# Patient Record
Sex: MALE | Race: BLACK OR AFRICAN AMERICAN | NOT HISPANIC OR LATINO | Employment: OTHER | URBAN - METROPOLITAN AREA
[De-identification: names, ages, dates, MRNs, and addresses within clinical notes are randomized per-mention and may not be internally consistent; named-entity substitution may affect disease eponyms.]

---

## 2020-03-08 PROCEDURE — 93005 ELECTROCARDIOGRAM TRACING: CPT

## 2020-03-08 PROCEDURE — 99283 EMERGENCY DEPT VISIT LOW MDM: CPT

## 2020-03-09 ENCOUNTER — HOSPITAL ENCOUNTER (EMERGENCY)
Facility: HOSPITAL | Age: 85
Discharge: HOME | End: 2020-03-09
Attending: EMERGENCY MEDICINE

## 2020-03-09 VITALS
HEART RATE: 106 BPM | TEMPERATURE: 97.9 F | SYSTOLIC BLOOD PRESSURE: 146 MMHG | DIASTOLIC BLOOD PRESSURE: 76 MMHG | OXYGEN SATURATION: 99 % | WEIGHT: 127 LBS | RESPIRATION RATE: 16 BRPM

## 2020-03-09 DIAGNOSIS — M79.601 RIGHT ARM PAIN: Primary | ICD-10-CM

## 2020-03-09 LAB
ATRIAL RATE: 100 BPM
P AXIS: 67 DEGREES
PR INTERVAL: 206 MS
QRS AXIS: -73 DEGREES
QRSD INTERVAL: 96 MS
QT INTERVAL: 340 MS
QTC INTERVAL: 438 MS
T WAVE AXIS: 68 DEGREES
VENTRICULAR RATE: 100 BPM

## 2020-03-09 PROCEDURE — 93010 ELECTROCARDIOGRAM REPORT: CPT | Performed by: INTERNAL MEDICINE

## 2020-03-09 PROCEDURE — 99282 EMERGENCY DEPT VISIT SF MDM: CPT | Performed by: EMERGENCY MEDICINE

## 2020-03-09 RX ORDER — AMLODIPINE BESYLATE 10 MG/1
10 TABLET ORAL DAILY
COMMUNITY
End: 2022-07-04 | Stop reason: SDUPTHER

## 2020-03-09 NOTE — ED PROVIDER NOTES
History  Chief Complaint   Patient presents with    Arm Pain     patient c/o right arm pain starting at shoulder to bicep, started on 3/2, reports is intermittent worsened today, did have chest pain on 3/2 which has subsided since, hurts more with cold water       History provided by:  Patient   used: No      42-year-old man presents emergency department for evaluation of right arm pain  He states this is isolated to his right biceps area  Started on 1 week ago  Intermittent  Denies any current chest pain or shortness of breath  Denies any diaphoresis  Denies any numbness, weakness, tingling  Denies any recent falls or trauma  Did not take any medication for the pain  Currently completely asymptomatic  Denies any exertional component  Prior to Admission Medications   Prescriptions Last Dose Informant Patient Reported? Taking? amLODIPine (NORVASC) 10 mg tablet 3/8/2020 at Unknown time  Yes Yes   Sig: Take 10 mg by mouth daily      Facility-Administered Medications: None       Past Medical History:   Diagnosis Date    Hypertension        History reviewed  No pertinent surgical history  History reviewed  No pertinent family history  I have reviewed and agree with the history as documented  E-Cigarette/Vaping    E-Cigarette Use Never User      E-Cigarette/Vaping Substances     Social History     Tobacco Use    Smoking status: Never Smoker    Smokeless tobacco: Never Used   Substance Use Topics    Alcohol use: Never     Frequency: Never    Drug use: Never       Review of Systems   Constitutional: Negative for activity change, appetite change, chills, fatigue and fever  HENT: Negative for congestion, dental problem, facial swelling, sore throat, tinnitus and trouble swallowing  Eyes: Negative for pain, discharge and itching  Respiratory: Negative for apnea, chest tightness and wheezing  Cardiovascular: Negative for chest pain, palpitations and leg swelling  Gastrointestinal: Negative for abdominal pain and nausea  Genitourinary: Negative for difficulty urinating, dysuria and flank pain  Musculoskeletal: Negative for arthralgias, back pain, gait problem, joint swelling and neck pain  Right arm pain   Skin: Negative for color change, rash and wound  Neurological: Negative for dizziness and facial asymmetry  Psychiatric/Behavioral: Negative for agitation and behavioral problems  The patient is not nervous/anxious  All other systems reviewed and are negative  Physical Exam  Physical Exam   Constitutional: He is oriented to person, place, and time  He appears well-developed and well-nourished  No distress  HENT:   Head: Normocephalic and atraumatic  Right Ear: External ear normal    Left Ear: External ear normal    Eyes: Pupils are equal, round, and reactive to light  EOM are normal  Right eye exhibits no discharge  Left eye exhibits no discharge  Neck: Normal range of motion  Neck supple  No tracheal deviation present  No thyromegaly present  Cardiovascular: Regular rhythm  No murmur heard  Pulmonary/Chest: Effort normal and breath sounds normal    Abdominal: Soft  Bowel sounds are normal  He exhibits no distension  There is no tenderness  Musculoskeletal: Normal range of motion  He exhibits no edema or deformity  Full range of motion  No bony tenderness  Intact, symmetric distal pulses  Bilateral breath sounds  Neurological: He is alert and oriented to person, place, and time  No cranial nerve deficit  He exhibits normal muscle tone  Skin: Skin is warm  Capillary refill takes less than 2 seconds  He is not diaphoretic  Psychiatric: He has a normal mood and affect  His behavior is normal    Nursing note and vitals reviewed        Vital Signs  ED Triage Vitals [03/09/20 0018]   Temperature Pulse Respirations Blood Pressure SpO2   97 9 °F (36 6 °C) (!) 106 16 146/76 99 %      Temp Source Heart Rate Source Patient Position - Orthostatic VS BP Location FiO2 (%)   Tympanic Monitor Sitting Right arm --      Pain Score       5           Vitals:    03/09/20 0018   BP: 146/76   Pulse: (!) 106   Patient Position - Orthostatic VS: Sitting         Visual Acuity      ED Medications  Medications - No data to display    Diagnostic Studies  Results Reviewed     None                 No orders to display              Procedures  Procedures         ED Course                               MDM  Number of Diagnoses or Management Options  Right arm pain: new and does not require workup  Diagnosis management comments: Pain isolated right bicep  No signs of biceps tear  Patient currently completely asymptomatic  No bony tenderness  No emergent labs or imaging indicated  Possible musculoskeletal pain  Return precautions discussed in depth with patient and his family  Specific return precautions for chest pain or shortness of breath, numbness, tingling  Intact distal pulse  Not suspect acute vascular pathology  No signs of DVT  Recommended Tylenol, rice therapy, return if worsens  Risk of Complications, Morbidity, and/or Mortality  Presenting problems: moderate  Diagnostic procedures: low  Management options: moderate    Patient Progress  Patient progress: stable        Disposition  Final diagnoses:   Right arm pain     Time reflects when diagnosis was documented in both MDM as applicable and the Disposition within this note     Time User Action Codes Description Comment    3/9/2020 12:45 AM Lance Padilla [M79 [id number]] Right arm pain       ED Disposition     ED Disposition Condition Date/Time Comment    Discharge Stable Mon Mar 9, 2020 12:45 AM Nayan Boss discharge to home/self care              Follow-up Information     Follow up With Specialties Details Why Contact Info Additional [address] Emergency Department Emergency Medicine Go to  If symptoms worsen (any chest pain, shortness of breath, numbness, weakness) [address] [facility] [address]  [phone] [facility] ED, [place], [place], [address]          Discharge Medication List as of 3/9/2020 12:45 AM      CONTINUE these medications which have NOT CHANGED    Details   amLODIPine (NORVASC) 10 mg tablet Take 10 mg by mouth daily, Historical Med           No discharge procedures on file      PDMP Review     None          ED Provider  Electronically Signed by           Mahendra Oliveira MD  03/09/20 2295

## 2021-03-10 ENCOUNTER — IMMUNIZATIONS (OUTPATIENT)
Dept: FAMILY MEDICINE CLINIC | Facility: HOSPITAL | Age: 86
End: 2021-03-10

## 2021-03-10 DIAGNOSIS — Z23 ENCOUNTER FOR IMMUNIZATION: Primary | ICD-10-CM

## 2021-03-10 PROCEDURE — 0011A SARS-COV-2 / COVID-19 MRNA VACCINE (MODERNA) 100 MCG: CPT

## 2021-03-10 PROCEDURE — 91301 SARS-COV-2 / COVID-19 MRNA VACCINE (MODERNA) 100 MCG: CPT

## 2021-04-09 ENCOUNTER — IMMUNIZATIONS (OUTPATIENT)
Dept: FAMILY MEDICINE CLINIC | Facility: HOSPITAL | Age: 86
End: 2021-04-09

## 2021-04-09 DIAGNOSIS — Z23 ENCOUNTER FOR IMMUNIZATION: Primary | ICD-10-CM

## 2021-04-09 PROCEDURE — 0012A SARS-COV-2 / COVID-19 MRNA VACCINE (MODERNA) 100 MCG: CPT

## 2021-04-09 PROCEDURE — 91301 SARS-COV-2 / COVID-19 MRNA VACCINE (MODERNA) 100 MCG: CPT

## 2022-07-04 ENCOUNTER — APPOINTMENT (EMERGENCY)
Dept: RADIOLOGY | Facility: HOSPITAL | Age: 87
End: 2022-07-04
Payer: COMMERCIAL

## 2022-07-04 ENCOUNTER — HOSPITAL ENCOUNTER (EMERGENCY)
Facility: HOSPITAL | Age: 87
Discharge: HOME | End: 2022-07-04
Attending: EMERGENCY MEDICINE
Payer: COMMERCIAL

## 2022-07-04 VITALS
DIASTOLIC BLOOD PRESSURE: 76 MMHG | OXYGEN SATURATION: 97 % | RESPIRATION RATE: 18 BRPM | HEART RATE: 84 BPM | TEMPERATURE: 97.1 F | SYSTOLIC BLOOD PRESSURE: 125 MMHG

## 2022-07-04 DIAGNOSIS — J43.9 BULLOUS EMPHYSEMA (HCC): ICD-10-CM

## 2022-07-04 DIAGNOSIS — E87.6 HYPOKALEMIA: ICD-10-CM

## 2022-07-04 DIAGNOSIS — I10 HYPERTENSION: Primary | ICD-10-CM

## 2022-07-04 DIAGNOSIS — R77.8 ELEVATED TROPONIN: ICD-10-CM

## 2022-07-04 DIAGNOSIS — R91.1 PULMONARY NODULE: ICD-10-CM

## 2022-07-04 LAB
2HR DELTA HS TROPONIN: 8 NG/L
4HR DELTA HS TROPONIN: 9 NG/L
ALBUMIN SERPL BCP-MCNC: 3.9 G/DL (ref 3.5–5)
ALP SERPL-CCNC: 64 U/L (ref 46–116)
ALT SERPL W P-5'-P-CCNC: 19 U/L (ref 12–78)
ANION GAP SERPL CALCULATED.3IONS-SCNC: 10 MMOL/L (ref 4–13)
AST SERPL W P-5'-P-CCNC: 19 U/L (ref 5–45)
BACTERIA UR QL AUTO: NORMAL /HPF
BASOPHILS # BLD AUTO: 0.03 THOUSANDS/ΜL (ref 0–0.1)
BASOPHILS NFR BLD AUTO: 0 % (ref 0–1)
BILIRUB SERPL-MCNC: 0.71 MG/DL (ref 0.2–1)
BILIRUB UR QL STRIP: NEGATIVE
BUN SERPL-MCNC: 13 MG/DL (ref 5–25)
CALCIUM SERPL-MCNC: 8.8 MG/DL (ref 8.3–10.1)
CARDIAC TROPONIN I PNL SERPL HS: 13 NG/L
CARDIAC TROPONIN I PNL SERPL HS: 14 NG/L
CARDIAC TROPONIN I PNL SERPL HS: 5 NG/L
CHLORIDE SERPL-SCNC: 106 MMOL/L (ref 100–108)
CLARITY UR: CLEAR
CO2 SERPL-SCNC: 28 MMOL/L (ref 21–32)
COLOR UR: COLORLESS
CREAT SERPL-MCNC: 1.1 MG/DL (ref 0.6–1.3)
EOSINOPHIL # BLD AUTO: 0.1 THOUSAND/ΜL (ref 0–0.61)
EOSINOPHIL NFR BLD AUTO: 1 % (ref 0–6)
ERYTHROCYTE [DISTWIDTH] IN BLOOD BY AUTOMATED COUNT: 12.9 % (ref 11.6–15.1)
GFR SERPL CREATININE-BSD FRML MDRD: 60 ML/MIN/1.73SQ M
GLUCOSE SERPL-MCNC: 102 MG/DL (ref 65–140)
GLUCOSE UR STRIP-MCNC: NEGATIVE MG/DL
HCT VFR BLD AUTO: 44.2 % (ref 36.5–49.3)
HGB BLD-MCNC: 14.5 G/DL (ref 12–17)
HGB UR QL STRIP.AUTO: NEGATIVE
IMM GRANULOCYTES # BLD AUTO: 0.02 THOUSAND/UL (ref 0–0.2)
IMM GRANULOCYTES NFR BLD AUTO: 0 % (ref 0–2)
KETONES UR STRIP-MCNC: NEGATIVE MG/DL
LEUKOCYTE ESTERASE UR QL STRIP: ABNORMAL
LYMPHOCYTES # BLD AUTO: 1.15 THOUSANDS/ΜL (ref 0.6–4.47)
LYMPHOCYTES NFR BLD AUTO: 16 % (ref 14–44)
MCH RBC QN AUTO: 30.3 PG (ref 26.8–34.3)
MCHC RBC AUTO-ENTMCNC: 32.8 G/DL (ref 31.4–37.4)
MCV RBC AUTO: 93 FL (ref 82–98)
MONOCYTES # BLD AUTO: 0.61 THOUSAND/ΜL (ref 0.17–1.22)
MONOCYTES NFR BLD AUTO: 8 % (ref 4–12)
NEUTROPHILS # BLD AUTO: 5.47 THOUSANDS/ΜL (ref 1.85–7.62)
NEUTS SEG NFR BLD AUTO: 75 % (ref 43–75)
NITRITE UR QL STRIP: NEGATIVE
NON-SQ EPI CELLS URNS QL MICRO: NORMAL /HPF
NRBC BLD AUTO-RTO: 0 /100 WBCS
PH UR STRIP.AUTO: 7.5 [PH]
PLATELET # BLD AUTO: 438 THOUSANDS/UL (ref 149–390)
PMV BLD AUTO: 9 FL (ref 8.9–12.7)
POTASSIUM SERPL-SCNC: 3.3 MMOL/L (ref 3.5–5.3)
PROT SERPL-MCNC: 7.5 G/DL (ref 6.4–8.2)
PROT UR STRIP-MCNC: NEGATIVE MG/DL
RBC # BLD AUTO: 4.78 MILLION/UL (ref 3.88–5.62)
RBC #/AREA URNS AUTO: NORMAL /HPF
SODIUM SERPL-SCNC: 144 MMOL/L (ref 136–145)
SP GR UR STRIP.AUTO: 1.01 (ref 1–1.03)
UROBILINOGEN UR QL STRIP.AUTO: 0.2 E.U./DL
WBC # BLD AUTO: 7.38 THOUSAND/UL (ref 4.31–10.16)
WBC #/AREA URNS AUTO: NORMAL /HPF

## 2022-07-04 PROCEDURE — 99285 EMERGENCY DEPT VISIT HI MDM: CPT | Performed by: EMERGENCY MEDICINE

## 2022-07-04 PROCEDURE — 85025 COMPLETE CBC W/AUTO DIFF WBC: CPT | Performed by: EMERGENCY MEDICINE

## 2022-07-04 PROCEDURE — 93005 ELECTROCARDIOGRAM TRACING: CPT

## 2022-07-04 PROCEDURE — 71045 X-RAY EXAM CHEST 1 VIEW: CPT

## 2022-07-04 PROCEDURE — G1004 CDSM NDSC: HCPCS

## 2022-07-04 PROCEDURE — 81001 URINALYSIS AUTO W/SCOPE: CPT | Performed by: EMERGENCY MEDICINE

## 2022-07-04 PROCEDURE — 36415 COLL VENOUS BLD VENIPUNCTURE: CPT | Performed by: EMERGENCY MEDICINE

## 2022-07-04 PROCEDURE — 99284 EMERGENCY DEPT VISIT MOD MDM: CPT

## 2022-07-04 PROCEDURE — 80053 COMPREHEN METABOLIC PANEL: CPT | Performed by: EMERGENCY MEDICINE

## 2022-07-04 PROCEDURE — 84484 ASSAY OF TROPONIN QUANT: CPT | Performed by: EMERGENCY MEDICINE

## 2022-07-04 PROCEDURE — 71250 CT THORAX DX C-: CPT

## 2022-07-04 RX ORDER — AMLODIPINE BESYLATE 10 MG/1
10 TABLET ORAL DAILY
Qty: 30 TABLET | Refills: 0 | Status: SHIPPED | OUTPATIENT
Start: 2022-07-04

## 2022-07-04 RX ORDER — POTASSIUM CHLORIDE 20 MEQ/1
40 TABLET, EXTENDED RELEASE ORAL ONCE
Status: COMPLETED | OUTPATIENT
Start: 2022-07-04 | End: 2022-07-04

## 2022-07-04 RX ADMIN — POTASSIUM CHLORIDE 40 MEQ: 1500 TABLET, EXTENDED RELEASE ORAL at 12:53

## 2022-07-04 NOTE — ED PROVIDER NOTES
History  Chief Complaint   Patient presents with    High Blood Pressure     Pt worried about his blood pressure, has been in the 180's at home  Takes amlodipine,      Patient here with complaint about hypertension, stated his systolic blood pressure was in the 180s today, and was tachycardic to about 111  He denies chest pain, shortness of breath, headache or visual changes  Patient has a history of known hypertension and is compliant with Norvasc  He states that his blood pressure has been somewhat elevated in the past 4 days  Patient checks his blood pressure every morning and every night  History provided by:  Patient   used: No    Hypertension  Severity:  Moderate  Onset quality:  Sudden  Timing:  Constant  Progression:  Unchanged  Chronicity:  New  Relieved by:  Nothing  Worsened by:  Nothing  Ineffective treatments:  None tried  Associated symptoms: no abdominal pain, no chest pain, no fever, no hematuria, no nausea, no palpitations, no shortness of breath and not vomiting        Prior to Admission Medications   Prescriptions Last Dose Informant Patient Reported? Taking? amLODIPine (NORVASC) 10 mg tablet   Yes No   Sig: Take 10 mg by mouth daily   amLODIPine (NORVASC) 10 mg tablet   No Yes   Sig: Take 1 tablet (10 mg total) by mouth daily      Facility-Administered Medications: None       Past Medical History:   Diagnosis Date    Hypertension        History reviewed  No pertinent surgical history  History reviewed  No pertinent family history  I have reviewed and agree with the history as documented  E-Cigarette/Vaping    E-Cigarette Use Never User      E-Cigarette/Vaping Substances     Social History     Tobacco Use    Smoking status: Never Smoker    Smokeless tobacco: Never Used   Vaping Use    Vaping Use: Never used   Substance Use Topics    Alcohol use: Never    Drug use: Never       Review of Systems   Constitutional: Negative for chills and fever  Respiratory: Negative for cough, shortness of breath and wheezing  Cardiovascular: Negative for chest pain and palpitations  Gastrointestinal: Negative for abdominal pain, constipation, diarrhea, nausea and vomiting  Genitourinary: Negative for dysuria, flank pain, hematuria and urgency  Musculoskeletal: Negative for back pain  Skin: Negative for color change and rash  All other systems reviewed and are negative  Physical Exam  Physical Exam  Vitals and nursing note reviewed  Constitutional:       Appearance: He is well-developed  HENT:      Head: Normocephalic and atraumatic  Eyes:      Pupils: Pupils are equal, round, and reactive to light  Cardiovascular:      Rate and Rhythm: Normal rate and regular rhythm  Heart sounds: Normal heart sounds  Pulmonary:      Effort: Pulmonary effort is normal       Breath sounds: Normal breath sounds  Abdominal:      General: Bowel sounds are normal  There is no distension  Palpations: Abdomen is soft  There is no mass  Tenderness: There is no abdominal tenderness  There is no guarding or rebound  Musculoskeletal:      Cervical back: Normal range of motion and neck supple  Skin:     General: Skin is warm and dry  Capillary Refill: Capillary refill takes less than 2 seconds  Neurological:      Mental Status: He is alert and oriented to person, place, and time  Psychiatric:         Behavior: Behavior normal          Thought Content:  Thought content normal          Judgment: Judgment normal          Vital Signs  ED Triage Vitals [07/04/22 0702]   Temperature Pulse Respirations Blood Pressure SpO2   (!) 97 1 °F (36 2 °C) 65 18 154/90 98 %      Temp Source Heart Rate Source Patient Position - Orthostatic VS BP Location FiO2 (%)   Tympanic Monitor Lying Left arm --      Pain Score       --           Vitals:    07/04/22 0845 07/04/22 0900 07/04/22 1254 07/04/22 1359   BP: 129/68 131/76 157/77 125/76   Pulse: 68 78 89 84 Patient Position - Orthostatic VS:   Sitting Lying         Visual Acuity      ED Medications  Medications   potassium chloride (K-DUR,KLOR-CON) CR tablet 40 mEq (40 mEq Oral Given 7/4/22 1253)       Diagnostic Studies  Results Reviewed     Procedure Component Value Units Date/Time    HS Troponin I 4hr [[id number]]  (Normal) Collected: 07/04/22 1146    Lab Status: Final result Specimen: Blood from Arm, Right Updated: 07/04/22 1216     hs TnI 4hr 14 ng/L      Delta 4hr hsTnI 9 ng/L     HS Troponin I 2hr [[id number]]  (Normal) Collected: 07/04/22 0950    Lab Status: Final result Specimen: Blood from Arm, Right Updated: 07/04/22 1019     hs TnI 2hr 13 ng/L      Delta 2hr hsTnI 8 ng/L     Urine Microscopic [[id number]]  (Normal) Collected: 07/04/22 0819    Lab Status: Final result Specimen: Urine, Clean Catch Updated: 07/04/22 0844     RBC, UA None Seen /hpf      WBC, UA 0-1 /hpf      Epithelial Cells Occasional /hpf      Bacteria, UA Occasional /hpf     UA (URINE) with reflex to Scope [[id number]]  (Abnormal) Collected: 07/04/22 0819    Lab Status: Final result Specimen: Urine, Clean Catch Updated: 07/04/22 0824     Color, UA Colorless     Clarity, UA Clear     Specific Gravity, UA 1 010     pH, UA 7 5     Leukocytes, UA Trace     Nitrite, UA Negative     Protein, UA Negative mg/dl      Glucose, UA Negative mg/dl      Ketones, UA Negative mg/dl      Urobilinogen, UA 0 2 E U /dl      Bilirubin, UA Negative     Occult Blood, UA Negative    HS Troponin 0hr (reflex protocol) [[id number]]  (Normal) Collected: 07/04/22 0745    Lab Status: Final result Specimen: Blood from Arm, Left Updated: 07/04/22 0815     hs TnI 0hr 5 ng/L     Comprehensive metabolic panel [[id number]]  (Abnormal) Collected: 07/04/22 0745    Lab Status: Final result Specimen: Blood from Arm, Left Updated: 07/04/22 6259     Sodium 144 mmol/L      Potassium 3 3 mmol/L      Chloride 106 mmol/L      CO2 28 mmol/L      ANION GAP 10 mmol/L      BUN 13 mg/dL Creatinine 1 10 mg/dL      Glucose 102 mg/dL      Calcium 8 8 mg/dL      AST 19 U/L      ALT 19 U/L      Alkaline Phosphatase 64 U/L      Total Protein 7 5 g/dL      Albumin 3 9 g/dL      Total Bilirubin 0 71 mg/dL      eGFR 60 ml/min/1 73sq m     Narrative:      National Kidney Disease Foundation guidelines for Chronic Kidney Disease (CKD):     Stage 1 with normal or high GFR (GFR > 90 mL/min/1 73 square meters)    Stage 2 Mild CKD (GFR = 60-89 mL/min/1 73 square meters)    Stage 3A Moderate CKD (GFR = 45-59 mL/min/1 73 square meters)    Stage 3B Moderate CKD (GFR = 30-44 mL/min/1 73 square meters)    Stage 4 Severe CKD (GFR = 15-29 mL/min/1 73 square meters)    Stage 5 End Stage CKD (GFR <15 mL/min/1 73 square meters)  Note: GFR calculation is accurate only with a steady state creatinine    CBC and differential [[id number]]  (Abnormal) Collected: 07/04/22 0745    Lab Status: Final result Specimen: Blood from Arm, Left Updated: 07/04/22 0751     WBC 7 38 Thousand/uL      RBC 4 78 Million/uL      Hemoglobin 14 5 g/dL      Hematocrit 44 2 %      MCV 93 fL      MCH 30 3 pg      MCHC 32 8 g/dL      RDW 12 9 %      MPV 9 0 fL      Platelets 324 Thousands/uL      nRBC 0 /100 WBCs      Neutrophils Relative 75 %      Immat GRANS % 0 %      Lymphocytes Relative 16 %      Monocytes Relative 8 %      Eosinophils Relative 1 %      Basophils Relative 0 %      Neutrophils Absolute 5 47 Thousands/µL      Immature Grans Absolute 0 02 Thousand/uL      Lymphocytes Absolute 1 15 Thousands/µL      Monocytes Absolute 0 61 Thousand/µL      Eosinophils Absolute 0 10 Thousand/µL      Basophils Absolute 0 03 Thousands/µL                  CT chest without contrast   Final Result by Irene Davidson MD (07/04 1027)         1  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  No focal consolidation  2  2 mm right upper lobe nodule   Based on current Fleischner Society 2017 Guidelines on incidental pulmonary nodule, no routine follow-up is needed if the patient is low risk  If the patient is high risk, optional follow-up chest CT at 12 months can be    considered  3  Additional findings as noted  The study was marked in [facility] for immediate notification  Workstation performed: [id number]         XR chest 1 view portable   Final Result by Noel Olivarez MD (07/04 1224)      Emphysema with no acute disease  Workstation performed: [id number]                    Procedures  ECG 12 Lead Documentation Only    Date/Time: 7/4/2022 8:02 AM  Performed by: Crystal Sher DO  Authorized by: Crystal Sher DO     Indications / Diagnosis:  HTN  ECG reviewed by me, the ED Provider: yes    Patient location:  ED  Previous ECG:     Previous ECG:  Unavailable    Comparison to cardiac monitor: Yes    Interpretation:     Interpretation: non-specific    Rate:     ECG rate:  81bpm    ECG rate assessment: normal    Rhythm:     Rhythm: sinus rhythm    Ectopy:     Ectopy: none    QRS:     QRS axis:  Left  Conduction:     Conduction: normal    ST segments:     ST segments:  Normal  T waves:     T waves: normal               ED Course  ED Course as of 07/04/22 1615   Mon Jul 04, 2022   1051 I reviewed test results with patient and family  Patient opts for 3rd troponin at this time rather than getting admitted  MDM  Number of Diagnoses or Management Options  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  Elevated troponin: new and requires workup  Hypertension: new and requires workup  Hypokalemia: new and requires workup  Pulmonary nodule: new and requires workup  Diagnosis management comments: Patient here with complaint of intermittent episodes of hypertension  He states he is compliant with medication  Labs reviewed  Initial troponin of 5, delta troponin of 8    Patient remains asymptomatic without chest pain or shortness of breath  I offered patient admission, however he prefers to have 3rd troponin done in the ED  I reviewed labs with an EKG with Cardiology on-call and he agreed that patient may be discharged to home with outpatient follow-up as he is asymptomatic at this time  Patient's son is also in the ER he agrees with plan  Patient to follow-up with Cardiology, pulmonology and [place] [address] to establish primary care  Patient return to emergency room for worsening symptoms  Amount and/or Complexity of Data Reviewed  Clinical lab tests: ordered and reviewed  Tests in the radiology section of CPT®: ordered and reviewed    Risk of Complications, Morbidity, and/or Mortality  Presenting problems: high  Diagnostic procedures: high  Management options: high    Patient Progress  Patient progress: improved      Disposition  Final diagnoses:   Hypertension   Hypokalemia   Elevated troponin   Pulmonary nodule   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[address] )     Time reflects when diagnosis was documented in both MDM as applicable and the Disposition within this note     Time User Action Codes Description Comment    7/4/2022  1:44 PM Oyesanmi, [address] [place] Hypertension     7/4/2022  1:45 PM Oyesanmi, Olubusola O Add [E87 6] Hypokalemia     7/4/2022  1:45 PM Marval Yaron O Add [R77 8] Elevated troponin     7/4/2022  1:45 PM Oyesanmi, Olubusola O Add [R91 1] Pulmonary nodule     7/4/2022  1:46 PM Marval Yaron O Add [J43 9] Bullous emphysema [facility])       ED Disposition     ED Disposition   Discharge    Condition   Stable    Date/Time   Mon Jul 4, 2022  1:44 PM    Comment   Darius Barragan discharge to home/self care                 Follow-up Information     Follow up With Specialties Details Why Contact Info Additional Information    [address]  [facility] Schedule an appointment as soon as possible for a visit in 2 days for follow up Sunday [address]  [address] [facility]  [phone] [address] Pulmonology Schedule an appointment as soon as possible for a visit in 2 days for follow up [address] [facility] Pulmonary [address] [place], [place], [address] [facility] Cardiology Schedule an appointment as soon as possible for a visit  for follow up [address] [facility], [place] [address] [facility] [place], [place] [facility] [address] [place] [address], [address] [place], [place], [address] [phone]          Discharge Medication List as of 7/4/2022  1:49 PM      CONTINUE these medications which have CHANGED    Details   amLODIPine (NORVASC) 10 mg tablet Take 1 tablet (10 mg total) by mouth daily, Starting Mon 7/4/2022, Normal             No discharge procedures on file      PDMP Review     None          ED Provider  Electronically Signed by           Marielena Chen DO  07/04/22 8495

## 2022-07-04 NOTE — DISCHARGE INSTRUCTIONS
Return to the ER for further concerns or worsening symptoms  Follow up with your primary care physician, cardiology and pulmonology in 1-2 days

## 2022-07-05 LAB
ATRIAL RATE: 81 BPM
P AXIS: 70 DEGREES
PR INTERVAL: 196 MS
QRS AXIS: -70 DEGREES
QRSD INTERVAL: 106 MS
QT INTERVAL: 384 MS
QTC INTERVAL: 446 MS
T WAVE AXIS: 68 DEGREES
VENTRICULAR RATE: 81 BPM

## 2022-07-05 PROCEDURE — 93010 ELECTROCARDIOGRAM REPORT: CPT | Performed by: INTERNAL MEDICINE

## 2023-03-10 ENCOUNTER — HOSPITAL ENCOUNTER (OUTPATIENT)
Facility: HOSPITAL | Age: 88
Setting detail: OBSERVATION
Discharge: HOME | End: 2023-03-11
Attending: EMERGENCY MEDICINE | Admitting: FAMILY MEDICINE

## 2023-03-10 ENCOUNTER — APPOINTMENT (OUTPATIENT)
Dept: RADIOLOGY | Facility: HOSPITAL | Age: 88
End: 2023-03-10

## 2023-03-10 ENCOUNTER — APPOINTMENT (EMERGENCY)
Dept: RADIOLOGY | Facility: HOSPITAL | Age: 88
End: 2023-03-10

## 2023-03-10 DIAGNOSIS — I10 HYPERTENSION, UNSPECIFIED TYPE: ICD-10-CM

## 2023-03-10 DIAGNOSIS — G45.9 TIA (TRANSIENT ISCHEMIC ATTACK): Primary | ICD-10-CM

## 2023-03-10 DIAGNOSIS — E46 PROTEIN CALORIE MALNUTRITION (HCC): ICD-10-CM

## 2023-03-10 LAB
2HR DELTA HS TROPONIN: 4 NG/L
4HR DELTA HS TROPONIN: 11 NG/L
ALBUMIN SERPL BCP-MCNC: 4 G/DL (ref 3.5–5)
ALP SERPL-CCNC: 63 U/L (ref 34–104)
ALT SERPL W P-5'-P-CCNC: 11 U/L (ref 7–52)
ANION GAP SERPL CALCULATED.3IONS-SCNC: 9 MMOL/L (ref 4–13)
APTT PPP: 31 SECONDS (ref 23–37)
AST SERPL W P-5'-P-CCNC: 17 U/L (ref 13–39)
BASOPHILS # BLD AUTO: 0.02 THOUSANDS/ÂΜL (ref 0–0.1)
BASOPHILS NFR BLD AUTO: 0 % (ref 0–1)
BILIRUB SERPL-MCNC: 0.48 MG/DL (ref 0.2–1)
BILIRUB UR QL STRIP: NEGATIVE
BUN SERPL-MCNC: 11 MG/DL (ref 5–25)
CALCIUM SERPL-MCNC: 9.2 MG/DL (ref 8.4–10.2)
CARDIAC TROPONIN I PNL SERPL HS: 10 NG/L
CARDIAC TROPONIN I PNL SERPL HS: 17 NG/L
CARDIAC TROPONIN I PNL SERPL HS: 6 NG/L
CHLORIDE SERPL-SCNC: 104 MMOL/L (ref 96–108)
CLARITY UR: CLEAR
CO2 SERPL-SCNC: 26 MMOL/L (ref 21–32)
COLOR UR: COLORLESS
CREAT SERPL-MCNC: 0.99 MG/DL (ref 0.6–1.3)
EOSINOPHIL # BLD AUTO: 0.1 THOUSAND/ÂΜL (ref 0–0.61)
EOSINOPHIL NFR BLD AUTO: 1 % (ref 0–6)
ERYTHROCYTE [DISTWIDTH] IN BLOOD BY AUTOMATED COUNT: 13.7 % (ref 11.6–15.1)
FLUAV RNA RESP QL NAA+PROBE: NEGATIVE
FLUBV RNA RESP QL NAA+PROBE: NEGATIVE
GFR SERPL CREATININE-BSD FRML MDRD: 68 ML/MIN/1.73SQ M
GLUCOSE SERPL-MCNC: 126 MG/DL (ref 65–140)
GLUCOSE UR STRIP-MCNC: NEGATIVE MG/DL
HCT VFR BLD AUTO: 44.1 % (ref 36.5–49.3)
HGB BLD-MCNC: 14.4 G/DL (ref 12–17)
HGB UR QL STRIP.AUTO: NEGATIVE
IMM GRANULOCYTES # BLD AUTO: 0.03 THOUSAND/UL (ref 0–0.2)
IMM GRANULOCYTES NFR BLD AUTO: 0 % (ref 0–2)
INR PPP: 1.08 (ref 0.84–1.19)
KETONES UR STRIP-MCNC: NEGATIVE MG/DL
LEUKOCYTE ESTERASE UR QL STRIP: NEGATIVE
LYMPHOCYTES # BLD AUTO: 1.28 THOUSANDS/ÂΜL (ref 0.6–4.47)
LYMPHOCYTES NFR BLD AUTO: 15 % (ref 14–44)
MCH RBC QN AUTO: 29.8 PG (ref 26.8–34.3)
MCHC RBC AUTO-ENTMCNC: 32.7 G/DL (ref 31.4–37.4)
MCV RBC AUTO: 91 FL (ref 82–98)
MONOCYTES # BLD AUTO: 0.55 THOUSAND/ÂΜL (ref 0.17–1.22)
MONOCYTES NFR BLD AUTO: 6 % (ref 4–12)
NEUTROPHILS # BLD AUTO: 6.56 THOUSANDS/ÂΜL (ref 1.85–7.62)
NEUTS SEG NFR BLD AUTO: 78 % (ref 43–75)
NITRITE UR QL STRIP: NEGATIVE
NRBC BLD AUTO-RTO: 0 /100 WBCS
PH UR STRIP.AUTO: 7 [PH]
PLATELET # BLD AUTO: 584 THOUSANDS/UL (ref 149–390)
PMV BLD AUTO: 9 FL (ref 8.9–12.7)
POTASSIUM SERPL-SCNC: 3.4 MMOL/L (ref 3.5–5.3)
PROT SERPL-MCNC: 7.3 G/DL (ref 6.4–8.4)
PROT UR STRIP-MCNC: NEGATIVE MG/DL
PROTHROMBIN TIME: 14.1 SECONDS (ref 11.6–14.5)
RBC # BLD AUTO: 4.84 MILLION/UL (ref 3.88–5.62)
RSV RNA RESP QL NAA+PROBE: NEGATIVE
SARS-COV-2 RNA RESP QL NAA+PROBE: NEGATIVE
SODIUM SERPL-SCNC: 139 MMOL/L (ref 135–147)
SP GR UR STRIP.AUTO: <=1.005 (ref 1–1.03)
UROBILINOGEN UR QL STRIP.AUTO: 0.2 E.U./DL
WBC # BLD AUTO: 8.54 THOUSAND/UL (ref 4.31–10.16)

## 2023-03-10 RX ORDER — ATORVASTATIN CALCIUM 40 MG/1
40 TABLET, FILM COATED ORAL EVERY EVENING
Status: DISCONTINUED | OUTPATIENT
Start: 2023-03-10 | End: 2023-03-11 | Stop reason: HOSPADM

## 2023-03-10 RX ORDER — ENOXAPARIN SODIUM 100 MG/ML
40 INJECTION SUBCUTANEOUS DAILY
Status: DISCONTINUED | OUTPATIENT
Start: 2023-03-10 | End: 2023-03-11 | Stop reason: HOSPADM

## 2023-03-10 RX ORDER — AMLODIPINE BESYLATE 10 MG/1
10 TABLET ORAL DAILY
Status: DISCONTINUED | OUTPATIENT
Start: 2023-03-11 | End: 2023-03-11 | Stop reason: HOSPADM

## 2023-03-10 RX ORDER — ASPIRIN 81 MG/1
81 TABLET, CHEWABLE ORAL DAILY
Status: DISCONTINUED | OUTPATIENT
Start: 2023-03-11 | End: 2023-03-11 | Stop reason: HOSPADM

## 2023-03-10 RX ORDER — LISINOPRIL 10 MG/1
10 TABLET ORAL DAILY
COMMUNITY

## 2023-03-10 RX ORDER — ERGOCALCIFEROL 1.25 MG/1
50000 CAPSULE ORAL WEEKLY
Status: DISCONTINUED | OUTPATIENT
Start: 2023-03-10 | End: 2023-03-11 | Stop reason: HOSPADM

## 2023-03-10 RX ADMIN — ENOXAPARIN SODIUM 40 MG: 40 INJECTION SUBCUTANEOUS at 15:56

## 2023-03-10 RX ADMIN — IOHEXOL 85 ML: 350 INJECTION, SOLUTION INTRAVENOUS at 16:58

## 2023-03-10 RX ADMIN — ERGOCALCIFEROL 50000 UNITS: 1.25 CAPSULE ORAL at 19:12

## 2023-03-10 RX ADMIN — ATORVASTATIN CALCIUM 40 MG: 40 TABLET, FILM COATED ORAL at 18:07

## 2023-03-10 NOTE — ASSESSMENT & PLAN NOTE
Body mass index is 20 56 kg/m²  Patient and wife state that he has had decreased appetite      · Ensure supplements with meals  · Nutrition consult

## 2023-03-10 NOTE — CONSULTS
[address]   Neurology Initial Consult    Olivia Parr is a 81 y o  male  [address] [facility]-*          Information obtained from:   Chief Complaint   Patient presents with   • Dizziness     Pt c/o near syncope Friday and Saturday 3/3-3/4for 2 short and separate events only  No return of symptoms  Pt c/o pain and swelling to leg early in week  Pt denies any complaints at this time         Assessment/Plan:    1  TIA  2  HTN  3  Presyncope   4  HLD    Patient is asymptomatic at this time  Admit under stroke/tia pathway  MRI brain ordered  CTA head and neck ordered  Aspirin 81mg to be continued long term  Lipitor 40mg  TTE w/ shunt  His orthostatics are negative  Lipid panel  A1c  PT/OT  Speech and swallow per tia guidelines  F/u in 10 weeks       Total time of encounter: 70 min   More than 50% of time was spent in counseling and coordination of care of patient  HPI:  Olivia Parr is an 82 yo M with PMH of HTN presents with cc of episode of presyncopal event a week prior  Patient provides history  He says that last Friday he has relatively sudden onset of left leg pain and weakness  No symptoms in left arm or face  He reported associated slurring of speech  He felt as if he was going to pass out  All of his symptoms lasted 10-15 minutes  He didn't seek medical care but later was told he should so decided to come today  He did take aspirin 81mg on Mon, Tues and Wed but normally is not on AP at home  He only takes norvasc and zestril at home for HTN  Past Medical History:   Diagnosis Date   • Hypertension        History reviewed  No pertinent surgical history      No Known Allergies      Current Facility-Administered Medications:   •  [START ON 3/11/2023] amLODIPine (NORVASC) tablet 10 mg, 10 mg, Oral, Daily, Cresencio Jolly MD  •  [START ON 3/11/2023] aspirin chewable tablet 81 mg, 81 mg, Oral, Daily, Cresencio Jolly MD  •  atorvastatin (LIPITOR) tablet 40 mg, 40 mg, Oral, QPM, Yecenia Boss MD  •  enoxaparin (LOVENOX) subcutaneous injection 40 mg, 40 mg, Subcutaneous, Daily, Yecenia Boss MD, 40 mg at 03/10/23 1556  •  ergocalciferol (VITAMIN D2) capsule 50,000 Units, 50,000 Units, Oral, Weekly, David Delgado MD  •  [START ON 3/11/2023] pneumococcal 20-kaveh conj vacc (PREVNAR 20) IM Injection 0 5 mL, 0 5 mL, Intramuscular, Prior to discharge, Taras Oconnor MD    Social History     Socioeconomic History   • Marital status: /Civil Union     Spouse name: Not on file   • Number of children: Not on file   • Years of education: Not on file   • Highest education level: Not on file   Occupational History   • Not on file   Tobacco Use   • Smoking status: Never     Passive exposure: Never   • Smokeless tobacco: Never   Vaping Use   • Vaping Use: Never used   Substance and Sexual Activity   • Alcohol use: Never   • Drug use: Never   • Sexual activity: Not on file   Other Topics Concern   • Not on file   Social History Narrative   • Not on file     Social Determinants of Health     Financial Resource Strain: Not on file   Food Insecurity: Not on file   Transportation Needs: Not on file   Physical Activity: Not on file   Stress: Not on file   Social Connections: Not on file   Intimate Partner Violence: Not on file   Housing Stability: Not on file       History reviewed  No pertinent family history  Review of systems:  Please see HPI for positive symptoms  No fever, no chills, no weight change  Ocular: No drainage, no blurred vision  HEENT:  No sore throat, earache, or congestion  No neck pain  COR:  No chest pain  No palpitations  Lungs:  no sob, wheezing,  GI:  no  nausea, no vomiting, no diarrhea, no constipation, no anorexia  :  No dysuria, frequency, or urgency  No hematuria  Musculoskeletal:  No joint pain or swelling or edema  Skin:  No rash or itching  Psychiatric:  no anxiety, no depression  Endocrine:  No polyuria or polydipsia      Physical examination:  Vitals:    03/10/23 1618   BP: 132/77   Pulse: 87   Resp:    Temp:    SpO2: 98%     NIHSS:    1a Level of Consciousness: 0 = Alert   1b  LOC Questions: 0 = Answers both correctly   1c  LOC Commands: 0 = Obeys both correctly   2  Best Gaze: 0 = Normal   3  Visual: 0 = No visual field loss   4  Facial Palsy: 0=Normal symmetric movement   5a  Motor Right Arm: 0=No drift, limb holds 90 (or 45) degrees for full 10 seconds   5b  Motor Left Arm: 0=No drift, limb holds 90 (or 45) degrees for full 10 seconds   6a  Motor Right Le=No drift, limb holds 90 (or 45) degrees for full 10 seconds   6b  Motor Left Le=No drift, limb holds 90 (or 45) degrees for full 10 seconds   7  Limb Ataxia:  0=Absent   8  Sensory: 0=Normal; no sensory loss   9  Best Language:  0=No aphasia, normal   10  Dysarthria: 0=Normal articulation   11  Extinction and Inattention (formerly Neglect): 0=No abnormality   Total Score: 0                     GENERAL APPEARANCE:  The patient is alert, oriented  He is in no acute distress  HEENT:  Head is normocephalic  The sinuses are otherwise nontender  Pupils are equal and reactive  NECK:  Supple without lymphadenopathy  HEART:  Regular rate and rhythm  LUNGS:  clear to auscultation  No crackles or wheezes are heard  ABDOMEN:  Soft, nontender, nondistended with good bowel sounds heard  EXTREMITIES:  Without cyanosis, clubbing or edema  Mental status: The patient is alert, attentive, and oriented  Speech is clear and fluent, good repetition, comprehension, and naming  he recalls 3/3 objects at 5 minutes  Cranial nerves:  CN II: Visual fields are full to confrontation  Fundoscopic exam is normal with sharp discs and no vascular changes    Pupils are 3 mm and  reactive to light  CN III, IV, VI: At primary gaze, there is no eye deviation  CN V: Facial sensation is intact to pinprick in all 3 divisions bilaterally  Corneal responses are intact    CN VII: Face is symmetric with normal eye closure and smile  CN VIII: Hearing is normal to rubbing fingers  CN IX, X: Palate elevates symmetrically  Phonation is normal   CN XI: Head turning and shoulder shrug are intact  CN XII: Tongue is midline with normal movements and no atrophy  Motor: There is no pronator drift of out-stretched arms  Muscle bulk and tone are normal      Muscle exam  Arm Right Left Leg Right Left   Deltoid 5/5 5/5 Iliopsoas 5/5 5/5   Biceps 5/5 5/5 Quads 5/5 5/5   Triceps 5/5 5/5 Hamstrings 5/5 5/5   Wrist Extension 5/5 5/5 Ankle Dorsi Flexion 5/5 5/5   Wrist Flexion 5/5 5/5 Ankle Plantar Flexion 5/5 5/5   Interossei 5/5 5/5 Ankle Eversion 5/5 5/5   APB 5/5 5/5 Ankle Inversion 5/5 5/5       Reflexes   RJ BJ TJ KJ AJ Plantars Hager's   Right 2+ 2+ 2+ 2+ 2+ Downgoing Not present   Left 2+ 2+ 2+ 2+ 2+ Downgoing Not present     Sensory:  Light touch, pinprick, position sense, and vibration sense are intact in fingers and toes  Coordination:  Rapid alternating movements and fine finger movements are intact  There is no dysmetria on finger-to-nose and heel-knee-shin  There are no abnormal or extraneous movements  Romberg negative  Gait/Stance:  Normal heel/toe walking and tandem gait  Lab Results   Component Value Date    WBC 8 54 03/10/2023    HGB 14 4 03/10/2023    HCT 44 1 03/10/2023    MCV 91 03/10/2023     (H) 03/10/2023     No results found for: HGBA1C  Lab Results   Component Value Date    ALT 11 03/10/2023    AST 17 03/10/2023    ALKPHOS 63 03/10/2023     Lab Results   Component Value Date    CALCIUM 9 2 03/10/2023    K 3 4 (L) 03/10/2023    CO2 26 03/10/2023     03/10/2023    BUN 11 03/10/2023    CREATININE 0 99 03/10/2023         Radiology          Review of reports and notes reveal:    Independent Interpretation of images or specimens:  CT head without contrast    Result Date: 3/10/2023  No acute intracranial abnormality   Workstation performed: [id number]               Thank you for this consult  TYRA Edmonds    [facility]  [address]  [address]

## 2023-03-10 NOTE — PROGRESS NOTES
03/10/23 1612 03/10/23 1614 03/10/23 1618   Vitals   Pulse 75 86 87   Blood Pressure 128/69 133/76 132/77   MAP (mmHg) 89 95 95   BP Location Left arm Left arm Left arm   BP Method Automatic Automatic Automatic   Patient Position - Orthostatic VS Lying - Orthostatic VS Sitting - Orthostatic VS Standing for 3 minutes - Orthostatic VS   Oxygen Therapy   SpO2 97 % 98 % 98 %

## 2023-03-10 NOTE — ASSESSMENT & PLAN NOTE
Patient with weakness, slurred speech and near syncope 1 week ago, patient decided to come in today for evaluation of these symptoms  On evaluation, pupillary reaction was sluggish, but rest of neurologic exam was normal, patient is AAOx3  Patient did not receive ASA and Plavix loading dose  CT showed no acute intracranial abnormality      · Neurology consult  · Consider adding ASA and Plavix pending neurology recommendations  · Start Lipitor 40 mg  · Brain MRI ordered  · Echo ordered  · Troponins WNL  · A1c and lipid panel pending  · PT/OT  · Telemetry  · Orthostatic vital signs  · Monitor vitals, allow for permissive hypertension  · Neuro checks per protocol  · NPO until patient passes dysphagia assessment

## 2023-03-10 NOTE — H&P
History and Physical - [facility]     Patient Information: Dufm Show 81 y o  male MRN: [id number]  Unit/Bed#: [address] Encounter: [id number]  Admitting Physician: Taras Oconnor MD   PCP: No primary care provider on file  Date of Admission:  03/10/23     Assessment and Plan     * TIA (transient ischemic attack)  Assessment & Plan  Patient with weakness, slurred speech and near syncope 1 week ago, patient decided to come in today for evaluation of these symptoms  On evaluation, pupillary reaction was sluggish, but rest of neurologic exam was normal, patient is AAOx3  Patient did not receive ASA and Plavix loading dose  CT showed no acute intracranial abnormality  · Neurology consult  · Consider adding ASA and Plavix pending neurology recommendations  · Start Lipitor 40 mg  · Brain MRI ordered  · Echo ordered  · Troponins WNL  · A1c and lipid panel pending  · PT/OT  · Telemetry  · Orthostatic vital signs  · Monitor vitals, allow for permissive hypertension  · Neuro checks per protocol  · NPO until patient passes dysphagia assessment    Protein calorie malnutrition ([facility] [address] )  Assessment & Plan  Body mass index is 20 56 kg/m²  Patient and wife state that he has had decreased appetite  · Ensure supplements with meals  · Nutrition consult    HTN (hypertension)  Assessment & Plan  BP on admission 159/79  · Home amlodipine 10 mg daily to start tomorrow       Fluids: None  Electrolyte repletion: Replete now and as needed  Nutrition: Diet Regular; Regular House; Sodium 2 GM  VTE Prophylaxis: VTE Score: 3 Moderate Risk (Score 3-4) - Pharmacological DVT Prophylaxis Ordered: enoxaparin (Lovenox)  Code Status: Level 1 - Full Code  Anticipated Length of Stay:  Patient will be admitted on an Observation basis with an anticipated length of stay of less than 2 midnights       Justification for Hospital Stay: Rule out TIA, requires observation with telemetry and neurology evaluation  Total Time for Visit, including Counseling / Coordination of Care: 30 mins  Greater than 50% of this total time spent on direct patient counseling and coordination of care  Patient Information Sharing: With the consent of Theo Melendez, their loved ones were notified today by inpatient team of the patient’s condition and current plan  All questions answered  Chief Complaint:     Chief Complaint   Patient presents with   • Dizziness     Pt c/o near syncope Friday and Saturday 3/3-3/4for 2 short and separate events only  No return of symptoms  Pt c/o pain and swelling to leg early in week  Pt denies any complaints at this time       Subjective      History of Present Illness:     Theo Melendez is a 81 y o  male past medical history of hypertension presenting for evaluation of near syncope and slurred speech  Patient reports onset of symptoms was 1 week ago, they have not recurred since then  Patient was accompanied by his wife, and decided to come for evaluation today due to concerns that his symptoms may return  Other than some decreased appetite, patient had no other acute complaints  Review of Systems:  Review of Systems   Constitutional: Negative for chills and fever  HENT: Negative for sore throat  Eyes: Negative for visual disturbance  Respiratory: Negative for cough and shortness of breath  Cardiovascular: Negative for chest pain and palpitations  Gastrointestinal: Negative for abdominal pain, diarrhea and vomiting  Genitourinary: Negative for dysuria  Musculoskeletal:        Reports mild lower extremity pain bilaterally that comes and goes, not present today   Skin: Negative for rash  Neurological: Positive for speech difficulty (Resolved) and weakness  Negative for dizziness, facial asymmetry, light-headedness and headaches  Past Medical History:   Diagnosis Date   • Hypertension      History reviewed  No pertinent surgical history    No Known Allergies  Prior to Admission Medications   Prescriptions Last Dose Informant Patient Reported? Taking? amLODIPine (NORVASC) 10 mg tablet 3/9/2023  No Yes   Sig: Take 1 tablet (10 mg total) by mouth daily   lisinopril (ZESTRIL) 10 mg tablet 3/9/2023  Yes Yes   Sig: Take 10 mg by mouth daily      Facility-Administered Medications: None     Social History     Socioeconomic History   • Marital status: /Civil Union     Spouse name: Not on file   • Number of children: Not on file   • Years of education: Not on file   • Highest education level: Not on file   Occupational History   • Not on file   Tobacco Use   • Smoking status: Never   • Smokeless tobacco: Never   Vaping Use   • Vaping Use: Never used   Substance and Sexual Activity   • Alcohol use: Never   • Drug use: Never   • Sexual activity: Not on file   Other Topics Concern   • Not on file   Social History Narrative   • Not on file     Social Determinants of Health     Financial Resource Strain: Not on file   Food Insecurity: Not on file   Transportation Needs: Not on file   Physical Activity: Not on file   Stress: Not on file   Social Connections: Not on file   Intimate Partner Violence: Not on file   Housing Stability: Not on file     History reviewed  No pertinent family history  Objective     Physical Exam:   Vitals:   Blood Pressure: 131/62 (03/10/23 1400)  Pulse: 69 (03/10/23 1400)  Temperature: 97 8 °F (36 6 °C) (03/10/23 0804)  Temp Source: Oral (03/10/23 0804)  Respirations: 18 (03/10/23 1330)  Height: 5' 4" (162 6 cm) (03/10/23 0804)  Weight - Scale: 54 3 kg (119 lb 12 8 oz) (03/10/23 0804)  SpO2: 97 % (03/10/23 1400)     Physical Exam  Vitals reviewed  Constitutional:       General: He is not in acute distress  Appearance: Normal appearance  Comments: Thin but not cachectic   HENT:      Head: Normocephalic and atraumatic        Right Ear: Tympanic membrane, ear canal and external ear normal       Left Ear: Tympanic membrane, ear canal and external ear normal       Mouth/Throat:      Mouth: Mucous membranes are moist       Pharynx: Oropharynx is clear  Eyes:      Pupils: Pupils are equal, round, and reactive to light  Comments: Pupillary reaction sluggish bilaterally   Cardiovascular:      Rate and Rhythm: Normal rate and regular rhythm  Heart sounds: Heart sounds are distant  No murmur heard  Pulmonary:      Effort: Pulmonary effort is normal  No respiratory distress  Breath sounds: Normal breath sounds  Abdominal:      General: Bowel sounds are normal       Palpations: Abdomen is soft  Tenderness: There is no abdominal tenderness  Musculoskeletal:      Cervical back: Neck supple  Right lower leg: No edema  Left lower leg: No edema  Skin:     General: Skin is warm  Capillary Refill: Capillary refill takes less than 2 seconds  Findings: No rash  Neurological:      General: No focal deficit present  Mental Status: He is alert  Psychiatric:         Mood and Affect: Mood normal           Lab Results: I have personally reviewed pertinent reports         Results from last 7 days   Lab Units 03/10/23  0832   WBC Thousand/uL 8 54   HEMOGLOBIN g/dL 14 4   HEMATOCRIT % 44 1   PLATELETS Thousands/uL 584*   NEUTROS PCT % 78*   LYMPHS PCT % 15   MONOS PCT % 6   EOS PCT % 1     Results from last 7 days   Lab Units 03/10/23  0832   POTASSIUM mmol/L 3 4*   CHLORIDE mmol/L 104   CO2 mmol/L 26   BUN mg/dL 11   CREATININE mg/dL 0 99   CALCIUM mg/dL 9 2   ALK PHOS U/L 63   ALT U/L 11   AST U/L 17   EGFR ml/min/1 73sq m 68     Results from last 7 days   Lab Units 03/10/23  0832   INR  1 08                  Results from last 7 days   Lab Units 03/10/23  1034   COLOR UA  Colorless   CLARITY UA  Clear   SPEC GRAV UA  <=1 005   PH UA  7 0   LEUKOCYTES UA  Negative   NITRITE UA  Negative   GLUCOSE UA mg/dl Negative   KETONES UA mg/dl Negative   BILIRUBIN UA  Negative   BLOOD UA  Negative           Results from last 7 days   Lab Units 03/10/23  1232 03/10/23  1032 03/10/23  0832   HS TNI 0HR ng/L  --   --  6   HS TNI 2HR ng/L  --  10  --    HS TNI 4HR ng/L 17  --   --              Imaging: I have personally reviewed pertinent reports  CT head without contrast    Result Date: 3/10/2023  No acute intracranial abnormality   Workstation performed: [id number]             Entire H&P was discussed with Dr Odilon Lizama who agreed to what is noted above     ** Please Note: This note has been constructed using a voice recognition system **     Michael Butterfield MD  03/10/23  2:54 PM

## 2023-03-11 ENCOUNTER — APPOINTMENT (OUTPATIENT)
Dept: NON INVASIVE DIAGNOSTICS | Facility: HOSPITAL | Age: 88
End: 2023-03-11

## 2023-03-11 ENCOUNTER — APPOINTMENT (OUTPATIENT)
Dept: RADIOLOGY | Facility: HOSPITAL | Age: 88
End: 2023-03-11

## 2023-03-11 VITALS
RESPIRATION RATE: 16 BRPM | HEART RATE: 87 BPM | BODY MASS INDEX: 20.32 KG/M2 | WEIGHT: 119 LBS | HEIGHT: 64 IN | DIASTOLIC BLOOD PRESSURE: 69 MMHG | TEMPERATURE: 98.3 F | OXYGEN SATURATION: 96 % | SYSTOLIC BLOOD PRESSURE: 132 MMHG

## 2023-03-11 LAB
ALBUMIN SERPL BCP-MCNC: 3.6 G/DL (ref 3.5–5)
ALP SERPL-CCNC: 57 U/L (ref 34–104)
ALT SERPL W P-5'-P-CCNC: 8 U/L (ref 7–52)
ANION GAP SERPL CALCULATED.3IONS-SCNC: 8 MMOL/L (ref 4–13)
AST SERPL W P-5'-P-CCNC: 15 U/L (ref 13–39)
BASOPHILS # BLD AUTO: 0.04 THOUSANDS/ÂΜL (ref 0–0.1)
BASOPHILS NFR BLD AUTO: 0 % (ref 0–1)
BILIRUB SERPL-MCNC: 0.4 MG/DL (ref 0.2–1)
BUN SERPL-MCNC: 19 MG/DL (ref 5–25)
CALCIUM SERPL-MCNC: 9.2 MG/DL (ref 8.4–10.2)
CHLORIDE SERPL-SCNC: 106 MMOL/L (ref 96–108)
CHOLEST SERPL-MCNC: 142 MG/DL
CO2 SERPL-SCNC: 27 MMOL/L (ref 21–32)
CREAT SERPL-MCNC: 0.95 MG/DL (ref 0.6–1.3)
EOSINOPHIL # BLD AUTO: 0.13 THOUSAND/ÂΜL (ref 0–0.61)
EOSINOPHIL NFR BLD AUTO: 1 % (ref 0–6)
ERYTHROCYTE [DISTWIDTH] IN BLOOD BY AUTOMATED COUNT: 13.5 % (ref 11.6–15.1)
EST. AVERAGE GLUCOSE BLD GHB EST-MCNC: 111 MG/DL
GFR SERPL CREATININE-BSD FRML MDRD: 72 ML/MIN/1.73SQ M
GLUCOSE P FAST SERPL-MCNC: 78 MG/DL (ref 65–99)
GLUCOSE SERPL-MCNC: 78 MG/DL (ref 65–140)
HBA1C MFR BLD: 5.5 %
HCT VFR BLD AUTO: 40.7 % (ref 36.5–49.3)
HDLC SERPL-MCNC: 27 MG/DL
HGB BLD-MCNC: 13 G/DL (ref 12–17)
IMM GRANULOCYTES # BLD AUTO: 0.04 THOUSAND/UL (ref 0–0.2)
IMM GRANULOCYTES NFR BLD AUTO: 0 % (ref 0–2)
LDLC SERPL CALC-MCNC: 100 MG/DL (ref 0–100)
LYMPHOCYTES # BLD AUTO: 1.71 THOUSANDS/ÂΜL (ref 0.6–4.47)
LYMPHOCYTES NFR BLD AUTO: 17 % (ref 14–44)
MAGNESIUM SERPL-MCNC: 2.2 MG/DL (ref 1.9–2.7)
MCH RBC QN AUTO: 29.6 PG (ref 26.8–34.3)
MCHC RBC AUTO-ENTMCNC: 31.9 G/DL (ref 31.4–37.4)
MCV RBC AUTO: 93 FL (ref 82–98)
MONOCYTES # BLD AUTO: 0.87 THOUSAND/ÂΜL (ref 0.17–1.22)
MONOCYTES NFR BLD AUTO: 9 % (ref 4–12)
NEUTROPHILS # BLD AUTO: 7.46 THOUSANDS/ÂΜL (ref 1.85–7.62)
NEUTS SEG NFR BLD AUTO: 73 % (ref 43–75)
NRBC BLD AUTO-RTO: 0 /100 WBCS
PLATELET # BLD AUTO: 569 THOUSANDS/UL (ref 149–390)
PMV BLD AUTO: 9.3 FL (ref 8.9–12.7)
POTASSIUM SERPL-SCNC: 4.1 MMOL/L (ref 3.5–5.3)
PROT SERPL-MCNC: 6.7 G/DL (ref 6.4–8.4)
RBC # BLD AUTO: 4.39 MILLION/UL (ref 3.88–5.62)
SODIUM SERPL-SCNC: 141 MMOL/L (ref 135–147)
TRIGL SERPL-MCNC: 75 MG/DL
WBC # BLD AUTO: 10.25 THOUSAND/UL (ref 4.31–10.16)

## 2023-03-11 RX ORDER — ATORVASTATIN CALCIUM 40 MG/1
40 TABLET, FILM COATED ORAL EVERY EVENING
Qty: 30 TABLET | Refills: 2 | Status: SHIPPED | OUTPATIENT
Start: 2023-03-11

## 2023-03-11 RX ORDER — ASPIRIN 81 MG/1
81 TABLET, CHEWABLE ORAL DAILY
Qty: 30 TABLET | Refills: 2 | Status: SHIPPED | OUTPATIENT
Start: 2023-03-12

## 2023-03-11 RX ORDER — ERGOCALCIFEROL 1.25 MG/1
50000 CAPSULE ORAL WEEKLY
Qty: 4 CAPSULE | Refills: 0 | Status: SHIPPED | OUTPATIENT
Start: 2023-03-17 | End: 2023-04-08

## 2023-03-11 RX ADMIN — ASPIRIN 81 MG CHEWABLE TABLET 81 MG: 81 TABLET CHEWABLE at 11:10

## 2023-03-11 RX ADMIN — AMLODIPINE BESYLATE 10 MG: 10 TABLET ORAL at 11:10

## 2023-03-11 RX ADMIN — ENOXAPARIN SODIUM 40 MG: 40 INJECTION SUBCUTANEOUS at 11:10

## 2023-03-11 NOTE — PLAN OF CARE
Problem: NEUROSENSORY - ADULT  Goal: Achieves stable or improved neurological status  Description: INTERVENTIONS  - Monitor and report changes in neurological status  - Monitor vital signs such as temperature, blood pressure, glucose, and any other labs ordered   - Initiate measures to prevent increased intracranial pressure  - Monitor for seizure activity and implement precautions if appropriate      Outcome: Progressing  Goal: Remains free of injury related to seizures activity  Description: INTERVENTIONS  - Maintain airway, patient safety  and administer oxygen as ordered  - Monitor patient for seizure activity, document and report duration and description of seizure to physician/advanced practitioner  - If seizure occurs,  ensure patient safety during seizure  - Reorient patient post seizure  - Seizure pads on all 4 side rails  - Instruct patient/family to notify RN of any seizure activity including if an aura is experienced  - Instruct patient/family to call for assistance with activity based on nursing assessment  - Administer anti-seizure medications if ordered    Outcome: Progressing  Goal: Achieves maximal functionality and self care  Description: INTERVENTIONS  - Monitor swallowing and airway patency with patient fatigue and changes in neurological status  - Encourage and assist patient to increase activity and self care     - Encourage visually impaired, hearing impaired and aphasic patients to use assistive/communication devices  Outcome: Progressing     Problem: CARDIOVASCULAR - ADULT  Goal: Maintains optimal cardiac output and hemodynamic stability  Description: INTERVENTIONS:  - Monitor I/O, vital signs and rhythm  - Monitor for S/S and trends of decreased cardiac output  - Administer and titrate ordered vasoactive medications to optimize hemodynamic stability  - Assess quality of pulses, skin color and temperature  - Assess for signs of decreased coronary artery perfusion  - Instruct patient to report change in severity of symptoms  Outcome: Progressing  Goal: Absence of cardiac dysrhythmias or at baseline rhythm  Description: INTERVENTIONS:  - Continuous cardiac monitoring, vital signs, obtain 12 lead EKG if ordered  - Administer antiarrhythmic and heart rate control medications as ordered  - Monitor electrolytes and administer replacement therapy as ordered  Outcome: Progressing     Problem: Nutrition/Hydration-ADULT  Goal: Nutrient/Hydration intake appropriate for improving, restoring or maintaining nutritional needs  Description: Monitor and assess patient's nutrition/hydration status for malnutrition  Collaborate with interdisciplinary team and initiate plan and interventions as ordered  Monitor patient's weight and dietary intake as ordered or per policy  Utilize nutrition screening tool and intervene as necessary  Determine patient's food preferences and provide high-protein, high-caloric foods as appropriate       INTERVENTIONS:  - Monitor oral intake, urinary output, labs, and treatment plans  - Assess nutrition and hydration status and recommend course of action  - Evaluate amount of meals eaten  - Assist patient with eating if necessary   - Allow adequate time for meals  - Recommend/ encourage appropriate diets, oral nutritional supplements, and vitamin/mineral supplements  - Order, calculate, and assess calorie counts as needed  - Recommend, monitor, and adjust tube feedings and TPN/PPN based on assessed needs  - Assess need for intravenous fluids  - Provide specific nutrition/hydration education as appropriate  - Include patient/family/caregiver in decisions related to nutrition  Outcome: Progressing

## 2023-03-11 NOTE — PLAN OF CARE
Problem: PHYSICAL THERAPY ADULT  Goal: Performs mobility at highest level of function for planned discharge setting  See evaluation for individualized goals  Description: Treatment/Interventions: Patient/family training          See flowsheet documentation for full assessment, interventions and recommendations  Note: Prognosis: Good  Problem List:  (none)  Assessment: Patient is an 81y o  year old male seen for Physical Therapy evaluation  Patient admitted with TIA (transient ischemic attack)  Comorbidities affecting patient's physical performance include: htn  Personal factors affecting patient at time of initial evaluation include: stairs to enter home  Prior to admission, patient was independent with functional mobility without assistive device and independent with ADLS  Please find objective findings from Physical Therapy assessment regarding body systems outlined above with impairments and limitations including none  The Barthel Index was used as a functional outcome tool presenting with a score of Barthel Index Score: 100 today indicating no limitations of functional mobility and ADLS  Patient's clinical presentation is currently stable  as seen in patient's presentation of baseline  As demonstrated by objective findings, the assigned level of complexity for this evaluation is low  The patient's AM-PAC Basic Mobility Inpatient Short Form Raw Score is 24  A Raw score of greater than 16 suggests the patient may benefit from discharge to home  PT Discharge Recommendation: No rehabilitation needs    See flowsheet documentation for full assessment

## 2023-03-11 NOTE — UTILIZATION REVIEW
Initial Clinical Review    Admission: Date/Time/Statement:   Admission Orders (From admission, onward)     Ordered        03/10/23 1110  Place in Observation  Once                   Orders Placed This Encounter   Procedures   • Place in Observation     Standing Status:   Standing     Number of Occurrences:   1     Order Specific Question:   Level of Care     Answer:   Med Surg [16]      ED Arrival Information     Expected   -    Arrival   3/10/2023 07:45    Acuity   Less Urgent            Means of arrival   Walk-In    Escorted by   Family Member    Service   Family Medicine    Admission type   Emergency            Arrival complaint   Feeling faint        Chief Complaint   Patient presents with   • Dizziness     Pt c/o near syncope Friday and Saturday 3/3-3/4for 2 short and separate events only  No return of symptoms  Pt c/o pain and swelling to leg early in week  Pt denies any complaints at this time     Initial Presentation  82 y/o male with PMHx HTN on norvasc + zestril, HLD  - presents to [facility] ED on 3/10/23 2nd 10 to 15 minute episode of left leg pain and  weakness with slurred speech and feeling like he was going to pass out  He did not seek medical care at the time but later was told he should  Also reports taking ASA 81mg daily x 3 on Mon, Tues and Wed but normally is not on ASA  In ED -    /79  Exam: alert + fully oriented  No focal deficit present  CT Head shows No acute intracranial pathology  CT Nexk normal   Labs:   Plt Ct 584,000 K+ 3 4  Placed in Observation 3/10/23 at 1110 2nd TIA - VS + Neuro checks, ECHO, Brain MRI, Neurology Consult, start Lipitor, ASA, Lovenox     3/10/23 NEUROLOGY:  Assessment/Plan:   1  TIA  2  HTN  3  Presyncope   4  HLD     Asymptomatic at this time     Admitted  under stroke/tia pathway  MRI brain pending  CTA head and neck ordered  Aspirin 81mg to be continued long term  Lipitor 40mg  TTE w/ shunt  His orthostatics are negative  Lipid panel  A1c  PT/OT  Speech and swallow per tia guidelines  F/u in 10 weeks        3/11/23  MRI BRAIN WITHOUT CONTRAST:   Impression: Normal    ED Triage Vitals [03/10/23 0804]   Temperature Pulse Respirations Blood Pressure SpO2   97 8 °F (36 6 °C) (!) 113 18 159/79 99 %      Temp Source Heart Rate Source Patient Position - Orthostatic VS BP Location FiO2 (%)   Oral Monitor Lying Right arm --     Wt Readings from Last 1 Encounters:   03/11/23 54 kg (119 lb)     Additional Vital Signs:             Pertinent Labs/Diagnostic Test Results:   CTA head and neck w wo contrast   CT Brain: No acute intracranial pathology  Nonemergent findings above  CT angiography:   Neck: Within normal limits  Brain:   Moderate long segment basilar artery stenosis  Evidence of right middle cerebral and left posterior cerebral artery atherosclerosis without focal stenosis  No aneurysm, large vessel occlusion, high-grade stenosis or dissection  CT head without contrast   No acute intracranial abnormality         3/11/23  MRI BRAIN WITHOUT CONTRAST - Impression: Normal     Results from last 7 days   Lab Units 03/10/23  0832   SARS-COV-2  Negative     Results from last 7 days   Lab Units 03/11/23  0448 03/10/23  0832   WBC Thousand/uL 10 25* 8 54   HEMOGLOBIN g/dL 13 0 14 4   HEMATOCRIT % 40 7 44 1   PLATELETS Thousands/uL 569* 584*   NEUTROS ABS Thousands/µL 7 46 6 56     Results from last 7 days   Lab Units 03/11/23  0448 03/10/23  0832   SODIUM mmol/L 141 139   POTASSIUM mmol/L 4 1 3 4*   CHLORIDE mmol/L 106 104   CO2 mmol/L 27 26   ANION GAP mmol/L 8 9   BUN mg/dL 19 11   CREATININE mg/dL 0 95 0 99   EGFR ml/min/1 73sq m 72 68   CALCIUM mg/dL 9 2 9 2   MAGNESIUM mg/dL 2 2  --      Results from last 7 days   Lab Units 03/11/23  0448 03/10/23  0832   AST U/L 15 17   ALT U/L 8 11   ALK PHOS U/L 57 63   TOTAL PROTEIN g/dL 6 7 7 3   ALBUMIN g/dL 3 6 4 0   TOTAL BILIRUBIN mg/dL 0 40 0 48         Results from last 7 days   Lab Units 03/11/23  0448 03/10/23  0832   GLUCOSE RANDOM mg/dL 78 126     Results from last 7 days   Lab Units 03/11/23  0448   HEMOGLOBIN A1C % 5 5   EAG mg/dl 111     Results from last 7 days   Lab Units 03/10/23  1232 03/10/23  1032 03/10/23  0832   HS TNI 0HR ng/L  --   --  6   HS TNI 2HR ng/L  --  10  --    HSTNI D2 ng/L  --  4  --    HS TNI 4HR ng/L 17  --   --    HSTNI D4 ng/L 11  --   --      Results from last 7 days   Lab Units 03/10/23  0832   PROTIME seconds 14 1   INR  1 08   PTT seconds 31     Results from last 7 days   Lab Units 03/10/23  1034   CLARITY UA  Clear   COLOR UA  Colorless   SPEC GRAV UA  <=1 005   PH UA  7 0   GLUCOSE UA mg/dl Negative   KETONES UA mg/dl Negative   BLOOD UA  Negative   PROTEIN UA mg/dl Negative   NITRITE UA  Negative   BILIRUBIN UA  Negative   UROBILINOGEN UA E U /dl 0 2   LEUKOCYTES UA  Negative     Results from last 7 days   Lab Units 03/10/23  8431   INFLUENZA A PCR  Negative   INFLUENZA B PCR  Negative   RSV PCR  Negative     Past Medical History:   Diagnosis Date   • Hypertension      Present on Admission:  • TIA (transient ischemic attack)  • HTN (hypertension)  • Protein calorie malnutrition ([facility] )    Admitting Diagnosis: TIA (transient ischemic attack) [G45 9]  Dizziness [R42]  Protein calorie malnutrition ([place] [address] ) [E46]    Age/Sex: 81 y o  male    Admission Orders:  TELEMETRY  VS + Neuro Checks Q1HR X 4 - Q4HRS  NIH STROKE SCALE Q24hrs  X 2  Incentive Spirometry q1hr while awake  Continuous Pulse Oximetry  UP with assistance  MRI Brain  ECHO  PT + OT Evals   SPEECH Eval Re Dysphagia Assessment  Diet Regular: House;  Sodium 2 GM  Dietary Nutritional Supplements Ensure Plus TID with meals    Scheduled Medications:  amLODIPine, 10 mg, Oral, Daily  aspirin, 81 mg, Oral, Daily  atorvastatin, 40 mg, Oral, QPM  enoxaparin, 40 mg, Subcutaneous, Daily  ergocalciferol, 50,000 Units, Oral, Weekly    Continuous IV Infusions:    PRN Meds:  pneumococcal 20-kaveh conj vacc, 0 5 mL, Intramuscular, Prior to discharge    IP CONSULT TO NEUROLOGY  IP CONSULT TO NUTRITION SERVICES    Network Utilization Review Department  ATTENTION: Please call with any questions or concerns to [phone] and carefully listen to the prompts so that you are directed to the right person  All voicemails are confidential   Mitul Chairez all requests for admission clinical reviews, approved or denied determinations and any other requests to dedicated fax number below belonging to the campus where the patient is receiving treatment   List of dedicated fax numbers for the Facilities:  [address] DENIALS (Administrative/Medical Necessity) [phone]   [address] (Maternity/NICU/Pediatrics) [phone]   9[address] Ace[address]o [phone]   [place] [address] [phone]   [address] 28 [phone]   1553 [facility] 134 815 [facility] [phone]

## 2023-03-11 NOTE — ED PROVIDER NOTES
History  Chief Complaint   Patient presents with   • Dizziness     Pt c/o near syncope Friday and Saturday 3/3-3/4for 2 short and separate events only  No return of symptoms  Pt c/o pain and swelling to leg early in week  Pt denies any complaints at this time     81yoM c/o transient dysarthria and facial droop on Friday, now resolved  Feels faint sometimes  Vague historian  Son convinced him to come to the ED today  Prior to Admission Medications   Prescriptions Last Dose Informant Patient Reported? Taking? amLODIPine (NORVASC) 10 mg tablet 3/9/2023  No Yes   Sig: Take 1 tablet (10 mg total) by mouth daily   lisinopril (ZESTRIL) 10 mg tablet 3/9/2023  Yes Yes   Sig: Take 10 mg by mouth daily      Facility-Administered Medications: None       Past Medical History:   Diagnosis Date   • Hypertension        History reviewed  No pertinent surgical history  History reviewed  No pertinent family history  I have reviewed and agree with the history as documented  E-Cigarette/Vaping   • E-Cigarette Use Never User      E-Cigarette/Vaping Substances   • Nicotine No    • THC No    • CBD No    • Flavoring No    • Other No    • Unknown No      Social History     Tobacco Use   • Smoking status: Never     Passive exposure: Never   • Smokeless tobacco: Never   Vaping Use   • Vaping Use: Never used   Substance Use Topics   • Alcohol use: Never   • Drug use: Never       Review of Systems   Constitutional: Positive for fever  Physical Exam  Physical Exam  Vitals reviewed  Constitutional:       Appearance: He is well-developed  HENT:      Head: Normocephalic and atraumatic  Right Ear: External ear normal       Left Ear: External ear normal       Nose: Nose normal  No rhinorrhea  Mouth/Throat:      Mouth: Mucous membranes are moist    Eyes:      Conjunctiva/sclera: Conjunctivae normal    Cardiovascular:      Rate and Rhythm: Normal rate and regular rhythm     Pulmonary:      Effort: Pulmonary effort is normal       Breath sounds: Normal breath sounds  No wheezing or rales  Abdominal:      Palpations: Abdomen is soft  Tenderness: There is no abdominal tenderness  Musculoskeletal:      Cervical back: Neck supple  Right lower leg: No edema  Left lower leg: No edema  Skin:     General: Skin is warm and dry  Neurological:      Mental Status: He is alert and oriented to person, place, and time  Cranial Nerves: No cranial nerve deficit  Sensory: No sensory deficit  Motor: No weakness        Coordination: Coordination normal    Psychiatric:         Mood and Affect: Mood normal          Vital Signs  ED Triage Vitals [03/10/23 0804]   Temperature Pulse Respirations Blood Pressure SpO2   97 8 °F (36 6 °C) (!) 113 18 159/79 99 %      Temp Source Heart Rate Source Patient Position - Orthostatic VS BP Location FiO2 (%)   Oral Monitor Lying Right arm --      Pain Score       No Pain           Vitals:    03/11/23 0733 03/11/23 0740 03/11/23 1320 03/11/23 1450   BP: 135/77 141/73 135/77 132/69   Pulse: 70 76 94 87   Patient Position - Orthostatic VS:  Lying           Visual Acuity  Visual Acuity    Flowsheet Row Most Recent Value   L Pupil Size (mm) 3   R Pupil Size (mm) 3   L Pupil Shape Round   R Pupil Shape Round          ED Medications  Medications   amLODIPine (NORVASC) tablet 10 mg (10 mg Oral Given 3/11/23 1110)   atorvastatin (LIPITOR) tablet 40 mg (40 mg Oral Given 3/10/23 1807)   aspirin chewable tablet 81 mg (81 mg Oral Given 3/11/23 1110)   enoxaparin (LOVENOX) subcutaneous injection 40 mg (40 mg Subcutaneous Given 3/11/23 1110)   pneumococcal 20-kaveh conj vacc (PREVNAR 20) IM Injection 0 5 mL (0 5 mL Intramuscular Refused 3/11/23 1636)   ergocalciferol (VITAMIN D2) capsule 50,000 Units (50,000 Units Oral Given 3/10/23 1912)   iohexol (OMNIPAQUE) 350 MG/ML injection (SINGLE-DOSE) 85 mL (85 mL Intravenous Given 3/10/23 1658)       Diagnostic Studies  Results Reviewed Procedure Component Value Units Date/Time    Hemoglobin A1c w/EAG Estimation [[id number]] Collected: 03/11/23 0448    Lab Status: Final result Specimen: Blood from Arm, Left Updated: 03/11/23 1158     Hemoglobin A1C 5 5 %       mg/dl     Lipid Panel with Direct LDL reflex [[id number]]  (Abnormal) Collected: 03/11/23 0448    Lab Status: Final result Specimen: Blood from Arm, Left Updated: 03/11/23 0605     Cholesterol 142 mg/dL      Triglycerides 75 mg/dL      HDL, Direct 27 mg/dL      LDL Calculated 100 mg/dL     Comprehensive metabolic panel [[id number]] Collected: 03/11/23 0448    Lab Status: Final result Specimen: Blood from Arm, Left Updated: 03/11/23 5990     Sodium 141 mmol/L      Potassium 4 1 mmol/L      Chloride 106 mmol/L      CO2 27 mmol/L      ANION GAP 8 mmol/L      BUN 19 mg/dL      Creatinine 0 95 mg/dL      Glucose 78 mg/dL      Glucose, Fasting 78 mg/dL      Calcium 9 2 mg/dL      AST 15 U/L      ALT 8 U/L      Alkaline Phosphatase 57 U/L      Total Protein 6 7 g/dL      Albumin 3 6 g/dL      Total Bilirubin 0 40 mg/dL      eGFR 72 ml/min/1 73sq m     Narrative:      [place] guidelines for Chronic Kidney Disease (CKD):   •  Stage 1 with normal or high GFR (GFR > 90 mL/min/1 73 square meters)  •  Stage 2 Mild CKD (GFR = 60-89 mL/min/1 73 square meters)  •  Stage 3A Moderate CKD (GFR = 45-59 mL/min/1 73 square meters)  •  Stage 3B Moderate CKD (GFR = 30-44 mL/min/1 73 square meters)  •  Stage 4 Severe CKD (GFR = 15-29 mL/min/1 73 square meters)  •  Stage 5 End Stage CKD (GFR <15 mL/min/1 73 square meters)  Note: GFR calculation is accurate only with a steady state creatinine    Magnesium [[id number]]  (Normal) Collected: 03/11/23 0448    Lab Status: Final result Specimen: Blood from Arm, Left Updated: 03/11/23 0605     Magnesium 2 2 mg/dL     CBC and differential [[id number]]  (Abnormal) Collected: 03/11/23 0448    Lab Status: Final result Specimen: Blood from Arm, Left Updated: 03/11/23 0523     WBC 10 25 Thousand/uL      RBC 4 39 Million/uL      Hemoglobin 13 0 g/dL      Hematocrit 40 7 %      MCV 93 fL      MCH 29 6 pg      MCHC 31 9 g/dL      RDW 13 5 %      MPV 9 3 fL      Platelets 882 Thousands/uL      nRBC 0 /100 WBCs      Neutrophils Relative 73 %      Immat GRANS % 0 %      Lymphocytes Relative 17 %      Monocytes Relative 9 %      Eosinophils Relative 1 %      Basophils Relative 0 %      Neutrophils Absolute 7 46 Thousands/µL      Immature Grans Absolute 0 04 Thousand/uL      Lymphocytes Absolute 1 71 Thousands/µL      Monocytes Absolute 0 87 Thousand/µL      Eosinophils Absolute 0 13 Thousand/µL      Basophils Absolute 0 04 Thousands/µL     HS Troponin I 4hr [[id number]]  (Normal) Collected: 03/10/23 1232    Lab Status: Final result Specimen: Blood from Arm, Right Updated: 03/10/23 1310     hs TnI 4hr 17 ng/L      Delta 4hr hsTnI 11 ng/L     HS Troponin I 2hr [[id number]]  (Normal) Collected: 03/10/23 1032    Lab Status: Final result Specimen: Blood from Arm, Right Updated: 03/10/23 1113     hs TnI 2hr 10 ng/L      Delta 2hr hsTnI 4 ng/L     UA (URINE) with reflex to Scope [[id number]] Collected: 03/10/23 1034    Lab Status: Final result Specimen: Urine, Clean Catch Updated: 03/10/23 1054     Color, UA Colorless     Clarity, UA Clear     Specific Gravity, UA <=1 005     pH, UA 7 0     Leukocytes, UA Negative     Nitrite, UA Negative     Protein, UA Negative mg/dl      Glucose, UA Negative mg/dl      Ketones, UA Negative mg/dl      Urobilinogen, UA 0 2 E U /dl      Bilirubin, UA Negative     Occult Blood, UA Negative    HS Troponin 0hr (reflex protocol) [[id number]]  (Normal) Collected: 03/10/23 0832    Lab Status: Final result Specimen: Blood from Arm, Right Updated: 03/10/23 0926     hs TnI 0hr 6 ng/L     Comprehensive metabolic panel [[id number]]  (Abnormal) Collected: 03/10/23 0832    Lab Status: Final result Specimen: Blood from Arm, Right Updated: 03/10/23 3895 Sodium 139 mmol/L      Potassium 3 4 mmol/L      Chloride 104 mmol/L      CO2 26 mmol/L      ANION GAP 9 mmol/L      BUN 11 mg/dL      Creatinine 0 99 mg/dL      Glucose 126 mg/dL      Calcium 9 2 mg/dL      AST 17 U/L      ALT 11 U/L      Alkaline Phosphatase 63 U/L      Total Protein 7 3 g/dL      Albumin 4 0 g/dL      Total Bilirubin 0 48 mg/dL      eGFR 68 ml/min/1 73sq m     Narrative:      [facility] guidelines for Chronic Kidney Disease (CKD):   •  Stage 1 with normal or high GFR (GFR > 90 mL/min/1 73 square meters)  •  Stage 2 Mild CKD (GFR = 60-89 mL/min/1 73 square meters)  •  Stage 3A Moderate CKD (GFR = 45-59 mL/min/1 73 square meters)  •  Stage 3B Moderate CKD (GFR = 30-44 mL/min/1 73 square meters)  •  Stage 4 Severe CKD (GFR = 15-29 mL/min/1 73 square meters)  •  Stage 5 End Stage CKD (GFR <15 mL/min/1 73 square meters)  Note: GFR calculation is accurate only with a steady state creatinine    FLU/RSV/COVID - if FLU/RSV clinically relevant [[id number]]  (Normal) Collected: 03/10/23 0832    Lab Status: Final result Specimen: Nares from Nose Updated: 03/10/23 0917     SARS-CoV-2 Negative     INFLUENZA A PCR Negative     INFLUENZA B PCR Negative     RSV PCR Negative    Narrative:      FOR PEDIATRIC PATIENTS - copy/paste COVID Guidelines URL to browser: [email or web address]    SARS-CoV-2 assay is a Nucleic Acid Amplification assay intended for the  qualitative detection of nucleic acid from SARS-CoV-2 in nasopharyngeal  swabs  Results are for the presumptive identification of SARS-CoV-2 RNA  Positive results are indicative of infection with SARS-CoV-2, the virus  causing COVID-19, but do not rule out bacterial infection or co-infection  with other viruses  Laboratories within the [place] and its  territories are required to report all positive results to the appropriate  public health authorities   Negative results do not preclude SARS-CoV-2  infection and should not be used as the sole basis for treatment or other  patient management decisions  Negative results must be combined with  clinical observations, patient history, and epidemiological information  This test has not been FDA cleared or approved  This test has been authorized by FDA under an Emergency Use Authorization  (EUA)  This test is only authorized for the duration of time the  declaration that circumstances exist justifying the authorization of the  emergency use of an in vitro diagnostic tests for detection of SARS-CoV-2  virus and/or diagnosis of COVID-19 infection under section 564(b)(1) of  the Act, 21 U  S C  945KXK-8(F)(7), unless the authorization is terminated  or revoked sooner  The test has been validated but independent review by FDA  and CLIA is pending  Test performed using [employer] GeneXpert: This RT-PCR assay targets N2,  a region unique to SARS-CoV-2  A conserved region in the E-gene was chosen  for pan-Sarbecovirus detection which includes SARS-CoV-2  According to CMS-2020-01-R, this platform meets the definition of high-throughput technology      Protime-INR [[id number]]  (Normal) Collected: 03/10/23 0832    Lab Status: Final result Specimen: Blood from Arm, Right Updated: 03/10/23 0906     Protime 14 1 seconds      INR 1 08    APTT [[id number]]  (Normal) Collected: 03/10/23 0832    Lab Status: Final result Specimen: Blood from Arm, Right Updated: 03/10/23 0906     PTT 31 seconds     CBC and differential [[id number]]  (Abnormal) Collected: 03/10/23 0832    Lab Status: Final result Specimen: Blood from Arm, Right Updated: 03/10/23 0839     WBC 8 54 Thousand/uL      RBC 4 84 Million/uL      Hemoglobin 14 4 g/dL      Hematocrit 44 1 %      MCV 91 fL      MCH 29 8 pg      MCHC 32 7 g/dL      RDW 13 7 %      MPV 9 0 fL      Platelets 755 Thousands/uL      nRBC 0 /100 WBCs      Neutrophils Relative 78 %      Immat GRANS % 0 %      Lymphocytes Relative 15 %      Monocytes Relative 6 %      Eosinophils Relative 1 %      Basophils Relative 0 %      Neutrophils Absolute 6 56 Thousands/µL      Immature Grans Absolute 0 03 Thousand/uL      Lymphocytes Absolute 1 28 Thousands/µL      Monocytes Absolute 0 55 Thousand/µL      Eosinophils Absolute 0 10 Thousand/µL      Basophils Absolute 0 02 Thousands/µL                  MRI brain wo contrast   Final Result by Patricia Cox DO (03/11 1442)      Normal       Workstation performed: [id number]         CTA head and neck w wo contrast   Final Result by Shivani Colvin MD (03/11 0818)      CT Brain: No acute intracranial pathology  Nonemergent findings above  CT angiography:   Neck: Within normal limits  Brain:   Moderate long segment basilar artery stenosis  Evidence of right middle cerebral and left posterior cerebral artery atherosclerosis without focal stenosis  No aneurysm, large vessel occlusion, high-grade stenosis or dissection  Workstation performed: [id number]         CT head without contrast   Final Result by Daniel Hatch MD (03/10 7030)      No acute intracranial abnormality  Workstation performed: [id number]                    Procedures  Procedures         ED Course                                             Medical Decision Making  NIHSS 0  Concerning story for TIA  Placed on tele obs under CFP team for stroke pathway  Amount and/or Complexity of Data Reviewed  Labs: ordered  Radiology: ordered  Risk  Decision regarding hospitalization            Disposition  Final diagnoses:   TIA (transient ischemic attack)     Time reflects when diagnosis was documented in both MDM as applicable and the Disposition within this note     Time User Action Codes Description Comment    3/10/2023 11:09 AM Matthew Aviles Add [G45 9] TIA (transient ischemic attack)     3/10/2023 12:37 PM Vesna Garcia Add [E46] Protein calorie malnutrition ([facility] )     3/11/2023 3:17 PM Juliana Butler Add [I10] Hypertension, unspecified type       ED Disposition     ED Disposition   Admit    Condition   Stable    Date/Time   Fri Mar 10, 2023 11:09 AM    Comment   Case was discussed with Dr Cary Dumont and the patient's admission status was agreed to be Admission Status: observation status to the service of Dr Cary Dumont  Follow-up Information    None         Current Discharge Medication List      START taking these medications    Details   aspirin 81 mg chewable tablet Chew 1 tablet (81 mg total) daily Do not start before March 12, 2023  Qty: 30 tablet, Refills: 2    Associated Diagnoses: TIA (transient ischemic attack); Hypertension, unspecified type      atorvastatin (LIPITOR) 40 mg tablet Take 1 tablet (40 mg total) by mouth every evening  Qty: 30 tablet, Refills: 2    Associated Diagnoses: TIA (transient ischemic attack); Hypertension, unspecified type      ergocalciferol (VITAMIN D2) 50,000 units Take 1 capsule (50,000 Units total) by mouth once a week for 4 doses Do not start before March 17, 2023    Qty: 4 capsule, Refills: 0    Associated Diagnoses: Protein calorie malnutrition ([facility] )         CONTINUE these medications which have NOT CHANGED    Details   amLODIPine (NORVASC) 10 mg tablet Take 1 tablet (10 mg total) by mouth daily  Qty: 30 tablet, Refills: 0    Associated Diagnoses: Hypertension      lisinopril (ZESTRIL) 10 mg tablet Take 10 mg by mouth daily                 PDMP Review     None          ED Provider  Electronically Signed by           Ruthy Jenkins DO  03/11/23 6466

## 2023-03-11 NOTE — SPEECH THERAPY NOTE
Consult received  Records reviewed  Pt admitted c symptoms concerning for CVA/TIA  Pt passed RN Dysphagia Assessment  Communication deficits denied and none noted this am (pt alert, oriented, able to comprehend all ?'s/commands, expressed needs with ease, read menu etc)  MRI results pending  No additional inpatient Speech Pathology evaluation appears indicated at this time  Please re-consult if additional concerns arise  Thank you

## 2023-03-11 NOTE — DISCHARGE INSTR - AVS FIRST PAGE
-Start taking lipitor 40 mg daily  -Start taking aspirin 81 mg daily  -Follow up with neurology in 10 weeks  -Monitor BP

## 2023-03-11 NOTE — PHYSICAL THERAPY NOTE
PHYSICAL THERAPY EVALUATION/TREATMENT    Pt is at his baseline level of function  No skilled PT or OT needs noted at this time  03/11/23 1001   Note Type   Note type Evaluation   Pain Assessment   Pain Assessment Tool Spear-Baker FACES   Pain Score No Pain   Spear-Baker FACES Pain Rating 2   Pain Location/Orientation Orientation: Left  (posterior thigh intermittently)   Home Living   Home Layout One level  (full flight to enter)   Prior Function   Level of [place] Independent with ADLs; Independent with functional mobility   Lives With Spouse   Falls in the last 6 months 0   General   Additional Pertinent History Pt admitted with history of near syncope and slurred speech that have resolved  Work up in progress for TIA  MRI brain pending  Cognition   Overall Cognitive Status WFL   Orientation Level Oriented X4   Following Commands Follows all commands and directions without difficulty   Subjective   Subjective "I feel good but I'm tired of sitting here"   RUE Assessment   RUE Assessment WNL   LUE Assessment   LUE Assessment WNL   RLE Assessment   RLE Assessment WNL   LLE Assessment   LLE Assessment WNL   Coordination   Movements are Fluid and Coordinated 1   Sensation WFL   Finger to Nose & Finger to Finger  Intact   Heel to Shin Intact   Rapid Alternating Movements Intact   Transfers   Sit to Stand 7  Independent   Stand to Sit 7  Independent   Stand pivot 7  Independent   Ambulation/Elevation   Gait pattern WNL   Gait Assistance 7  Independent   Distance 50 feet   Balance   Static Sitting Good   Dynamic Sitting Good   Static Standing Good   Dynamic Standing Good   Ambulatory Good   Assessment   Prognosis Good   Problem List   (none)   Assessment Patient is an 81y o  year old male seen for Physical Therapy evaluation  Patient admitted with TIA (transient ischemic attack)  Comorbidities affecting patient's physical performance include: htn    Personal factors affecting patient at time of initial evaluation include: stairs to enter home  Prior to admission, patient was independent with functional mobility without assistive device and independent with ADLS  Please find objective findings from Physical Therapy assessment regarding body systems outlined above with impairments and limitations including none  The Barthel Index was used as a functional outcome tool presenting with a score of Barthel Index Score: 100 today indicating no limitations of functional mobility and ADLS  Patient's clinical presentation is currently stable  as seen in patient's presentation of baseline  As demonstrated by objective findings, the assigned level of complexity for this evaluation is low  The patient's AM-PAC Basic Mobility Inpatient Short Form Raw Score is 24  A Raw score of greater than 16 suggests the patient may benefit from discharge to home  Goals   Patient Goals "go home"   STG Expiration Date 03/18/23   Short Term Goal #1 Pt will ambulate ad phill on the unit to maintain current level of function     Plan   Treatment/Interventions Patient/family training   PT Frequency Other (Comment)  (1 time visit only)   Recommendation   PT Discharge Recommendation No rehabilitation needs   AM-PAC Basic Mobility Inpatient   Turning in Flat Bed Without Bedrails 4   Lying on Back to Sitting on Edge of Flat Bed Without Bedrails 4   Moving Bed to Chair 4   Standing Up From Chair Using Arms 4   Walk in Room 4   Climb 3-5 Stairs With Railing 4   Basic Mobility Inpatient Raw Score 24   Basic Mobility Standardized Score 57 68   Highest Level Of Mobility   JH-HLM Goal 8: Walk 250 feet or more   JH-HLM Achieved 8: Walk 250 feet ot more   Modified [place] Scale   Modified [place] Scale 0   Barthel Index   Feeding 10   Bathing 5   Grooming Score 5   Dressing Score 10   Bladder Score 10   Bowels Score 10   Toilet Use Score 10   Transfers (Bed/Chair) Score 15   Mobility (Level Surface) Score 15   Stairs Score 10   Barthel Index Score 100 Additional Treatment Session   Start Time 9070   End Time 1000   Treatment Assessment Pt educated in the importance of early mobility while in the hospital   Pt ambulated 250 feet with good tolerance and steady gait  Pt instructed to ambulate ad phill in the hallway at least 3x per day  Pt and wife verbalized understanding  End of Consult   Patient Position at End of Consult Bedside chair; All needs within reach   [facility] License Number  [facility] [id number]

## 2023-03-11 NOTE — DISCHARGE SUMMARY
[facility] Discharge Summary - Medical Faisal Riggins 81 y o  male MRN: [id number]    [facility] [address]  [place] [address] / Bed: Julio Junior 32-* Encounter: [id number]    BRIEF OVERVIEW  Admitting Provider: Denae Garnett MD  Discharge Provider: Denae Garnett, *    Discharge To: Home      Outpatient Follow-Up: Demario Pay[facility] care, Neurology  Things to address at first follow up visit:     Have you had any symptoms such as slurred speech, left sided weakness, and near syncopal episodes since discharge? Are you taking Lipitor 40 mg daily? Are you taking aspirin 81 mg daily? Have you followed up with neurology? How is your diet? Are you eating 3 meals a day? How has your BP been? Labs and results pending at discharge: none    Admission Date: 3/10/2023     Discharge Date: 3/11/2023    Primary Discharge Diagnosis  Principal Problem:    TIA (transient ischemic attack)  Active Problems:    HTN (hypertension)    Protein calorie malnutrition ([address] )  Resolved Problems:    * No resolved hospital problems  *        Consulting Providers   Neurology        [address] STAY    Procedures Performed/Pertinent Test results  3/10: CBC WNL, K3 4, Trop WNL, UA WNL, A1c 5 5, lipid panel HDL 27 and   MRI- negative  CT head: No acute intracranial abnormality  TTE- EF 71%      HPI  Faisal Riggins is a 81 y o  male past medical history of hypertension presented for evaluation of near syncope and slurred speech  Patient reported onset of symptoms was 1 week ago, they did not recur since then  Patient was accompanied by his wife, and decided to come for evaluation today due to concerns that his symptoms may return  Other than some decreased appetite, patient had no other acute complaints        Hospital Course  Patient was evaluated for CVA  No findings on CT head or CTA head/neck and started on Lipitor and ASA  MRI was negative   Patient did not exhibit any stroke like symptoms such as weakness and slurred speech during admission  PT evaluation showed no needs  Neurology consulted and recommended follow up outpatient in 10 weeks  Discharged on lipitor and aspirin  Recommended nutrition consult and establish care with PCP in this area  Echo-EF 71%    MRI- negative    Nutrition was consulted due to patient's habitus    Physical Exam at Discharge  General appearance: alert and oriented, in no acute distress and thin body habitus  Head: Normocephalic, without obvious abnormality, atraumatic  Lungs: clear to auscultation bilaterally  Heart: regular rate and rhythm, S1, S2 normal, no murmur, click, rub or gallop  Abdomen: soft, non-tender; bowel sounds normal; no masses,  no organomegaly  Neurologic: Grossly normal    Medications   Current Discharge Medication List          Current Discharge Medication List      CONTINUE these medications which have NOT CHANGED    Details   amLODIPine (NORVASC) 10 mg tablet Take 1 tablet (10 mg total) by mouth daily  Qty: 30 tablet, Refills: 0    Associated Diagnoses: Hypertension      lisinopril (ZESTRIL) 10 mg tablet Take 10 mg by mouth daily            Current Discharge Medication List      START taking these medications    Details   aspirin 81 mg chewable tablet Chew 1 tablet (81 mg total) daily Do not start before March 12, 2023  Qty: 30 tablet, Refills: 2    Associated Diagnoses: TIA (transient ischemic attack); Hypertension, unspecified type      atorvastatin (LIPITOR) 40 mg tablet Take 1 tablet (40 mg total) by mouth every evening  Qty: 30 tablet, Refills: 2    Associated Diagnoses: TIA (transient ischemic attack); Hypertension, unspecified type      ergocalciferol (VITAMIN D2) 50,000 units Take 1 capsule (50,000 Units total) by mouth once a week for 4 doses Do not start before March 17, 2023    Qty: 4 capsule, Refills: 0    Associated Diagnoses: Protein calorie malnutrition ([facility] [address] )            Current Discharge Medication List Allergies  No Known Allergies    Diet restrictions: none  Activity restrictions: none  Code Status: Level 1 - Full Code  Advance Directive and Living Will: <no information>  Power of :    POLST:      Discharge Condition: stable      Discharge  Statement   I spent 30 minutes discharging the patient  This time was spent on the day of discharge  I had direct contact with the patient on the day of discharge  Additional documentation is required if more than 30 minutes were spent on discharge

## 2023-03-11 NOTE — PLAN OF CARE
Problem: PHYSICAL THERAPY ADULT  Goal: Performs mobility at highest level of function for planned discharge setting  See evaluation for individualized goals  Description: Treatment/Interventions: Patient/family training          See flowsheet documentation for full assessment, interventions and recommendations  3/11/2023 1119 by Mir Huntley, PT  Outcome: Adequate for Discharge  Note: Prognosis: Good  Problem List:  (none)  Assessment: Patient is an 81y o  year old male seen for Physical Therapy evaluation  Patient admitted with TIA (transient ischemic attack)  Comorbidities affecting patient's physical performance include: htn  Personal factors affecting patient at time of initial evaluation include: stairs to enter home  Prior to admission, patient was independent with functional mobility without assistive device and independent with ADLS  Please find objective findings from Physical Therapy assessment regarding body systems outlined above with impairments and limitations including none  The Barthel Index was used as a functional outcome tool presenting with a score of Barthel Index Score: 100 today indicating no limitations of functional mobility and ADLS  Patient's clinical presentation is currently stable  as seen in patient's presentation of baseline  As demonstrated by objective findings, the assigned level of complexity for this evaluation is low  The patient's -[facility] Basic Mobility Inpatient Short Form Raw Score is 24  A Raw score of greater than 16 suggests the patient may benefit from discharge to home  PT Discharge Recommendation: No rehabilitation needs    See flowsheet documentation for full assessment  3/11/2023 1119 by Mir Huntley PT  Note: Prognosis: Good  Problem List:  (none)  Assessment: Patient is an 81y o  year old male seen for Physical Therapy evaluation  Patient admitted with TIA (transient ischemic attack)    Comorbidities affecting patient's physical performance include: htn  Personal factors affecting patient at time of initial evaluation include: stairs to enter home  Prior to admission, patient was independent with functional mobility without assistive device and independent with ADLS  Please find objective findings from Physical Therapy assessment regarding body systems outlined above with impairments and limitations including none  The Barthel Index was used as a functional outcome tool presenting with a score of Barthel Index Score: 100 today indicating no limitations of functional mobility and ADLS  Patient's clinical presentation is currently stable  as seen in patient's presentation of baseline  As demonstrated by objective findings, the assigned level of complexity for this evaluation is low  The patient's AM-PAC Basic Mobility Inpatient Short Form Raw Score is 24  A Raw score of greater than 16 suggests the patient may benefit from discharge to home  PT Discharge Recommendation: No rehabilitation needs    See flowsheet documentation for full assessment

## 2023-03-12 LAB
AORTIC ROOT: 3 CM
APICAL FOUR CHAMBER EJECTION FRACTION: 71 %
AV LVOT PEAK GRADIENT: 8 MMHG
AV PEAK GRADIENT: 15 MMHG
DOP CALC LVOT AREA: 2.54 CM2
DOP CALC LVOT DIAMETER: 1.8 CM
E WAVE DECELERATION TIME: 227 MS
FRACTIONAL SHORTENING: 46 (ref 28–44)
INTERVENTRICULAR SEPTUM IN DIASTOLE (PARASTERNAL SHORT AXIS VIEW): 1 CM
INTERVENTRICULAR SEPTUM: 1 CM (ref 0.6–1.1)
LAAS-AP2: 15.9 CM2
LAAS-AP4: 11.4 CM2
LEFT ATRIUM SIZE: 3.1 CM
LEFT INTERNAL DIMENSION IN SYSTOLE: 2 CM (ref 2.1–4)
LEFT VENTRICULAR INTERNAL DIMENSION IN DIASTOLE: 3.7 CM (ref 3.5–6)
LEFT VENTRICULAR POSTERIOR WALL IN END DIASTOLE: 1.1 CM
LEFT VENTRICULAR STROKE VOLUME: 43 ML
LVSV (TEICH): 43 ML
MV E'TISSUE VEL-SEP: 7 CM/S
MV PEAK A VEL: 0.67 M/S
MV PEAK E VEL: 64 CM/S
MV STENOSIS PRESSURE HALF TIME: 66 MS
MV VALVE AREA P 1/2 METHOD: 3.33
RIGHT ATRIUM AREA SYSTOLE A4C: 16.5 CM2
RIGHT VENTRICLE ID DIMENSION: 3.1 CM
SL CV LEFT ATRIUM LENGTH A2C: 4.8 CM
SL CV LV EF: 70
SL CV PED ECHO LEFT VENTRICLE DIASTOLIC VOLUME (MOD BIPLANE) 2D: 57 ML
SL CV PED ECHO LEFT VENTRICLE SYSTOLIC VOLUME (MOD BIPLANE) 2D: 14 ML
TR MAX PG: 35 MMHG
TR PEAK VELOCITY: 3.1 M/S
TRICUSPID ANNULAR PLANE SYSTOLIC EXCURSION: 2.9 CM
TRICUSPID VALVE PEAK REGURGITATION VELOCITY: 3.13 M/S

## 2023-03-13 ENCOUNTER — TELEPHONE (OUTPATIENT)
Dept: FAMILY MEDICINE CLINIC | Facility: CLINIC | Age: 88
End: 2023-03-13

## 2023-03-13 LAB
ATRIAL RATE: 96 BPM
P AXIS: 74 DEGREES
PR INTERVAL: 188 MS
QRS AXIS: -78 DEGREES
QRSD INTERVAL: 100 MS
QT INTERVAL: 360 MS
QTC INTERVAL: 454 MS
T WAVE AXIS: 81 DEGREES
VENTRICULAR RATE: 96 BPM

## 2023-03-13 NOTE — TELEPHONE ENCOUNTER
----- Message from Collette Slimmer, MD sent at 3/11/2023  3:45 PM EST -----  Regarding: TCM/new pt  Please schedule new patient/TCM appt for patient within 7-10 days

## 2023-03-13 NOTE — TELEPHONE ENCOUNTER
Patient has new patient appointment set up with [facility]     Called patient and left message to see if he is establishing care there or with [facility]

## 2023-03-21 ENCOUNTER — TELEPHONE (OUTPATIENT)
Dept: NEUROLOGY | Facility: CLINIC | Age: 88
End: 2023-03-21

## 2023-03-21 NOTE — TELEPHONE ENCOUNTER
1ST ATTEMPT - Called and spoke with patient  He stated that he cannot make the appt, and said the person that takes him to his appts would need to be the one to schedule  He said that he has our number on his discharge papers and someone else would call back to schedule  I will f/u next week  HFU/SLW/DIZZINESS      NOTES:   JONA Gutiérrez  Cc: Michael Grewal; Chas Garcia  Patient can follow-up with neurovascular or general neurology AP, resident or attending neurologist in 10 weeks

## 2023-03-27 NOTE — TELEPHONE ENCOUNTER
2nd Attempt,     Spoke to patient he stated he is doing well and he will call back with his son-in Law if needed  I provided our call back number  so when ready he can call and schedule his HFU      Thank you,     Heladio Bell

## 2024-03-13 ENCOUNTER — APPOINTMENT (INPATIENT)
Dept: RADIOLOGY | Facility: HOSPITAL | Age: 89
DRG: 329 | End: 2024-03-13

## 2024-03-13 ENCOUNTER — ANESTHESIA (INPATIENT)
Dept: PERIOP | Facility: HOSPITAL | Age: 89
DRG: 710 | End: 2024-03-13
Payer: COMMERCIAL

## 2024-03-13 ENCOUNTER — APPOINTMENT (EMERGENCY)
Dept: RADIOLOGY | Facility: HOSPITAL | Age: 89
DRG: 710 | End: 2024-03-13
Payer: COMMERCIAL

## 2024-03-13 ENCOUNTER — ANESTHESIA EVENT (INPATIENT)
Dept: PERIOP | Facility: HOSPITAL | Age: 89
DRG: 710 | End: 2024-03-13
Payer: COMMERCIAL

## 2024-03-13 ENCOUNTER — HOSPITAL ENCOUNTER (INPATIENT)
Facility: HOSPITAL | Age: 89
LOS: 1 days | DRG: 710 | End: 2024-03-13
Attending: EMERGENCY MEDICINE | Admitting: SURGERY
Payer: COMMERCIAL

## 2024-03-13 ENCOUNTER — HOSPITAL ENCOUNTER (INPATIENT)
Facility: HOSPITAL | Age: 89
LOS: 13 days | Discharge: HOME | DRG: 329 | End: 2024-03-26
Attending: SURGERY | Admitting: EMERGENCY MEDICINE

## 2024-03-13 VITALS
DIASTOLIC BLOOD PRESSURE: 67 MMHG | BODY MASS INDEX: 20.55 KG/M2 | HEART RATE: 97 BPM | HEIGHT: 64 IN | RESPIRATION RATE: 20 BRPM | SYSTOLIC BLOOD PRESSURE: 126 MMHG | OXYGEN SATURATION: 99 % | TEMPERATURE: 97.4 F | WEIGHT: 120.37 LBS

## 2024-03-13 DIAGNOSIS — I81 DEEP VEIN THROMBOSIS OF PORTAL VEIN: ICD-10-CM

## 2024-03-13 DIAGNOSIS — Z98.890 S/P EXPLORATORY LAPAROTOMY: Primary | ICD-10-CM

## 2024-03-13 DIAGNOSIS — K55.9 SMALL BOWEL ISCHEMIA (HCC): ICD-10-CM

## 2024-03-13 DIAGNOSIS — A41.9 SEPSIS (HCC): Primary | ICD-10-CM

## 2024-03-13 DIAGNOSIS — I10 HTN (HYPERTENSION): ICD-10-CM

## 2024-03-13 DIAGNOSIS — I81 PORTAL VEIN THROMBOSIS: ICD-10-CM

## 2024-03-13 DIAGNOSIS — K55.9 MESENTERIC ISCHEMIA (HCC): ICD-10-CM

## 2024-03-13 PROBLEM — K55.029 ISCHEMIC NECROSIS OF SMALL BOWEL (HCC): Status: RESOLVED | Noted: 2024-03-13 | Resolved: 2024-03-13

## 2024-03-13 PROBLEM — K55.029 ISCHEMIC NECROSIS OF SMALL BOWEL (HCC): Status: ACTIVE | Noted: 2024-03-13

## 2024-03-13 PROBLEM — Z85.46 H/O PROSTATE CANCER: Status: ACTIVE | Noted: 2024-03-13

## 2024-03-13 LAB
2HR DELTA HS TROPONIN: 0 NG/L
ABO GROUP BLD: NORMAL
ALBUMIN SERPL BCP-MCNC: 3.5 G/DL (ref 3.5–5)
ALP SERPL-CCNC: 81 U/L (ref 34–104)
ALT SERPL W P-5'-P-CCNC: 15 U/L (ref 7–52)
ANION GAP SERPL CALCULATED.3IONS-SCNC: 15 MMOL/L (ref 4–13)
ANION GAP SERPL CALCULATED.3IONS-SCNC: 8 MMOL/L (ref 4–13)
APTT PPP: 116 SECONDS (ref 23–37)
APTT PPP: 38 SECONDS (ref 23–37)
APTT PPP: 80 SECONDS (ref 23–37)
AST SERPL W P-5'-P-CCNC: 18 U/L (ref 13–39)
ATRIAL RATE: 115 BPM
BACTERIA UR QL AUTO: NORMAL /HPF
BASE EXCESS BLDA CALC-SCNC: -0.1 MMOL/L
BASE EXCESS BLDA CALC-SCNC: -0.6 MMOL/L
BASE EXCESS BLDA CALC-SCNC: -1.8 MMOL/L
BASE EXCESS BLDA CALC-SCNC: -7 MMOL/L (ref -2–3)
BASOPHILS # BLD MANUAL: 0 THOUSAND/UL (ref 0–0.1)
BASOPHILS NFR MAR MANUAL: 0 % (ref 0–1)
BILIRUB SERPL-MCNC: 0.58 MG/DL (ref 0.2–1)
BILIRUB UR QL STRIP: NEGATIVE
BLD GP AB SCN SERPL QL: NEGATIVE
BLD GP AB SCN SERPL QL: NEGATIVE
BODY TEMPERATURE: 98.4 DEGREES FEHRENHEIT
BUN SERPL-MCNC: 25 MG/DL (ref 5–25)
BUN SERPL-MCNC: 26 MG/DL (ref 5–25)
CA-I BLD-SCNC: 1.02 MMOL/L (ref 1.12–1.32)
CA-I BLD-SCNC: 1.14 MMOL/L (ref 1.12–1.32)
CALCIUM SERPL-MCNC: 7.6 MG/DL (ref 8.4–10.2)
CALCIUM SERPL-MCNC: 9.4 MG/DL (ref 8.4–10.2)
CARDIAC TROPONIN I PNL SERPL HS: 8 NG/L
CARDIAC TROPONIN I PNL SERPL HS: 8 NG/L
CHLORIDE SERPL-SCNC: 106 MMOL/L (ref 96–108)
CHLORIDE SERPL-SCNC: 97 MMOL/L (ref 96–108)
CLARITY UR: CLEAR
CO2 SERPL-SCNC: 21 MMOL/L (ref 21–32)
CO2 SERPL-SCNC: 25 MMOL/L (ref 21–32)
COLOR UR: YELLOW
CREAT SERPL-MCNC: 1.37 MG/DL (ref 0.6–1.3)
CREAT SERPL-MCNC: 1.89 MG/DL (ref 0.6–1.3)
EOSINOPHIL # BLD MANUAL: 0 THOUSAND/UL (ref 0–0.4)
EOSINOPHIL NFR BLD MANUAL: 0 % (ref 0–6)
ERYTHROCYTE [DISTWIDTH] IN BLOOD BY AUTOMATED COUNT: 14.9 % (ref 11.6–15.1)
ERYTHROCYTE [DISTWIDTH] IN BLOOD BY AUTOMATED COUNT: 15.1 % (ref 11.6–15.1)
ERYTHROCYTE [DISTWIDTH] IN BLOOD BY AUTOMATED COUNT: 15.2 % (ref 11.6–15.1)
FLUAV RNA RESP QL NAA+PROBE: NEGATIVE
FLUBV RNA RESP QL NAA+PROBE: NEGATIVE
GFR SERPL CREATININE-BSD FRML MDRD: 31 ML/MIN/1.73SQ M
GFR SERPL CREATININE-BSD FRML MDRD: 46 ML/MIN/1.73SQ M
GLUCOSE SERPL-MCNC: 143 MG/DL (ref 65–140)
GLUCOSE SERPL-MCNC: 158 MG/DL (ref 65–140)
GLUCOSE SERPL-MCNC: 196 MG/DL (ref 65–140)
GLUCOSE UR STRIP-MCNC: NEGATIVE MG/DL
HCO3 BLDA-SCNC: 18.3 MMOL/L (ref 22–28)
HCO3 BLDA-SCNC: 19.8 MMOL/L (ref 22–28)
HCO3 BLDA-SCNC: 21.9 MMOL/L (ref 22–28)
HCO3 BLDA-SCNC: 23.4 MMOL/L (ref 22–28)
HCT VFR BLD AUTO: 37.4 % (ref 36.5–49.3)
HCT VFR BLD AUTO: 43.3 % (ref 36.5–49.3)
HCT VFR BLD AUTO: 47 % (ref 36.5–49.3)
HCT VFR BLD CALC: 36 % (ref 36.5–49.3)
HGB BLD-MCNC: 12.6 G/DL (ref 12–17)
HGB BLD-MCNC: 14.1 G/DL (ref 12–17)
HGB BLD-MCNC: 15.4 G/DL (ref 12–17)
HGB BLDA-MCNC: 12.2 G/DL (ref 12–17)
HGB UR QL STRIP.AUTO: ABNORMAL
HOROWITZ INDEX BLDA+IHG-RTO: 40 MM[HG]
HOROWITZ INDEX BLDA+IHG-RTO: 40 MM[HG]
INR PPP: 1.31 (ref 0.84–1.19)
INR PPP: 1.51 (ref 0.84–1.19)
KETONES UR STRIP-MCNC: NEGATIVE MG/DL
LACTATE SERPL-SCNC: 1 MMOL/L (ref 0.5–2)
LACTATE SERPL-SCNC: 1.8 MMOL/L (ref 0.5–2)
LACTATE SERPL-SCNC: 2.8 MMOL/L (ref 0.5–2)
LEUKOCYTE ESTERASE UR QL STRIP: NEGATIVE
LG PLATELETS BLD QL SMEAR: PRESENT
LIPASE SERPL-CCNC: 38 U/L (ref 11–82)
LYMPHOCYTES # BLD AUTO: 1.15 THOUSAND/UL (ref 0.6–4.47)
LYMPHOCYTES # BLD AUTO: 3 % (ref 14–44)
MAGNESIUM SERPL-MCNC: 1.8 MG/DL (ref 1.9–2.7)
MAGNESIUM SERPL-MCNC: 2.2 MG/DL (ref 1.9–2.7)
MCH RBC QN AUTO: 28.1 PG (ref 26.8–34.3)
MCH RBC QN AUTO: 28.1 PG (ref 26.8–34.3)
MCH RBC QN AUTO: 28.3 PG (ref 26.8–34.3)
MCHC RBC AUTO-ENTMCNC: 32.6 G/DL (ref 31.4–37.4)
MCHC RBC AUTO-ENTMCNC: 32.8 G/DL (ref 31.4–37.4)
MCHC RBC AUTO-ENTMCNC: 33.7 G/DL (ref 31.4–37.4)
MCV RBC AUTO: 84 FL (ref 82–98)
MCV RBC AUTO: 86 FL (ref 82–98)
MCV RBC AUTO: 86 FL (ref 82–98)
METAMYELOCYTES NFR BLD MANUAL: 1 % (ref 0–1)
MONOCYTES # BLD AUTO: 1.15 THOUSAND/UL (ref 0–1.22)
MONOCYTES NFR BLD: 3 % (ref 4–12)
MYELOCYTES NFR BLD MANUAL: 1 % (ref 0–1)
NEUTROPHILS # BLD MANUAL: 35.2 THOUSAND/UL (ref 1.85–7.62)
NEUTS SEG NFR BLD AUTO: 92 % (ref 43–75)
NITRITE UR QL STRIP: NEGATIVE
NON-SQ EPI CELLS URNS QL MICRO: NORMAL /HPF
O2 CT BLDA-SCNC: 15.2 ML/DL (ref 16–23)
O2 CT BLDA-SCNC: 16.7 ML/DL (ref 16–23)
O2 CT BLDA-SCNC: 18.7 ML/DL (ref 16–23)
OXYHGB MFR BLDA: 97.9 % (ref 94–97)
OXYHGB MFR BLDA: 97.9 % (ref 94–97)
OXYHGB MFR BLDA: 98.3 % (ref 94–97)
P AXIS: 67 DEGREES
PCO2 BLD: 19 MMOL/L (ref 21–32)
PCO2 BLD: 35.5 MM HG (ref 36–44)
PCO2 BLDA: 25.5 MM HG (ref 36–44)
PCO2 BLDA: 29.8 MM HG (ref 36–44)
PCO2 BLDA: 33.9 MM HG (ref 36–44)
PCO2 TEMP ADJ BLDA: 33.8 MM HG (ref 36–44)
PEEP RESPIRATORY: 6 CM[H2O]
PEEP RESPIRATORY: 6 CM[H2O]
PH BLD: 7.32 [PH] (ref 7.35–7.45)
PH BLD: 7.46 [PH] (ref 7.35–7.45)
PH BLDA: 7.46 [PH] (ref 7.35–7.45)
PH BLDA: 7.49 [PH] (ref 7.35–7.45)
PH BLDA: 7.51 [PH] (ref 7.35–7.45)
PH UR STRIP.AUTO: 6 [PH]
PHOSPHATE SERPL-MCNC: 3.4 MG/DL (ref 2.3–4.1)
PLATELET # BLD AUTO: 502 THOUSANDS/UL (ref 149–390)
PLATELET # BLD AUTO: 515 THOUSANDS/UL (ref 149–390)
PLATELET # BLD AUTO: 533 THOUSANDS/UL (ref 149–390)
PLATELET BLD QL SMEAR: ABNORMAL
PMV BLD AUTO: 9.6 FL (ref 8.9–12.7)
PMV BLD AUTO: 9.7 FL (ref 8.9–12.7)
PMV BLD AUTO: 9.8 FL (ref 8.9–12.7)
PO2 BLD: 127.5 MM HG (ref 75–129)
PO2 BLD: 140 MM HG (ref 75–129)
PO2 BLDA: 128.1 MM HG (ref 75–129)
PO2 BLDA: 143.6 MM HG (ref 75–129)
PO2 BLDA: 149.8 MM HG (ref 75–129)
POLYCHROMASIA BLD QL SMEAR: PRESENT
POTASSIUM BLD-SCNC: 4.5 MMOL/L (ref 3.5–5.3)
POTASSIUM SERPL-SCNC: 4.4 MMOL/L (ref 3.5–5.3)
POTASSIUM SERPL-SCNC: 4.8 MMOL/L (ref 3.5–5.3)
PR INTERVAL: 154 MS
PROT SERPL-MCNC: 7.3 G/DL (ref 6.4–8.4)
PROT UR STRIP-MCNC: ABNORMAL MG/DL
PROTHROMBIN TIME: 16.5 SECONDS (ref 11.6–14.5)
PROTHROMBIN TIME: 18 SECONDS (ref 11.6–14.5)
QRS AXIS: -82 DEGREES
QRSD INTERVAL: 102 MS
QT INTERVAL: 336 MS
QTC INTERVAL: 464 MS
RBC # BLD AUTO: 4.48 MILLION/UL (ref 3.88–5.62)
RBC # BLD AUTO: 5.01 MILLION/UL (ref 3.88–5.62)
RBC # BLD AUTO: 5.45 MILLION/UL (ref 3.88–5.62)
RBC #/AREA URNS AUTO: NORMAL /HPF
RBC MORPH BLD: PRESENT
RH BLD: POSITIVE
RSV RNA RESP QL NAA+PROBE: NEGATIVE
SAO2 % BLD FROM PO2: 99 % (ref 60–85)
SARS-COV-2 RNA RESP QL NAA+PROBE: NEGATIVE
SODIUM BLD-SCNC: 135 MMOL/L (ref 136–145)
SODIUM SERPL-SCNC: 135 MMOL/L (ref 135–147)
SODIUM SERPL-SCNC: 137 MMOL/L (ref 135–147)
SP GR UR STRIP.AUTO: <=1.005 (ref 1–1.03)
SPECIMEN EXPIRATION DATE: NORMAL
SPECIMEN EXPIRATION DATE: NORMAL
SPECIMEN SOURCE: ABNORMAL
T WAVE AXIS: 87 DEGREES
UROBILINOGEN UR QL STRIP.AUTO: 0.2 E.U./DL
VENT AC: 16
VENT AC: 16
VENT- AC: AC
VENT- AC: AC
VENTRICULAR RATE: 115 BPM
VT SETTING VENT: 400 ML
VT SETTING VENT: 400 ML
WBC # BLD AUTO: 32.14 THOUSAND/UL (ref 4.31–10.16)
WBC # BLD AUTO: 38.26 THOUSAND/UL (ref 4.31–10.16)
WBC # BLD AUTO: 38.52 THOUSAND/UL (ref 4.31–10.16)
WBC #/AREA URNS AUTO: NORMAL /HPF
WBC TOXIC VACUOLES BLD QL SMEAR: PRESENT

## 2024-03-13 PROCEDURE — 96376 TX/PRO/DX INJ SAME DRUG ADON: CPT

## 2024-03-13 PROCEDURE — 82947 ASSAY GLUCOSE BLOOD QUANT: CPT

## 2024-03-13 PROCEDURE — 93010 ELECTROCARDIOGRAM REPORT: CPT | Performed by: INTERNAL MEDICINE

## 2024-03-13 PROCEDURE — C9113 INJ PANTOPRAZOLE SODIUM, VIA: HCPCS | Performed by: EMERGENCY MEDICINE

## 2024-03-13 PROCEDURE — 86850 RBC ANTIBODY SCREEN: CPT | Performed by: SURGERY

## 2024-03-13 PROCEDURE — 82805 BLOOD GASES W/O2 SATURATION: CPT

## 2024-03-13 PROCEDURE — 82330 ASSAY OF CALCIUM: CPT

## 2024-03-13 PROCEDURE — 80048 BASIC METABOLIC PNL TOTAL CA: CPT

## 2024-03-13 PROCEDURE — 85610 PROTHROMBIN TIME: CPT

## 2024-03-13 PROCEDURE — 85027 COMPLETE CBC AUTOMATED: CPT

## 2024-03-13 PROCEDURE — NC001 PR NO CHARGE: Performed by: PHYSICIAN ASSISTANT

## 2024-03-13 PROCEDURE — 0DN60ZZ RELEASE STOMACH, OPEN APPROACH: ICD-10-PCS | Performed by: SURGERY

## 2024-03-13 PROCEDURE — 87040 BLOOD CULTURE FOR BACTERIA: CPT

## 2024-03-13 PROCEDURE — 85007 BL SMEAR W/DIFF WBC COUNT: CPT | Performed by: EMERGENCY MEDICINE

## 2024-03-13 PROCEDURE — 96365 THER/PROPH/DIAG IV INF INIT: CPT

## 2024-03-13 PROCEDURE — 93005 ELECTROCARDIOGRAM TRACING: CPT

## 2024-03-13 PROCEDURE — 99024 POSTOP FOLLOW-UP VISIT: CPT | Performed by: SURGERY

## 2024-03-13 PROCEDURE — 83690 ASSAY OF LIPASE: CPT | Performed by: EMERGENCY MEDICINE

## 2024-03-13 PROCEDURE — 51040 INCISE & DRAIN BLADDER: CPT | Performed by: SURGERY

## 2024-03-13 PROCEDURE — 0DB80ZZ EXCISION OF SMALL INTESTINE, OPEN APPROACH: ICD-10-PCS | Performed by: SURGERY

## 2024-03-13 PROCEDURE — 99291 CRITICAL CARE FIRST HOUR: CPT | Performed by: EMERGENCY MEDICINE

## 2024-03-13 PROCEDURE — 83605 ASSAY OF LACTIC ACID: CPT

## 2024-03-13 PROCEDURE — 86901 BLOOD TYPING SEROLOGIC RH(D): CPT

## 2024-03-13 PROCEDURE — 74177 CT ABD & PELVIS W/CONTRAST: CPT

## 2024-03-13 PROCEDURE — 99255 IP/OBS CONSLTJ NEW/EST HI 80: CPT | Performed by: PHYSICIAN ASSISTANT

## 2024-03-13 PROCEDURE — 83735 ASSAY OF MAGNESIUM: CPT | Performed by: EMERGENCY MEDICINE

## 2024-03-13 PROCEDURE — 96375 TX/PRO/DX INJ NEW DRUG ADDON: CPT

## 2024-03-13 PROCEDURE — 99223 1ST HOSP IP/OBS HIGH 75: CPT | Performed by: SURGERY

## 2024-03-13 PROCEDURE — 84295 ASSAY OF SERUM SODIUM: CPT

## 2024-03-13 PROCEDURE — 84484 ASSAY OF TROPONIN QUANT: CPT | Performed by: EMERGENCY MEDICINE

## 2024-03-13 PROCEDURE — 83735 ASSAY OF MAGNESIUM: CPT

## 2024-03-13 PROCEDURE — 80053 COMPREHEN METABOLIC PANEL: CPT | Performed by: EMERGENCY MEDICINE

## 2024-03-13 PROCEDURE — 94760 N-INVAS EAR/PLS OXIMETRY 1: CPT

## 2024-03-13 PROCEDURE — 71045 X-RAY EXAM CHEST 1 VIEW: CPT

## 2024-03-13 PROCEDURE — 86900 BLOOD TYPING SEROLOGIC ABO: CPT

## 2024-03-13 PROCEDURE — 85730 THROMBOPLASTIN TIME PARTIAL: CPT | Performed by: SURGERY

## 2024-03-13 PROCEDURE — 94002 VENT MGMT INPAT INIT DAY: CPT

## 2024-03-13 PROCEDURE — 86920 COMPATIBILITY TEST SPIN: CPT

## 2024-03-13 PROCEDURE — 87040 BLOOD CULTURE FOR BACTERIA: CPT | Performed by: EMERGENCY MEDICINE

## 2024-03-13 PROCEDURE — 36415 COLL VENOUS BLD VENIPUNCTURE: CPT | Performed by: EMERGENCY MEDICINE

## 2024-03-13 PROCEDURE — 83605 ASSAY OF LACTIC ACID: CPT | Performed by: EMERGENCY MEDICINE

## 2024-03-13 PROCEDURE — 88307 TISSUE EXAM BY PATHOLOGIST: CPT | Performed by: PATHOLOGY

## 2024-03-13 PROCEDURE — 85027 COMPLETE CBC AUTOMATED: CPT | Performed by: SURGERY

## 2024-03-13 PROCEDURE — 85730 THROMBOPLASTIN TIME PARTIAL: CPT

## 2024-03-13 PROCEDURE — 84132 ASSAY OF SERUM POTASSIUM: CPT

## 2024-03-13 PROCEDURE — 82803 BLOOD GASES ANY COMBINATION: CPT

## 2024-03-13 PROCEDURE — 82272 OCCULT BLD FECES 1-3 TESTS: CPT

## 2024-03-13 PROCEDURE — 0241U HB NFCT DS VIR RESP RNA 4 TRGT: CPT | Performed by: EMERGENCY MEDICINE

## 2024-03-13 PROCEDURE — 0T9B00Z DRAINAGE OF BLADDER WITH DRAINAGE DEVICE, OPEN APPROACH: ICD-10-PCS | Performed by: SURGERY

## 2024-03-13 PROCEDURE — 86850 RBC ANTIBODY SCREEN: CPT

## 2024-03-13 PROCEDURE — 44120 REMOVAL OF SMALL INTESTINE: CPT | Performed by: SURGERY

## 2024-03-13 PROCEDURE — 81001 URINALYSIS AUTO W/SCOPE: CPT | Performed by: EMERGENCY MEDICINE

## 2024-03-13 PROCEDURE — 85027 COMPLETE CBC AUTOMATED: CPT | Performed by: EMERGENCY MEDICINE

## 2024-03-13 PROCEDURE — 99233 SBSQ HOSP IP/OBS HIGH 50: CPT | Performed by: SURGERY

## 2024-03-13 PROCEDURE — 84100 ASSAY OF PHOSPHORUS: CPT

## 2024-03-13 PROCEDURE — 85610 PROTHROMBIN TIME: CPT | Performed by: SURGERY

## 2024-03-13 PROCEDURE — 85014 HEMATOCRIT: CPT

## 2024-03-13 PROCEDURE — 99285 EMERGENCY DEPT VISIT HI MDM: CPT

## 2024-03-13 PROCEDURE — 86901 BLOOD TYPING SEROLOGIC RH(D): CPT | Performed by: SURGERY

## 2024-03-13 PROCEDURE — 96367 TX/PROPH/DG ADDL SEQ IV INF: CPT

## 2024-03-13 PROCEDURE — 86900 BLOOD TYPING SEROLOGIC ABO: CPT | Performed by: SURGERY

## 2024-03-13 RX ORDER — METOCLOPRAMIDE HYDROCHLORIDE 5 MG/ML
10 INJECTION INTRAMUSCULAR; INTRAVENOUS ONCE
Status: COMPLETED | OUTPATIENT
Start: 2024-03-13 | End: 2024-03-13

## 2024-03-13 RX ORDER — MAGNESIUM HYDROXIDE 1200 MG/15ML
LIQUID ORAL AS NEEDED
Status: DISCONTINUED | OUTPATIENT
Start: 2024-03-13 | End: 2024-03-13 | Stop reason: HOSPADM

## 2024-03-13 RX ORDER — HEPARIN SODIUM 1000 [USP'U]/ML
2000 INJECTION, SOLUTION INTRAVENOUS; SUBCUTANEOUS EVERY 6 HOURS PRN
Status: DISCONTINUED | OUTPATIENT
Start: 2024-03-13 | End: 2024-03-13 | Stop reason: HOSPADM

## 2024-03-13 RX ORDER — HEPARIN SODIUM 1000 [USP'U]/ML
4000 INJECTION, SOLUTION INTRAVENOUS; SUBCUTANEOUS EVERY 6 HOURS PRN
Status: DISCONTINUED | OUTPATIENT
Start: 2024-03-13 | End: 2024-03-13 | Stop reason: HOSPADM

## 2024-03-13 RX ORDER — SODIUM CHLORIDE, SODIUM GLUCONATE, SODIUM ACETATE, POTASSIUM CHLORIDE, MAGNESIUM CHLORIDE, SODIUM PHOSPHATE, DIBASIC, AND POTASSIUM PHOSPHATE .53; .5; .37; .037; .03; .012; .00082 G/100ML; G/100ML; G/100ML; G/100ML; G/100ML; G/100ML; G/100ML
1000 INJECTION, SOLUTION INTRAVENOUS ONCE
Status: COMPLETED | OUTPATIENT
Start: 2024-03-13 | End: 2024-03-13

## 2024-03-13 RX ORDER — SODIUM CHLORIDE 9 MG/ML
INJECTION, SOLUTION INTRAVENOUS CONTINUOUS PRN
Status: DISCONTINUED | OUTPATIENT
Start: 2024-03-13 | End: 2024-03-13

## 2024-03-13 RX ORDER — SUCCINYLCHOLINE/SOD CL,ISO/PF 100 MG/5ML
SYRINGE (ML) INTRAVENOUS AS NEEDED
Status: DISCONTINUED | OUTPATIENT
Start: 2024-03-13 | End: 2024-03-13

## 2024-03-13 RX ORDER — FENTANYL CITRATE 50 UG/ML
100 INJECTION, SOLUTION INTRAMUSCULAR; INTRAVENOUS ONCE
Status: COMPLETED | OUTPATIENT
Start: 2024-03-13 | End: 2024-03-13

## 2024-03-13 RX ORDER — ONDANSETRON 2 MG/ML
4 INJECTION INTRAMUSCULAR; INTRAVENOUS ONCE
Qty: 2 ML | Refills: 0 | Status: DISCONTINUED | OUTPATIENT
Start: 2024-03-13 | End: 2024-03-13 | Stop reason: CLARIF

## 2024-03-13 RX ORDER — ALBUMIN, HUMAN INJ 5% 5 %
25 SOLUTION INTRAVENOUS ONCE
Status: COMPLETED | OUTPATIENT
Start: 2024-03-13 | End: 2024-03-13

## 2024-03-13 RX ORDER — HEPARIN SODIUM 1000 [USP'U]/ML
4000 INJECTION, SOLUTION INTRAVENOUS; SUBCUTANEOUS ONCE
Status: COMPLETED | OUTPATIENT
Start: 2024-03-13 | End: 2024-03-13

## 2024-03-13 RX ORDER — ROCURONIUM BROMIDE 10 MG/ML
INJECTION, SOLUTION INTRAVENOUS AS NEEDED
Status: DISCONTINUED | OUTPATIENT
Start: 2024-03-13 | End: 2024-03-13

## 2024-03-13 RX ORDER — FENTANYL CITRATE 50 UG/ML
50 INJECTION, SOLUTION INTRAMUSCULAR; INTRAVENOUS EVERY 2 HOUR PRN
Status: DISCONTINUED | OUTPATIENT
Start: 2024-03-13 | End: 2024-03-15

## 2024-03-13 RX ORDER — HEPARIN SODIUM 10000 [USP'U]/100ML
3-30 INJECTION, SOLUTION INTRAVENOUS
Status: DISCONTINUED | OUTPATIENT
Start: 2024-03-13 | End: 2024-03-13 | Stop reason: HOSPADM

## 2024-03-13 RX ORDER — PROPOFOL 10 MG/ML
INJECTION, EMULSION INTRAVENOUS AS NEEDED
Status: DISCONTINUED | OUTPATIENT
Start: 2024-03-13 | End: 2024-03-13

## 2024-03-13 RX ORDER — HEPARIN SODIUM 10000 [USP'U]/100ML
3-30 INJECTION, SOLUTION INTRAVENOUS
Status: DISCONTINUED | OUTPATIENT
Start: 2024-03-13 | End: 2024-03-14

## 2024-03-13 RX ORDER — FENTANYL CITRATE-0.9 % NACL/PF 10 MCG/ML
150 PLASTIC BAG, INJECTION (ML) INTRAVENOUS CONTINUOUS
Status: DISCONTINUED | OUTPATIENT
Start: 2024-03-13 | End: 2024-03-15

## 2024-03-13 RX ORDER — CALCIUM GLUCONATE 20 MG/ML
2 INJECTION, SOLUTION INTRAVENOUS ONCE
Status: COMPLETED | OUTPATIENT
Start: 2024-03-13 | End: 2024-03-13

## 2024-03-13 RX ORDER — PHENYLEPHRINE HYDROCHLORIDE 10 MG/ML
INJECTION INTRAVENOUS
Status: COMPLETED
Start: 2024-03-13 | End: 2024-03-13

## 2024-03-13 RX ORDER — LIDOCAINE HYDROCHLORIDE 10 MG/ML
INJECTION, SOLUTION EPIDURAL; INFILTRATION; INTRACAUDAL; PERINEURAL AS NEEDED
Status: DISCONTINUED | OUTPATIENT
Start: 2024-03-13 | End: 2024-03-13

## 2024-03-13 RX ORDER — PANTOPRAZOLE SODIUM 40 MG/10ML
40 INJECTION, POWDER, LYOPHILIZED, FOR SOLUTION INTRAVENOUS ONCE
Status: COMPLETED | OUTPATIENT
Start: 2024-03-13 | End: 2024-03-13

## 2024-03-13 RX ORDER — ONDANSETRON 2 MG/ML
4 INJECTION INTRAMUSCULAR; INTRAVENOUS ONCE
Status: COMPLETED | OUTPATIENT
Start: 2024-03-13 | End: 2024-03-13

## 2024-03-13 RX ORDER — MIDAZOLAM HYDROCHLORIDE 2 MG/2ML
INJECTION, SOLUTION INTRAMUSCULAR; INTRAVENOUS AS NEEDED
Status: DISCONTINUED | OUTPATIENT
Start: 2024-03-13 | End: 2024-03-13

## 2024-03-13 RX ORDER — MAGNESIUM SULFATE HEPTAHYDRATE 40 MG/ML
2 INJECTION, SOLUTION INTRAVENOUS ONCE
Status: COMPLETED | OUTPATIENT
Start: 2024-03-13 | End: 2024-03-13

## 2024-03-13 RX ORDER — PROPOFOL 10 MG/ML
5-50 INJECTION, EMULSION INTRAVENOUS
Status: DISCONTINUED | OUTPATIENT
Start: 2024-03-13 | End: 2024-03-14

## 2024-03-13 RX ORDER — ACETAMINOPHEN 10 MG/ML
1000 INJECTION, SOLUTION INTRAVENOUS ONCE
Status: COMPLETED | OUTPATIENT
Start: 2024-03-13 | End: 2024-03-13

## 2024-03-13 RX ORDER — DEXMEDETOMIDINE HYDROCHLORIDE 4 UG/ML
.1-.7 INJECTION, SOLUTION INTRAVENOUS
Status: DISCONTINUED | OUTPATIENT
Start: 2024-03-13 | End: 2024-03-15

## 2024-03-13 RX ORDER — CHLORHEXIDINE GLUCONATE ORAL RINSE 1.2 MG/ML
15 SOLUTION DENTAL EVERY 12 HOURS SCHEDULED
Status: DISCONTINUED | OUTPATIENT
Start: 2024-03-13 | End: 2024-03-13

## 2024-03-13 RX ORDER — SODIUM CHLORIDE, SODIUM GLUCONATE, SODIUM ACETATE, POTASSIUM CHLORIDE, MAGNESIUM CHLORIDE, SODIUM PHOSPHATE, DIBASIC, AND POTASSIUM PHOSPHATE .53; .5; .37; .037; .03; .012; .00082 G/100ML; G/100ML; G/100ML; G/100ML; G/100ML; G/100ML; G/100ML
75 INJECTION, SOLUTION INTRAVENOUS CONTINUOUS
Status: DISCONTINUED | OUTPATIENT
Start: 2024-03-13 | End: 2024-03-16

## 2024-03-13 RX ORDER — CHLORHEXIDINE GLUCONATE ORAL RINSE 1.2 MG/ML
15 SOLUTION DENTAL EVERY 12 HOURS SCHEDULED
Status: DISCONTINUED | OUTPATIENT
Start: 2024-03-13 | End: 2024-03-26 | Stop reason: HOSPADM

## 2024-03-13 RX ORDER — LIDOCAINE HYDROCHLORIDE 20 MG/ML
1 JELLY TOPICAL ONCE
Status: COMPLETED | OUTPATIENT
Start: 2024-03-13 | End: 2024-03-13

## 2024-03-13 RX ORDER — SODIUM CHLORIDE, SODIUM LACTATE, POTASSIUM CHLORIDE, CALCIUM CHLORIDE 600; 310; 30; 20 MG/100ML; MG/100ML; MG/100ML; MG/100ML
INJECTION, SOLUTION INTRAVENOUS CONTINUOUS PRN
Status: DISCONTINUED | OUTPATIENT
Start: 2024-03-13 | End: 2024-03-13

## 2024-03-13 RX ADMIN — HEPARIN SODIUM 4000 UNITS: 1000 INJECTION INTRAVENOUS; SUBCUTANEOUS at 12:00

## 2024-03-13 RX ADMIN — Medication 75 MCG/HR: at 15:52

## 2024-03-13 RX ADMIN — TOPICAL ANESTHETIC 2 SPRAY: 200 SPRAY DENTAL; PERIODONTAL at 11:26

## 2024-03-13 RX ADMIN — ROCURONIUM BROMIDE 50 MG: 10 INJECTION, SOLUTION INTRAVENOUS at 12:40

## 2024-03-13 RX ADMIN — ONDANSETRON 4 MG: 2 INJECTION INTRAMUSCULAR; INTRAVENOUS at 08:34

## 2024-03-13 RX ADMIN — SUGAMMADEX 100 MG: 100 INJECTION, SOLUTION INTRAVENOUS at 13:46

## 2024-03-13 RX ADMIN — METOCLOPRAMIDE 10 MG: 5 INJECTION, SOLUTION INTRAMUSCULAR; INTRAVENOUS at 11:13

## 2024-03-13 RX ADMIN — SODIUM CHLORIDE, SODIUM GLUCONATE, SODIUM ACETATE, POTASSIUM CHLORIDE, MAGNESIUM CHLORIDE, SODIUM PHOSPHATE, DIBASIC, AND POTASSIUM PHOSPHATE 75 ML/HR: .53; .5; .37; .037; .03; .012; .00082 INJECTION, SOLUTION INTRAVENOUS at 16:01

## 2024-03-13 RX ADMIN — PHENYLEPHRINE HYDROCHLORIDE 25 MCG/MIN: 10 INJECTION INTRAVENOUS at 16:20

## 2024-03-13 RX ADMIN — FENTANYL CITRATE 100 MCG: 50 INJECTION INTRAMUSCULAR; INTRAVENOUS at 15:53

## 2024-03-13 RX ADMIN — PROPOFOL 35 MCG/KG/MIN: 10 INJECTION, EMULSION INTRAVENOUS at 16:01

## 2024-03-13 RX ADMIN — IOHEXOL 100 ML: 350 INJECTION, SOLUTION INTRAVENOUS at 10:05

## 2024-03-13 RX ADMIN — LIDOCAINE HYDROCHLORIDE 1 APPLICATION: 20 JELLY TOPICAL at 11:27

## 2024-03-13 RX ADMIN — SODIUM CHLORIDE, SODIUM LACTATE, POTASSIUM CHLORIDE, AND CALCIUM CHLORIDE: .6; .31; .03; .02 INJECTION, SOLUTION INTRAVENOUS at 12:20

## 2024-03-13 RX ADMIN — HEPARIN SODIUM 18 UNITS/KG/HR: 10000 INJECTION, SOLUTION INTRAVENOUS at 12:01

## 2024-03-13 RX ADMIN — PHENYLEPHRINE HYDROCHLORIDE 10 MG: 10 INJECTION INTRAVENOUS at 16:20

## 2024-03-13 RX ADMIN — MAGNESIUM SULFATE HEPTAHYDRATE 2 G: 40 INJECTION, SOLUTION INTRAVENOUS at 17:07

## 2024-03-13 RX ADMIN — LIDOCAINE HYDROCHLORIDE 50 MG: 10 INJECTION, SOLUTION EPIDURAL; INFILTRATION; INTRACAUDAL at 12:29

## 2024-03-13 RX ADMIN — SODIUM CHLORIDE 1000 ML: 0.9 INJECTION, SOLUTION INTRAVENOUS at 08:29

## 2024-03-13 RX ADMIN — DEXMEDETOMIDINE HYDROCHLORIDE 0.2 MCG/KG/HR: 4 INJECTION, SOLUTION INTRAVENOUS at 19:11

## 2024-03-13 RX ADMIN — SODIUM CHLORIDE, SODIUM GLUCONATE, SODIUM ACETATE, POTASSIUM CHLORIDE, MAGNESIUM CHLORIDE, SODIUM PHOSPHATE, DIBASIC, AND POTASSIUM PHOSPHATE 1000 ML: .53; .5; .37; .037; .03; .012; .00082 INJECTION, SOLUTION INTRAVENOUS at 15:47

## 2024-03-13 RX ADMIN — PHENYLEPHRINE HYDROCHLORIDE 100 MCG/MIN: 10 INJECTION INTRAVENOUS at 12:37

## 2024-03-13 RX ADMIN — Medication 100 MG: at 12:29

## 2024-03-13 RX ADMIN — SODIUM CHLORIDE: 0.9 INJECTION, SOLUTION INTRAVENOUS at 12:40

## 2024-03-13 RX ADMIN — CALCIUM GLUCONATE 2 G: 20 INJECTION, SOLUTION INTRAVENOUS at 17:00

## 2024-03-13 RX ADMIN — ALBUMIN (HUMAN) 25 G: 12.5 INJECTION, SOLUTION INTRAVENOUS at 23:08

## 2024-03-13 RX ADMIN — MIDAZOLAM 2 MG: 1 INJECTION INTRAMUSCULAR; INTRAVENOUS at 13:50

## 2024-03-13 RX ADMIN — ACETAMINOPHEN 1000 MG: 10 INJECTION INTRAVENOUS at 08:52

## 2024-03-13 RX ADMIN — PANTOPRAZOLE SODIUM 40 MG: 40 INJECTION, POWDER, FOR SOLUTION INTRAVENOUS at 08:38

## 2024-03-13 RX ADMIN — PIPERACILLIN AND TAZOBACTAM 4.5 G: 4; .5 INJECTION, POWDER, FOR SOLUTION INTRAVENOUS at 10:47

## 2024-03-13 RX ADMIN — HEPARIN SODIUM 8 UNITS/KG/HR: 10000 INJECTION, SOLUTION INTRAVENOUS at 16:00

## 2024-03-13 RX ADMIN — CHLORHEXIDINE GLUCONATE 15 ML: 1.2 SOLUTION ORAL at 20:59

## 2024-03-13 RX ADMIN — ALBUMIN (HUMAN) 25 G: 12.5 INJECTION, SOLUTION INTRAVENOUS at 21:10

## 2024-03-13 RX ADMIN — SODIUM CHLORIDE, SODIUM GLUCONATE, SODIUM ACETATE, POTASSIUM CHLORIDE, MAGNESIUM CHLORIDE, SODIUM PHOSPHATE, DIBASIC, AND POTASSIUM PHOSPHATE 1000 ML: .53; .5; .37; .037; .03; .012; .00082 INJECTION, SOLUTION INTRAVENOUS at 19:12

## 2024-03-13 RX ADMIN — PIPERACILLIN SODIUM AND TAZOBACTAM SODIUM 4.5 G: 36; 4.5 INJECTION, POWDER, LYOPHILIZED, FOR SOLUTION INTRAVENOUS at 17:22

## 2024-03-13 RX ADMIN — PIPERACILLIN SODIUM AND TAZOBACTAM SODIUM 4.5 G: 36; 4.5 INJECTION, POWDER, LYOPHILIZED, FOR SOLUTION INTRAVENOUS at 20:06

## 2024-03-13 RX ADMIN — ONDANSETRON 4 MG: 2 INJECTION INTRAMUSCULAR; INTRAVENOUS at 10:25

## 2024-03-13 RX ADMIN — PROPOFOL 150 MG: 10 INJECTION, EMULSION INTRAVENOUS at 12:29

## 2024-03-13 NOTE — PROGRESS NOTES
Vascular Attending Brief Note:    Patient seen/examined upon arrival to hospitals .  Currently intubated, sedated, on low dose pressor; critically ill.    Case briefly discussed with resident Surgeon at [facility] involved in Mr Valera's surgery.  Reportedly 87 yo man who presented with 3-4 days of abdominal pain.  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  He was taken urgently to OR for Exploratory lap/SBR/Abthera placement and now transferred to hospitals for higher level of care. Intra-op note/findings images noted.    Images reviewed/case discussed with interventional radiologists.  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    Intervention if needed would require a challenging Transvenous/transhepatic approach to portal venous system which in this particular circumstance is further complicated by the thrombosis of the intrahepatic portal branches.    Recommend continued aggressive resuscitation/anticoagulation which continues to be mainstay of treatment  for MVT with invasive measures reserved for failure of medical therapy.    Notify on call vascular/IR if patient were to further deteriorate clinically this evening/overnight.  Plan for 2 nd look laparotomy with ACS tomorrow and sooner if indicated.   Plan communicated directly to ICU CRLUL Enriquez.

## 2024-03-13 NOTE — CONSULTS
"Consultation - Surgery  Jf Valera 88 y.o. male MRN: [id number]  Unit/Bed#: [facility] 514-01 Encounter: [id number]        Assessment/Plan     Assessment:  Jf Valera is a 88 y.o. male w/acute mesenteric ischemia  S/p exlap SBR, AUREA, left in discontinuity, suprapubic tube insertion, abthera placement      Plan:  OR in am for take back  Appreciate ICU level care  Wean pressors as able  Trend end points of resuscitation  Strict IO monitoring  Maintain Abthera vac        History of Present Illness     HPI:  Jf Valera is a 88 y.o. male who presents as a transfer from [facility] with open abdomen. Patient presented with acute mesenteric ischemia in the setting of portal veins thrombus. He was taken to the OR where intraoperative findings included: \"150 cm frankly ischemic distal small bowel resected and left in discontinuity, approximately 10 cm cuff of viable small bowel proximal to ileocecal valve, suprapubic gonzalez catheter placed via open approach\". Patient was transferred over with abthera vac in place. He is intubated and sedated w/propofol. Currently on stress dose steroids, zosyn, receiving IVF bolus. He has been weaned from pressors. Vascular surgery was consulted given mesenteric ischemia and ongoing operative planning. Per chart review, vascular recommends aggressive resuscitation and anticoagulation and are available during second look laparotomy tomorrow.     Review of Systems   Unable to perform ROS: Intubated       Historical Information   Past Medical History:   Diagnosis Date    Hypertension      No past surgical history on file.  Social History   Social History     Substance and Sexual Activity   Alcohol Use Never     Social History     Substance and Sexual Activity   Drug Use Never     Social History     Tobacco Use   Smoking Status Never    Passive exposure: Never   Smokeless Tobacco Never     Family History: No family history on file.    Meds/Allergies   PTA meds:   Prior to Admission Medications "   Prescriptions Last Dose Informant Patient Reported? Taking?   amLODIPine (NORVASC) 10 mg tablet   No No   Sig: Take 1 tablet (10 mg total) by mouth daily   aspirin 81 mg chewable tablet   No No   Sig: Chew 1 tablet (81 mg total) daily Do not start before March 12, 2023.   Patient not taking: Reported on 3/13/2024   atorvastatin (LIPITOR) 40 mg tablet   No No   Sig: Take 1 tablet (40 mg total) by mouth every evening   Patient not taking: Reported on 3/13/2024   ergocalciferol (VITAMIN D2) 50,000 units   No No   Sig: Take 1 capsule (50,000 Units total) by mouth once a week for 4 doses Do not start before March 17, 2023.   lisinopril (ZESTRIL) 10 mg tablet   Yes No   Sig: Take 10 mg by mouth daily   Patient not taking: Reported on 3/13/2024      Facility-Administered Medications: None     No Known Allergies    Objective   First Vitals:   Blood Pressure: 117/58 (03/13/24 1553)  Pulse: 73 (03/13/24 1553)  Temperature: (!) 96.8 °F (36 °C) (03/13/24 1656)  Temp Source: Esophageal (03/13/24 1656)  Respirations: 16 (03/13/24 1640)  Weight - Scale: 54 kg (119 lb 0.8 oz) (03/13/24 1644)  SpO2: 100 % (03/13/24 1541)    Current Vitals:   Blood Pressure: 110/59 (03/13/24 1800)  Pulse: 68 (03/13/24 1800)  Temperature: (!) 96.6 °F (35.9 °C) (03/13/24 1800)  Temp Source: Esophageal (03/13/24 1800)  Respirations: 17 (03/13/24 1800)  Weight - Scale: 54 kg (119 lb 0.8 oz) (03/13/24 1644)  SpO2: 100 % (03/13/24 1800)      Intake/Output Summary (Last 24 hours) at 3/13/2024 1919  Last data filed at 3/13/2024 1812  Gross per 24 hour   Intake 1327 ml   Output 575 ml   Net 752 ml       Invasive Devices       Peripheral Intravenous Line  Duration             Peripheral IV 03/13/24 Left;Ventral (anterior) Forearm <1 day    Peripheral IV 03/13/24 Proximal;Right;Ventral (anterior) Forearm <1 day    Peripheral IV 03/13/24 Right Antecubital <1 day              Arterial Line  Duration             Arterial Line 03/13/24 Right Radial <1 day               Drain  Duration             NG/OG/Enteral Tube Nasogastric 16 Fr Left nare <1 day    Suprapubic Catheter 18 Fr. <1 day              Airway  Duration             ETT  Oral 7.5 mm <1 day                    Physical Exam  Vitals reviewed.   Constitutional:       Appearance: He is ill-appearing.      Comments: frail   HENT:      Head: Normocephalic and atraumatic.      Right Ear: External ear normal.      Left Ear: External ear normal.      Nose: Nose normal.      Comments: NGT with thick gastric output     Mouth/Throat:      Comments: ETT  Cardiovascular:      Rate and Rhythm: Normal rate.   Pulmonary:      Effort: No respiratory distress.      Comments: Intubated, ETT, no respiratory distress on vent  Abdominal:      Palpations: Abdomen is soft.      Comments: Abthera vac in place holding suction with minimal serosang output   Musculoskeletal:      Cervical back: Normal range of motion.      Right lower leg: No edema.      Left lower leg: No edema.   Skin:     General: Skin is warm and dry.   Neurological:      Comments: Sedated, unable to assess given propofol          Lab Results: CBC:   Lab Results   Component Value Date    WBC 32.14 (H) 03/13/2024    HGB 12.6 03/13/2024    HCT 37.4 03/13/2024    MCV 84 03/13/2024     (H) 03/13/2024    RBC 4.48 03/13/2024    MCH 28.1 03/13/2024    MCHC 33.7 03/13/2024    RDW 15.1 03/13/2024    MPV 9.6 03/13/2024   , CMP:   Lab Results   Component Value Date    SODIUM 135 03/13/2024    K 4.4 03/13/2024     03/13/2024    CO2 21 03/13/2024    CO2 19 (L) 03/13/2024    BUN 26 (H) 03/13/2024    CREATININE 1.37 (H) 03/13/2024    GLUCOSE 158 (H) 03/13/2024    CALCIUM 7.6 (L) 03/13/2024    AST 18 03/13/2024    ALT 15 03/13/2024    ALKPHOS 81 03/13/2024    EGFR 46 03/13/2024   , Coagulation:   Lab Results   Component Value Date    INR 1.51 (H) 03/13/2024   , Urinalysis:   Lab Results   Component Value Date    COLORU Yellow 03/13/2024    CLARITYU Clear 03/13/2024    SPECGRAV  "<=1.005 03/13/2024    PHUR 6.0 03/13/2024    LEUKOCYTESUR Negative 03/13/2024    NITRITE Negative 03/13/2024    GLUCOSEU Negative 03/13/2024    KETONESU Negative 03/13/2024    BILIRUBINUR Negative 03/13/2024    BLOODU Trace-Intact (A) 03/13/2024   , Amylase: No results found for: \"AMYLASE\", Lipase:   Lab Results   Component Value Date    LIPASE 38 03/13/2024     Imaging: I have personally reviewed pertinent reports.    EKG, Pathology, and Other Studies: I have personally reviewed pertinent reports.      Code Status: Level 1 - Full Code  Advance Directive and Living Will:      Power of :    POLST:        "

## 2024-03-13 NOTE — H&P
H&P Exam - General Surgery   Jf Valera 88 y.o. male MRN: [id number]  Unit/Bed#: ED 08 Encounter: [id number]    Assessment/Plan     Assessment:  87 y/o M w acute mesenteric ischemia 2/2 portal venous thrombus.     Vitals stable. Tachycardic. Abdomen distended, nontender.     Labs:   Lactate: 2.8  Wbc: 38    Plan:  NPO/ NGT  IV abx, zosyn,   Heparin gtt, vte high  To OR for ex lap, possible bowel resection, all indicated procedures  Will need transfer to [facility] for IR/ Vascular surgery mgt of portal venous thrombus.     History of Present Illness   History, ROS and PFSH unobtainable from any source due to none.  HPI:  Jf Valera is a 88 y.o. male w PMH of HTN, no prior abdominal surgeries, who presents with BRBPR, N/V and abdominal pain today. Significant leukocytosis, and lactic acidosis. Reports 2-3 d abdominal pain. Denied h/o c-scope. No h/o MI or stroke. Does not take ac or ap therapy. Explained pt will need emergent ex lap, possible bowel resection, then interventional radiology/ vascular intervention of portal venous thrombus. Family is agreeable and wants full code status.     Review of Systems   Constitutional:  Positive for chills and fever.   HENT: Negative.     Eyes: Negative.    Respiratory: Negative.     Cardiovascular: Negative.    Gastrointestinal:  Positive for abdominal distention, abdominal pain, nausea and vomiting.   Endocrine: Negative.    Genitourinary: Negative.    Musculoskeletal: Negative.    Skin: Negative.    Allergic/Immunologic: Negative.    Neurological: Negative.    Hematological: Negative.    Psychiatric/Behavioral: Negative.         Historical Information   Past Medical History:   Diagnosis Date    Hypertension      History reviewed. No pertinent surgical history.  Social History   Social History     Substance and Sexual Activity   Alcohol Use Never     Social History     Substance and Sexual Activity   Drug Use Never     Social History     Tobacco Use   Smoking Status Never     "Passive exposure: Never   Smokeless Tobacco Never     E-Cigarette/Vaping    E-Cigarette Use Never User      E-Cigarette/Vaping Substances    Nicotine No     THC No     CBD No     Flavoring No     Other No     Unknown No      Family History: non-contributory    Meds/Allergies   all current active meds have been reviewed  No Known Allergies    Objective   Vitals: Blood pressure 126/67, pulse 97, temperature (!) 97.4 °F (36.3 °C), temperature source Oral, resp. rate 20, height 5' 4\" (1.626 m), weight 54.6 kg (120 lb 5.9 oz), SpO2 99%.,Body mass index is 20.66 kg/m².    Intake/Output Summary (Last 24 hours) at 3/13/2024 1136  Last data filed at 3/13/2024 1112  Gross per 24 hour   Intake 1200 ml   Output --   Net 1200 ml     Invasive Devices       Peripheral Intravenous Line  Duration             Peripheral IV 03/13/24 Left;Ventral (anterior) Forearm <1 day    Peripheral IV 03/13/24 Proximal;Right;Ventral (anterior) Forearm <1 day              Drain  Duration             NG/OG/Enteral Tube Nasogastric 16 Fr Left nare <1 day                    Physical Exam  Vitals reviewed.   Constitutional:       General: He is in acute distress.      Appearance: Normal appearance. He is not toxic-appearing.   HENT:      Head: Normocephalic.      Nose: Nose normal.   Eyes:      Pupils: Pupils are equal, round, and reactive to light.   Cardiovascular:      Pulses: Normal pulses.   Pulmonary:      Effort: Pulmonary effort is normal.   Abdominal:      General: There is distension.      Palpations: Abdomen is soft.      Tenderness: There is abdominal tenderness. There is guarding.   Genitourinary:     Comments: deferred  Musculoskeletal:         General: Normal range of motion.      Cervical back: Normal range of motion and neck supple.   Skin:     General: Skin is warm.      Capillary Refill: Capillary refill takes 2 to 3 seconds.   Neurological:      General: No focal deficit present.      Mental Status: He is alert.   Psychiatric:        "  Mood and Affect: Mood normal.         Lab Results: I have personally reviewed pertinent reports.    Imaging: I have personally reviewed pertinent reports.    EKG, Pathology, and Other Studies: I have personally reviewed pertinent reports.      Code Status: Prior  Advance Directive and Living Will:      Power of :    POLST:      Counseling / Coordination of Care  Counseling/Coordination of Care: Total floor / unit time spent today 30 minutes. Greater than 50% of total time was spent with the patient and / or family counseling and / or coordination of care. A description of the counseling / coordination of care: 30

## 2024-03-13 NOTE — Clinical Note
Case was discussed with Dr. Pate and the patient's admission status was agreed to be Admission Status: inpatient status to the service of Dr. Pate.

## 2024-03-13 NOTE — ANESTHESIA PROCEDURE NOTES
Arterial Line Insertion    Performed by: Joaquín Schulz DO  Authorized by: Joaquín Schulz DO  Consent: Verbal consent obtained. Written consent obtained.  Risks and benefits: risks, benefits and alternatives were discussed  Consent given by: patient  Patient understanding: patient states understanding of the procedure being performed  Patient consent: the patient's understanding of the procedure matches consent given  Procedure consent: procedure consent matches procedure scheduled  Relevant documents: relevant documents present and verified  Test results: test results available and properly labeled  Site marked: the operative site was marked  Radiology Images: Radiology Images displayed and confirmed. If images not available, report reviewed  Patient identity confirmed: arm band  Preparation: Patient was prepped and draped in the usual sterile fashion.  Indications: multiple ABGs and hemodynamic monitoring  Orientation:  Right  Location: radial artery  Sedation:  Patient sedated: yes    Procedure Details:  Needle gauge: 20  Number of attempts: 1    Post-procedure:  Post-procedure: dressing applied  Waveform: good waveform  Post-procedure CNS: normal  Patient tolerance: patient tolerated the procedure well with no immediate complications

## 2024-03-13 NOTE — PLAN OF CARE
Problem: SAFETY,RESTRAINT: NV/NON-SELF DESTRUCTIVE BEHAVIOR  Goal: Remains free of harm/injury (restraint for non violent/non self-detsructive behavior)  Description: INTERVENTIONS:  - Instruct patient/family regarding restraint use   - Assess and monitor physiologic and psychological status   - Provide interventions and comfort measures to meet assessed patient needs   - Identify and implement measures to help patient regain control  - Assess readiness for release of restraint   Outcome: Progressing     Problem: SAFETY ADULT  Goal: Patient will remain free of falls  Description: INTERVENTIONS:  - Educate patient/family on patient safety including physical limitations  - Instruct patient to call for assistance with activity   - Consult OT/PT to assist with strengthening/mobility   - Keep Call bell within reach  - Keep bed low and locked with side rails adjusted as appropriate  - Keep care items and personal belongings within reach  - Initiate and maintain comfort rounds  - Make Fall Risk Sign visible to staff  - Apply yellow socks and bracelet for high fall risk patients  - Consider moving patient to room near nurses station  Outcome: Progressing     Problem: Knowledge Deficit  Goal: Patient/family/caregiver demonstrates understanding of disease process, treatment plan, medications, and discharge instructions  Description: Complete learning assessment and assess knowledge base.  Interventions:  - Provide teaching at level of understanding  - Provide teaching via preferred learning methods  Outcome: Progressing     Problem: BEHAVIOR  Goal: Pt/Family maintain appropriate behavior and adhere to behavioral management agreement, if implemented  Description: INTERVENTIONS:  - Assess the family dynamic   - Encourage verbalization of thoughts and concerns in a socially appropriate manner  - Assess patient/family's coping skills and non-compliant behavior (including use of illegal substances).  - Utilize positive,  consistent limit setting strategies supporting safety of patient, staff and others  - Initiate consult with Case Management, Spiritual Care or other ancillary services as appropriate  - If a patient's/visitor's behavior jeopardizes the safety of the patient, staff, or others, refer to organization procedure.   - Notify Security of behavior or suspected illegal substances which indicate the need for search of the patient and/or belongings  - Encourage participation in the decision making process about a behavioral management agreement; implement if patient meets criteria  Outcome: Progressing     Problem: SPIRITUAL CARE  Goal: Pt/Family able to move forward in process of forgiving self, others and/or higher power  Description: INTERVENTIONS:  - Assist patient with any spiritual needs/requests such as communion, confession, anointing, etc  - Explore guilt and help patient/family identify possible spiritual/cultural beliefs and values  - Explore possibilities of making amends & reconciliation with self, others, and/or a greater power  - Guide patient/family in identifying painful feelings  - Help patient explore and identify spiritual beliefs, cultural understandings or values that may help or hinder letting go of issue  - Help patient explore feelings of anger, bitterness, resentment, anxiety   Help patient/family identify and examine the situation in which these feelings are experienced  - Help patient/family identify destructive displacement of feelings onto other individuals  - Refer patient to formal counseling and/or to [facility] community for further support as needed or per request  Outcome: Progressing     Problem: Nutrition/Hydration-ADULT  Goal: Nutrient/Hydration intake appropriate for improving, restoring or maintaining nutritional needs  Description: Monitor and assess patient's nutrition/hydration status for malnutrition. Collaborate with interdisciplinary team and initiate plan and interventions as ordered.   Monitor patient's weight and dietary intake as ordered or per policy. Utilize nutrition screening tool and intervene as necessary. Determine patient's food preferences and provide high-protein, high-caloric foods as appropriate.     INTERVENTIONS:  - Monitor oral intake, urinary output, labs, and treatment plans  - Assess nutrition and hydration status and recommend course of action  - Evaluate amount of meals eaten  - Assist patient with eating if necessary   - Allow adequate time for meals  - Recommend/ encourage appropriate diets, oral nutritional supplements, and vitamin/mineral supplements  - Order, calculate, and assess calorie counts as needed  - Recommend, monitor, and adjust tube feedings and TPN/PPN based on assessed needs  - Assess need for intravenous fluids  - Provide specific nutrition/hydration education as appropriate  - Include patient/family/caregiver in decisions related to nutrition  Outcome: Progressing

## 2024-03-13 NOTE — H&P
[facility]  H&P: Critical Care  Name: Jf Valera 88 y.o. male I MRN: [id number]  [facility] Date of Admission: 3/13/2024   Date of Service: 3/13/2024 I Hospital Day: 0      Assessment/Plan     Neuro: No acute issues  Plan:  Sedation/analgesia: fentanyl 75mcg/hr gtt, propofol gtt, 50 fentanyl q2 prn  If hypotension worsens, d/c propofol and initiate precedex for sedation  Delirium precautions     CV:   Diagnosis: chronic hypertension  Plan: holding home lisinopril 10 mg daily and amlodipine 10 mg daily in setting of hypotension    Diagnosis: acute hypotension  Unspecified, likely multifactorial. Cannot exclude distributive shock at this time  Plan:   Hima currently at 30, wean as able  Continue fluid resuscitation  Trend end points  Maintain MAP's>65    Pulm:  No acute issues  Intubated post-operatively, maintain while sedated and for upcoming procedures  Confirmed proper tube place via CXR  Current settings: 16/400/40/6  ABG's as needed  Maintain sats > 88%    GI:   Diagnosis: mesenteric ischemia  S/p ex-lap, lysis of adhesions, small bowel obstruction  Abthera vac in place, draining serosanguinous fluid  Plan:  General surgery consulted, appreciate recs     :   Diagnosis: LISA  Initial creatinine 1.89, baseline 0.95-1.10  Downtrending after fluid resuscitation; currently 1.37  Plan:  Continue to monitor     Diagnosis: urinary retention, unable to insert gonzalez catheter  Enlarged prostate see on CT  S/p suprapubic catheter placement  Plan:  Monitor I/O's  Trend creatinine     F/E/N:    F: isolyte 75ml/hr, 1x isolyte 1 L bolus  E: will replete mag and ca; monitor, replete as needed  N: NPO    Heme/Onc:   Diagnosis: venous portal system thrombosis  3/13 CT a/p: 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   Unknown source  Plan:  Heparin gtt; currently held due to supra therapeutic  "APTT  Vascular surgery consulted, appreciate recs   Consider further coagulopathy workup      No VTE   Endo:   No acute issues  Monitor blood glucose, goal 140-160     ID:   Diagnosis: leukocytosis   Initial WBC of 38.26  Likely in setting of mesenteric ischemia  Plan:  Monitor WBC's and fever curve   Zosyn 4.5 mg q8h  Blood cultures x2 pending   UA pending   Monitor WBC's and fever curve  Fluid resuscitation    MSK/Skin:   No acute issues  Frequent skin checks      Disposition: Critical care       History of Present Illness     HPI: Jf Valera is a 88 y.o.  with PMH of HTN who presents as Level 1 transfer from [facility] with diffuse portal system venous thrombosis and SBO s/p ex lap, lysis of adhesions, small bowel obstruction, and suprapubic catheter insertion. Patient presented to [facility] ED today for n/v, abdominal pain, and BRBPR. Found to have lactic acidosis and leukocytosis. 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 Resulting diffuse small bowel wall thickening, with distal small bowel hypoenhancement most consistent with veno occlusive ischemia. Patient taken to the OR for operative management and transferred to [facility] for IR thrombectomy. Patient's abdomen left open following procedure, with abtheravac in place. Patient hypotensive prior to transfer; started on rey. Intubated and sedated on arrival.     History obtained from chart review.  Review of Systems   Unable to perform ROS: Intubated      Historical Information   Past Medical History:  No date: Hypertension No past surgical history on file.   Current Outpatient Medications   Medication Instructions    amLODIPine (NORVASC) 10 mg, Oral, Daily    aspirin 81 mg, Oral, Daily    atorvastatin (LIPITOR) 40 mg, Oral, Every evening    ergocalciferol (VITAMIN D2) 50,000 Units, Oral, Weekly    lisinopril (ZESTRIL) 10 mg, Daily    No Known Allergies "   Social History     Tobacco Use    Smoking status: Never     Passive exposure: Never    Smokeless tobacco: Never   Vaping Use    Vaping status: Never Used   Substance Use Topics    Alcohol use: Never    Drug use: Never    No family history on file.       Objective                            Vitals I/O      Most Recent Min/Max in 24hrs   Temp   Temp  Min: 97.4 °F (36.3 °C)  Max: 97.4 °F (36.3 °C)   Pulse 73 Pulse  Min: 73  Max: 124   Resp   Resp  Min: 20  Max: 20   /58 BP  Min: 111/63  Max: 126/67   O2 Sat 98 % SpO2  Min: 98 %  Max: 99 %    No intake or output data in the 24 hours ending 03/13/24 1643    Diet NPO    Invasive Monitoring   Arterial Line  [place] /62  Arterial Line BP  Min: 119/62  Max: 119/62   MAP 84 mmHg  Arterial Line MAP (mmHg)  Min: 84 mmHg  Max: 84 mmHg           Physical Exam   Physical Exam  Eyes:      Pupils: Pupils are equal, round, and reactive to light.   Skin:     General: Skin is warm and dry.   HENT:      Head: Normocephalic and atraumatic.      Nose: No congestion.   Cardiovascular:      Rate and Rhythm: Normal rate.   Musculoskeletal:         General: Normal range of motion.   Abdominal:      Comments: Patient with open abdomen, abthera vac in place, serosanguinous drainage     Constitutional:       General: He is not in acute distress.     Appearance: He is ill-appearing. He is not toxic-appearing.      Interventions: He is sedated, intubated and restrained.   Pulmonary:      Effort: He is intubated.      Breath sounds: Normal breath sounds.   Neurological:      Comments: Sedated   and Moving all 4 extremities spontaneously off sedation   Genitourinary/Anorectal:     Comments: Suprapubic catheter, blood tinged urine             Diagnostic Studies      EKG: last performed 3/13, tachycardic, NSR  Imaging:  I have personally reviewed pertinent reports.       Medications:  Scheduled PRN   calcium gluconate, 2 g, Once  chlorhexidine, 15 mL, Q12H RONALD  magnesium sulfate, 2 g,  Once  multi-electrolyte, 1,000 mL, Once  piperacillin-tazobactam, 4.5 g, Once   And  piperacillin-tazobactam, 4.5 g, Q8H      fentaNYL, 50 mcg, Q2H PRN       Continuous    fentaNYL, 75 mcg/hr, Last Rate: 75 mcg/hr (03/13/24 1552)  heparin (porcine), 3-30 Units/kg/hr (Order-Specific), Last Rate: Stopped (03/13/24 1620)  multi-electrolyte, 75 mL/hr, Last Rate: 75 mL/hr (03/13/24 1601)  phenylephine,  mcg/min, Last Rate: 25 mcg/min (03/13/24 1620)  propofol, 5-50 mcg/kg/min, Last Rate: 35 mcg/kg/min (03/13/24 1601)         Labs:    CBC    Recent Labs     03/13/24  1157 03/13/24  1214 03/13/24  1548   WBC 38.52*  --  32.14*   HGB 14.1 12.2 12.6   HCT 43.3 36* 37.4   *  --  502*     BMP    Recent Labs     03/13/24  0820 03/13/24  1214 03/13/24  1548   SODIUM 137  --  135   K 4.8  --  4.4   CL 97  --  106   CO2 25 19* 21   AGAP 15*  --  8   BUN 25  --  26*   CREATININE 1.89*  --  1.37*   CALCIUM 9.4  --  7.6*       Coags    Recent Labs     03/13/24  1157 03/13/24  1548   INR 1.31* 1.51*   PTT 38* 116*        Additional Electrolytes  Recent Labs     03/13/24  0820 03/13/24  1214 03/13/24  1548   MG 2.2  --  1.8*   PHOS  --   --  3.4   CAIONIZED  --  1.14 1.02*          Blood Gas    Recent Labs     03/13/24  1550   PHART 7.507*   [id number] 25.5*   PO2ART 143.6*   GTH1BBY 19.8*   BEART -1.8   SOURCE Line, Arterial     Recent Labs     03/13/24  1550   SOURCE Line, Arterial    LFTs  Recent Labs     03/13/24  0820   ALT 15   AST 18   ALKPHOS 81   ALB 3.5   TBILI 0.58       Infectious  No recent results  Glucose  Recent Labs     03/13/24  0820 03/13/24  1548   GLUC 196* 143*               Rubia Jackson MD

## 2024-03-13 NOTE — ED PROVIDER NOTES
History  Chief Complaint   Patient presents with    Constipation     Pt c/o issues with costipation since the beginning of march, has been taking ducolax.  Yesterday pt started vomiting after eating and drinking, unable to keep anything down.  Also started with ab pain yesterday. Today noticed bright red blood in his stool      88-year-old male with past history of hypertension, presents to the ED for evaluation of intermittent constipation and abdominal discomfort over the past 3 to 4 days.  Patient states that he initially started with constipation.  Patient took Dulcolax.  Patient then had abdominal pain started yesterday.  This morning patient tried to have a bowel movement and only liquid came out that was bloody.  Patient then started to have nausea and vomiting.  Patient complains of some abdominal distention.  Subsequently patient came to the ED for further evaluation.  Patient denies any fevers or chills.  Patient denies any dysuria or increased urinary frequency.      History provided by:  Patient and relative  Constipation  Associated symptoms: abdominal pain and nausea    Associated symptoms: no back pain, no dysuria, no fever and no vomiting        Prior to Admission Medications   Prescriptions Last Dose Informant Patient Reported? Taking?   amLODIPine (NORVASC) 10 mg tablet 3/12/2024  No Yes   Sig: Take 1 tablet (10 mg total) by mouth daily   aspirin 81 mg chewable tablet Not Taking  No No   Sig: Chew 1 tablet (81 mg total) daily Do not start before March 12, 2023.   Patient not taking: Reported on 3/13/2024   atorvastatin (LIPITOR) 40 mg tablet Not Taking  No No   Sig: Take 1 tablet (40 mg total) by mouth every evening   Patient not taking: Reported on 3/13/2024   ergocalciferol (VITAMIN D2) 50,000 units   No No   Sig: Take 1 capsule (50,000 Units total) by mouth once a week for 4 doses Do not start before March 17, 2023.   lisinopril (ZESTRIL) 10 mg tablet Not Taking  Yes No   Sig: Take 10 mg by  mouth daily   Patient not taking: Reported on 3/13/2024      Facility-Administered Medications: None       Past Medical History:   Diagnosis Date    Hypertension        History reviewed. No pertinent surgical history.    History reviewed. No pertinent family history.  I have reviewed and agree with the history as documented.    E-Cigarette/Vaping    E-Cigarette Use Never User      E-Cigarette/Vaping Substances    Nicotine No     THC No     CBD No     Flavoring No     Other No     Unknown No      Social History     Tobacco Use    Smoking status: Never     Passive exposure: Never    Smokeless tobacco: Never   Vaping Use    Vaping status: Never Used   Substance Use Topics    Alcohol use: Never    Drug use: Never       Review of Systems   Constitutional:  Negative for chills and fever.   HENT:  Negative for ear pain and sore throat.    Eyes:  Negative for pain and visual disturbance.   Respiratory:  Negative for cough and shortness of breath.    Cardiovascular:  Negative for chest pain and palpitations.   Gastrointestinal:  Positive for abdominal pain, blood in stool, constipation and nausea. Negative for vomiting.   Genitourinary:  Negative for dysuria and hematuria.   Musculoskeletal:  Negative for arthralgias and back pain.   Skin:  Negative for color change and rash.   Neurological:  Negative for seizures and syncope.   All other systems reviewed and are negative.      Physical Exam  Physical Exam  Vitals and nursing note reviewed.   Constitutional:       General: He is not in acute distress.     Appearance: He is well-developed.   HENT:      Head: Normocephalic and atraumatic.   Eyes:      Conjunctiva/sclera: Conjunctivae normal.   Cardiovascular:      Rate and Rhythm: Normal rate and regular rhythm.      Heart sounds: No murmur heard.  Pulmonary:      Effort: Pulmonary effort is normal. No respiratory distress.      Breath sounds: Normal breath sounds.   Abdominal:      Palpations: Abdomen is soft.       Tenderness: There is no abdominal tenderness.      Comments: Abdomen is soft, mildly distended, with bowel sound present all 4 quadrants  Tenderness to palpation noted along the right side of abdomen.   Genitourinary:     Rectum: Guaiac result positive.      Comments: No glo blood noted on digital rectal exam.  Scant amount of stool noted in rectal vault.  Patient is Hemoccult positive per rectum.  Musculoskeletal:         General: No swelling.      Cervical back: Neck supple.   Skin:     General: Skin is warm and dry.      Capillary Refill: Capillary refill takes less than 2 seconds.   Neurological:      Mental Status: He is alert.   Psychiatric:         Mood and Affect: Mood normal.         Vital Signs  ED Triage Vitals [03/13/24 0751]   Temperature Pulse Respirations Blood Pressure SpO2   (!) 97.4 °F (36.3 °C) (!) 124 20 111/63 98 %      Temp Source Heart Rate Source Patient Position - Orthostatic VS BP Location FiO2 (%)   Oral Monitor Lying Right arm --      Pain Score       --           Vitals:    03/13/24 0751 03/13/24 0830 03/13/24 0900   BP: 111/63 119/78 126/67   Pulse: (!) 124 (!) 113 97   Patient Position - Orthostatic VS: Lying Lying Lying         Visual Acuity      ED Medications  Medications   heparin (porcine) 25,000 units in 0.45% NaCl 250 mL infusion (premix) (18 Units/kg/hr × 50 kg (Order-Specific) Intravenous New Bag 3/13/24 1201)   heparin (porcine) injection 4,000 Units ( Intravenous MAR Hold 3/13/24 1221)   heparin (porcine) injection 2,000 Units ( Intravenous MAR Hold 3/13/24 1221)   piperacillin-tazobactam (ZOSYN) IVPB 4.5 g ( Intravenous Dose Auto Held 3/16/24 1600)     And   piperacillin-tazobactam (ZOSYN) IVPB 4.5 g ( Intravenous MAR Hold 3/13/24 1221)   sodium chloride 0.9 % bolus 1,000 mL (0 mL Intravenous Stopped 3/13/24 0932)   ondansetron (ZOFRAN) injection 4 mg (4 mg Intravenous Given 3/13/24 0834)   acetaminophen (Ofirmev) injection 1,000 mg (0 mg Intravenous Stopped 3/13/24 0932)    pantoprazole (PROTONIX) injection 40 mg (40 mg Intravenous Given 3/13/24 0838)   ondansetron (ZOFRAN) injection 4 mg (4 mg Intravenous Given 3/13/24 1025)   iohexol (OMNIPAQUE) 350 MG/ML injection (MULTI-DOSE) 100 mL (100 mL Intravenous Given 3/13/24 1005)   piperacillin-tazobactam (ZOSYN) IVPB 4.5 g (0 g Intravenous Stopped 3/13/24 1112)   metoclopramide (REGLAN) injection 10 mg (10 mg Intravenous Given 3/13/24 1113)   lidocaine (URO-JET) 2 % urethral/mucosal gel 1 Application (1 Application Urethral Given 3/13/24 1127)   benzocaine (HURRICAINE) 20 % mucosal spray 2 spray (2 sprays Mucosal Given 3/13/24 1126)   heparin (porcine) injection 4,000 Units (4,000 Units Intravenous Given 3/13/24 1200)       Diagnostic Studies  Results Reviewed       Procedure Component Value Units Date/Time    Urine Microscopic [[id number]]  (Normal) Collected: 03/13/24 1203    Lab Status: Final result Specimen: Urine, Other Updated: 03/13/24 1232     RBC, UA 0-1 /hpf      WBC, UA 1-2 /hpf      Epithelial Cells None Seen /hpf      Bacteria, UA Occasional /hpf     UA w Reflex to Microscopic w Reflex to Culture [[id number]]  (Abnormal) Collected: 03/13/24 1203    Lab Status: Final result Specimen: Urine, Other Updated: 03/13/24 1222     Color, UA Yellow     Clarity, UA Clear     Specific Gravity, UA <=1.005     pH, UA 6.0     Leukocytes, UA Negative     Nitrite, UA Negative     Protein, UA Trace mg/dl      Glucose, UA Negative mg/dl      Ketones, UA Negative mg/dl      Urobilinogen, UA 0.2 E.U./dl      Bilirubin, UA Negative     Occult Blood, UA Trace-Intact    CBC [[id number]]  (Abnormal) Collected: 03/13/24 1157    Lab Status: Final result Specimen: Blood from Arm, Left Updated: 03/13/24 1205     WBC 38.52 Thousand/uL      RBC 5.01 Million/uL      Hemoglobin 14.1 g/dL      Hematocrit 43.3 %      MCV 86 fL      MCH 28.1 pg      MCHC 32.6 g/dL      RDW 15.2 %      Platelets 515 Thousands/uL      MPV 9.7 fL     APTT [[id number]] Collected:  03/13/24 1157    Lab Status: In process Specimen: Blood from Arm, Left Updated: 03/13/24 1200    Protime-INR [[id number]] Collected: 03/13/24 1157    Lab Status: In process Specimen: Blood from Arm, Left Updated: 03/13/24 1200    HS Troponin I 2hr [[id number]]  (Normal) Collected: 03/13/24 1030    Lab Status: Final result Specimen: Blood from Arm, Right Updated: 03/13/24 1109     hs TnI 2hr 8 ng/L      Delta 2hr hsTnI 0 ng/L     Lactic acid, plasma (w/reflex if result > 2.0) [[id number]]  (Abnormal) Collected: 03/13/24 0952    Lab Status: Final result Specimen: Blood from Arm, Right Updated: 03/13/24 1028     LACTIC ACID 2.8 mmol/L     Narrative:      Result may be elevated if tourniquet was used during collection.    Lactic acid 2 Hours [[id number]]     Lab Status: No result Specimen: Blood     HS Troponin I 4hr [[id number]]     Lab Status: No result Specimen: Blood     RBC Morphology Reflex Test [[id number]] Collected: 03/13/24 0820    Lab Status: Final result Specimen: Blood from Arm, Left Updated: 03/13/24 1001    Blood culture #1 [[id number]] Collected: 03/13/24 0952    Lab Status: In process Specimen: Blood from Arm, Left Updated: 03/13/24 0956    Blood culture #2 [[id number]] Collected: 03/13/24 0952    Lab Status: In process Specimen: Blood from Arm, Right Updated: 03/13/24 0956    FLU/RSV/COVID - if FLU/RSV clinically relevant [[id number]]  (Normal) Collected: 03/13/24 0820    Lab Status: Final result Specimen: Nares from Nose Updated: 03/13/24 0916     SARS-CoV-2 Negative     INFLUENZA A PCR Negative     INFLUENZA B PCR Negative     RSV PCR Negative    Narrative:      FOR PEDIATRIC PATIENTS - copy/paste COVID Guidelines URL to browser: [email or web address]    SARS-CoV-2 assay is a Nucleic Acid Amplification assay intended for the  qualitative detection of nucleic acid from SARS-CoV-2 in nasopharyngeal  swabs. Results are for the presumptive identification of  SARS-CoV-2 RNA.    Positive results are indicative of infection with SARS-CoV-2, the virus  causing COVID-19, but do not rule out bacterial infection or co-infection  with other viruses. Laboratories within the [place] and its  territories are required to report all positive results to the appropriate  public health authorities. Negative results do not preclude SARS-CoV-2  infection and should not be used as the sole basis for treatment or other  patient management decisions. Negative results must be combined with  clinical observations, patient history, and epidemiological information.  This test has not been FDA cleared or approved.    This test has been authorized by FDA under an Emergency Use Authorization  (EUA). This test is only authorized for the duration of time the  declaration that circumstances exist justifying the authorization of the  emergency use of an in vitro diagnostic tests for detection of SARS-CoV-2  virus and/or diagnosis of COVID-19 infection under section 564(b)(1) of  the Act, 21 U.S.C. 360bbb-3(b)(1), unless the authorization is terminated  or revoked sooner. The test has been validated but independent review by FDA  and CLIA is pending.    Test performed using [employer] Gene[employer]pert: This RT-PCR assay targets N2,  a region unique to SARS-CoV-2. A conserved region in the E-gene was chosen  for pan-Sarbecovirus detection which includes SARS-CoV-2.    According to CMS-2020-01-R, this platform meets the definition of high-throughput technology.    HS Troponin 0hr (reflex protocol) [[id number]]  (Normal) Collected: 03/13/24 0820    Lab Status: Final result Specimen: Blood from Arm, Left Updated: 03/13/24 0908     hs TnI 0hr 8 ng/L     CBC and differential [[id number]]  (Abnormal) Collected: 03/13/24 0820    Lab Status: Final result Specimen: Blood from Arm, Left Updated: 03/13/24 0905     WBC 38.26 Thousand/uL      RBC 5.45 Million/uL      Hemoglobin 15.4 g/dL      Hematocrit 47.0 %      MCV 86  fL      MCH 28.3 pg      MCHC 32.8 g/dL      RDW 14.9 %      MPV 9.8 fL      Platelets 533 Thousands/uL     Narrative:      This is an appended report.  These results have been appended to a previously verified report.    Manual Differential(PHLEBS Do Not Order) [[id number]]  (Abnormal) Collected: 03/13/24 0820    Lab Status: Final result Specimen: Blood from Arm, Left Updated: 03/13/24 0905     Segmented % 92 %      Lymphocytes % 3 %      Monocytes % 3 %      Eosinophils % 0 %      Basophils % 0 %      Metamyelocytes % 1 %      Myelocytes % 1 %      Absolute Neutrophils 35.20 Thousand/uL      Absolute Lymphocytes 1.15 Thousand/uL      Absolute Monocytes 1.15 Thousand/uL      Absolute Eosinophils 0.00 Thousand/uL      Absolute Basophils 0.00 Thousand/uL      Total Counted --     Toxic Vacuolated Neutrophils Present     RBC Morphology Present     Platelet Estimate Increased     Large Platelet Present     Polychromasia Present    Comprehensive metabolic panel [[id number]]  (Abnormal) Collected: 03/13/24 0820    Lab Status: Final result Specimen: Blood from Arm, Left Updated: 03/13/24 0903     Sodium 137 mmol/L      Potassium 4.8 mmol/L      Chloride 97 mmol/L      CO2 25 mmol/L      ANION GAP 15 mmol/L      BUN 25 mg/dL      Creatinine 1.89 mg/dL      Glucose 196 mg/dL      Calcium 9.4 mg/dL      AST 18 U/L      ALT 15 U/L      Alkaline Phosphatase 81 U/L      Total Protein 7.3 g/dL      Albumin 3.5 g/dL      Total Bilirubin 0.58 mg/dL      eGFR 31 ml/min/1.73sq m     Narrative:      National Kid[employer] Disease Foundation guidelines for Chronic Kidney Disease (CKD):     Stage 1 with normal or high GFR (GFR > 90 mL/min/1.73 square meters)    Stage 2 Mild CKD (GFR = 60-89 mL/min/1.73 square meters)    Stage 3A Moderate CKD (GFR = 45-59 mL/min/1.73 square meters)    Stage 3B Moderate CKD (GFR = 30-44 mL/min/1.73 square meters)    Stage 4 Severe CKD (GFR = 15-29 mL/min/1.73 square meters)    Stage 5 End Stage CKD (GFR <15  mL/min/1.73 square meters)  Note: GFR calculation is accurate only with a steady state creatinine    Magnesium [[id number]]  (Normal) Collected: 03/13/24 0820    Lab Status: Final result Specimen: Blood from Arm, Left Updated: 03/13/24 0903     Magnesium 2.2 mg/dL     Lipase [[id number]]  (Normal) Collected: 03/13/24 0820    Lab Status: Final result Specimen: Blood from Arm, Left Updated: 03/13/24 0903     Lipase 38 u/L                    CT abdomen pelvis with contrast   Final Result by Messi Olivas MD (03/13 1113)      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 Resulting diffuse small bowel wall thickening, with distal small bowel hypoenhancement most consistent with venoocclusive    ischemia.      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 Left SFA occlusion. Concern for an embolic etiology.      Moderate ascites      Markedly distended stomach with dilated fluid-filled esophagus.      Markedly enlarged prostate with irregular enhancement.         Findings were discussed with Dr Foley from the emergency department at 11:00 a.m. on 3/13/2024         Study initially reviewed and reported by Dr. Topete.            Workstation performed: [id number]                    Procedures  ECG 12 Lead Documentation Only    Date/Time: 3/13/2024 10:38 AM    Performed by: Gregg Foley DO  Authorized by: Gregg Foley DO    Indications / Diagnosis:  Abdominal pain  ECG reviewed by me, the ED Provider: yes    Patient location:  ED  Previous ECG:     Previous ECG:  Compared to current    Similarity:  No change    Comparison to cardiac monitor: Yes    Interpretation:     Interpretation: abnormal    Comments:      Sinus rhythm, rate 115, left axis deviation, normal intervals, no acute ST/T wave abnormalities noted, sinus tachycardia noted that is new  from previous study.  CriticalCare Time    Date/Time: 3/13/2024 10:41 AM    Performed by: Gregg Foley DO  Authorized by: Gregg Foley DO    Critical care provider statement:     Critical care time (minutes):  65    Critical care time was exclusive of:  Separately billable procedures and treating other patients    Critical care was necessary to treat or prevent imminent or life-threatening deterioration of the following conditions:  Sepsis    Critical care was time spent personally by me on the following activities:  Blood draw for specimens, obtaining history from patient or surrogate, development of treatment plan with patient or surrogate, discussions with consultants, evaluation of patient's response to treatment, examination of patient, review of old charts, re-evaluation of patient's condition, ordering and review of laboratory studies, ordering and review of radiographic studies, interpretation of cardiac output measurements and ordering and performing treatments and interventions  Comments:      Small bowel ischemia, portal venous thrombosis requiring emergent exploratory laparotomy.           ED Course  ED Course as of 03/13/24 1245   Wed Mar 13, 2024   1028 LACTIC ACID(!!): 2.8  Sepsis alert initiated.  Broad-spectrum antibiotic started.   1111 Case discussed with radiologist who notes patient has extensive thrombus in the portal venous system causing small bowel ischemia.  Patient also has a distended abdomen.  There may be also an embolic clot in the aorta.  Notified vascular team as well as general surgery team and awaiting callback.   1116 General surgery resident at bedside speaking to patient and family.   1136 Evaluated by general surgery team.  At this time patient will be taken to the OR for exploratory laparotomy for possible small bowel resection.  At this time heparin is ordered for portal venous thrombosis.  Additional fluids were ordered.  Patient admitted to general surgery service.   Patient and family agrees with operative plans.                                             Medical Decision Making  Obtain septic workup, UA, CT abdomen/pelvis  With IV fluids, pain medication, antiemetics and continue to monitor patient for any worsening symptoms    Patient heart rate as well as white count was significantly elevated triggering SIRS.  Lactic acid was elevated.  Subsequently CT abdomen/pelvis showed concern for multiple thrombosis of the portal venous system including SMv and SMV branches causing small bowel ischemia as well as distended stomach.  NG tube placed in the ED.  Empiric broad-spectrum antibiotic started.  Patient was seen by general surgery team who ordered heparin.  Patient will be admitted to general surgery team for expiratory laparotomy and further evaluation and management.  Patient and family agrees with admission plans.    Amount and/or Complexity of Data Reviewed  Labs: ordered. Decision-making details documented in ED Course.  Radiology: ordered. Decision-making details documented in ED Course.  ECG/medicine tests: ordered and independent interpretation performed. Decision-making details documented in ED Course.    Risk  OTC drugs.  Prescription drug management.  Decision regarding hospitalization.             Disposition  Final diagnoses:   Sepsis (HCC)   Mesenteric ischemia (HCC)   Deep vein thrombosis of portal vein   Small bowel ischemia (HCC)     Time reflects when diagnosis was documented in both MDM as applicable and the Disposition within this note       Time User Action Codes Description Comment    3/13/2024 10:30 AM FerusToniomaira Add [A41.9] Sepsis (HCC)     3/13/2024 11:32 AM FerusToniomaira Add [K55.9] Mesenteric ischemia (HCC)     3/13/2024 11:35 AM Tonio Foleymaira Add [I81] Deep vein thrombosis of portal vein     3/13/2024 11:35 AM RosausToniomaira Add [K55.9] Small bowel ischemia (HCC)           ED Disposition       ED Disposition   Admit    Condition    Stable    Date/Time   Wed Mar 13, 2024 11:36 AM    Comment   Case was discussed with general surgery resident and the patient's admission status was agreed to be Admission Status: inpatient status to the service of Dr. Azul.               Follow-up Information    None         Current Discharge Medication List        CONTINUE these medications which have NOT CHANGED    Details   amLODIPine (NORVASC) 10 mg tablet Take 1 tablet (10 mg total) by mouth daily  Qty: 30 tablet, Refills: 0    Associated Diagnoses: Hypertension      aspirin 81 mg chewable tablet Chew 1 tablet (81 mg total) daily Do not start before March 12, 2023.  Qty: 30 tablet, Refills: 2    Associated Diagnoses: TIA (transient ischemic attack); Hypertension, unspecified type      atorvastatin (LIPITOR) 40 mg tablet Take 1 tablet (40 mg total) by mouth every evening  Qty: 30 tablet, Refills: 2    Associated Diagnoses: TIA (transient ischemic attack); Hypertension, unspecified type      ergocalciferol (VITAMIN D2) 50,000 units Take 1 capsule (50,000 Units total) by mouth once a week for 4 doses Do not start before March 17, 2023.  Qty: 4 capsule, Refills: 0    Associated Diagnoses: Protein calorie malnutrition (HCC)      lisinopril (ZESTRIL) 10 mg tablet Take 10 mg by mouth daily             No discharge procedures on file.    PDMP Review       None            ED Provider  Electronically Signed by             Gregg Foley DO  03/13/24 2472       Gregg Foley DO  03/13/24 7585

## 2024-03-13 NOTE — CONSULTS
Consultation - Vascular Surgery   Jf Valera 88 y.o. male MRN: [id number]  Unit/Bed#: OR POOL Encounter: [id number]      Assessment/Plan      Assessment/Plan:  88-year-old male with history of hypertension presenting with bright red blood per rectum, nausea, vomiting, and abdominal pain in the setting of significant leukocytosis and mildly elevated lactic acid.  CT scan revealed diffuse portal system thrombosis with small bowel wall thickening and hypoenhancement concerning for ischemia, along with mural thrombus in the descending aorta and infrarenal abdominal aorta with SFA occlusion.    Patient does not complain of any left leg pain.  LLE motor and sensation intact. DP and PT non-palpable but detected with Doppler.    Heparin gtt with bolus given.  General surgery took patient to OR for exploratory laparotomy with small bowel resection secondary to ischemic small bowel.   Spoke with attending, vascular service at [facility] will continue to follow for indicated vascular procedures vs IR intervention          History of Present Illness   Physician Requesting Consult: Beltran Lorenz,*  Reason for Consult / Principal Problem:  portal venous thrombosis and   History limited secondary to acuity of condition and urgent nature of need for operative intervention.    HPI: Jf Valera is a 88 y.o. male with history of HTN and TIA who presents with bright red blood per rectum, nausea, vomiting, and abdominal pain.   Patient reports he has been experiencing worsening abdominal pain, abdominal distention and inability to tolerate oral intake for the past 48 hours. He last tried eating something yesterday morning for breakfast but has vomited since then. Unable to tell me if emesis was bloody or coffee-ground.  Patient reports his stool was definitely bloody.  Patient denies any history of clotting disorders.  Patient is not on any anticoagulation.  Denies any recent trauma.  He is not a smoker.  Denies any  "symptoms of claudication, lower extremity wounds, neuropathy, or paresthesias of left foot. He is ambulatory and independent.            Inpatient consult to Vascular Surgery  Consult performed by: Dawit De Jesus PA-C  Consult ordered by: Gregg Foley DO          Review of Systems   Unable to perform ROS: Acuity of condition   Cardiovascular:  Negative for leg swelling.   Gastrointestinal:  Positive for abdominal distention, abdominal pain, blood in stool, nausea and vomiting.   Musculoskeletal:  Negative for back pain and joint swelling.   Skin:  Negative for wound.       Historical Information   Past Medical History:   Diagnosis Date    Hypertension      History reviewed. No pertinent surgical history.  Social History   Social History     Substance and Sexual Activity   Alcohol Use Never     Social History     Substance and Sexual Activity   Drug Use Never     E-Cigarette/Vaping    E-Cigarette Use Never User      E-Cigarette/Vaping Substances    Nicotine No     THC No     CBD No     Flavoring No     Other No     Unknown No      Social History     Tobacco Use   Smoking Status Never    Passive exposure: Never   Smokeless Tobacco Never     Family History: non-contributory}    Meds/Allergies   all current active meds have been reviewed  No Known Allergies    Objective   Vitals: Blood pressure 126/67, pulse 97, temperature (!) 97.4 °F (36.3 °C), temperature source Oral, resp. rate 20, height 5' 4\" (1.626 m), weight 54.6 kg (120 lb 5.9 oz), SpO2 99%.,Body mass index is 20.66 kg/m².    Intake/Output Summary (Last 24 hours) at 3/13/2024 1414  Last data filed at 3/13/2024 1359  Gross per 24 hour   Intake 2800 ml   Output 400 ml   Net 2400 ml     Invasive Devices       Peripheral Intravenous Line  Duration             Peripheral IV 03/13/24 Left;Ventral (anterior) Forearm <1 day    Peripheral IV 03/13/24 Proximal;Right;Ventral (anterior) Forearm <1 day    Peripheral IV 03/13/24 Right Antecubital <1 day              " Arterial Line  Duration             Arterial Line 03/13/24 Right Radial <1 day              Drain  Duration             NG/OG/Enteral Tube Nasogastric 16 Fr Left nare <1 day    Suprapubic Catheter 18 Fr. <1 day              Airway  Duration             ETT  Oral 7.5 mm <1 day                    Physical Exam  Vitals reviewed.   Constitutional:       General: He is awake. He is not in acute distress.     Appearance: Normal appearance. He is well-developed and underweight. He is ill-appearing. He is not toxic-appearing or diaphoretic.      Interventions: He is not intubated.  HENT:      Head: Normocephalic and atraumatic. Not macrocephalic and not microcephalic. No raccoon eyes, Sommer's sign, right periorbital erythema or left periorbital erythema.      Right Ear: Hearing and external ear normal.      Left Ear: Hearing and external ear normal.      Nose: Nose normal.   Eyes:      General: No scleral icterus.        Right eye: No discharge.         Left eye: No discharge.      Conjunctiva/sclera: Conjunctivae normal.      Right eye: Right conjunctiva is not injected. No hemorrhage.     Left eye: Left conjunctiva is not injected. No hemorrhage.     Pupils: Pupils are equal, round, and reactive to light.   Cardiovascular:      Rate and Rhythm: Regular rhythm. Tachycardia present.      Pulses: Decreased pulses.           Radial pulses are 2+ on the left side.        Popliteal pulses are 1+ on the left side.        Dorsalis pedis pulses are detected w/ Doppler on the left side.        Posterior tibial pulses are detected w/ Doppler on the left side.   Pulmonary:      Effort: Pulmonary effort is normal. No tachypnea, bradypnea or respiratory distress. He is not intubated.      Breath sounds: Normal breath sounds. No stridor. No decreased breath sounds, wheezing or rhonchi.   Abdominal:      General: A surgical scar is present. There is distension.      Palpations: Abdomen is soft. Abdomen is not rigid.      Tenderness:  "There is generalized abdominal tenderness (mild). There is no guarding or rebound.      Hernia: No hernia is present.   Musculoskeletal:      Right lower leg: No edema.      Left lower leg: No edema.   Skin:     General: Skin is warm and dry.      Capillary Refill: Capillary refill takes less than 2 seconds.   Neurological:      Mental Status: He is alert and oriented to person, place, and time. He is not disoriented.      GCS: GCS eye subscore is 4. GCS verbal subscore is 5. GCS motor subscore is 6.      Cranial Nerves: Cranial nerves 2-12 are intact. No cranial nerve deficit.      Sensory: No sensory deficit.      Motor: Motor function is intact.   Psychiatric:         Attention and Perception: Attention normal.         Mood and Affect: Mood normal.         Speech: Speech normal.         Behavior: Behavior normal. Behavior is cooperative.         Thought Content: Thought content normal.         Lab Results: I have personally reviewed pertinent reports.  , Coags:   Lab Results   Component Value Date    PTT 38 (H) 03/13/2024    INR 1.31 (H) 03/13/2024   , Lipid Panel: No results found for: \"CHOL\", CBC with diff:   Lab Results   Component Value Date    WBC 38.52 (H) 03/13/2024    HGB 12.2 03/13/2024    HCT 36 (L) 03/13/2024    MCV 86 03/13/2024     (H) 03/13/2024    RBC 5.01 03/13/2024    MCH 28.1 03/13/2024    MCHC 32.6 03/13/2024    RDW 15.2 (H) 03/13/2024    MPV 9.7 03/13/2024   , BMP/CMP:   Lab Results   Component Value Date    SODIUM 137 03/13/2024    K 4.8 03/13/2024    CL 97 03/13/2024    CO2 19 (L) 03/13/2024    CO2 25 03/13/2024    BUN 25 03/13/2024    CREATININE 1.89 (H) 03/13/2024    GLUCOSE 158 (H) 03/13/2024    CALCIUM 9.4 03/13/2024    AST 18 03/13/2024    ALT 15 03/13/2024    ALKPHOS 81 03/13/2024    EGFR 31 03/13/2024     Imaging Studies: I have personally reviewed pertinent reports.   and I have personally reviewed pertinent films in PACS  EKG, Pathology, and Other Studies: I have personally " reviewed pertinent reports.    VTE Prophylaxis: Heparin gtt     Code Status: Prior  Advance Directive and Living Will:      Power of :    POLST:      Counseling / Coordination of Care  Counseling/Coordination of Care: Total floor / unit time spent today 40 minutes. Greater than 50% of total time was spent with the patient and / or family counseling and / or coordination of care. A description of the counseling / coordination of care:   Obtaining patient history, performing physical exam, reviewing pertinent labs and imaging, discussed management with attending physician

## 2024-03-13 NOTE — ANESTHESIA PREPROCEDURE EVALUATION
Procedure:  LAPAROTOMY EXPLORATORY (Abdomen)    Relevant Problems   CARDIO   (+) HTN (hypertension)      NEURO/PSYCH   (+) TIA (transient ischemic attack)        Physical Exam    Airway    Mallampati score: II         Dental   No notable dental hx     Cardiovascular  Rhythm: regular, Rate: normal, Cardiovascular exam normal    Pulmonary  Pulmonary exam normal Breath sounds clear to auscultation    Other Findings  3/13/24 CT Abdomen:    IMPRESSION:     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 Resulting diffuse small bowel wall thickening, with distal small bowel hypoenhancement most consistent with venoocclusive   ischemia.     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 Left SFA occlusion. Concern for an embolic etiology.     Moderate ascites     Markedly distended stomach with dilated fluid-filled esophagus.     Markedly enlarged prostate with irregular enhancement.       Hb 15.4     Lactate 2.8     On heparin infusion for mesenteric ischemia       Anesthesia Plan  ASA Score- 3 Emergent    Anesthesia Type- general with ASA Monitors.         Additional Monitors: arterial line.    Airway Plan: ETT.    Comment: 2 PIV, +/- central line depending on IV access     +/- extubate depending on case .       Plan Factors-Exercise tolerance (METS): <4 METS.    Chart reviewed. EKG reviewed. Imaging results reviewed. Existing labs reviewed. Patient summary reviewed.    Patient is not a current smoker.  Patient did not smoke on day of surgery.        Intended use of anticholinesterase.    Induction-     Postoperative Plan- Plan for postoperative opioid use. Planned trial extubation    Informed Consent- Anesthetic plan and risks discussed with patient.  I personally reviewed this patient with the CRNA. Discussed and agreed on the Anesthesia Plan with the  CRNA..

## 2024-03-13 NOTE — ANESTHESIA POSTPROCEDURE EVALUATION
Post-Op Assessment Note    CV Status:  Stable  Pain Score: 0    Pain management: adequate       Mental Status:  Sleepy   Hydration Status:  Stable   PONV Controlled:  None   Airway Patency:  Patent  Airway: intubated     Post Op Vitals Reviewed: Yes    No anethesia notable event occurred.    Staff: CRNA               BP   121/60   Temp 97   Pulse 75   Resp 16   SpO2 96   Pt. Left intubated. Transported to [place] via ambulance. Report given to critical care team.

## 2024-03-13 NOTE — RESPIRATORY THERAPY NOTE
RT Protocol Note  Jf Valera 88 y.o. male MRN: [id number]  Unit/Bed#: [facility] 514-01 Encounter: [id number]    Assessment    Principal Problem:    Portal vein thrombosis  Active Problems:    HTN (hypertension)    S/P exploratory laparotomy    Mesenteric ischemia (HCC)    H/O prostate cancer      Home Pulmonary Medications:  none       Past Medical History:   Diagnosis Date    Hypertension      Social History     Socioeconomic History    Marital status: /Civil Union     Spouse name: Not on file    Number of children: Not on file    Years of education: Not on file    Highest education level: Not on file   Occupational History    Not on file   Tobacco Use    Smoking status: Never     Passive exposure: Never    Smokeless tobacco: Never   Vaping Use    Vaping status: Never Used   Substance and Sexual Activity    Alcohol use: Never    Drug use: Never    Sexual activity: Not on file   Other Topics Concern    Not on file   Social History Narrative    Not on file     Social Determinants of Health     Financial Resource Strain: Not on file   Food Insecurity: Not on file   Transportation Needs: Not on file   Physical Activity: Not on file   Stress: Not on file   Social Connections: Not on file   Intimate Partner Violence: Not on file   Housing Stability: Not on file       Subjective         Objective    Physical Exam:        Vitals:  There were no vitals taken for this visit.          Imaging and other studies: I have personally reviewed pertinent reports.            Plan             Resp Comments: (P) pt arrived from transport team and is placed on c3 ventilator on current vent settings.  pt has no pulmonary history or pulmonary medications listed.  will continue to monitor per protocol. ett secured at 24 cm rohith where i received tube placement. will have md team order a cxr for confirmation of tube placement.

## 2024-03-13 NOTE — OP NOTE
OPERATIVE REPORT  PATIENT NAME: Jf Valera    :  1935  MRN: [id number]  Pt Location: [facility] OR ROOM 03    SURGERY DATE: 3/13/2024    Surgeons and Role:     * Marvin Azul MD - Primary     * Jerome Hayes MD - Assisting     * Dawit De Jesus PA-C - Assisting    Preop Diagnosis:  Sepsis (HCC) [A41.9]  Mesenteric ischemia (HCC) [K55.9]    Post-Op Diagnosis Codes:     * Sepsis (HCC) [A41.9]     * Mesenteric ischemia (HCC) [K55.9]    Procedure(s):  LAPAROTOMY EXPLORATORY; SMALL BOWEL RESECTION; LYSIS OF ADHESIONS; SUPRPAPUBIC TUBE INSERTION    Specimen(s):  ID Type Source Tests Collected by Time Destination   1 : Small Bowel approximately 157cm Tissue Small Bowel, NOS TISSUE EXAM Marvin Azul MD 3/13/2024 1310        Estimated Blood Loss:   Minimal    Drains:  NG/OG/Enteral Tube Nasogastric 16 Fr Left nare (Active)   Placement Reverification Auscultation 24 1133   Site Assessment Clean;Dry;Intact 24 1133   External Tube Length (cm) 65 cm 24 1133   Marking at Nare (cm) - For ongoing assessment of tube placement 65 cm 24 1133   Status Suction-low intermittent 24 1133   Drainage Appearance Brown 24 1133   Number of days: 0       Suprapubic Catheter 18 Fr. (Active)   Number of days: 0       Anesthesia Type:   General    Operative Indications:  Sepsis (HCC) [A41.9]  Mesenteric ischemia (HCC) [K55.9]    Operative Findings:  -Exploratory laparotomy  -150 cm frankly ischemic distal small bowel resected and left in discontinuity  -Approximately 10 cm cuff of viable small bowel proximal to ileocecal valve  -Suprapubic gonzalez catheter placed via open approach         Complications:   None    Procedure and Technique:  Patient was identified in the preoperative holding area taken to the operating room and stable condition.  Upon arrival to the operating room he was again identified verbally via wristband laid supine on the operating table both arms were extended pressure points were  padded patient was induced with general anesthesia, airway was secured with endotracheal intubation per anesthesia colleagues.  Kerns catheter was not able to be placed due to enlarged prostate, coudé catheter was attempted however not successful.  Now abdomen was prepped and draped in the regular sterile fashion timeout was called and all in agreement.  Generous midline laparotomy with 10 blade scalpel.  Fascia was divided the length of the incision with Bovie electrocautery.  Upon entry to the peritoneum copious amounts of reactive fluid was evacuated.  Omental adhesions were taken down with super jaw ligature.  The transverse colon was reflected cephalad and small bowel was eviscerated and run from the ligament of Treitz to the terminal ileum.  We identified approximately 170 cm distal to the ligament of Treitz there is slow transition to frankly edematous and ischemic bowel.  There is 150 cm frankly ischemic portion of small bowel terminating approximately 10 cm proximal to the terminal ileum.  Segmental small bowel resection performed.  Again proximal small bowel was divided with SHRAVAN blue load stapler.  Small bowel was left in discontinuity.  Abdomen was irrigated with copious amounts of warm saline solution.  Inspected the ascending, transverse, descending, sigmoid and rectum all appeared viable.  Bladder was distended, and Kerns catheter was unable to be placed due to enlarged prostate, therefore suprapubic catheter was placed using open approach.  Briefly, stab incision immediately anterior to the pubic symphysis with Bovie electrocautery.  Using 2S retractors dissected down to the level of the fascia, rectus was divided and identified the bladder which was grasped with [place] and pursestring was placed using 3-0 PDS.  Created a cystotomy with Bovie electrocautery and 18 [place] Kerns catheter was placed into the bladder.  Kerns balloon was insufflated with 20 cc of sterile water.  Pursestring was tightened.   Fascia surrounding the suprapubic was closed with figure-of-eight 3-0 PDS.  Secured with 3-0 nylon drain stitch.  Now hemostasis along the midline incision was ensured with Bovie electrocautery.  1 Octopus was placed into the abdomen and 2 blue sponges.  ABThera VAC tape was applied.  Connected to 125 mmHg suction there was excellent seal no leak.  Patient remained intubated and was transported to [facility] for vascular surgery intervention.  At the end of the case sponge and instrument counts were performed are correct.  Dr Azul was present for entire procedure.      I was present for the entire procedure.    Patient Disposition:  intubated and critically ill    This procedure was not performed to treat colon cancer through resection      SIGNATURE: Jerome Hayes MD  DATE: March 13, 2024  TIME: 2:01 PM

## 2024-03-13 NOTE — PROGRESS NOTES
Given report to Yue SESAY at [facility] Critical Care Unit P5.  Patient transported via [facility] from the [facility].  Pumps and settings were discussed with Yue.  Patient will be arriving to room 514.

## 2024-03-14 ENCOUNTER — APPOINTMENT (INPATIENT)
Dept: RADIOLOGY | Facility: HOSPITAL | Age: 89
DRG: 329 | End: 2024-03-14

## 2024-03-14 ENCOUNTER — ANESTHESIA EVENT (INPATIENT)
Dept: PERIOP | Facility: HOSPITAL | Age: 89
End: 2024-03-14

## 2024-03-14 ENCOUNTER — ANESTHESIA (INPATIENT)
Dept: PERIOP | Facility: HOSPITAL | Age: 89
End: 2024-03-14

## 2024-03-14 PROBLEM — Z51.5 PALLIATIVE CARE BY SPECIALIST: Status: ACTIVE | Noted: 2024-03-14

## 2024-03-14 PROBLEM — Z71.89 GOALS OF CARE, COUNSELING/DISCUSSION: Status: ACTIVE | Noted: 2024-03-14

## 2024-03-14 LAB
ALBUMIN SERPL BCP-MCNC: 3.2 G/DL (ref 3.5–5)
ALP SERPL-CCNC: 38 U/L (ref 34–104)
ALT SERPL W P-5'-P-CCNC: 12 U/L (ref 7–52)
ANION GAP SERPL CALCULATED.3IONS-SCNC: 13 MMOL/L (ref 4–13)
ANION GAP SERPL CALCULATED.3IONS-SCNC: 13 MMOL/L (ref 4–13)
APTT PPP: 51 SECONDS (ref 23–37)
APTT PPP: 56 SECONDS (ref 23–37)
APTT PPP: 73 SECONDS (ref 23–37)
AST SERPL W P-5'-P-CCNC: 21 U/L (ref 13–39)
BASE EXCESS BLDA CALC-SCNC: -1 MMOL/L
BASE EXCESS BLDA CALC-SCNC: -3.5 MMOL/L
BASE EXCESS BLDA CALC-SCNC: 0 MMOL/L
BASOPHILS # BLD AUTO: 0.01 THOUSANDS/ÂΜL (ref 0–0.1)
BASOPHILS # BLD AUTO: 0.03 THOUSANDS/ÂΜL (ref 0–0.1)
BASOPHILS NFR BLD AUTO: 0 % (ref 0–1)
BASOPHILS NFR BLD AUTO: 0 % (ref 0–1)
BILIRUB DIRECT SERPL-MCNC: 0.24 MG/DL (ref 0–0.2)
BILIRUB SERPL-MCNC: 0.66 MG/DL (ref 0.2–1)
BUN SERPL-MCNC: 13 MG/DL (ref 5–25)
BUN SERPL-MCNC: 16 MG/DL (ref 5–25)
CA-I BLD-SCNC: 0.97 MMOL/L (ref 1.12–1.32)
CA-I BLD-SCNC: 1.05 MMOL/L (ref 1.12–1.32)
CALCIUM SERPL-MCNC: 7.3 MG/DL (ref 8.4–10.2)
CALCIUM SERPL-MCNC: 7.5 MG/DL (ref 8.4–10.2)
CHLORIDE SERPL-SCNC: 107 MMOL/L (ref 96–108)
CHLORIDE SERPL-SCNC: 109 MMOL/L (ref 96–108)
CO2 SERPL-SCNC: 21 MMOL/L (ref 21–32)
CO2 SERPL-SCNC: 23 MMOL/L (ref 21–32)
CREAT SERPL-MCNC: 0.95 MG/DL (ref 0.6–1.3)
CREAT SERPL-MCNC: 1.07 MG/DL (ref 0.6–1.3)
EOSINOPHIL # BLD AUTO: 0 THOUSAND/ÂΜL (ref 0–0.61)
EOSINOPHIL # BLD AUTO: 0 THOUSAND/ÂΜL (ref 0–0.61)
EOSINOPHIL NFR BLD AUTO: 0 % (ref 0–6)
EOSINOPHIL NFR BLD AUTO: 0 % (ref 0–6)
ERYTHROCYTE [DISTWIDTH] IN BLOOD BY AUTOMATED COUNT: 15 % (ref 11.6–15.1)
ERYTHROCYTE [DISTWIDTH] IN BLOOD BY AUTOMATED COUNT: 15.2 % (ref 11.6–15.1)
GFR SERPL CREATININE-BSD FRML MDRD: 62 ML/MIN/1.73SQ M
GFR SERPL CREATININE-BSD FRML MDRD: 71 ML/MIN/1.73SQ M
GLUCOSE SERPL-MCNC: 116 MG/DL (ref 65–140)
GLUCOSE SERPL-MCNC: 120 MG/DL (ref 65–140)
HCO3 BLDA-SCNC: 21.2 MMOL/L (ref 22–28)
HCO3 BLDA-SCNC: 22.6 MMOL/L (ref 22–28)
HCO3 BLDA-SCNC: 22.8 MMOL/L (ref 22–28)
HCT VFR BLD AUTO: 26.5 % (ref 36.5–49.3)
HCT VFR BLD AUTO: 31.2 % (ref 36.5–49.3)
HCT VFR BLD AUTO: 34.2 % (ref 36.5–49.3)
HGB BLD-MCNC: 11 G/DL (ref 12–17)
HGB BLD-MCNC: 8.9 G/DL (ref 12–17)
HGB BLD-MCNC: 9.9 G/DL (ref 12–17)
HOROWITZ INDEX BLDA+IHG-RTO: 40 MM[HG]
HOROWITZ INDEX BLDA+IHG-RTO: 40 MM[HG]
IMM GRANULOCYTES # BLD AUTO: 0.06 THOUSAND/UL (ref 0–0.2)
IMM GRANULOCYTES # BLD AUTO: 0.15 THOUSAND/UL (ref 0–0.2)
IMM GRANULOCYTES NFR BLD AUTO: 0 % (ref 0–2)
IMM GRANULOCYTES NFR BLD AUTO: 1 % (ref 0–2)
INR PPP: 1.57 (ref 0.84–1.19)
LACTATE SERPL-SCNC: 0.7 MMOL/L (ref 0.5–2)
LACTATE SERPL-SCNC: 0.8 MMOL/L (ref 0.5–2)
LYMPHOCYTES # BLD AUTO: 1.02 THOUSANDS/ÂΜL (ref 0.6–4.47)
LYMPHOCYTES # BLD AUTO: 1.03 THOUSANDS/ÂΜL (ref 0.6–4.47)
LYMPHOCYTES NFR BLD AUTO: 5 % (ref 14–44)
LYMPHOCYTES NFR BLD AUTO: 6 % (ref 14–44)
MAGNESIUM SERPL-MCNC: 2.3 MG/DL (ref 1.9–2.7)
MAGNESIUM SERPL-MCNC: 2.5 MG/DL (ref 1.9–2.7)
MCH RBC QN AUTO: 28.3 PG (ref 26.8–34.3)
MCH RBC QN AUTO: 28.3 PG (ref 26.8–34.3)
MCHC RBC AUTO-ENTMCNC: 31.7 G/DL (ref 31.4–37.4)
MCHC RBC AUTO-ENTMCNC: 33.6 G/DL (ref 31.4–37.4)
MCV RBC AUTO: 84 FL (ref 82–98)
MCV RBC AUTO: 89 FL (ref 82–98)
MONOCYTES # BLD AUTO: 1.19 THOUSAND/ÂΜL (ref 0.17–1.22)
MONOCYTES # BLD AUTO: 1.78 THOUSAND/ÂΜL (ref 0.17–1.22)
MONOCYTES NFR BLD AUTO: 7 % (ref 4–12)
MONOCYTES NFR BLD AUTO: 8 % (ref 4–12)
NEUTROPHILS # BLD AUTO: 14.9 THOUSANDS/ÂΜL (ref 1.85–7.62)
NEUTROPHILS # BLD AUTO: 18.96 THOUSANDS/ÂΜL (ref 1.85–7.62)
NEUTS SEG NFR BLD AUTO: 86 % (ref 43–75)
NEUTS SEG NFR BLD AUTO: 87 % (ref 43–75)
NRBC BLD AUTO-RTO: 0 /100 WBCS
NRBC BLD AUTO-RTO: 0 /100 WBCS
O2 CT BLDA-SCNC: 13.9 ML/DL (ref 16–23)
O2 CT BLDA-SCNC: 14.8 ML/DL (ref 16–23)
O2 CT BLDA-SCNC: 15.5 ML/DL (ref 16–23)
OXYHGB MFR BLDA: 97.7 % (ref 94–97)
OXYHGB MFR BLDA: 98 % (ref 94–97)
OXYHGB MFR BLDA: 98.5 % (ref 94–97)
PCO2 BLDA: 29.7 MM HG (ref 36–44)
PCO2 BLDA: 34.8 MM HG (ref 36–44)
PCO2 BLDA: 36.5 MM HG (ref 36–44)
PEEP RESPIRATORY: 6 CM[H2O]
PEEP RESPIRATORY: 6 CM[H2O]
PH BLDA: 7.38 [PH] (ref 7.35–7.45)
PH BLDA: 7.43 [PH] (ref 7.35–7.45)
PH BLDA: 7.5 [PH] (ref 7.35–7.45)
PHOSPHATE SERPL-MCNC: 3.2 MG/DL (ref 2.3–4.1)
PHOSPHATE SERPL-MCNC: 3.2 MG/DL (ref 2.3–4.1)
PLATELET # BLD AUTO: 302 THOUSANDS/UL (ref 149–390)
PLATELET # BLD AUTO: 479 THOUSANDS/UL (ref 149–390)
PMV BLD AUTO: 10 FL (ref 8.9–12.7)
PMV BLD AUTO: 10.2 FL (ref 8.9–12.7)
PO2 BLDA: 118.4 MM HG (ref 75–129)
PO2 BLDA: 126.8 MM HG (ref 75–129)
PO2 BLDA: 150.5 MM HG (ref 75–129)
POTASSIUM SERPL-SCNC: 3.6 MMOL/L (ref 3.5–5.3)
POTASSIUM SERPL-SCNC: 3.6 MMOL/L (ref 3.5–5.3)
PROT SERPL-MCNC: 4.7 G/DL (ref 6.4–8.4)
PROTHROMBIN TIME: 18.6 SECONDS (ref 11.6–14.5)
RBC # BLD AUTO: 3.14 MILLION/UL (ref 3.88–5.62)
RBC # BLD AUTO: 3.5 MILLION/UL (ref 3.88–5.62)
SODIUM SERPL-SCNC: 141 MMOL/L (ref 135–147)
SODIUM SERPL-SCNC: 145 MMOL/L (ref 135–147)
SPECIMEN SOURCE: ABNORMAL
VENT AC: 16
VENT AC: 16
VENT- AC: AC
VENT- AC: AC
VT SETTING VENT: 400 ML
VT SETTING VENT: 400 ML
WBC # BLD AUTO: 17.19 THOUSAND/UL (ref 4.31–10.16)
WBC # BLD AUTO: 21.94 THOUSAND/UL (ref 4.31–10.16)

## 2024-03-14 PROCEDURE — 94760 N-INVAS EAR/PLS OXIMETRY 1: CPT

## 2024-03-14 PROCEDURE — 83605 ASSAY OF LACTIC ACID: CPT

## 2024-03-14 PROCEDURE — 76937 US GUIDE VASCULAR ACCESS: CPT

## 2024-03-14 PROCEDURE — 80076 HEPATIC FUNCTION PANEL: CPT

## 2024-03-14 PROCEDURE — 83735 ASSAY OF MAGNESIUM: CPT

## 2024-03-14 PROCEDURE — 99233 SBSQ HOSP IP/OBS HIGH 50: CPT | Performed by: SURGERY

## 2024-03-14 PROCEDURE — 85025 COMPLETE CBC W/AUTO DIFF WBC: CPT

## 2024-03-14 PROCEDURE — 0D180Z8 BYPASS SMALL INTESTINE TO SMALL INTESTINE, OPEN APPROACH: ICD-10-PCS | Performed by: SURGERY

## 2024-03-14 PROCEDURE — 99255 IP/OBS CONSLTJ NEW/EST HI 80: CPT | Performed by: INTERNAL MEDICINE

## 2024-03-14 PROCEDURE — 80048 BASIC METABOLIC PNL TOTAL CA: CPT

## 2024-03-14 PROCEDURE — 99024 POSTOP FOLLOW-UP VISIT: CPT | Performed by: SURGERY

## 2024-03-14 PROCEDURE — 44120 REMOVAL OF SMALL INTESTINE: CPT | Performed by: SURGERY

## 2024-03-14 PROCEDURE — 36556 INSERT NON-TUNNEL CV CATH: CPT

## 2024-03-14 PROCEDURE — 71045 X-RAY EXAM CHEST 1 VIEW: CPT

## 2024-03-14 PROCEDURE — 84100 ASSAY OF PHOSPHORUS: CPT

## 2024-03-14 PROCEDURE — 0WCH0ZZ EXTIRPATION OF MATTER FROM RETROPERITONEUM, OPEN APPROACH: ICD-10-PCS | Performed by: SURGERY

## 2024-03-14 PROCEDURE — 74018 RADEX ABDOMEN 1 VIEW: CPT

## 2024-03-14 PROCEDURE — 0WQF0ZZ REPAIR ABDOMINAL WALL, OPEN APPROACH: ICD-10-PCS | Performed by: SURGERY

## 2024-03-14 PROCEDURE — P9016 RBC LEUKOCYTES REDUCED: HCPCS

## 2024-03-14 PROCEDURE — 99291 CRITICAL CARE FIRST HOUR: CPT | Performed by: EMERGENCY MEDICINE

## 2024-03-14 PROCEDURE — 85730 THROMBOPLASTIN TIME PARTIAL: CPT

## 2024-03-14 PROCEDURE — 82805 BLOOD GASES W/O2 SATURATION: CPT

## 2024-03-14 PROCEDURE — 85730 THROMBOPLASTIN TIME PARTIAL: CPT | Performed by: SURGERY

## 2024-03-14 PROCEDURE — 49900 REPAIR OF ABDOMINAL WALL: CPT | Performed by: SURGERY

## 2024-03-14 PROCEDURE — 85014 HEMATOCRIT: CPT

## 2024-03-14 PROCEDURE — 82330 ASSAY OF CALCIUM: CPT

## 2024-03-14 PROCEDURE — 94003 VENT MGMT INPAT SUBQ DAY: CPT

## 2024-03-14 PROCEDURE — 85610 PROTHROMBIN TIME: CPT

## 2024-03-14 PROCEDURE — 30233N1 TRANSFUSION OF NONAUTOLOGOUS RED BLOOD CELLS INTO PERIPHERAL VEIN, PERCUTANEOUS APPROACH: ICD-10-PCS | Performed by: SURGERY

## 2024-03-14 PROCEDURE — 0W3H0ZZ CONTROL BLEEDING IN RETROPERITONEUM, OPEN APPROACH: ICD-10-PCS | Performed by: SURGERY

## 2024-03-14 PROCEDURE — 02HV33Z INSERTION OF INFUSION DEVICE INTO SUPERIOR VENA CAVA, PERCUTANEOUS APPROACH: ICD-10-PCS | Performed by: EMERGENCY MEDICINE

## 2024-03-14 PROCEDURE — 85018 HEMOGLOBIN: CPT

## 2024-03-14 RX ORDER — POTASSIUM CHLORIDE 14.9 MG/ML
20 INJECTION INTRAVENOUS
Qty: 200 ML | Refills: 0 | Status: COMPLETED | OUTPATIENT
Start: 2024-03-14 | End: 2024-03-14

## 2024-03-14 RX ORDER — MAGNESIUM HYDROXIDE 1200 MG/15ML
LIQUID ORAL AS NEEDED
Status: DISCONTINUED | OUTPATIENT
Start: 2024-03-14 | End: 2024-03-14 | Stop reason: HOSPADM

## 2024-03-14 RX ORDER — ROCURONIUM BROMIDE 10 MG/ML
INJECTION, SOLUTION INTRAVENOUS AS NEEDED
Status: DISCONTINUED | OUTPATIENT
Start: 2024-03-14 | End: 2024-03-14

## 2024-03-14 RX ORDER — SODIUM CHLORIDE, SODIUM GLUCONATE, SODIUM ACETATE, POTASSIUM CHLORIDE, MAGNESIUM CHLORIDE, SODIUM PHOSPHATE, DIBASIC, AND POTASSIUM PHOSPHATE .53; .5; .37; .037; .03; .012; .00082 G/100ML; G/100ML; G/100ML; G/100ML; G/100ML; G/100ML; G/100ML
1000 INJECTION, SOLUTION INTRAVENOUS ONCE
Status: COMPLETED | OUTPATIENT
Start: 2024-03-14 | End: 2024-03-14

## 2024-03-14 RX ORDER — PHENYLEPHRINE HCL IN 0.9% NACL 1 MG/10 ML
SYRINGE (ML) INTRAVENOUS AS NEEDED
Status: DISCONTINUED | OUTPATIENT
Start: 2024-03-14 | End: 2024-03-14

## 2024-03-14 RX ORDER — ALBUMIN, HUMAN INJ 5% 5 %
25 SOLUTION INTRAVENOUS ONCE
Status: COMPLETED | OUTPATIENT
Start: 2024-03-14 | End: 2024-03-14

## 2024-03-14 RX ORDER — SODIUM CHLORIDE, SODIUM GLUCONATE, SODIUM ACETATE, POTASSIUM CHLORIDE, MAGNESIUM CHLORIDE, SODIUM PHOSPHATE, DIBASIC, AND POTASSIUM PHOSPHATE .53; .5; .37; .037; .03; .012; .00082 G/100ML; G/100ML; G/100ML; G/100ML; G/100ML; G/100ML; G/100ML
500 INJECTION, SOLUTION INTRAVENOUS ONCE
Status: COMPLETED | OUTPATIENT
Start: 2024-03-14 | End: 2024-03-14

## 2024-03-14 RX ORDER — HEPARIN SODIUM 10000 [USP'U]/100ML
3-30 INJECTION, SOLUTION INTRAVENOUS
Status: DISCONTINUED | OUTPATIENT
Start: 2024-03-14 | End: 2024-03-25

## 2024-03-14 RX ORDER — CALCIUM GLUCONATE 20 MG/ML
2 INJECTION, SOLUTION INTRAVENOUS ONCE
Status: DISCONTINUED | OUTPATIENT
Start: 2024-03-14 | End: 2024-03-14

## 2024-03-14 RX ORDER — NOREPINEPHRINE BITARTRATE 1 MG/ML
INJECTION, SOLUTION INTRAVENOUS
Status: COMPLETED
Start: 2024-03-14 | End: 2024-03-14

## 2024-03-14 RX ORDER — CALCIUM GLUCONATE 20 MG/ML
2 INJECTION, SOLUTION INTRAVENOUS ONCE
Status: COMPLETED | OUTPATIENT
Start: 2024-03-14 | End: 2024-03-14

## 2024-03-14 RX ORDER — ALBUMIN, HUMAN INJ 5% 5 %
SOLUTION INTRAVENOUS CONTINUOUS PRN
Status: DISCONTINUED | OUTPATIENT
Start: 2024-03-14 | End: 2024-03-14

## 2024-03-14 RX ADMIN — DEXMEDETOMIDINE HYDROCHLORIDE 0.7 MCG/KG/HR: 4 INJECTION, SOLUTION INTRAVENOUS at 21:39

## 2024-03-14 RX ADMIN — POTASSIUM CHLORIDE 20 MEQ: 14.9 INJECTION, SOLUTION INTRAVENOUS at 10:24

## 2024-03-14 RX ADMIN — PIPERACILLIN SODIUM AND TAZOBACTAM SODIUM 4.5 G: 36; 4.5 INJECTION, POWDER, LYOPHILIZED, FOR SOLUTION INTRAVENOUS at 21:39

## 2024-03-14 RX ADMIN — FENTANYL CITRATE 50 MCG: 50 INJECTION INTRAMUSCULAR; INTRAVENOUS at 08:41

## 2024-03-14 RX ADMIN — NOREPINEPHRINE BITARTRATE 4 MG: 1 SOLUTION INTRAVENOUS at 00:13

## 2024-03-14 RX ADMIN — FENTANYL CITRATE 50 MCG: 50 INJECTION INTRAMUSCULAR; INTRAVENOUS at 12:23

## 2024-03-14 RX ADMIN — FENTANYL CITRATE 50 MCG: 50 INJECTION INTRAMUSCULAR; INTRAVENOUS at 04:00

## 2024-03-14 RX ADMIN — SODIUM CHLORIDE, SODIUM GLUCONATE, SODIUM ACETATE, POTASSIUM CHLORIDE, MAGNESIUM CHLORIDE, SODIUM PHOSPHATE, DIBASIC, AND POTASSIUM PHOSPHATE 500 ML: .53; .5; .37; .037; .03; .012; .00082 INJECTION, SOLUTION INTRAVENOUS at 17:19

## 2024-03-14 RX ADMIN — SODIUM CHLORIDE, SODIUM GLUCONATE, SODIUM ACETATE, POTASSIUM CHLORIDE, MAGNESIUM CHLORIDE, SODIUM PHOSPHATE, DIBASIC, AND POTASSIUM PHOSPHATE 75 ML/HR: .53; .5; .37; .037; .03; .012; .00082 INJECTION, SOLUTION INTRAVENOUS at 19:30

## 2024-03-14 RX ADMIN — ROCURONIUM BROMIDE 30 MG: 10 INJECTION, SOLUTION INTRAVENOUS at 15:42

## 2024-03-14 RX ADMIN — PIPERACILLIN SODIUM AND TAZOBACTAM SODIUM 4.5 G: 36; 4.5 INJECTION, POWDER, LYOPHILIZED, FOR SOLUTION INTRAVENOUS at 04:01

## 2024-03-14 RX ADMIN — Medication 150 MCG/HR: at 21:39

## 2024-03-14 RX ADMIN — ALBUMIN (HUMAN) 25 G: 12.5 INJECTION, SOLUTION INTRAVENOUS at 13:39

## 2024-03-14 RX ADMIN — DEXMEDETOMIDINE HYDROCHLORIDE 0.7 MCG/KG/HR: 4 INJECTION, SOLUTION INTRAVENOUS at 09:54

## 2024-03-14 RX ADMIN — Medication 150 MCG/HR: at 11:58

## 2024-03-14 RX ADMIN — PIPERACILLIN SODIUM AND TAZOBACTAM SODIUM 4.5 G: 36; 4.5 INJECTION, POWDER, LYOPHILIZED, FOR SOLUTION INTRAVENOUS at 11:50

## 2024-03-14 RX ADMIN — CHLORHEXIDINE GLUCONATE 15 ML: 1.2 SOLUTION ORAL at 08:32

## 2024-03-14 RX ADMIN — POTASSIUM CHLORIDE 20 MEQ: 14.9 INJECTION, SOLUTION INTRAVENOUS at 08:36

## 2024-03-14 RX ADMIN — SODIUM CHLORIDE, SODIUM GLUCONATE, SODIUM ACETATE, POTASSIUM CHLORIDE, MAGNESIUM CHLORIDE, SODIUM PHOSPHATE, DIBASIC, AND POTASSIUM PHOSPHATE 1000 ML: .53; .5; .37; .037; .03; .012; .00082 INJECTION, SOLUTION INTRAVENOUS at 03:36

## 2024-03-14 RX ADMIN — SODIUM CHLORIDE, SODIUM GLUCONATE, SODIUM ACETATE, POTASSIUM CHLORIDE, MAGNESIUM CHLORIDE, SODIUM PHOSPHATE, DIBASIC, AND POTASSIUM PHOSPHATE 500 ML: .53; .5; .37; .037; .03; .012; .00082 INJECTION, SOLUTION INTRAVENOUS at 08:26

## 2024-03-14 RX ADMIN — NOREPINEPHRINE BITARTRATE 2 MCG/MIN: 1 INJECTION, SOLUTION, CONCENTRATE INTRAVENOUS at 01:16

## 2024-03-14 RX ADMIN — CHLORHEXIDINE GLUCONATE 15 ML: 1.2 SOLUTION ORAL at 21:51

## 2024-03-14 RX ADMIN — FENTANYL CITRATE 50 MCG: 50 INJECTION INTRAMUSCULAR; INTRAVENOUS at 06:15

## 2024-03-14 RX ADMIN — ROCURONIUM BROMIDE 50 MG: 10 INJECTION, SOLUTION INTRAVENOUS at 14:58

## 2024-03-14 RX ADMIN — CALCIUM GLUCONATE 2 G: 20 INJECTION, SOLUTION INTRAVENOUS at 08:30

## 2024-03-14 RX ADMIN — Medication 100 MCG: at 15:52

## 2024-03-14 RX ADMIN — NOREPINEPHRINE BITARTRATE 2 MCG/MIN: 1 INJECTION, SOLUTION, CONCENTRATE INTRAVENOUS at 00:13

## 2024-03-14 RX ADMIN — ALBUMIN (HUMAN): 12.5 INJECTION, SOLUTION INTRAVENOUS at 15:24

## 2024-03-14 RX ADMIN — Medication 75 MCG/HR: at 01:15

## 2024-03-14 RX ADMIN — HEPARIN SODIUM 19 UNITS/KG/HR: 10000 INJECTION, SOLUTION INTRAVENOUS at 19:30

## 2024-03-14 NOTE — PROGRESS NOTES
[facility]  Progress Note: Critical Care  Name: Jf Valera 88 y.o. male I MRN: [id number]  Unit/Bed#: [facility]PHP 514-01 I Date of Admission: 3/13/2024   Date of Service: 3/14/2024 I Hospital Day: 1    Assessment/Plan   Neuro:   No acute issues   Continue sedation/analgesia   Increase Fentanyl to 100mcg/gtt  Continue Precedex 0.7mcg/kg/hr  Fentanyl 50mcg Q2 hrs PRN pain/agitation     CV:   Borderline hypotension, improving  Etiology unknown, suspect in the setting of volume loss, however cannot exclude distributive shock at this time  MAPs this AM 65-75  Levo currently on hold, to resume prn  Propofol d/c'd last event   Continuous cardiac monitoring   Judicious fluid resuscitation   Patient received x3 1L Isolyte bolus and x2 5% Albumin 25mg in the past 24 hrs  Ordered x1 500cc Isolyte   Continue maintenance fluids: Isolyte 75mL/hr  Chronic hypertension   Holding home Amlodipine and Lisinopril in the setting of borderline hypertension      Pulm:  Post operative mechanical ventilation   Current settin/400/40/6  Peak pressure 17-20, Plateau pressure 20  Plan for return to OR today   Continue mechanical ventilation   O2 sat goal >92%  ABGs as needed  CXR as needed  Respiratory alkalosis, minimal  AB.457/33.8/128.1  Patient without base excess or gap on chemistry   Patient overbreathing vent at 17-18 breaths per minute   Suspect in the setting of uncomfortable/agitation, will adjust sedation        GI:   Mesenteric ischemia   3/13: ex lap, small bowel resection, lysis of adhesion, insertion of suprapubic cath with Dr. Hayes/Dr. Azul at [facility]  Plan for return to OR today for washout and possible closure  Continue to monitor Abthera output and replace fluid loss  1.35cc in 24 hrs    :   LISA, improving  Initial Cr 1.89   This AM Cr 1.07  Baseline Cr 0.95-1.10  Continue to monitor renal indices   Suprapubic catheter placed 3/13  Unable to insert gonzalez through  urethra pre-op likely 2/2 enlarged prostate  CT A/P 3/13: Massive enlargement of the prostate with irregular hyperenhancement   Continue to monitor insertion site   Continue to trend urine output   1.4L / 24 hrs (2.4 cc/kg/hr)      F/E/N:   Fluids: Isolyte 75 ml/hr  Electrolytes: replete as indicated for goal K>4, Phos >3, Mg >2  Nutrition: NPO    Heme/Onc:   Venous portal system thrombosis   3/13 CT A/P: Diffuse portal system thrombosis involving the main portal vein, intrahepatic portal veins, splenic vein, SMV and SMV branches  Etiology unknown, to consider malignancy, underlying genetic hypercoagulability, etc.   Plan: Heparin gtt  Vascular surgery following, greatly appreciate assistance  Per vascular, continue medical management, no plan for procedural intervention at this time    Endo:   No acute issues  Maintain euglycemia; goal glucose 140-180  Hypoglycemia protocol    ID:   Leukocytosis  Initial WBC 38  Down trending, this AM 17  Likely secondary to mesenteric ischemia   Will continue to monitor WBC count and fever curse   Continue Zosyn 4.5mg Q8 hrs   Blood cultures negative to date    MSK/Skin:   No acute issues  Frequent turning/skin checks  PT/OT when able     Disposition: Critical care    ICU Core Measures     Vented Patient  VAP Bundle  VAP bundle ordered     A: Assess, Prevent, and Manage Pain Has pain been assessed? Yes  Need for changes to pain regimen? No   B: Both Spontaneous Awakening Trials (SATs) and Spontaneous Breathing Trials (SBTs) Plan to perform spontaneous awakening trial today? Yes   Plan to perform spontaneous breathing trial today? No secondary to not appropriate for extubation at this time, patient returning to OR today  Obvious barriers to extubation? Yes   C: Choice of Sedation RASS Goal: 0 Alert and Calm or +1 Restless  Need for changes to sedation or analgesia regimen? Yes   D: Delirium CAM-ICU: Negative   E: Early Mobility  Plan for early mobility? Yes   F: Family Engagement  "Plan for family engagement today? Yes       Antibiotic Review: Awaiting culture results.  and Continue broad spectrum secondary to severity of illness.     Review of Invasive Devices:      Bev Plan: Keep arterial line for hemodynamic monitoring, frequent ABGs, and frequent labs    Prophylaxis:  VTE VTE covered by:  heparin (porcine), Intravenous, 17 Units/kg/hr at 03/14/24 0500       Stress Ulcer  not ordered      Significant 24hr Events     24hr events: Overnight, patient with hypotension requiring multiple fluid boluses - x2 L Isolyte, x2 500cc Albumin. Propofol d/c'd, Precedex added.   This am, patient shakes his head \"yes\" to be uncomfortable, however denies pain.      Subjective     Review of Systems   Unable to perform ROS: Intubated        Objective                            Vitals I/O      Most Recent Min/Max in 24hrs   Temp 98.4 °F (36.9 °C) Temp  Min: 96.6 °F (35.9 °C)  Max: 98.6 °F (37 °C)   Pulse 102 Pulse  Min: 66  Max: 124   Resp 17 Resp  Min: 7  Max: 20   /65 BP  Min: 83/50  Max: 140/69   O2 Sat 100 % SpO2  Min: 98 %  Max: 100 %      Intake/Output Summary (Last 24 hours) at 3/14/2024 0702  Last data filed at 3/14/2024 0600  Gross per 24 hour   Intake 5735.62 ml   Output 2800 ml   Net 2935.62 ml       Diet NPO    Invasive Monitoring   Arterial Line  Bev /60  Arterial Line BP  Min: 79/42  Max: 150/66   MAP 91 mmHg  Arterial Line MAP (mmHg)  Min: 57 mmHg  Max: 98 mmHg           Physical Exam   Physical Exam  Vitals and nursing note reviewed.   Eyes:      Pupils: Pupils are equal, round, and reactive to light.   Skin:     General: Skin is warm and dry.   HENT:      Head: Normocephalic and atraumatic.      Nose: No rhinorrhea or epistaxis.   Neck:      Vascular: No JVD.   Cardiovascular:      Rate and Rhythm: Normal rate and regular rhythm.      Pulses: Decreased pulses (bilateral radial pulses palpable. LLE with DP and PT signals. RRL without palbable pulses or doppler signals.).      " Heart sounds: Normal heart sounds.   Abdominal: General: There is distension.     Tenderness: There is no abdominal tenderness. There is no guarding.      Comments: Abthera in place and in clean, dry and intact without serous-sang output   Constitutional:       Appearance: He is underweight. He is ill-appearing.      Interventions: He is sedated, intubated and restrained.   Pulmonary:      Effort: Pulmonary effort is normal. He is intubated.      Breath sounds: Normal breath sounds.   Neurological:      Mental Status: He is alert. He is calm.      Comments: Appears uncomfortable at times with grimace   Following simple commands; squeeze hand, open eyes   Genitourinary/Anorectal:     Comments: Supra pubic cath  external catheter present.          Diagnostic Studies    EKG: sinus tachycardia  Imaging:  I have personally reviewed pertinent films in PACS     Medications:  Scheduled PRN   chlorhexidine, 15 mL, Q12H RONALD  piperacillin-tazobactam, 4.5 g, Q8H      fentaNYL, 50 mcg, Q2H PRN       Continuous    dexmedetomidine, 0.1-0.7 mcg/kg/hr, Last Rate: 0.7 mcg/kg/hr (03/14/24 0634)  fentaNYL, 100 mcg/hr, Last Rate: 75 mcg/hr (03/14/24 0115)  heparin (porcine), 3-30 Units/kg/hr (Order-Specific), Last Rate: 17 Units/kg/hr (03/14/24 0500)  multi-electrolyte, 75 mL/hr, Last Rate: 75 mL/hr (03/13/24 1601)  norepinephrine, 1-30 mcg/min, Last Rate: 3 mcg/min (03/14/24 0700)  propofol, 5-50 mcg/kg/min, Last Rate: Stopped (03/13/24 1852)         Labs:    CBC    Recent Labs     03/13/24  1548 03/14/24  0427   WBC 32.14* 17.19*   HGB 12.6 8.9*   HCT 37.4 26.5*   * 302     BMP    Recent Labs     03/13/24  1548 03/14/24  0427   SODIUM 135 145   K 4.4 3.6    109*   CO2 21 23   AGAP 8 13   BUN 26* 16   CREATININE 1.37* 1.07   CALCIUM 7.6* 7.3*       Coags    Recent Labs     03/13/24  1157 03/13/24  1548 03/13/24  2215 03/14/24  0427   INR 1.31* 1.51*  --   --    PTT 38* 116* 80* 56*        Additional Electrolytes  Recent  Labs     03/13/24  1548 03/14/24  0427   MG 1.8* 2.5   PHOS 3.4 3.2   CAIONIZED 1.02* 0.97*          Blood Gas    Recent Labs     03/13/24  2215   PHART 7.457*   [id number] 33.9*   PO2ART 128.1   [id number] 23.4   BEART -0.1   SOURCE Line, Arterial     Recent Labs     03/13/24  2215   SOURCE Line, Arterial    LFTs  Recent Labs     03/13/24  0820   ALT 15   AST 18   ALKPHOS 81   ALB 3.5   TBILI 0.58       Infectious  No recent results  Glucose  Recent Labs     03/13/24  0820 03/13/24  1548 03/14/24  0427   GLUC 196* 143* 120               Richard Kilgore PA-C

## 2024-03-14 NOTE — ANESTHESIA PREPROCEDURE EVALUATION
Procedure:  LAPAROTOMY EXPLORATORY, POSS BOWEL RESECTION, POSS VENOUS THROMBECTOMY, POSS ABTHERA, POSS CLOSURE (Abdomen)    Relevant Problems   CARDIO   (+) HTN (hypertension)      NEURO/PSYCH   (+) TIA (transient ischemic attack)      Jf Valera is a 88 y.o. male with acute mesenteric ischemia s/p OR for exlap, SBR, AUREA, left in discontinuity, suprapubic cath insertion, abthera placement on 3/14     Physical Exam    Airway    Mallampati score: II  TM Distance: >3 FB  Neck ROM: full     Dental   No notable dental hx     Cardiovascular  Cardiovascular exam normal    Pulmonary  Pulmonary exam normal     Other Findings    Sinus tachycardia  Left axis deviation  Minimal voltage criteria for LVH, may be normal variant ( Olaf product )  Septal infarct (cited on or before 08-MAR-2020)  Abnormal ECG        Left Ventricle: Left ventricular cavity size is normal. Wall thickness is borderline increased. The left ventricular ejection fraction is 70% by visual estimation. Systolic function is normal. Wall motion is normal. Diastolic function is normal for age.    Atrial Septum: No patent foramen ovale detected using saline contrast injection with provocation by Valsalva.      Anesthesia Plan  ASA Score- 4     Anesthesia Type- general with ASA Monitors.         Additional Monitors:     Airway Plan: ETT.           Plan Factors-Exercise tolerance (METS): <4 METS.    Chart reviewed. EKG reviewed. Imaging results reviewed. Existing labs reviewed. Patient summary reviewed.    Patient is not a current smoker.      There is medical exclusion for perioperative obstructive sleep apnea risk education.        Induction- inhalational.    Postoperative Plan-     Informed Consent- Anesthetic plan and risks discussed with son.  I personally reviewed this patient with the CRNA. Discussed and agreed on the Anesthesia Plan with the CRNA..          Diffuse portal system thrombosis involving the main portal vein, intrahepatic portal veins,  splenic vein,, SMV and SMV branches.. Resulting diffuse small bowel wall thickening, with distal small bowel hypoenhancement most consistent with venoocclusive   ischemia.             Hb 15.4      Lactate 2.8      On heparin infusion for mesenteric ischemia       Patient on vent

## 2024-03-14 NOTE — PROGRESS NOTES
Pastoral Care Progress Note    3/14/2024  Patient: Jf Valera : 1935  Admission Date & Time: 3/13/2024 1524  MRN: [id number] CSN: [id number]         met with family while pt lay there and Doc reported pt's condition. Family appeared to be at peace even with the hard news and reported that they saw God as in control, even in hard times.  joined with family in prayer and remains available.                24 1300   Clinical Encounter Type   Visited With Patient and family together   Surgical Visit Pre-op   Referral From    Referral To    [facility] Encounters   [facility] Needs Prayer

## 2024-03-14 NOTE — PROGRESS NOTES
Progress Note - Vascular Surgery   Jf Valera 88 y.o. male MRN: [id number]  Unit/Bed#: [facility] 514-01 Encounter: [id number]    Assessment:  Jf Valera is a 88 y.o. 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  Findings suggestive of hypercoagulability given associated descending thoracic and infrarenal aortic thrombus along with left SFA thrombus occlusion.      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  No acute vascular intervention planned currently, although extensive mesenteric venous thrombosis noted, ischemic bowel has been removed potentially mitigating the benefit of more invasive thrombectomy however will follow up on 2nd look to evaluate remainder of bowel viability   Any intervention would also be challenging as transhepatic portal venous access is complicated by intrahepatic portal venous thrombus   Continue hep gtt for AC  Hypotension/Systemic shock   Continue goal directed resuscitation  Wean pressors as tolerated   Vent management per ICU   Aortic/SFA thrombus   Continue AC   Monitor frequent LE neurovascular checks for signs of distal embolization   Currently without evidence of gross LE ischemia however will continue to monitor   Recommend cardioembolic and hypercoagulable work up, cannot r/o secondary to malignancy   Remainder of care per primary team, overall clinical status remains tentative however will continue to follow, if any acute issues or concerns please dont hesitate to contact vascular team     Subjective/Objective    S/E, remains on levo with fluctuating doses, sedation held this AM and arousable, able to follow commands, denied any LE pain or paresthesias, motor intact.  Remains on vent.     Objective:     Blood pressure 137/65,  pulse 102, temperature 98.4 °F (36.9 °C), resp. rate 17, weight 53.2 kg (117 lb 4.6 oz), SpO2 100%.,Body mass index is 20.13 kg/m².      Intake/Output Summary (Last 24 hours) at 3/14/2024 0753  Last data filed at 3/14/2024 0714  Gross per 24 hour   Intake 5735.62 ml   Output 2900 ml   Net 2835.62 ml       Invasive Devices       Peripheral Intravenous Line  Duration             Peripheral IV 03/13/24 Left;Upper;Ventral (anterior) Arm <1 day    Peripheral IV 03/13/24 Left;Ventral (anterior) Forearm <1 day    Peripheral IV 03/13/24 Proximal;Right;Ventral (anterior) Forearm <1 day    Peripheral IV 03/13/24 Right Antecubital <1 day              Arterial Line  Duration             Arterial Line 03/13/24 Right Radial <1 day              Drain  Duration             NG/OG/Enteral Tube Nasogastric 16 Fr Left nare <1 day    Suprapubic Catheter 18 Fr. <1 day              Airway  Duration             ETT  Oral 7.5 mm <1 day                    Physical Exam:   GEN: Frail appearing  HEENT: NCAT, ETT and NGT in place   CV: RRR  Lung: mechanical vent   Ab: distended, Abthera wound vac in place   Extrem: LLE multiphasic peroneal/DP/PT, RLE without signals however cap refill <3s, motor appears intact, no gross cyanosis or ischemia    Neuro: sedated however arousable and able to follow commands     Lab, Imaging and other studies:I have personally reviewed pertinent lab results.     VTE Pharmacologic Prophylaxis: Heparin  VTE Mechanical Prophylaxis: sequential compression device    Recent Results (from the past 36 hour(s))   ECG 12 lead    Collection Time: 03/13/24  8:04 AM   Result Value Ref Range    Ventricular Rate 115 BPM    Atrial Rate 115 BPM    DC Interval 154 ms    QRSD Interval 102 ms    QT Interval 336 ms    QTC Interval 464 ms    P [place] 67 degrees    QRS Axis -82 degrees    T Wave Axis 87 degrees   CBC and differential    Collection Time: 03/13/24  8:20 AM   Result Value Ref Range    WBC 38.26 (H) 4.31 - 10.16 Thousand/uL     "RBC 5.45 3.88 - 5.62 Million/uL    Hemoglobin 15.4 12.0 - 17.0 g/dL    Hematocrit 47.0 36.5 - 49.3 %    MCV 86 82 - 98 fL    MCH 28.3 26.8 - 34.3 pg    MCHC 32.8 31.4 - 37.4 g/dL    RDW 14.9 11.6 - 15.1 %    MPV 9.8 8.9 - 12.7 fL    Platelets 533 (H) 149 - 390 Thousands/uL   Comprehensive metabolic panel    Collection Time: 03/13/24  8:20 AM   Result Value Ref Range    Sodium 137 135 - 147 mmol/L    Potassium 4.8 3.5 - 5.3 mmol/L    Chloride 97 96 - 108 mmol/L    CO2 25 21 - 32 mmol/L    ANION GAP 15 (H) 4 - 13 mmol/L    BUN 25 5 - 25 mg/dL    Creatinine 1.89 (H) 0.60 - 1.30 mg/dL    Glucose 196 (H) 65 - 140 mg/dL    Calcium 9.4 8.4 - 10.2 mg/dL    AST 18 13 - 39 U/L    ALT 15 7 - 52 U/L    Alkaline Phosphatase 81 34 - 104 U/L    Total Protein 7.3 6.4 - 8.4 g/dL    Albumin 3.5 3.5 - 5.0 g/dL    Total Bilirubin 0.58 0.20 - 1.00 mg/dL    eGFR 31 ml/min/1.73sq m   Magnesium    Collection Time: 03/13/24  8:20 AM   Result Value Ref Range    Magnesium 2.2 1.9 - 2.7 mg/dL   Lipase    Collection Time: 03/13/24  8:20 AM   Result Value Ref Range    Lipase 38 11 - 82 u/L   FLU/RSV/COVID - if FLU/RSV clinically relevant    Collection Time: 03/13/24  8:20 AM    Specimen: Nose; Nares   Result Value Ref Range    SARS-CoV-2 Negative Negative    INFLUENZA A PCR Negative Negative    INFLUENZA B PCR Negative Negative    RSV PCR Negative Negative   HS Troponin 0hr (reflex protocol)    Collection Time: 03/13/24  8:20 AM   Result Value Ref Range    hs TnI 0hr 8 \"Refer to ACS Flowchart\"- see link ng/L   Manual Differential(PHLEBS Do Not Order)    Collection Time: 03/13/24  8:20 AM   Result Value Ref Range    Segmented % 92 (H) 43 - 75 %    Lymphocytes % 3 (L) 14 - 44 %    Monocytes % 3 (L) 4 - 12 %    Eosinophils % 0 0 - 6 %    Basophils % 0 0 - 1 %    Metamyelocytes % 1 0 - 1 %    Myelocytes % 1 0 - 1 %    Absolute Neutrophils 35.20 (H) 1.85 - 7.62 Thousand/uL    Absolute Lymphocytes 1.15 0.60 - 4.47 Thousand/uL    Absolute Monocytes " "1.15 0.00 - 1.22 Thousand/uL    Absolute Eosinophils 0.00 0.00 - 0.40 Thousand/uL    Absolute Basophils 0.00 0.00 - 0.10 Thousand/uL    Total Counted      Toxic Vacuolated Neutrophils Present     RBC Morphology Present     Platelet Estimate Increased (A) Adequate    Large Platelet Present     Polychromasia Present    Lactic acid, plasma (w/reflex if result > 2.0)    Collection Time: 03/13/24  9:52 AM   Result Value Ref Range    LACTIC ACID 2.8 (HH) 0.5 - 2.0 mmol/L   Blood culture #1    Collection Time: 03/13/24  9:52 AM    Specimen: Arm, Left; Blood   Result Value Ref Range    Blood Culture Received in Microbiology Lab. Culture in Progress.    Blood culture #2    Collection Time: 03/13/24  9:52 AM    Specimen: Arm, Right; Blood   Result Value Ref Range    Blood Culture Received in Microbiology Lab. Culture in Progress.    HS Troponin I 2hr    Collection Time: 03/13/24 10:30 AM   Result Value Ref Range    hs TnI 2hr 8 \"Refer to ACS Flowchart\"- see link ng/L    Delta 2hr hsTnI 0 <20 ng/L   APTT    Collection Time: 03/13/24 11:57 AM   Result Value Ref Range    PTT 38 (H) 23 - 37 seconds   CBC    Collection Time: 03/13/24 11:57 AM   Result Value Ref Range    WBC 38.52 (H) 4.31 - 10.16 Thousand/uL    RBC 5.01 3.88 - 5.62 Million/uL    Hemoglobin 14.1 12.0 - 17.0 g/dL    Hematocrit 43.3 36.5 - 49.3 %    MCV 86 82 - 98 fL    MCH 28.1 26.8 - 34.3 pg    MCHC 32.6 31.4 - 37.4 g/dL    RDW 15.2 (H) 11.6 - 15.1 %    Platelets 515 (H) 149 - 390 Thousands/uL    MPV 9.7 8.9 - 12.7 fL   Protime-INR    Collection Time: 03/13/24 11:57 AM   Result Value Ref Range    Protime 16.5 (H) 11.6 - 14.5 seconds    INR 1.31 (H) 0.84 - 1.19   ABORh Recheck - Contact Blood Bank Prior to Collection    Collection Time: 03/13/24 11:57 AM   Result Value Ref Range    ABO Grouping A     Rh Factor Positive    UA w Reflex to Microscopic w Reflex to Culture    Collection Time: 03/13/24 12:03 PM    Specimen: Urine, Other   Result Value Ref Range    Color, " UA Yellow     Clarity, UA Clear     Specific Gravity, UA <=1.005 1.000 - 1.030    pH, UA 6.0 5.0, 5.5, 6.0, 6.5, 7.0, 7.5, 8.0, 8.5, 9.0    Leukocytes, UA Negative Negative    Nitrite, UA Negative Negative    Protein, UA Trace (A) Negative mg/dl    Glucose, UA Negative Negative mg/dl    Ketones, UA Negative Negative mg/dl    Urobilinogen, UA 0.2 0.2, 1.0 E.U./dl E.U./dl    Bilirubin, UA Negative Negative    Occult Blood, UA Trace-Intact (A) Negative   Urine Microscopic    Collection Time: 03/13/24 12:03 PM   Result Value Ref Range    RBC, UA 0-1 None Seen, 0-1, 1-2, 2-4, 0-5 /hpf    WBC, UA 1-2 None Seen, 0-1, 1-2, 0-5, 2-4 /hpf    Epithelial Cells None Seen None Seen, Occasional /hpf    Bacteria, UA Occasional None Seen, Occasional /hpf   POCT Blood Gas (CG8+)    Collection Time: 03/13/24 12:14 PM   Result Value Ref Range    pH, Art i-STAT 7.321 (L) 7.350 - 7.450    pCO2, Art i-STAT 35.5 (L) 36.0 - 44.0 mm HG    pO2, ART i-STAT 140.0 (H) 75.0 - 129.0 mm HG    BE, i-STAT -7 (L) -2 - 3 mmol/L    HCO3, Art i-STAT 18.3 (L) 22.0 - 28.0 mmol/L    CO2, i-STAT 19 (L) 21 - 32 mmol/L    O2 Sat, i-STAT 99 (H) 60 - 85 %    SODIUM, I-STAT 135 (L) 136 - 145 mmol/l    Potassium, i-STAT 4.5 3.5 - 5.3 mmol/L    Calcium, Ionized i-STAT 1.14 1.12 - 1.32 mmol/L    Hct, i-STAT 36 (L) 36.5 - 49.3 %    Hgb, i-STAT 12.2 12.0 - 17.0 g/dl    Glucose, i-STAT 158 (H) 65 - 140 mg/dl    Specimen Type ARTERIAL    Type and screen    Collection Time: 03/13/24 12:49 PM   Result Value Ref Range    ABO Grouping A     Rh Factor Positive     Antibody Screen Negative     Specimen Expiration Date 20240316    APTT    Collection Time: 03/13/24  3:48 PM   Result Value Ref Range     (H) 23 - 37 seconds   Basic metabolic panel    Collection Time: 03/13/24  3:48 PM   Result Value Ref Range    Sodium 135 135 - 147 mmol/L    Potassium 4.4 3.5 - 5.3 mmol/L    Chloride 106 96 - 108 mmol/L    CO2 21 21 - 32 mmol/L    ANION GAP 8 4 - 13 mmol/L    BUN 26 (H) 5  - 25 mg/dL    Creatinine 1.37 (H) 0.60 - 1.30 mg/dL    Glucose 143 (H) 65 - 140 mg/dL    Calcium 7.6 (L) 8.4 - 10.2 mg/dL    eGFR 46 ml/min/1.73sq m   Calcium, ionized    Collection Time: 03/13/24  3:48 PM   Result Value Ref Range    Calcium, Ionized 1.02 (L) 1.12 - 1.32 mmol/L   CBC and differential    Collection Time: 03/13/24  3:48 PM   Result Value Ref Range    WBC 32.14 (H) 4.31 - 10.16 Thousand/uL    RBC 4.48 3.88 - 5.62 Million/uL    Hemoglobin 12.6 12.0 - 17.0 g/dL    Hematocrit 37.4 36.5 - 49.3 %    MCV 84 82 - 98 fL    MCH 28.1 26.8 - 34.3 pg    MCHC 33.7 31.4 - 37.4 g/dL    RDW 15.1 11.6 - 15.1 %    MPV 9.6 8.9 - 12.7 fL    Platelets 502 (H) 149 - 390 Thousands/uL   Lactic acid, plasma (w/reflex if result > 2.0)    Collection Time: 03/13/24  3:48 PM   Result Value Ref Range    LACTIC ACID 1.8 0.5 - 2.0 mmol/L   Magnesium    Collection Time: 03/13/24  3:48 PM   Result Value Ref Range    Magnesium 1.8 (L) 1.9 - 2.7 mg/dL   Phosphorus    Collection Time: 03/13/24  3:48 PM   Result Value Ref Range    Phosphorus 3.4 2.3 - 4.1 mg/dL   Protime-INR    Collection Time: 03/13/24  3:48 PM   Result Value Ref Range    Protime 18.0 (H) 11.6 - 14.5 seconds    INR 1.51 (H) 0.84 - 1.19   Type and screen    Collection Time: 03/13/24  3:48 PM   Result Value Ref Range    ABO Grouping A     Rh Factor Positive     Antibody Screen Negative     Specimen Expiration Date 20240316    Blood gas, arterial    Collection Time: 03/13/24  3:50 PM   Result Value Ref Range    pH, Arterial 7.507 (H) 7.350 - 7.450    pCO2, Arterial 25.5 (L) 36.0 - 44.0 mm Hg    pO2, Arterial 143.6 (H) 75.0 - 129.0 mm Hg    HCO3, Arterial 19.8 (L) 22.0 - 28.0 mmol/L    Base Excess, Arterial -1.8 mmol/L    O2 Content, Arterial 18.7 16.0 - 23.0 mL/dL    O2 HGB,Arterial  97.9 (H) 94.0 - 97.0 %    SOURCE Line, Arterial    Blood gas, arterial    Collection Time: 03/13/24  5:30 PM   Result Value Ref Range    pH, Arterial 7.485 (H) 7.350 - 7.450    pCO2, Arterial  29.8 (L) 36.0 - 44.0 mm Hg    pO2, Arterial 149.8 (H) 75.0 - 129.0 mm Hg    HCO3, Arterial 21.9 (L) 22.0 - 28.0 mmol/L    Base Excess, Arterial -0.6 mmol/L    O2 Content, Arterial 16.7 16.0 - 23.0 mL/dL    O2 HGB,Arterial  98.3 (H) 94.0 - 97.0 %    SOURCE Line, Arterial     Vent Type- AC AC     AC Rate 16     Tidal Volume 400 ml    Inspired Air (FIO2) 40     PEEP 6    APTT    Collection Time: 03/13/24 10:15 PM   Result Value Ref Range    PTT 80 (H) 23 - 37 seconds   Blood gas, arterial    Collection Time: 03/13/24 10:15 PM   Result Value Ref Range    pH, Arterial 7.457 (H) 7.350 - 7.450    PH ART TC 7.459 (H) 7.350 - 7.450    pCO2, Arterial 33.9 (L) 36.0 - 44.0 mm Hg    PCO2 (TC) Arterial 33.8 (L) 36.0 - 44.0 mm Hg    pO2, Arterial 128.1 75.0 - 129.0 mm Hg    PO2 (TC) Arterial 127.5 75.0 - 129.0 mm Hg    HCO3, Arterial 23.4 22.0 - 28.0 mmol/L    Base Excess, Arterial -0.1 mmol/L    O2 Content, Arterial 15.2 (L) 16.0 - 23.0 mL/dL    O2 HGB,Arterial  97.9 (H) 94.0 - 97.0 %    SOURCE Line, Arterial     Temperature 98.4 Degrees Fehrenheit    Vent Type- AC AC     AC Rate 16     Tidal Volume 400 ml    Inspired Air (FIO2) 40     PEEP 6    Lactic acid, plasma (w/reflex if result > 2.0)    Collection Time: 03/13/24 10:15 PM   Result Value Ref Range    LACTIC ACID 1.0 0.5 - 2.0 mmol/L   APTT    Collection Time: 03/14/24  4:27 AM   Result Value Ref Range    PTT 56 (H) 23 - 37 seconds   Phosphorus    Collection Time: 03/14/24  4:27 AM   Result Value Ref Range    Phosphorus 3.2 2.3 - 4.1 mg/dL   Magnesium    Collection Time: 03/14/24  4:27 AM   Result Value Ref Range    Magnesium 2.5 1.9 - 2.7 mg/dL   Basic metabolic panel    Collection Time: 03/14/24  4:27 AM   Result Value Ref Range    Sodium 145 135 - 147 mmol/L    Potassium 3.6 3.5 - 5.3 mmol/L    Chloride 109 (H) 96 - 108 mmol/L    CO2 23 21 - 32 mmol/L    ANION GAP 13 4 - 13 mmol/L    BUN 16 5 - 25 mg/dL    Creatinine 1.07 0.60 - 1.30 mg/dL    Glucose 120 65 - 140 mg/dL     Calcium 7.3 (L) 8.4 - 10.2 mg/dL    eGFR 62 ml/min/1.73sq m   CBC and differential    Collection Time: 03/14/24  4:27 AM   Result Value Ref Range    WBC 17.19 (H) 4.31 - 10.16 Thousand/uL    RBC 3.14 (L) 3.88 - 5.62 Million/uL    Hemoglobin 8.9 (L) 12.0 - 17.0 g/dL    Hematocrit 26.5 (L) 36.5 - 49.3 %    MCV 84 82 - 98 fL    MCH 28.3 26.8 - 34.3 pg    MCHC 33.6 31.4 - 37.4 g/dL    RDW 15.2 (H) 11.6 - 15.1 %    MPV 10.0 8.9 - 12.7 fL    Platelets 302 149 - 390 Thousands/uL    nRBC 0 /100 WBCs    Neutrophils Relative 87 (H) 43 - 75 %    Immature Grans % 0 0 - 2 %    Lymphocytes Relative 6 (L) 14 - 44 %    Monocytes Relative 7 4 - 12 %    Eosinophils Relative 0 0 - 6 %    Basophils Relative 0 0 - 1 %    Neutrophils Absolute 14.90 (H) 1.85 - 7.62 Thousands/µL    Absolute Immature Grans 0.06 0.00 - 0.20 Thousand/uL    Absolute Lymphocytes 1.03 0.60 - 4.47 Thousands/µL    Absolute Monocytes 1.19 0.17 - 1.22 Thousand/µL    Eosinophils Absolute 0.00 0.00 - 0.61 Thousand/µL    Basophils Absolute 0.01 0.00 - 0.10 Thousands/µL   Calcium, ionized    Collection Time: 03/14/24  4:27 AM   Result Value Ref Range    Calcium, Ionized 0.97 (L) 1.12 - 1.32 mmol/L   Lactic acid, plasma (w/reflex if result > 2.0)    Collection Time: 03/14/24  4:27 AM   Result Value Ref Range    LACTIC ACID 0.7 0.5 - 2.0 mmol/L

## 2024-03-14 NOTE — ANESTHESIA POSTPROCEDURE EVALUATION
Post-Op Assessment Note    CV Status:  Stable    Pain management: adequate       Post-procedure mental status: Sedated and intubated.   Hydration Status:  Euvolemic   PONV Controlled:  Controlled   Airway Patency:  Patent     Post Op Vitals Reviewed: Yes    No anethesia notable event occurred.    Staff: CRNA     Reason for prolonged intubation > 24 hours:  Multiple OR procedures          BP   144/68   Temp      Pulse 68   Resp 16   SpO2 100

## 2024-03-14 NOTE — PLAN OF CARE
Problem: SAFETY,RESTRAINT: NV/NON-SELF DESTRUCTIVE BEHAVIOR  Goal: Remains free of harm/injury (restraint for non violent/non self-detsructive behavior)  Description: INTERVENTIONS:  - Instruct patient/family regarding restraint use   - Assess and monitor physiologic and psychological status   - Provide interventions and comfort measures to meet assessed patient needs   - Identify and implement measures to help patient regain control  - Assess readiness for release of restraint   Outcome: Progressing  Goal: Returns to optimal restraint-free functioning  Description: INTERVENTIONS:  - Assess the patient's behavior and symptoms that indicate continued need for restraint  - Identify and implement measures to help patient regain control  - Assess readiness for release of restraint   Outcome: Progressing     Problem: PAIN - ADULT  Goal: Verbalizes/displays adequate comfort level or baseline comfort level  Description: Interventions:  - Encourage patient to monitor pain and request assistance  - Assess pain using appropriate pain scale  - Administer analgesics based on type and severity of pain and evaluate response  - Implement non-pharmacological measures as appropriate and evaluate response  - Consider cultural and social influences on pain and pain management  - Notify physician/advanced practitioner if interventions unsuccessful or patient reports new pain  Outcome: Progressing     Problem: INFECTION - ADULT  Goal: Absence or prevention of progression during hospitalization  Description: INTERVENTIONS:  - Assess and monitor for signs and symptoms of infection  - Monitor lab/diagnostic results  - Monitor all insertion sites, i.e. indwelling lines, tubes, and drains  - Monitor endotracheal if appropriate and nasal secretions for changes in amount and color  - [place] appropriate cooling/warming therapies per order  - Administer medications as ordered  - Instruct and encourage patient and family to use good hand  hygiene technique  - Identify and instruct in appropriate isolation precautions for identified infection/condition  Outcome: Progressing  Goal: Absence of fever/infection during neutropenic period  Description: INTERVENTIONS:  - Monitor WBC    Outcome: Progressing     Problem: SAFETY ADULT  Goal: Patient will remain free of falls  Description: INTERVENTIONS:  - Educate patient/family on patient safety including physical limitations  - Instruct patient to call for assistance with activity   - Consult OT/PT to assist with strengthening/mobility   - Keep Call bell within reach  - Keep bed low and locked with side rails adjusted as appropriate  - Keep care items and personal belongings within reach  - Initiate and maintain comfort rounds  - Make Fall Risk Sign visible to staff  - Offer Toileting every  Hours, in advance of need  - Initiate/Maintain alarm  - Obtain necessary fall risk management equipment:   - Apply yellow socks and bracelet for high fall risk patients  - Consider moving patient to room near nurses station  Outcome: Progressing  Goal: Maintain or return to baseline ADL function  Description: INTERVENTIONS:  -  Assess patient's ability to carry out ADLs; assess patient's baseline for ADL function and identify physical deficits which impact ability to perform ADLs (bathing, care of mouth/teeth, toileting, grooming, dressing, etc.)  - Assess/evaluate cause of self-care deficits   - Assess range of motion  - Assess patient's mobility; develop plan if impaired  - Assess patient's need for assistive devices and provide as appropriate  - Encourage maximum independence but intervene and supervise when necessary  - Involve family in performance of ADLs  - Assess for home care needs following discharge   - Consider OT consult to assist with ADL evaluation and planning for discharge  - Provide patient education as appropriate  Outcome: Progressing  Goal: Maintains/Returns to pre admission functional  level  Description: INTERVENTIONS:  - Perform AM-PAC 6 Click Basic Mobility/ Daily Activity assessment daily.  - Set and communicate daily mobility goal to care team and patient/family/caregiver.   - Collaborate with rehabilitation services on mobility goals if consulted  - Perform Range of Motion  times a day.  - Reposition patient every  hours.  - Dangle patient  times a day  - Stand patient  times a day  - Ambulate patient  times a day  - Out of bed to chair  times a day   - Out of bed for meals times a day  - Out of bed for toileting  - Record patient progress and toleration of activity level   Outcome: Progressing     Problem: DISCHARGE PLANNING  Goal: Discharge to home or other facility with appropriate resources  Description: INTERVENTIONS:  - Identify barriers to discharge w/patient and caregiver  - Arrange for needed discharge resources and transportation as appropriate  - Identify discharge learning needs (meds, wound care, etc.)  - Arrange for interpretive services to assist at discharge as needed  - Refer to Case Management Department for coordinating discharge planning if the patient needs post-hospital services based on physician/advanced practitioner order or complex needs related to functional status, cognitive ability, or social support system  Outcome: Progressing     Problem: Knowledge Deficit  Goal: Patient/family/caregiver demonstrates understanding of disease process, treatment plan, medications, and discharge instructions  Description: Complete learning assessment and assess knowledge base.  Interventions:  - Provide teaching at level of understanding  - Provide teaching via preferred learning methods  Outcome: Progressing     Problem: COPING  Goal: Pt/Family able to verbalize concerns and demonstrate effective coping strategies  Description: INTERVENTIONS:  - Assist patient/family to identify coping skills, available support systems and cultural and spiritual values  - Provide emotional  support, including active listening and acknowledgement of concerns of patient and caregivers  - Reduce environmental stimuli, as able  - Provide patient education  - Assess for spiritual pain/suffering and initiate spiritual care, including notification of Pastoral Care or timothy based community as needed  - Assess effectiveness of coping strategies  Outcome: Progressing  Goal: Will report anxiety at manageable levels  Description: INTERVENTIONS:  - Administer medication as ordered  - Teach and encourage coping skills  - Provide emotional support  - Assess patient/family for anxiety and ability to cope  Outcome: Progressing     Problem: DEATH & DYING  Goal: Pt/Family communicate acceptance of impending death and expresses psychological comfort and peace  Description: INTERVENTIONS:  - Assess patient/family anxiety and grief process related to end of life issues  - Provide emotional, spiritual and psychosocial support  - Provide information about the patient’s health status with consideration of family and cultural values  - Communicate willingness to discuss death and facilitate grief process  with patient/family as appropriate  - Emphasize sustaining relationships within family system and community, or timothy/spiritual traditions  - Initiate Spiritual Care, Pastoral care or other ancillary consults as needed  - Refer to community support groups as appropriate  Outcome: Progressing     Problem: CHANGE IN BODY IMAGE  Goal: Pt/Family communicate acceptance of loss or change in body image and expresses psychological comfort and peace  Description: INTERVENTIONS:  - Assess patient/family anxiety and grief process related to change in body image, loss of functional status and loss of sense of self  - Assess patient/family's coping skills and provide emotional, spiritual and psychosocial support  - Provide information about the patient's health status with consideration of family and cultural values  - Communicate  willingness to discuss loss and facilitate grief process with patient/family as appropriate  - Emphasize sustaining relationships within family system and community, or timothy/spiritual traditions  - Refer to community support groups as appropriate  - Initiate Spiritual Care, Pastoral care or other ancillary consults as needed  Outcome: Progressing     Problem: DECISION MAKING  Goal: Pt/Family able to effectively weigh alternatives and participate in decision making related to treatment and care  Description: INTERVENTIONS:  - Identify decision maker  - Determine when there are differences among patient's view, family's view, and healthcare provider's view of patient condition and care goals  - Facilitate patient/family articulation of goals for care  - Help patient/family identify pros/cons of alternative solutions  - Provide information as requested by patient/family  - Respect patient/family rights related to privacy and sharing information   - Serve as a liaison between patient, family and health care team  - Initiate consults as appropriate (Ethics Team, Palliative Care, Family Care Conference, etc.)  Outcome: Progressing     Problem: CONFUSION/THOUGHT DISTURBANCE  Goal: Thought disturbances (confusion, delirium, depression, dementia or psychosis) are managed to maintain or return to baseline mental status and functional level  Description: INTERVENTIONS:  - Assess for possible contributors to  thought disturbance, including but not limited to medications, infection, impaired vision or hearing, underlying metabolic abnormalities, dehydration, respiratory compromise,  psychiatric diagnoses and notify attending PHYSICAN/AP  - Monitor and intervene to maintain adequate nutrition, hydration, elimination, sleep and activity  - Decrease environmental stimuli, including noise as appropriate.  - Provide frequent contacts to provide refocusing, direction and reassurance as needed. Approach patient calmly with eye contact  and at their level.  - [place] high risk fall precautions, aspiration precautions and other safety measures, as indicated  - If delirium suspected, notify physician/AP of change in condition and request immediate in-person evaluation  - Pursue consults as appropriate including Geriatric (campus dependent), OT for cognitive evaluation/activity planning, psychiatric, pastoral care, etc.  Outcome: Progressing     Problem: BEHAVIOR  Goal: Pt/Family maintain appropriate behavior and adhere to behavioral management agreement, if implemented  Description: INTERVENTIONS:  - Assess the family dynamic   - Encourage verbalization of thoughts and concerns in a socially appropriate manner  - Assess patient/family's coping skills and non-compliant behavior (including use of illegal substances).  - Utilize positive, consistent limit setting strategies supporting safety of patient, staff and others  - Initiate consult with Case Management, Spiritual Care or other ancillary services as appropriate  - If a patient's/visitor's behavior jeopardizes the safety of the patient, staff, or others, refer to organization procedure.   - Notify Security of behavior or suspected illegal substances which indicate the need for search of the patient and/or belongings  - Encourage participation in the decision making process about a behavioral management agreement; implement if patient meets criteria  Outcome: Progressing     Problem: Depression - IP adult  Goal: Effects of depression will be minimized  Description: INTERVENTIONS:  - Assess impact of patient's symptoms on level of functioning, self-care needs and offer support as indicated  - Assess patient/family knowledge of depression, impact on illness and need for teaching  - Provide emotional support, presence and reassurance  - Assess for possible suicidal thoughts, ideation or self-harm. If patient expresses suicidal thoughts or statements do not leave alone, notify physician/AP  immediately, initiate Suicide Precautions, and determine need for continual observation.  - Initiate consults and referrals as appropriate (a mental health professional, Spiritual Care)  - Administer medication as ordered  Outcome: Progressing     Problem: SELF HARM  Goal: Effect of psychiatric condition will be minimized and patient will be protected from self harm  Description: INTERVENTIONS:  - Assess impact of patient's symptoms on level of functioning, self-care needs and offer support as indicated  - Assess patient/family knowledge of depression, impact on illness and need for teaching  - Provide emotional support, presence and reassurance  - Assess for possible suicidal thoughts, ideation or self-harm. If patient expresses suicidal thoughts or statements do not leave alone, notify physician/AP immediately, initiate suicide precautions, and determine need for continual observation.  - initiate consults and referrals as appropriate (a mental health professional, Spiritual Care  Outcome: Progressing     Problem: ABUSE/NEGLECT  Goal: Pt/Caregiver/Family aware of resources to assist with issues of abuse and neglect  Description: INTERVENTIONS:  - If child abuse and/or neglect is suspected, notify Childline directly  - Assess for level of risk and safety  - Initiate referral to Case Management  - Notify PHYSICIAN/AP and Nursing Supervisor  - Provide appropriate education and resources to patient and/or family  - Initiate referral to age appropriate protective services, i.e. [facility] or Child Protective Services  - Offer patient/caregiver the option to Opt Out of patient information directory  - Provide emotional support, including active listening and acknowledgment of concerns  - Provide the patient with information about supportive services i.e. shelters, community resources for domestic violence  Outcome: Progressing     Problem: SUBSTANCE USE/ABUSE  Goal: Will have no detox symptoms and will  verbalize plan for changing substance-related behavior  Description: INTERVENTIONS:  - Monitor physical status and assess for symptoms of withdrawal  - Administer medication as ordered  - Provide emotional support with 1 on 1 interaction with staff  - Encourage recovery focused program/ addiction education  - Assess for verbalization of changing behaviors related to substance abuse  - Initiate consults and referrals as appropriate (Case Management, Spiritual Care, etc.)  Outcome: Progressing  Goal: By discharge, will develop insight into their chemical dependency and sustain motivation to continue in recovery  Description: INTERVENTIONS:  - Attends all daily group sessions and scheduled AA groups  - Actively practices coping skills through participation in the therapeutic community and adherence to program rules  - Reviews and completes assignments from individual treatment plan  - Assist patient development of understanding of their personal cycle of addiction and relapse triggers  Outcome: Progressing  Goal: By discharge, patient will have ongoing treatment plan addressing chemical dependency  Description: INTERVENTIONS:  - Assist patient with resources and/or appointments for ongoing recovery based living  Outcome: Progressing     Problem: SPIRITUAL CARE  Goal: Pt/Family able to move forward in process of forgiving self, others and/or higher power  Description: INTERVENTIONS:  - Assist patient with any spiritual needs/requests such as communion, confession, anointing, etc  - Explore guilt and help patient/family identify possible spiritual/cultural beliefs and values  - Explore possibilities of making amends & reconciliation with self, others, and/or a greater power  - Guide patient/family in identifying painful feelings  - Help patient explore and identify spiritual beliefs, cultural understandings or values that may help or hinder letting go of issue  - Help patient explore feelings of anger, bitterness,  resentment, anxiety   Help patient/family identify and examine the situation in which these feelings are experienced  - Help patient/family identify destructive displacement of feelings onto other individuals  - Refer patient to formal counseling and/or to timothy [facility] for further support as needed or per request  Outcome: Progressing  Goal: Patient feels balance and connection with others and/or higher power that empowers the self during times of loss, guilt and fear  Description: INTERVENTIONS:  - Create safety for patient through empathic presence and non-judgmental listening  - Encourage patient to explore his/her values, beliefs and/or spiritual images and practices  - Encourage use of breath work, imagery, meditation, relaxation, reiki to ease distress and provide healing  - Encourage use of cultural and spiritual celebrations and rituals  - Facilitate discussion that helps patient sort out spiritual concerns  - Help patient identify where meaning/hope/comfort & strength are in his/her life  - Refer patient to [facility] for assistance, as appropriate  - Respond to patient/family need for prayer/ritual/sacrament/ceremony  Outcome: Progressing     Problem: Nutrition/Hydration-ADULT  Goal: Nutrient/Hydration intake appropriate for improving, restoring or maintaining nutritional needs  Description: Monitor and assess patient's nutrition/hydration status for malnutrition. Collaborate with interdisciplinary team and initiate plan and interventions as ordered.  Monitor patient's weight and dietary intake as ordered or per policy. Utilize nutrition screening tool and intervene as necessary. Determine patient's food preferences and provide high-protein, high-caloric foods as appropriate.     INTERVENTIONS:  - Monitor oral intake, urinary output, labs, and treatment plans  - Assess nutrition and hydration status and recommend course of action  - Evaluate amount of meals eaten  - Assist patient with eating if  necessary   - Allow adequate time for meals  - Recommend/ encourage appropriate diets, oral nutritional supplements, and vitamin/mineral supplements  - Order, calculate, and assess calorie counts as needed  - Recommend, monitor, and adjust tube feedings and TPN/PPN based on assessed needs  - Assess need for intravenous fluids  - Provide specific nutrition/hydration education as appropriate  - Include patient/family/caregiver in decisions related to nutrition  Outcome: Progressing     Problem: Prexisting or High Potential for Compromised Skin Integrity  Goal: Skin integrity is maintained or improved  Description: INTERVENTIONS:  - Identify patients at risk for skin breakdown  - Assess and monitor skin integrity  - Assess and monitor nutrition and hydration status  - Monitor labs   - Assess for incontinence   - Turn and reposition patient  - Assist with mobility/ambulation  - Relieve pressure over bony prominences  - Avoid friction and shearing  - Provide appropriate hygiene as needed including keeping skin clean and dry  - Evaluate need for skin moisturizer/barrier cream  - Collaborate with interdisciplinary team   - Patient/family teaching  - Consider wound care consult   Outcome: Progressing

## 2024-03-14 NOTE — PROGRESS NOTES
RN suggested  visited with patient and wife. Pt. Is very sick and pending operation tomorrow. Both wife and pt are very Hindu.      greeted wife and patient, discussion ensued that illustrated this couple's deep timothy in God and showed complete trust.  prayed with them, holding hands with the wife.     Spiritual care remains available.

## 2024-03-14 NOTE — PROGRESS NOTES
Progress Note - Jf Valera 88 y.o. male MRN: [id number]    Unit/Bed#: [facility] 514-01 Encounter: [id number]      Assessment:  Jf Valera is a 88 y.o. male with acute mesenteric ischemia s/p OR for exlap, SBR, AUREA, left in discontinuity, suprapubic cath insertion, abthera placement on 3/14     Vac light thin punch colored  Suprapubic catheter in place  Gtt: IVF75, dex 0.6, fent75, Heparin  S/p 1L albumin and 2L bolus since arrival  CMV 16 /400 / 40% /6    Plan:  OR for take back / reexploratopn  Trend end points of resuscitation  Strict I/O monitoring  Appreciate ICU level care  Maintain abthera vac      Subjective:   Patient seen and examined bedside.  Events as noted.  Intubated and responsive w/nods.  Denies pain or feeling uncomfortable    Objective:     Vitals: Blood pressure 140/69, pulse (!) 111, temperature 98.2 °F (36.8 °C), temperature source Esophageal, resp. rate 12, weight 54 kg (119 lb 0.8 oz), SpO2 99%.,Body mass index is 20.43 kg/m².      Intake/Output Summary (Last 24 hours) at 3/14/2024 0504  Last data filed at 3/14/2024 0450  Gross per 24 hour   Intake 5532.82 ml   Output 2250 ml   Net 3282.82 ml       Physical Exam:   General - no acute distress, frail  CV - warm, regular rate  Pulm - no respiratory distress on vent  Abd - soft, nondistended, abthera with light thin punch color  Neuro - m/s grossly intact, cn grossly intact  Ext - moving all extremities       Invasive Devices       Peripheral Intravenous Line  Duration             Peripheral IV 03/13/24 Left;Upper;Ventral (anterior) Arm <1 day    Peripheral IV 03/13/24 Left;Ventral (anterior) Forearm <1 day    Peripheral IV 03/13/24 Proximal;Right;Ventral (anterior) Forearm <1 day    Peripheral IV 03/13/24 Right Antecubital <1 day              Arterial Line  Duration             Arterial Line 03/13/24 Right Radial <1 day              Drain  Duration             NG/OG/Enteral Tube Nasogastric 16 Fr Left nare <1 day    Suprapubic Catheter 18 Fr. <1  day              Airway  Duration             ETT  Oral 7.5 mm <1 day                    Lab, Imaging and other studies: I have personally reviewed pertinent reports.    VTE Pharmacologic Prophylaxis: Heparin  VTE Mechanical Prophylaxis: sequential compression device

## 2024-03-14 NOTE — ASSESSMENT & PLAN NOTE
Provided supportive listening and normalized experience of patient/family    Our team will continue to be available as needed. Please do not hesitate to reach our on call provider via [employer] or through our clinic answering service at [phone] should you have questions or concerns.

## 2024-03-14 NOTE — QUICK NOTE
"Post-op check -critical care surgery  Jf Valera 88 y.o. male MRN: [id number]  Unit/Bed#: [facility] 514-01 Encounter: [id number]    Assessment:  88 y.o. male now Day of Surgery s/p Procedure(s) (LRB):  EXPLORATORY LAPAROTOMY, WASHOUT. SMALL BOWEL ANASTOMOSIS, CONTROL OF LIVER BLEED, CLOSURE OF ABDOMINAL WOUND (N/A)    Levo 7  Fentanyl drip 150    Postop labs  ABG 7.38, 36.5, 150.5, 21.2, -3.5  WBC 21.9 from 17.2  Hemoglobin 9.9 from 8.9  Platelets 479 from 302    Plan:  - Diet NPO  - Follow-up postoperative labs  - 500 cc Isolyte bolus  - 2 units PRBCs ordered, transfuse  - Follow-up posttransfusion hemoglobin  - Monitor lower extremity vascular exam  - Monitor abdominal exam  - Monitor hemodynamics  - Wean pressors as tolerated  - Pain and Nausea control PRN    Subjective/Objective     Subjective: Went to evaluate the patient after arrival to the floor. The patient's pain is well-controlled.       Objective:   Vitals: Blood pressure 119/62, pulse 75, temperature (!) 97 °F (36.1 °C), resp. rate 16, height 5' 4\" (1.626 m), weight 53.2 kg (117 lb 4.6 oz), SpO2 99%.,Body mass index is 20.13 kg/m².    I/O         03/12 0701  03/13 0700 03/13 0701  03/14 0700 03/14 0701  03/15 0700    P.O.  0 0    I.V. (mL/kg)  4385.6 (82.4) 1441.7 (27.1)    NG/GT  0 0    IV Piggyback  1350 680    Total Intake(mL/kg)  5735.6 (107.8) 2121.7 (39.9)    Urine (mL/kg/hr)  1400 500 (0.9)    Emesis/NG output  50 400    Drains  1350 500    Stool  0 0    Total Output  2800 1400    Net  +2935.6 +721.7           Unmeasured Stool Occurrence  0 x 0 x            Physical Exam:  General: No acute distress  Neuro: alert and oriented  HEENT: moist mucous membranes  CV: Regular rate, requiring vasopressor support  Lungs: Normal work of breathing with ventilator support  Abdomen: Soft, minimally distended, nontender.  Midline incision with Mepilex dressing and no strikethrough  Extremities: Left lower extremity with PT and DP multiphasic Doppler signal.  Right " lower extremity with no PT or DP Doppler signal  Skin: Warm, dry      Lab, Imaging and other studies: I have personally reviewed pertinent reports.    VTE Pharmacologic Prophylaxis: Heparin  VTE Mechanical Prophylaxis: sequential compression device

## 2024-03-14 NOTE — PROCEDURES
Central Line Insertion    Date/Time: 3/14/2024 1:00 PM    Performed by: Richard Kilgore PA-C  Authorized by: Richard Kilgore PA-C    Patient location:  ICU ([facility])  Other Assisting Provider: Yes (comment) (Diamante Enriquez NP)    Consent:     Consent obtained:  Written    Consent given by:  Spouse    Risks discussed:  Arterial puncture, bleeding, incorrect placement, infection, pneumothorax and nerve damage  Universal protocol:     Procedure explained and questions answered to patient or proxy's satisfaction: yes      Relevant documents present and verified: yes      Test results available and properly labeled: yes      Radiology Images displayed and confirmed.  If images not available, report reviewed: yes      Required blood products, implants, devices, and special equipment available: yes      Patient identity confirmed:  Arm band and hospital-assigned identification number  Pre-procedure details:     Hand hygiene: Hand hygiene performed prior to insertion      Sterile barrier technique: All elements of maximal sterile technique followed      Skin preparation:  ChloraPrep    Skin preparation agent: Skin preparation agent completely dried prior to procedure    Indications:     Central line indications: medications requiring central line and hemodynamic monitoring      Central line indications comment:  Poor peripheral access  Anesthesia (see MAR for exact dosages):     Anesthesia method:  Local infiltration    Local anesthetic:  Lidocaine 1% w/o epi  Procedure details:     Location:  Right internal jugular    Vessel type: vein      Laterality:  Right    Approach: percutaneous technique used      Patient position:  Flat    Catheter type:  Triple lumen    Catheter size:  7 Fr    Landmarks identified: yes      Ultrasound guidance: yes      Ultrasound image availability:  Images available in PACS    Sterile ultrasound techniques: Sterile gel and sterile probe covers were used      Manometry confirmation: yes       Number of attempts:  2    Successful placement: yes      Catheter tip vessel location: superior vena cava    Post-procedure details:     Post-procedure:  Dressing applied and line sutured    Assessment:  Blood return through all ports, no pneumothorax on x-ray, placement verified by x-ray and free fluid flow    Patient tolerance of procedure:  Tolerated well, no immediate complications    Observer: Yes             No

## 2024-03-14 NOTE — RESPIRATORY THERAPY NOTE
03/14/24 0513   Respiratory Protocol   Protocol Initiated? Yes   Protocol Selection Respiratory   Language Barrier? Yes  (intubated)   Medical & Social History Reviewed? Yes   Diagnostic Studies Reviewed? Yes   Physical Assessment Performed? Yes   Respiratory Plan Vent/NIV/HFNC   Respiratory Assessment   Assessment Type Assess only   General Appearance Sleeping   Respiratory Pattern Assisted   Chest Assessment Chest expansion symmetrical   Bilateral Breath Sounds Diminished   Suction ET Tube   Resp Comments Pt was transfered with acute mesenteric ishemia s/p exlap. Pt is intubated  and on the C3 in CMV mode. Bs are diminished. there is no pulmonary history or medication on file. ET is secured at 24 at the lip. Pt is placed on respiratory protocol for the vent. Will continue to monitor.

## 2024-03-14 NOTE — RESPIRATORY THERAPY NOTE
RT Ventilator Management Note  Jf Valera 88 y.o. male MRN: [id number]  Unit/Bed#: [facility] 514-01 Encounter: [id number]      Daily Screen         3/13/2024  1541             Patient safety screen outcome:: Failed    Not Ready for Weaning due to:: Underline problem not resolved              Physical Exam:   Assessment Type: (P) Assess only  General Appearance: (P) Sleeping  Respiratory Pattern: (P) Assisted  Chest Assessment: (P) Chest expansion symmetrical  Bilateral Breath Sounds: (P) Diminished  R Breath Sounds: (P) Diminished  L Breath Sounds: (P) Diminished  Suction: ET Tube  O2 Device: (P) C3      Resp Comments: (P) pt remains on C3 vent on SCMV mode, pt appears comfortable and in no distress at this time, ETT moved to center of mouth and remains at 24 cm rohith, sx for scant ett secretions, no changes at this time, will continue to monitor

## 2024-03-14 NOTE — PROGRESS NOTES
PT is intubated, no propofol. If remains intubated and able to initiate TF recommend Jevity 1.2@10mL/hr, advance by 10mL every 6hrs to goal of 45mL/hr, add 1 pack of prosource provides total volume 1080mL, 1356cal, 75g pro, 872mL, water flushes per MD or consider 85mL every 4hrs provides total of 1382mL.

## 2024-03-14 NOTE — ASSESSMENT & PLAN NOTE
No limits on cares at this time       Code Status: Full- Level 1   Decisional apparatus:  Patient is competent on my exam today.  If competence is lost, patient's substitute decision maker would default to wife by [place].   Advance Directive / Living Will / POLST: No

## 2024-03-14 NOTE — UTILIZATION REVIEW
"Initial Clinical Review    Admission: Date/Time/Statement:   Admission Orders (From admission, onward)       Ordered        03/13/24 1527  Inpatient Admission  Once                          Orders Placed This Encounter   Procedures    Inpatient Admission     Standing Status:   Standing     Number of Occurrences:   1     Order Specific Question:   Level of Care     Answer:   Critical Care [15]     Order Specific Question:   Estimated length of stay     Answer:   More than 2 Midnights     Order Specific Question:   Certification     Answer:   I certify that inpatient services are medically necessary for this patient for a duration of greater than two midnights. See H&P and MD Progress Notes for additional information about the patient's course of treatment.     ED Arrival Information       Patient not seen in ED                       No chief complaint on file.      Initial Presentation: 88 y.o. male w/ PMH of HTN was transferred from [facility] to [facility] for a higher level pf care.  Pt initially presented to [facility] w/  today for n/v, abdominal pain, and BRBPR. Found to have lactic acidosis and leukocytosis. 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 Resulting diffuse small bowel wall thickening, with distal small bowel hypoenhancement most consistent with veno occlusive ischemia. Taken to the OR for operative management, abdomen left open following procedure, with abtheravac in place. Hypotensive prior to transfer; started on rey.    Transferred to [facility] for IR thrombectomy.  On arrival to [facility], pt intubated and sedated, w/ abdomen, abthera vac in place, serosanguinous drainage, suprapubic catheter, blood tinged urine.      Admit as Inpatient to ICU for evaluation and treatment of venous portal system thrombosis, mesenteric ischemia s/p ex-lap, acute hypotension, LISA.  Plan:  vasc surg consult. Gen surg " Consult. Coagulopathy w/u. Maintain intubated and sedated for upcoming procedures. On rey, wean as able. Continue fluid resuscitation. Trend end points. Maintain MAP's>65. Maintain sats > 88% . Cont to trend renal function. NPO. Monitor WBC's and fever curve. Zosyn 4.5 mg q8h. Blood cultures x2 pending. UA pending.     03/13 Vasc Surg Notes: Pt currently intubated, sedated, on low dose pressor; critically ill.   Intervention if needed would require a challenging Transvenous/transhepatic approach to portal venous system. Recommend continued aggressive resuscitation/anticoagulation. Plan for 2 nd look laparotomy with ACS tomorrow and sooner if indicated.     Gen Surg Consult:acute mesenteric ischemia  S/p exlap SBR, AUREA, left in discontinuity, suprapubic tube insertion, abthera placement.  Plan: OR in am for take back. Wean pressors as able. Trend end points of resuscitation. Strict IO monitoring. Maintain Abthera vac      ED Triage Vitals   Temperature Pulse Respirations Blood Pressure SpO2   03/13/24 1656 03/13/24 1553 03/13/24 1640 03/13/24 1553 03/13/24 1541   (!) 96.8 °F (36 °C) 73 16 117/58 100 %      Temp Source Heart Rate Source Patient Position - Orthostatic VS BP Location FiO2 (%)   03/13/24 1656 03/13/24 2000 03/14/24 0000 03/14/24 0000 03/14/24 0800   Esophageal Monitor Lying Right arm 40      Pain Score       03/13/24 1553       Med Not Given for Pain - for MAR use only          Wt Readings from Last 1 Encounters:   03/14/24 53.2 kg (117 lb 4.6 oz)     Additional Vital Signs:   Date/Time Temp Pulse Resp BP MAP (mmHg) Arterial Line BP MAP SpO2 FiO2 (%) O2 Device Patient Position - Orthostatic VS   03/14/24 1100 98.2 °F (36.8 °C) 81 16 111/55 79 115/51 75 mmHg 99 % -- -- --   03/14/24 1000 98.2 °F (36.8 °C) 73 16 106/58 77 113/51 73 mmHg 99 % -- -- --   03/14/24 0900 98.1 °F (36.7 °C) 81 16 117/62 84 119/51 76 mmHg 99 % -- -- --   03/14/24 0836 -- -- -- 124/61 -- -- -- -- -- -- --   03/14/24 0800 98.8 °F  (37.1 °C) 81 18 134/62 89 129/55 83 mmHg 99 % 40 Ventilator Lying   03/14/24 0700 98.6 °F (37 °C) 80 16 88/55 Abnormal  67 88/42 58 mmHg Abnormal  99 % -- -- --   03/14/24 0600 98.4 °F (36.9 °C) 102 17 137/65 94 138/60 91 mmHg 100 % -- -- --   03/14/24 0500 98.2 °F (36.8 °C) 79 17 104/56 74 108/48 70 mmHg 99 % -- -- --   03/14/24 0400 98.2 °F (36.8 °C) 111 Abnormal  12 140/69 97 150/66 98 mmHg 99 % -- Ventilator Lying   03/14/24 0335 -- 95 -- 137/63 -- -- -- -- -- -- --   03/14/24 0300 98.6 °F (37 °C) 75 16 93/51 67 103/47 67 mmHg 99 % -- -- --   03/14/24 0200 98.6 °F (37 °C) 76 16 101/58 76 109/50 71 mmHg 99 % -- -- --   03/14/24 0116 -- 76 -- 111/50 -- -- -- -- -- -- --   03/14/24 0100 98.4 °F (36.9 °C) 81 18 126/61 86 134/60 88 mmHg 100 % -- -- --   03/14/24 0015 98.4 °F (36.9 °C) 75 17 99/55 73 99/46 65 mmHg 100 % -- -- --   03/14/24 0013 -- 75 -- 94/46 Abnormal  -- -- -- -- -- -- --   03/14/24 0000 98.4 °F (36.9 °C) 77 16 90/54 67 90/44 61 mmHg Abnormal  99 % -- Ventilator Lying   03/13/24 2315 98.2 °F (36.8 °C) 85 16 96/55 72 105/49 69 mmHg 100 % -- -- --   03/13/24 2300 98.6 °F (37 °C) 80 7 Abnormal  83/50 Abnormal  62 Abnormal  79/42 57 mmHg Abnormal  99 % -- -- --   03/13/24 2200 98.6 °F (37 °C) 100 17 129/63 90 136/58 87 mmHg 100 % -- -- --   03/13/24 2100 98.2 °F (36.8 °C) 86 16 99/57 75 100/50 69 mmHg 99 % -- -- --   03/13/24 2000 97.7 °F (36.5 °C) 74 16 -- -- 116/56 80 mmHg 100 % -- Ventilator --   03/13/24 1900 97.3 °F (36.3 °C) Abnormal  66 16 93/53 68 93/51 67 mmHg 100 % -- -- --   03/13/24 1800 96.6 °F (35.9 °C) Abnormal  68 17 110/59 80 105/57 76 mmHg 100 % -- -- --   03/13/24 1700 96.8 °F (36 °C) Abnormal  74 16 113/57 78 110/67 84 mmHg 98 % -- -- --   03/13/24 1656 96.8 °F (36 °C) Abnormal  76 16 -- -- 95/60 75 mmHg 99 % -- Ventilator --   03/13/24 1650 -- 75 16 -- -- 104/64 80 mmHg 99 % -- -- --   03/13/24 1640 -- 75 16 106/56 75 92/59 70 mmHg 98 % -- -- --   03/13/24 1600 -- 75 -- 101/53 75  99/56 73 mmHg 99 % -- -- --   03/13/24 1553 -- 73 -- 117/58 83 119/62 84 mmHg 98 % -- -- --       Pertinent Labs/Diagnostic Test Results:   XR chest portable ICU   Final Result by Ramirez Brown MD (03/14 0945)      ETT and NGT in place.      No acute cardiopulmonary disease.            Workstation performed: [id number]           03/13 EKG result:Sinus tachycardia  Left axis deviation  Minimal voltage criteria for LVH, may be normal variant ( [employer] product )  Septal infarct (cited on or before 08-MAR-2020)    Results from last 7 days   Lab Units 03/13/24  0820   SARS-COV-2  Negative     Results from last 7 days   Lab Units 03/14/24 0427 03/13/24  1548 03/13/24  1214 03/13/24  1157 03/13/24  0820   WBC Thousand/uL 17.19* 32.14*  --  38.52* 38.26*   HEMOGLOBIN g/dL 8.9* 12.6  --  14.1 15.4   I STAT HEMOGLOBIN g/dl  --   --  12.2  --   --    HEMATOCRIT % 26.5* 37.4  --  43.3 47.0   HEMATOCRIT, ISTAT %  --   --  36*  --   --    PLATELETS Thousands/uL 302 502*  --  515* 533*   NEUTROS ABS Thousands/µL 14.90*  --   --   --   --          Results from last 7 days   Lab Units 03/14/24 0427 03/13/24  1548 03/13/24  1214 03/13/24  0820   SODIUM mmol/L 145 135  --  137   POTASSIUM mmol/L 3.6 4.4  --  4.8   CHLORIDE mmol/L 109* 106  --  97   CO2 mmol/L 23 21  --  25   CO2, I-STAT mmol/L  --   --  19*  --    ANION GAP mmol/L 13 8  --  15*   BUN mg/dL 16 26*  --  25   CREATININE mg/dL 1.07 1.37*  --  1.89*   EGFR ml/min/1.73sq m 62 46  --  31   CALCIUM mg/dL 7.3* 7.6*  --  9.4   CALCIUM, IONIZED mmol/L 0.97* 1.02*  --   --    CALCIUM, IONIZED, ISTAT mmol/L  --   --  1.14  --    MAGNESIUM mg/dL 2.5 1.8*  --  2.2   PHOSPHORUS mg/dL 3.2 3.4  --   --      Results from last 7 days   Lab Units 03/13/24  0820   AST U/L 18   ALT U/L 15   ALK PHOS U/L 81   TOTAL PROTEIN g/dL 7.3   ALBUMIN g/dL 3.5   TOTAL BILIRUBIN mg/dL 0.58         Results from last 7 days   Lab Units 03/14/24  0427 03/13/24  1548 03/13/24  0820   GLUCOSE RANDOM  "mg/dL 120 143* 196*             No results found for: \"BETA-HYDROXYBUTYRATE\"   Results from last 7 days   Lab Units 03/13/24  2215 03/13/24  1730 03/13/24  1550   PH ART  7.457* 7.485* 7.507*   PCO2 ART mm Hg 33.9* 29.8* 25.5*   PO2 ART mm Hg 128.1 149.8* 143.6*   HCO3 ART mmol/L 23.4 21.9* 19.8*   BASE EXC ART mmol/L -0.1 -0.6 -1.8   O2 CONTENT ART mL/dL 15.2* 16.7 18.7   O2 HGB, ARTERIAL % 97.9* 98.3* 97.9*   ABG SOURCE  Line, Arterial Line, Arterial Line, Arterial         Results from last 7 days   Lab Units 03/13/24  1214   I STAT BASE EXC mmol/L -7*   I STAT O2 SAT % 99*   ISTAT PH ART  7.321*   I STAT ART PCO2 mm HG 35.5*   I STAT ART PO2 mm .0*   I STAT ART HCO3 mmol/L 18.3*         Results from last 7 days   Lab Units 03/13/24  1030 03/13/24  0820   HS TNI 0HR ng/L  --  8   HS TNI 2HR ng/L 8  --    HSTNI D2 ng/L 0  --          Results from last 7 days   Lab Units 03/14/24  0427 03/13/24 2215 03/13/24  1548 03/13/24  1157   PROTIME seconds  --   --  18.0* 16.5*   INR   --   --  1.51* 1.31*   PTT seconds 56* 80* 116* 38*             Results from last 7 days   Lab Units 03/14/24  0427 03/13/24  2215 03/13/24  1548 03/13/24  0952   LACTIC ACID mmol/L 0.7 1.0 1.8 2.8*                                 Results from last 7 days   Lab Units 03/13/24  0820   LIPASE u/L 38                 Results from last 7 days   Lab Units 03/13/24  1203   CLARITY UA  Clear   COLOR UA  Yellow   SPEC GRAV UA  <=1.005   PH UA  6.0   GLUCOSE UA mg/dl Negative   KETONES UA mg/dl Negative   BLOOD UA  Trace-Intact*   PROTEIN UA mg/dl Trace*   NITRITE UA  Negative   BILIRUBIN UA  Negative   UROBILINOGEN UA E.U./dl 0.2   LEUKOCYTES UA  Negative   WBC UA /hpf 1-2   RBC UA /hpf 0-1   BACTERIA UA /hpf Occasional   EPITHELIAL CELLS WET PREP /hpf None Seen     Results from last 7 days   Lab Units 03/13/24  0820   INFLUENZA A PCR  Negative   INFLUENZA B PCR  Negative   RSV PCR  Negative                             Results from last 7 days "   Lab Units 03/13/24  2220 03/13/24  0952   BLOOD CULTURE  Received in Microbiology Lab. Culture in Progress.  Received in Microbiology Lab. Culture in Progress. Received in Microbiology Lab. Culture in Progress.  Received in Microbiology Lab. Culture in Progress.                   ED Treatment:   Medication Administration - No Administrations Displayed (No Start Event Found)       None          Past Medical History:   Diagnosis Date    Hypertension      Present on Admission:   HTN (hypertension)      Admitting Diagnosis: dvt of portal vein  Age/Sex: 88 y.o. male  Admission Orders:  SCD  Cardio-Pulmonary Monitoring, Neuro Checks, Nursing dysphagia screen, I/O, Daily weights, Vital signs      Scheduled Medications:  chlorhexidine, 15 mL, Mouth/Throat, Q12H RONALD  piperacillin-tazobactam, 4.5 g, Intravenous, Q8H  potassium chloride, 20 mEq, Intravenous, Q2H      Continuous IV Infusions:  dexmedetomidine, 0.1-0.7 mcg/kg/hr, Intravenous, Titrated  fentaNYL, 150 mcg/hr, Intravenous, Continuous  heparin (porcine), 3-30 Units/kg/hr (Order-Specific), Intravenous, Titrated  multi-electrolyte, 75 mL/hr, Intravenous, Continuous  norepinephrine, 1-30 mcg/min, Intravenous, Titrated      PRN Meds:  fentaNYL, 50 mcg, Intravenous, Q2H PRN        IP CONSULT TO CASE MANAGEMENT  IP CONSULT TO ACUTE CARE SURGERY  IP CONSULT TO VASCULAR MEDICINE   IP CONSULT TO PALLIATIVE CARE    Network Utilization Review Department  ATTENTION: Please call with any questions or concerns to [phone] and carefully listen to the prompts so that you are directed to the right person. All voicemails are confidential.   For Discharge needs, contact Care Management DC Support Team at [phone] opt. 2  Send all requests for admission clinical reviews, approved or denied determinations and any other requests to dedicated fax number below belonging to the campus where the patient is receiving treatment. List of dedicated fax numbers for the  Facilities:  FACILITY NAME UR FAX NUMBER   ADMISSION DENIALS (Administrative/Medical Necessity) [phone]   DISCHARGE SUPPORT TEAM (NETWORK) [phone]   PARENT CHILD HEALTH (Maternity/NICU/Pediatrics) [phone]   [facility] [phone]   [facility] [phone]   [facility] [phone]   [facility] [phone]   [facility] [phone]   [facility] [phone]   [facility] [phone]   [facility] [phone]   [facility] [phone]   [facility] [phone]   [facility] [phone]   [facility] [phone]

## 2024-03-14 NOTE — UTILIZATION REVIEW
"Initial Clinical Review    Admission: Date/Time/Statement:   Admission Orders (From admission, onward)       Ordered        03/13/24 1136  INPATIENT ADMISSION  Once                          Orders Placed This Encounter   Procedures    INPATIENT ADMISSION     Standing Status:   Standing     Number of Occurrences:   1     Order Specific Question:   Level of Care     Answer:   Level 2 Stepdown / HOT [14]     Order Specific Question:   Estimated length of stay     Answer:   More than 2 Midnights     Order Specific Question:   Certification     Answer:   I certify that inpatient services are medically necessary for this patient for a duration of greater than two midnights. See H&P and MD Progress Notes for additional information about the patient's course of treatment.     ED Arrival Information       Expected   -    Arrival   3/13/2024 07:19    Acuity   Urgent              Means of arrival   Walk-In    Escorted by   Family Member    Service   Surgery-General    Admission type   Emergency              Arrival complaint   Constipated             Chief Complaint   Patient presents with    Constipation     Pt c/o issues with costipation since the beginning of march, has been taking ducolax.  Yesterday pt started vomiting after eating and drinking, unable to keep anything down.  Also started with ab pain yesterday. Today noticed bright red blood in his stool        Initial Presentation:   88 yom to ER from home for evaluation of intermittent constipation and abdominal discomfort over the past 3 to 4 days. Patient states that he initially started with constipation. Patient took Dulcolax. Patient then had abdominal pain started yesterday. This morning patient tried to have a bowel movement and only liquid came out that was bloody. Patient then started to have nausea and vomiting. Patient complains of some abdominal distention. Admission CT a/p significant for \"diffuse portal system thrombosis involving the main portal vein, " intrahepatic portal veins, splenic vein,, SMV and SMV branches.. Resulting diffuse small bowel wall thickening, with distal small bowel hypoenhancement most consistent with veno occlusive ischemia.    Admitted to inpatient status for acute mesenteric ischemia 2/2 portal venous thrombus.     To OR for LAPAROTOMY EXPLORATORY; SMALL BOWEL RESECTION; LYSIS OF ADHESIONS; SUPRPAPUBIC TUBE INSERTION       Patient taken to the OR for operative management and transferred to [facility] for IR thrombectomy. Patient's abdomen left open following procedure, with abtheravac in place. Patient hypotensive prior to transfer; started on rey. Intubated and sedated on arrival.     ED Triage Vitals [03/13/24 0751]   Temperature Pulse Respirations Blood Pressure SpO2   (!) 97.4 °F (36.3 °C) (!) 124 20 111/63 98 %      Temp Source Heart Rate Source Patient Position - Orthostatic VS BP Location FiO2 (%)   Oral Monitor Lying Right arm --      Pain Score       --          Wt Readings from Last 1 Encounters:   03/14/24 53.2 kg (117 lb 4.6 oz)     Additional Vital Signs:   Date/Time Temp Pulse Resp BP MAP (mmHg) SpO2 O2 Device Patient Position - Orthostatic VS   03/13/24 0900 -- 97 20 126/67 90 99 % None (Room air) Lying   03/13/24 0830 -- 113 Abnormal  20 119/78 92 99 % None (Room air) Lying   03/13/24 0751 97.4 °F (36.3 °C) Abnormal  124 Abnormal  20 111/63 -- 98 % None (Room air) Lying     Pertinent Labs/Diagnostic Test Results:   CT abdomen pelvis with contrast   Final Result by Messi Olivas MD (03/13 1113)      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 Resulting diffuse small bowel wall thickening, with distal small bowel hypoenhancement most consistent with venoocclusive    ischemia.      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 Left SFA occlusion. Concern for an  "embolic etiology.      Moderate ascites      Markedly distended stomach with dilated fluid-filled esophagus.      Markedly enlarged prostate with irregular enhancement.         Findings were discussed with Dr Foley from the emergency department at 11:00 a.m. on 3/13/2024         Study initially reviewed and reported by Dr. Topete.            Workstation performed: [id number]           Results from last 7 days   Lab Units 03/13/24  0820   SARS-COV-2  Negative     Results from last 7 days   Lab Units 03/14/24 0427 03/13/24  1548 03/13/24  1214 03/13/24  1157 03/13/24  0820   WBC Thousand/uL 17.19* 32.14*  --  38.52* 38.26*   HEMOGLOBIN g/dL 8.9* 12.6  --  14.1 15.4   I STAT HEMOGLOBIN g/dl  --   --  12.2  --   --    HEMATOCRIT % 26.5* 37.4  --  43.3 47.0   HEMATOCRIT, ISTAT %  --   --  36*  --   --    PLATELETS Thousands/uL 302 502*  --  515* 533*   NEUTROS ABS Thousands/µL 14.90*  --   --   --   --          Results from last 7 days   Lab Units 03/14/24 0427 03/13/24  1548 03/13/24  1214 03/13/24  0820   SODIUM mmol/L 145 135  --  137   POTASSIUM mmol/L 3.6 4.4  --  4.8   CHLORIDE mmol/L 109* 106  --  97   CO2 mmol/L 23 21  --  25   CO2, I-STAT mmol/L  --   --  19*  --    ANION GAP mmol/L 13 8  --  15*   BUN mg/dL 16 26*  --  25   CREATININE mg/dL 1.07 1.37*  --  1.89*   EGFR ml/min/1.73sq m 62 46  --  31   CALCIUM mg/dL 7.3* 7.6*  --  9.4   CALCIUM, IONIZED mmol/L 0.97* 1.02*  --   --    CALCIUM, IONIZED, ISTAT mmol/L  --   --  1.14  --    MAGNESIUM mg/dL 2.5 1.8*  --  2.2   PHOSPHORUS mg/dL 3.2 3.4  --   --      Results from last 7 days   Lab Units 03/13/24  0820   AST U/L 18   ALT U/L 15   ALK PHOS U/L 81   TOTAL PROTEIN g/dL 7.3   ALBUMIN g/dL 3.5   TOTAL BILIRUBIN mg/dL 0.58         Results from last 7 days   Lab Units 03/14/24  0427 03/13/24  1548 03/13/24  0820   GLUCOSE RANDOM mg/dL 120 143* 196*             No results found for: \"BETA-HYDROXYBUTYRATE\"   Results from last 7 days   Lab Units 03/13/24  2215 " 03/13/24  1730 03/13/24  1550   PH ART  7.457* 7.485* 7.507*   PCO2 ART mm Hg 33.9* 29.8* 25.5*   PO2 ART mm Hg 128.1 149.8* 143.6*   HCO3 ART mmol/L 23.4 21.9* 19.8*   BASE EXC ART mmol/L -0.1 -0.6 -1.8   O2 CONTENT ART mL/dL 15.2* 16.7 18.7   O2 HGB, ARTERIAL % 97.9* 98.3* 97.9*   ABG SOURCE  Line, Arterial Line, Arterial Line, Arterial         Results from last 7 days   Lab Units 03/13/24  1214   I STAT BASE EXC mmol/L -7*   I STAT O2 SAT % 99*   ISTAT PH ART  7.321*   I STAT ART PCO2 mm HG 35.5*   I STAT ART PO2 mm .0*   I STAT ART HCO3 mmol/L 18.3*         Results from last 7 days   Lab Units 03/13/24  1030 03/13/24  0820   HS TNI 0HR ng/L  --  8   HS TNI 2HR ng/L 8  --    HSTNI D2 ng/L 0  --          Results from last 7 days   Lab Units 03/14/24  1106 03/14/24  0427 03/13/24 2215 03/13/24  1548 03/13/24  1157   PROTIME seconds  --   --   --  18.0* 16.5*   INR   --   --   --  1.51* 1.31*   PTT seconds 73* 56* 80* 116* 38*             Results from last 7 days   Lab Units 03/14/24  0427 03/13/24 2215 03/13/24  1548 03/13/24  0952   LACTIC ACID mmol/L 0.7 1.0 1.8 2.8*                                 Results from last 7 days   Lab Units 03/13/24  0820   LIPASE u/L 38                 Results from last 7 days   Lab Units 03/13/24  1203   CLARITY UA  Clear   COLOR UA  Yellow   SPEC GRAV UA  <=1.005   PH UA  6.0   GLUCOSE UA mg/dl Negative   KETONES UA mg/dl Negative   BLOOD UA  Trace-Intact*   PROTEIN UA mg/dl Trace*   NITRITE UA  Negative   BILIRUBIN UA  Negative   UROBILINOGEN UA E.U./dl 0.2   LEUKOCYTES UA  Negative   WBC UA /hpf 1-2   RBC UA /hpf 0-1   BACTERIA UA /hpf Occasional   EPITHELIAL CELLS WET PREP /hpf None Seen     Results from last 7 days   Lab Units 03/13/24  0820   INFLUENZA A PCR  Negative   INFLUENZA B PCR  Negative   RSV PCR  Negative                             Results from last 7 days   Lab Units 03/13/24  2220 03/13/24  0952   BLOOD CULTURE  Received in Microbiology Lab. Culture in  Progress.  Received in Microbiology Lab. Culture in Progress. Received in Microbiology Lab. Culture in Progress.  Received in Microbiology Lab. Culture in Progress.                   ED Treatment:   Medication Administration from 03/13/2024 0719 to 03/13/2024 1221         Date/Time Order Dose Route Action     03/13/2024 0829 EDT sodium chloride 0.9 % bolus 1,000 mL 1,000 mL Intravenous New Bag     03/13/2024 0834 EDT ondansetron (ZOFRAN) injection 4 mg 4 mg Intravenous Given     03/13/2024 0852 EDT acetaminophen (Ofirmev) injection 1,000 mg 1,000 mg Intravenous New Bag     03/13/2024 0838 EDT pantoprazole (PROTONIX) injection 40 mg 40 mg Intravenous Given     03/13/2024 1025 EDT ondansetron (ZOFRAN) injection 4 mg 4 mg Intravenous Given     03/13/2024 1005 EDT iohexol (OMNIPAQUE) 350 MG/ML injection (MULTI-DOSE) 100 mL 100 mL Intravenous Given     03/13/2024 1047 EDT piperacillin-tazobactam (ZOSYN) IVPB 4.5 g 4.5 g Intravenous New Bag     03/13/2024 1113 EDT metoclopramide (REGLAN) injection 10 mg 10 mg Intravenous Given     03/13/2024 1127 EDT lidocaine (URO-JET) 2 % urethral/mucosal gel 1 Application 1 Application Urethral Given     03/13/2024 1126 EDT benzocaine (HURRICAINE) 20 % mucosal spray 2 spray 2 spray Mucosal Given     03/13/2024 1200 EDT heparin (porcine) injection 4,000 Units 4,000 Units Intravenous Given     03/13/2024 1221 EDT heparin (porcine) 25,000 units in 0.45% NaCl 250 mL infusion (premix) 18 Units/kg/hr Intravenous Continue from Pre-op     03/13/2024 1201 EDT heparin (porcine) 25,000 units in 0.45% NaCl 250 mL infusion (premix) 18 Units/kg/hr Intravenous New Bag          Past Medical History:   Diagnosis Date    Hypertension      Present on Admission:  **None**      Admitting Diagnosis: Deep vein thrombosis of portal vein [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 male  Admission  Orders:  Scheduled Medications:  Medications 03/04 03/05 03/06 03/07 03/08 03/09 03/10 03/11 03/12 03/13   acetaminophen (Ofirmev) injection 1,000 mg  Dose: 1,000 mg  Freq: Once Route: IV  Last Dose: Stopped (03/13/24 0932)  Start: 03/13/24 0830 End: 03/13/24 0932   Admin Instructions:                0852     0932        albumin human (FLEXBUMIN) 5 % injection 25 g  Dose: 25 g  Freq: Once Route: IV  Last Dose: Stopped (03/13/24 2357)  Start: 03/13/24 2300 End: 03/13/24 2357   Admin Instructions:                2308 2357        albumin human (FLEXBUMIN) 5 % injection 25 g  Dose: 25 g  Freq: Once Route: IV  Last Dose: Stopped (03/13/24 2210)  Start: 03/13/24 2115 End: 03/13/24 2210   Admin Instructions:                2110 2210        benzocaine (HURRICAINE) 20 % mucosal spray 2 spray  Dose: 2 spray  Freq: Once Route: Mucosal  Start: 03/13/24 1115 End: 03/13/24 1126             1126        calcium gluconate 2 g in sodium chloride 0.9% 100 mL (premix)  Dose: 2 g  Freq: Once Route: IV  Last Dose: Stopped (03/14/24 0959)  Start: 03/14/24 0730 End: 03/14/24 0959   Admin Instructions:                   calcium gluconate 2 g in sodium chloride 0.9% 100 mL (premix)  Dose: 2 g  Freq: Once Route: IV  Last Dose: Stopped (03/13/24 1810)  Start: 03/13/24 1645 End: 03/13/24 1810   Admin Instructions:                1700     1810        chlorhexidine (PERIDEX) 0.12 % oral rinse 15 mL  Dose: 15 mL  Freq: Every 12 hours scheduled Route: MT  Start: 03/13/24 2100   Admin Instructions:                2059        chlorhexidine (PERIDEX) 0.12 % oral rinse 15 mL  Dose: 15 mL  Freq: Every 12 hours scheduled Route: MT  Start: 03/13/24 2100 End: 03/13/24 1531   Admin Instructions:                1531-D/C'd      fentaNYL injection 100 mcg  Dose: 100 mcg  Freq: Once Route: IV  Start: 03/13/24 1545 End: 03/13/24 1553   Admin Instructions:                1553        heparin (porcine) injection 4,000 Units  Dose: 4,000 Units  Freq: Once  Route: IV  Start: 03/13/24 1145 End: 03/13/24 1200   Admin Instructions:                1200        heparin (VTE/PE) high  Freq: Once Route: IV  Start: 03/13/24 1545 End: 03/13/24 1535   Admin Instructions:      Order specific questions:                1535-D/C'd      heparin (VTE/PE) high  Freq: Once Route: IV  Start: 03/13/24 1145 End: 03/13/24 1137   Admin Instructions:      Order specific questions:                1137-D/C'd      lidocaine (URO-JET) 2 % urethral/mucosal gel 1 Application  Dose: 1 Application  Freq: Once Route: UR  Start: 03/13/24 1115 End: 03/13/24 1127   Admin Instructions:                1127        magnesium sulfate 2 g/50 mL IVPB (premix) 2 g  Dose: 2 g  Freq: Once Route: IV  Last Dose: Stopped (03/13/24 2100)  Start: 03/13/24 1645 End: 03/13/24 2100   Admin Instructions:                1707     2100        metoclopramide (REGLAN) injection 10 mg  Dose: 10 mg  Freq: Once Route: IV  Start: 03/13/24 1100 End: 03/13/24 1113             1113        multi-electrolyte (ISOLYTE-S PH 7.4) bolus 1,000 mL  Dose: 1,000 mL  Freq: Once Route: IV  Last Dose: Stopped (03/14/24 0450)  Start: 03/14/24 0330 End: 03/14/24 0450                multi-electrolyte (ISOLYTE-S PH 7.4) bolus 1,000 mL  Dose: 1,000 mL  Freq: Once Route: IV  Last Dose: Stopped (03/13/24 2100)  Start: 03/13/24 1900 End: 03/13/24 2100             1912     2100        multi-electrolyte (ISOLYTE-S PH 7.4) bolus 1,000 mL  Dose: 1,000 mL  Freq: Once Route: IV  Last Dose: Stopped (03/13/24 1812)  Start: 03/13/24 1545 End: 03/13/24 1812             1547     1812        multi-electrolyte (ISOLYTE-S PH 7.4) bolus 500 mL  Dose: 500 mL  Freq: Once Route: IV  Last Dose: Stopped (03/14/24 0959)  Start: 03/14/24 0730 End: 03/14/24 0959                norepinephrine (LEVOPHED) 1 mg/mL injection **ADS Override Pull**  Start: 03/14/24 0007 End: 03/14/24 0013   Admin Instructions:                   ondansetron (ZOFRAN) injection 4 mg  Dose: 4 mg  Freq:  Once Route: IV  Start: 03/13/24 0945 End: 03/13/24 1025   Admin Instructions:                1025        ondansetron (ZOFRAN) injection 4 mg  Dose: 4 mg  Freq: Once Route: IV  Start: 03/13/24 0830 End: 03/13/24 0834   Admin Instructions:                0834        pantoprazole (PROTONIX) injection 40 mg  Dose: 40 mg  Freq: Once Route: IV  Start: 03/13/24 0845 End: 03/13/24 0840   Admin Instructions:                0838        phenylephrine HCl (VAZCULEP) 10 MG/ML solution **ADS Override Pull**  Start: 03/13/24 1526 End: 03/13/24 1620   Admin Instructions:                1620 [C]         piperacillin-tazobactam (ZOSYN) 4.5 g in sodium chloride 0.9 % 100 mL IV LOADING DOSE  Dose: 4.5 g  Freq: Once Route: IV  Start: 03/13/24 1645 End: 03/13/24 1752   Admin Instructions:      Order specific questions:                1722        And  piperacillin-tazobactam (ZOSYN) 4.5 g in sodium chloride 0.9 % 100 mL IVPB (EXTENDED INFUSION)  Dose: 4.5 g  Freq: Every 8 hours Route: IV  Last Dose: 4.5 g (03/14/24 1150)  Start: 03/13/24 2045   Admin Instructions:      Order specific questions:                2006         piperacillin-tazobactam (ZOSYN) 4.5 g in sodium chloride 0.9 % 100 mL IV LOADING DOSE  Dose: 4.5 g  Freq: Once Route: IV  Start: 03/13/24 1145 End: 03/13/24 1141   Admin Instructions:      Order specific questions:                1141-D/C'd      And  piperacillin-tazobactam (ZOSYN) IVPB 4.5 g  Dose: 4.5 g  Freq: Every 8 hours Route: IV  Start: 03/13/24 1545 End: 03/13/24 1141   Admin Instructions:      Order specific questions:                1141-D/C'd      piperacillin-tazobactam (ZOSYN) 4.5 g in sodium chloride 0.9 % 100 mL IVPB (EXTENDED INFUSION)  Dose: 4.5 g  Freq: Every 8 hours Route: IV  Start: 03/13/24 1500 End: 03/13/24 1524   Admin Instructions:      Order specific questions:                1500     1524-D/C'd       piperacillin-tazobactam (ZOSYN) IVPB 4.5 g  Dose: 4.5 g  Freq: Every 8 hours Route: IV  Start:  03/13/24 1600 End: 03/13/24 1338   Admin Instructions:      Order specific questions:                1221     1338     1338-D/C'd      And  piperacillin-tazobactam (ZOSYN) IVPB 4.5 g  Dose: 4.5 g  Freq: Once Route: IV  Start: 03/13/24 1200 End: 03/13/24 1338   Order specific questions:                1200     1221     1338     1338-D/C'd      piperacillin-tazobactam (ZOSYN) IVPB 4.5 g  Dose: 4.5 g  Freq: Once Route: IV  Last Dose: Stopped (03/13/24 1112)  Start: 03/13/24 1030 End: 03/13/24 1112   Order specific questions:                1047     1112        potassium chloride 20 mEq IVPB (premix)  Dose: 20 mEq  Freq: Every 2 hours Route: IV  Last Dose: 20 mEq (03/14/24 1024)  Start: 03/14/24 0800 End: 03/14/24 1224   Admin Instructions:                   sodium chloride 0.9 % bolus 1,000 mL  Dose: 1,000 mL  Freq: Once Route: IV  Last Dose: Stopped (03/13/24 0932)  Start: 03/13/24 0800 End: 03/13/24 0932             0829     0932        Legend:       [id number]/0403/0503/0603/0703/0803/0903/1003/1103/1203/13        Continuous Meds Sorted by Name  for [email or web address] as of 03/04/24 through 3/13/24  Legend:       Medications 03/04 03/05 03/06 03/07 03/08 03/09 03/10 03/11 03/12 03/13   dexmedeTOMIDine (Precedex) 400 mcg in sodium chloride 0.9% 100 mL  Rate: 1.4-9.5 mL/hr Dose: 0.1-0.7 mcg/kg/hr  Weight Dosing Info: 54 kg  Freq: Titrated Route: IV  Last Dose: 0.7 mcg/kg/hr (03/14/24 0954)  Start: 03/13/24 1915   Admin Instructions:      Order specific questions:                1911        fentaNYL 1000 mcg in sodium chloride 0.9% 100mL infusion  Rate: 15 mL/hr Dose: 150 mcg/hr  Freq: Continuous Route: IV  Last Dose: 150 mcg/hr (03/14/24 1158)  Start: 03/13/24 1530   Admin Instructions:                1552        heparin (porcine) 25,000 units in 0.45% NaCl 250 mL infusion (premix)  Rate: 1.5-15 mL/hr Dose: 3-30 Units/kg/hr  Weight Dosing Info: 50 kg (Order-Specific)  Freq: Titrated Route: IV  Last Dose: 17 Units/kg/hr  (03/14/24 0500)  Start: 03/13/24 1545   Admin Instructions:      Order specific questions:                1600     1620 [C]     1721        heparin (porcine) 25,000 units in 0.45% NaCl 250 mL infusion (premix)  Rate: 1.5-15 mL/hr Dose: 3-30 Units/kg/hr  Weight Dosing Info: 50 kg (Order-Specific)  Freq: Titrated Route: IV  Last Dose: 18 Units/kg/hr (03/13/24 1221)  Start: 03/13/24 1145 End: 03/13/24 1524   Admin Instructions:      Order specific questions:                1201     1221     1524-D/C'd      lactated ringers infusion  Freq: Continuous PRN Route: IV  Start: 03/13/24 1220 End: 03/13/24 1409             1220     1306     1359     1409-D/C'd      multi-electrolyte (PLASMALYTE-A/ISOLYTE-S PH 7.4) IV solution  Rate: 75 mL/hr Dose: 75 mL/hr  Freq: Continuous Route: IV  Last Dose: 75 mL/hr (03/13/24 1601)  Start: 03/13/24 1545             1601        norepinephrine (LEVOPHED) 4 mg (STANDARD CONCENTRATION) IV in sodium chloride 0.9% 250 mL  Rate: 3.8-112.5 mL/hr Dose: 1-30 mcg/min  Freq: Titrated Route: IV  Last Dose: 1 mcg/min (03/14/24 1259)  Start: 03/14/24 0015   Admin Instructions:                   phenylephrine (AKIRA-SYNEPHRINE) 50 mg (STANDARD CONCENTRATION) in sodium chloride 0.9% 250 mL  Rate: 7.5-54 mL/hr Dose:  mcg/min  Freq: Titrated Route: IV  Last Dose: Stopped (03/13/24 1640)  Start: 03/13/24 1630 End: 03/13/24 2129   Admin Instructions:                1620     1640     2129-D/C'd      phenylephrine (AKIRA-SYNEPHRINE) 50 mg (STANDARD CONCENTRATION) in sodium chloride 0.9% 250 mL  Freq: Continuous PRN Route: IV  Last Dose: Stopped (03/13/24 1357)  Start: 03/13/24 1237 End: 03/13/24 1409             1237     1251     1254     1257     1302     1315     1317     1321     1327     1330     1339     1357     1409-D/C'd      propofol (DIPRIVAN) 1000 mg in 100 mL infusion (premix)  Rate: 1.6-16.4 mL/hr Dose: 5-50 mcg/kg/min  Weight Dosing Info: 54.6 kg  Freq: Titrated Route: IV  Last Dose: Stopped  (03/13/24 1852)  Start: 03/13/24 1530 End: 03/14/24 0710   Admin Instructions:      Order specific questions:                1601     1727     1835     1843     1852        sodium chloride 0.9 % infusion  Freq: Continuous PRN Route: IV  Start: 03/13/24 1240 End: 03/13/24 1409             1240     1312     1359     1409-D/C'd      Legend:       [id number]/0403/0503/0603/0703/0803/0903/1003/1103/1203/13        PRN Meds Sorted by Name  for Jf Valera as of 03/04/24 through 3/13/24  Legend:       Medications 03/04 03/05 03/06 03/07 03/08 03/09 03/10 03/11 03/12 03/13   fentaNYL injection 50 mcg  Dose: 50 mcg  Freq: Every 2 hour PRN Route: IV  PRN Reasons: moderate pain,agitation  Start: 03/13/24 1527   Admin Instructions:                   heparin (porcine) injection 2,000 Units  Dose: 2,000 Units  Freq: Every 6 hours PRN Route: IV  PRN Comment: PTT 43 - 59 seconds  Start: 03/13/24 1135 End: 03/13/24 1524   Admin Instructions:                1221     1524     1524-D/C'd      heparin (porcine) injection 4,000 Units  Dose: 4,000 Units  Freq: Every 6 hours PRN Route: IV  PRN Comment: PTT less than or equal to 42 seconds  Start: 03/13/24 1135 End: 03/13/24 1524   Admin Instructions:                1221     1524     1524-D/C'd      iohexol (OMNIPAQUE) 350 MG/ML injection (MULTI-DOSE) 100 mL  Dose: 100 mL  Freq: Once in imaging Route: IV  PRN Reason: contrast  Start: 03/13/24 1005 End: 03/13/24 1005             1005        lactated ringers infusion  Freq: Continuous PRN Route: IV  Start: 03/13/24 1220 End: 03/13/24 1409             1220     1306     1359     1409-D/C'd      lidocaine (PF) (XYLOCAINE-MPF) 1 % injection  Freq: As needed Route: IV  Start: 03/13/24 1229 End: 03/13/24 1409             1229     1409-D/C'd      midazolam (VERSED) injection  Freq: As needed Route: IV  Start: 03/13/24 1350 End: 03/13/24 1409             1350     1409-D/C'd      norepinephrine (LEVOPHED) 1 mg/mL injection **ADS Override  Pull**  Start: 03/14/24 0007 End: 03/14/24 0013   Admin Instructions:                   phenylephrine (AKIRA-SYNEPHRINE) 50 mg (STANDARD CONCENTRATION) in sodium chloride 0.9% 250 mL  Freq: Continuous PRN Route: IV  Start: 03/13/24 1237 End: 03/13/24 1409             1237     1251     1254     1257     1302     1315     1317     1321     1327     1330     1339     1357     1409-D/C'd      phenylephrine bolus from bag  Freq: As needed Route: IV  Start: 03/13/24 1235 End: 03/13/24 1409             1235     1409-D/C'd      phenylephrine HCl (VAZCULEP) 10 MG/ML solution **ADS Override Pull**  Start: 03/13/24 1526 End: 03/13/24 1620   Admin Instructions:                1620 [C]        propofol (DIPRIVAN) 200 MG/20ML bolus injection  Freq: As needed Route: IV  Start: 03/13/24 1229 End: 03/13/24 1409             1229     1409-D/C'd      ROCuronium (ZEMURON) injection  Freq: As needed Route: IV  Start: 03/13/24 1240 End: 03/13/24 1409             1240     1409-D/C'd      sodium chloride 0.9 % infusion  Freq: Continuous PRN Route: IV  Start: 03/13/24 1240 End: 03/13/24 1409             1240     1312     1359     1409-D/C'd      sodium chloride 0.9 % irrigation solution  Freq: As needed  Start: 03/13/24 1304 End: 03/13/24 1410             1304     1305     1410-D/C'd      Succinylcholine Chloride 100 mg/5 mL syringe  Freq: As needed Route: IV  Start: 03/13/24 1229 End: 03/13/24 1409             1229     1409-D/C'd      Sugammadex Sodium (BRIDION)  Freq: As needed Route: IV  Start: 03/13/24 1346 End: 03/13/24 1409             1346     1409-D/C'd          Network Utilization Review Department  ATTENTION: Please call with any questions or concerns to [phone] and carefully listen to the prompts so that you are directed to the right person. All voicemails are confidential.   For Discharge needs, contact Care Management DC Support Team at [phone] opt. 2  Send all requests for admission clinical reviews, approved or denied  determinations and any other requests to dedicated fax number below belonging to the campus where the patient is receiving treatment. List of dedicated fax numbers for the Facilities:  FACILITY NAME UR FAX NUMBER   ADMISSION DENIALS (Administrative/Medical Necessity) [phone]   DISCHARGE SUPPORT TEAM (NETWORK) [phone]   PARENT CHILD HEALTH (Maternity/NICU/Pediatrics) [phone]   [facility] [phone]   [facility] [phone]   [facility] [phone]   [facility] [phone]   [facility] [facility] [phone]   [facility] [phone]   [facility] [phone]   [facility] [phone]   [facility] [phone]   Critical access hospital [phone]   [facility] [phone]   [facility] [phone]

## 2024-03-14 NOTE — CONSULTS
Consultation - Palliative and Supportive Care   Jf Valera 88 y.o. male [id number]    Goals of care, counseling/discussion  Assessment & Plan  No limits on cares at this time    Palliative care by specialist  Assessment & Plan  Introduced palliative care and the services we provide  Provided supportive listening and normalized experience of patient/family  PSC SW to visit  Discussed case with , appreciate spiritual care involvement    Mesenteric ischemia (HCC)  Assessment & Plan  S/p ex lap 3/13, for repeat OR today with hopeful anastomosis and closure       Code Status: Full- Level 1   Decisional apparatus:  Patient is not competent on my exam today.  If competence is lost, patient's substitute decision maker would default to wife by [place] Act 169.   Advance Directive / Living Will / POLST: No     I have reviewed the patient's controlled substance dispensing history in the Prescription Drug Monitoring Program in compliance with the [facility] regulations before prescribing any controlled substances.         We appreciate the invitation to be involved in this patient's care.  We will continue to follow.  Please do not hesitate to reach our on call provider through our clinic answering service at [phone] should you have acute symptom control concerns.    Emilia Werner MD  Palliative and Supportive Care  Clinic/Answering Service: [phone]  You can find me on [employer]!       IDENTIFICATION:  Inpatient consult to Palliative Care  Consult performed by: Emilia Werner MD  Consult ordered by: Marshal Niño MD        Physician Requesting Consult: Beltran Lorenz,*  Reason for Consult / Principal Problem: Mesenteric ischemia, GOC/family support  Hx and PE limited by: Patient intubated and sedated    NARRATIVE AND INTERVAL HISTORY:       Jf Valera is a 88 y.o. male who presented to the [facility] ED on 3/13/2024 with abdominal pain and constipation found to have significant small bowel ischemia  on imaging 2/2 thrombosis in the portal venous system as well as nearly occlusive thrombus in the infrarenal abdominal aorta and a L SFA occlusion concerning for embolic etiology.  He underwent urgent exploratory laparotomy with resection of 150 cm of small bowel left in discontinuity and placement of a suprapubic Kerns catheter.  He was transferred to [facility] for further evaluation and management.  His postoperative course has been complicated by hypotension requiring pressor support which is since been discontinued.  He remains intubated with plan for repeat surgical intervention today.    Jf is able to answer yes/no questions by nodding.  He notes discomfort from his ET tube and denies abdominal pain.  He is accompanied by his wife, son, and daughter who is an ICU nurse.  They all live together in [place].  They identify as [place] and find related to be a significant source of support.     Review of Systems   Unable to perform ROS: Intubated       Past Medical History:   Diagnosis Date    Hypertension      Past Surgical History:   Procedure Laterality Date    LAPAROTOMY N/A 3/13/2024    Procedure: LAPAROTOMY EXPLORATORY; SMALL BOWEL RESECTION; LYSIS OF ADHESIONS; SUPRPAPUBIC TUBE INSERTION;  Surgeon: Marvin Azul MD;  Location: [facility];  Service: General     Social History     Socioeconomic History    Marital status: /Civil Union     Spouse name: Not on file    Number of children: Not on file    Years of education: Not on file    Highest education level: Not on file   Occupational History    Not on file   Tobacco Use    Smoking status: Never     Passive exposure: Never    Smokeless tobacco: Never   Vaping Use    Vaping status: Never Used   Substance and Sexual Activity    Alcohol use: Never    Drug use: Never    Sexual activity: Not on file   Other Topics Concern    Not on file   Social History Narrative    Not on file     Social Determinants of Health     Financial Resource Strain: Not on file    Food Insecurity: Not on file   Transportation Needs: Not on file   Physical Activity: Not on file   Stress: Not on file   Social Connections: Not on file   Intimate Partner Violence: Not on file   Housing Stability: Not on file     No family history on file.    MEDICATIONS / ALLERGIES:    all current active meds have been reviewed    No Known Allergies    OBJECTIVE:    Physical Exam  Physical Exam  Constitutional:       Appearance: He is ill-appearing.   HENT:      Head: Normocephalic and atraumatic.      Mouth/Throat:      Comments: ET tube in place  Cardiovascular:      Rate and Rhythm: Normal rate.   Skin:     General: Skin is warm and dry.   Neurological:      General: No focal deficit present.      Mental Status: He is alert.      Comments: Shakes head yes/no appropriately in response to questioning         Lab Results: I have personally reviewed pertinent labs.  Imaging Studies: Reviewed  EKG, Pathology, and Other Studies: Reviewed    Counseling / Coordination of Care    Total floor / unit time spent today 60 minutes. Greater than 50% of total time was spent with the patient and / or family counseling and / or coordination of care. A description of the counseling / coordination of care: Chart review, symptom assessment, patient and family counseling, coordination with critical care, coordination with social work, coordination with pastoral care.

## 2024-03-15 LAB
ALBUMIN SERPL BCP-MCNC: 3.3 G/DL (ref 3.5–5)
ALP SERPL-CCNC: 31 U/L (ref 34–104)
ALT SERPL W P-5'-P-CCNC: 10 U/L (ref 7–52)
ANION GAP SERPL CALCULATED.3IONS-SCNC: 13 MMOL/L (ref 4–13)
APTT PPP: 57 SECONDS (ref 23–37)
APTT PPP: 73 SECONDS (ref 23–37)
APTT PPP: 83 SECONDS (ref 23–37)
AST SERPL W P-5'-P-CCNC: 15 U/L (ref 13–39)
BASOPHILS # BLD AUTO: 0.02 THOUSANDS/ÂΜL (ref 0–0.1)
BASOPHILS NFR BLD AUTO: 0 % (ref 0–1)
BILIRUB SERPL-MCNC: 0.76 MG/DL (ref 0.2–1)
BUN SERPL-MCNC: 14 MG/DL (ref 5–25)
CA-I BLD-SCNC: 1.08 MMOL/L (ref 1.12–1.32)
CALCIUM ALBUM COR SERPL-MCNC: 8.2 MG/DL (ref 8.3–10.1)
CALCIUM SERPL-MCNC: 7.6 MG/DL (ref 8.4–10.2)
CHLORIDE SERPL-SCNC: 106 MMOL/L (ref 96–108)
CO2 SERPL-SCNC: 21 MMOL/L (ref 21–32)
CREAT SERPL-MCNC: 0.98 MG/DL (ref 0.6–1.3)
EOSINOPHIL # BLD AUTO: 0.01 THOUSAND/ÂΜL (ref 0–0.61)
EOSINOPHIL NFR BLD AUTO: 0 % (ref 0–6)
ERYTHROCYTE [DISTWIDTH] IN BLOOD BY AUTOMATED COUNT: 15.3 % (ref 11.6–15.1)
GFR SERPL CREATININE-BSD FRML MDRD: 69 ML/MIN/1.73SQ M
GLUCOSE SERPL-MCNC: 107 MG/DL (ref 65–140)
HCT VFR BLD AUTO: 31.5 % (ref 36.5–49.3)
HGB BLD-MCNC: 9.6 G/DL (ref 12–17)
IMM GRANULOCYTES # BLD AUTO: 0.08 THOUSAND/UL (ref 0–0.2)
IMM GRANULOCYTES NFR BLD AUTO: 1 % (ref 0–2)
LYMPHOCYTES # BLD AUTO: 0.55 THOUSANDS/ÂΜL (ref 0.6–4.47)
LYMPHOCYTES NFR BLD AUTO: 3 % (ref 14–44)
MAGNESIUM SERPL-MCNC: 2.3 MG/DL (ref 1.9–2.7)
MCH RBC QN AUTO: 27.7 PG (ref 26.8–34.3)
MCHC RBC AUTO-ENTMCNC: 30.5 G/DL (ref 31.4–37.4)
MCV RBC AUTO: 91 FL (ref 82–98)
MONOCYTES # BLD AUTO: 1.33 THOUSAND/ÂΜL (ref 0.17–1.22)
MONOCYTES NFR BLD AUTO: 8 % (ref 4–12)
NEUTROPHILS # BLD AUTO: 15.56 THOUSANDS/ÂΜL (ref 1.85–7.62)
NEUTS SEG NFR BLD AUTO: 88 % (ref 43–75)
NRBC BLD AUTO-RTO: 0 /100 WBCS
PHOSPHATE SERPL-MCNC: 3.4 MG/DL (ref 2.3–4.1)
PLATELET # BLD AUTO: 331 THOUSANDS/UL (ref 149–390)
PMV BLD AUTO: 10.3 FL (ref 8.9–12.7)
POTASSIUM SERPL-SCNC: 3.3 MMOL/L (ref 3.5–5.3)
PROT SERPL-MCNC: 4.6 G/DL (ref 6.4–8.4)
RBC # BLD AUTO: 3.47 MILLION/UL (ref 3.88–5.62)
SODIUM SERPL-SCNC: 140 MMOL/L (ref 135–147)
WBC # BLD AUTO: 17.55 THOUSAND/UL (ref 4.31–10.16)

## 2024-03-15 PROCEDURE — 94664 DEMO&/EVAL PT USE INHALER: CPT

## 2024-03-15 PROCEDURE — 83735 ASSAY OF MAGNESIUM: CPT

## 2024-03-15 PROCEDURE — 82330 ASSAY OF CALCIUM: CPT

## 2024-03-15 PROCEDURE — 84100 ASSAY OF PHOSPHORUS: CPT

## 2024-03-15 PROCEDURE — 99291 CRITICAL CARE FIRST HOUR: CPT | Performed by: EMERGENCY MEDICINE

## 2024-03-15 PROCEDURE — 85730 THROMBOPLASTIN TIME PARTIAL: CPT | Performed by: SURGERY

## 2024-03-15 PROCEDURE — 94002 VENT MGMT INPAT INIT DAY: CPT

## 2024-03-15 PROCEDURE — 80053 COMPREHEN METABOLIC PANEL: CPT

## 2024-03-15 PROCEDURE — 94640 AIRWAY INHALATION TREATMENT: CPT

## 2024-03-15 PROCEDURE — 94760 N-INVAS EAR/PLS OXIMETRY 1: CPT

## 2024-03-15 PROCEDURE — 94003 VENT MGMT INPAT SUBQ DAY: CPT

## 2024-03-15 PROCEDURE — 85730 THROMBOPLASTIN TIME PARTIAL: CPT | Performed by: PHYSICIAN ASSISTANT

## 2024-03-15 PROCEDURE — 99232 SBSQ HOSP IP/OBS MODERATE 35: CPT | Performed by: SURGERY

## 2024-03-15 PROCEDURE — 85025 COMPLETE CBC W/AUTO DIFF WBC: CPT

## 2024-03-15 PROCEDURE — 99024 POSTOP FOLLOW-UP VISIT: CPT | Performed by: SURGERY

## 2024-03-15 RX ORDER — POTASSIUM CHLORIDE 29.8 MG/ML
40 INJECTION INTRAVENOUS ONCE
Status: COMPLETED | OUTPATIENT
Start: 2024-03-15 | End: 2024-03-15

## 2024-03-15 RX ORDER — ALBUMIN, HUMAN INJ 5% 5 %
25 SOLUTION INTRAVENOUS ONCE
Status: COMPLETED | OUTPATIENT
Start: 2024-03-15 | End: 2024-03-15

## 2024-03-15 RX ORDER — LEVALBUTEROL INHALATION SOLUTION 1.25 MG/3ML
1.25 SOLUTION RESPIRATORY (INHALATION) EVERY 6 HOURS PRN
Status: DISCONTINUED | OUTPATIENT
Start: 2024-03-15 | End: 2024-03-16

## 2024-03-15 RX ORDER — CALCIUM GLUCONATE 20 MG/ML
2 INJECTION, SOLUTION INTRAVENOUS ONCE
Status: COMPLETED | OUTPATIENT
Start: 2024-03-15 | End: 2024-03-15

## 2024-03-15 RX ADMIN — Medication 150 MCG/HR: at 03:50

## 2024-03-15 RX ADMIN — LEVALBUTEROL HYDROCHLORIDE 1.25 MG: 1.25 SOLUTION RESPIRATORY (INHALATION) at 11:22

## 2024-03-15 RX ADMIN — SODIUM CHLORIDE, SODIUM LACTATE, POTASSIUM CHLORIDE, AND CALCIUM CHLORIDE 500 ML: .6; .31; .03; .02 INJECTION, SOLUTION INTRAVENOUS at 00:20

## 2024-03-15 RX ADMIN — SODIUM CHLORIDE, SODIUM LACTATE, POTASSIUM CHLORIDE, AND CALCIUM CHLORIDE 500 ML: .6; .31; .03; .02 INJECTION, SOLUTION INTRAVENOUS at 05:39

## 2024-03-15 RX ADMIN — ALBUMIN (HUMAN) 25 G: 12.5 INJECTION, SOLUTION INTRAVENOUS at 01:11

## 2024-03-15 RX ADMIN — HEPARIN SODIUM 21 UNITS/KG/HR: 10000 INJECTION, SOLUTION INTRAVENOUS at 18:58

## 2024-03-15 RX ADMIN — PIPERACILLIN SODIUM AND TAZOBACTAM SODIUM 4.5 G: 36; 4.5 INJECTION, POWDER, LYOPHILIZED, FOR SOLUTION INTRAVENOUS at 20:14

## 2024-03-15 RX ADMIN — Medication 1 SPRAY: at 20:55

## 2024-03-15 RX ADMIN — CHLORHEXIDINE GLUCONATE 15 ML: 1.2 SOLUTION ORAL at 09:00

## 2024-03-15 RX ADMIN — CALCIUM GLUCONATE 2 G: 20 INJECTION, SOLUTION INTRAVENOUS at 08:06

## 2024-03-15 RX ADMIN — CHLORHEXIDINE GLUCONATE 15 ML: 1.2 SOLUTION ORAL at 20:14

## 2024-03-15 RX ADMIN — PIPERACILLIN SODIUM AND TAZOBACTAM SODIUM 4.5 G: 36; 4.5 INJECTION, POWDER, LYOPHILIZED, FOR SOLUTION INTRAVENOUS at 04:13

## 2024-03-15 RX ADMIN — DEXMEDETOMIDINE HYDROCHLORIDE 0.7 MCG/KG/HR: 4 INJECTION, SOLUTION INTRAVENOUS at 06:08

## 2024-03-15 RX ADMIN — SODIUM CHLORIDE, SODIUM GLUCONATE, SODIUM ACETATE, POTASSIUM CHLORIDE, MAGNESIUM CHLORIDE, SODIUM PHOSPHATE, DIBASIC, AND POTASSIUM PHOSPHATE 75 ML/HR: .53; .5; .37; .037; .03; .012; .00082 INJECTION, SOLUTION INTRAVENOUS at 12:25

## 2024-03-15 RX ADMIN — POTASSIUM CHLORIDE 40 MEQ: 29.8 INJECTION, SOLUTION INTRAVENOUS at 08:06

## 2024-03-15 RX ADMIN — PIPERACILLIN SODIUM AND TAZOBACTAM SODIUM 4.5 G: 36; 4.5 INJECTION, POWDER, LYOPHILIZED, FOR SOLUTION INTRAVENOUS at 12:25

## 2024-03-15 NOTE — PROGRESS NOTES
"Progress Note - Vascular Surgery   Jf Valera 88 y.o. male MRN: [id number]  Unit/Bed#: [facility] 514-01 Encounter: [id number]    Assessment:  Jf Valera is a 88 y.o. 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  Findings suggestive of hypercoagulability given associated descending thoracic and infrarenal aortic thrombus along with left SFA thrombus occlusion.      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  No acute vascular intervention planned, 2nd look with viable remaining small bowel, primary anastomosis performed and abd closed   Continue hep gtt for AC  Hypotension/Systemic shock   Management per SICU, input appreciated   Aortic/left SFA thrombus   Continue AC   Monitor frequent LE neurovascular checks for signs of distal embolization   Currently without evidence of gross LE ischemia, LLE w/   Recommend cardioembolic and hypercoagulable work up, cannot r/o secondary to malignancy   Remainder of care per primary team, no further needs from vascular perspective, if any acute issues or concerns please contact vascular team     Subjective/Objective    S/E, remains intubated and sedated     Objective:     Blood pressure 148/72, pulse 98, temperature 99.7 °F (37.6 °C), resp. rate (!) 23, height 5' 4\" (1.626 m), weight 57 kg (125 lb 10.6 oz), SpO2 92%.,Body mass index is 21.57 kg/m².      Intake/Output Summary (Last 24 hours) at 3/15/2024 1107  Last data filed at 3/15/2024 1000  Gross per 24 hour   Intake 6291.33 ml   Output 1720 ml   Net 4571.33 ml       Invasive Devices       Central Venous Catheter Line  Duration             CVC Central Lines 03/14/24 <1 day              Peripheral Intravenous Line  Duration             Peripheral IV 03/13/24 Left;Ventral " (anterior) Forearm 2 days    Peripheral IV 03/13/24 Proximal;Right;Ventral (anterior) Forearm 2 days    Peripheral IV 03/13/24 Left;Upper;Ventral (anterior) Arm 1 day    Peripheral IV 03/13/24 Right Antecubital 1 day              Arterial Line  Duration             Arterial Line 03/13/24 Right Radial 1 day              Drain  Duration             NG/OG/Enteral Tube Nasogastric 16 Fr Left nare 1 day    Suprapubic Catheter 18 Fr. 1 day                    Physical Exam:   GEN: Frail appearing  HEENT: NCAT, ETT and NGT in place   CV: RRR  Lung: mechanical vent   Ab: distended, Abthera wound vac in place   Extrem: LLE multiphasic peroneal/DP/PT, RLE without signals however cap refill <3s, motor appears intact, no gross cyanosis or ischemia    Neuro: sedated however arousable and able to follow commands     Lab, Imaging and other studies:I have personally reviewed pertinent lab results.     VTE Pharmacologic Prophylaxis: Heparin  VTE Mechanical Prophylaxis: sequential compression device    Recent Results (from the past 36 hour(s))   APTT    Collection Time: 03/14/24  4:27 AM   Result Value Ref Range    PTT 56 (H) 23 - 37 seconds   Phosphorus    Collection Time: 03/14/24  4:27 AM   Result Value Ref Range    Phosphorus 3.2 2.3 - 4.1 mg/dL   Magnesium    Collection Time: 03/14/24  4:27 AM   Result Value Ref Range    Magnesium 2.5 1.9 - 2.7 mg/dL   Basic metabolic panel    Collection Time: 03/14/24  4:27 AM   Result Value Ref Range    Sodium 145 135 - 147 mmol/L    Potassium 3.6 3.5 - 5.3 mmol/L    Chloride 109 (H) 96 - 108 mmol/L    CO2 23 21 - 32 mmol/L    ANION GAP 13 4 - 13 mmol/L    BUN 16 5 - 25 mg/dL    Creatinine 1.07 0.60 - 1.30 mg/dL    Glucose 120 65 - 140 mg/dL    Calcium 7.3 (L) 8.4 - 10.2 mg/dL    eGFR 62 ml/min/1.73sq m   CBC and differential    Collection Time: 03/14/24  4:27 AM   Result Value Ref Range    WBC 17.19 (H) 4.31 - 10.16 Thousand/uL    RBC 3.14 (L) 3.88 - 5.62 Million/uL    Hemoglobin 8.9 (L) 12.0  - 17.0 g/dL    Hematocrit 26.5 (L) 36.5 - 49.3 %    MCV 84 82 - 98 fL    MCH 28.3 26.8 - 34.3 pg    MCHC 33.6 31.4 - 37.4 g/dL    RDW 15.2 (H) 11.6 - 15.1 %    MPV 10.0 8.9 - 12.7 fL    Platelets 302 149 - 390 Thousands/uL    nRBC 0 /100 WBCs    Neutrophils Relative 87 (H) 43 - 75 %    Immature Grans % 0 0 - 2 %    Lymphocytes Relative 6 (L) 14 - 44 %    Monocytes Relative 7 4 - 12 %    Eosinophils Relative 0 0 - 6 %    Basophils Relative 0 0 - 1 %    Neutrophils Absolute 14.90 (H) 1.85 - 7.62 Thousands/µL    Absolute Immature Grans 0.06 0.00 - 0.20 Thousand/uL    Absolute Lymphocytes 1.03 0.60 - 4.47 Thousands/µL    Absolute Monocytes 1.19 0.17 - 1.22 Thousand/µL    Eosinophils Absolute 0.00 0.00 - 0.61 Thousand/µL    Basophils Absolute 0.01 0.00 - 0.10 Thousands/µL   Calcium, ionized    Collection Time: 03/14/24  4:27 AM   Result Value Ref Range    Calcium, Ionized 0.97 (L) 1.12 - 1.32 mmol/L   Lactic acid, plasma (w/reflex if result > 2.0)    Collection Time: 03/14/24  4:27 AM   Result Value Ref Range    LACTIC ACID 0.7 0.5 - 2.0 mmol/L   Blood gas, arterial    Collection Time: 03/14/24  4:27 AM   Result Value Ref Range    pH, Arterial 7.500 (H) 7.350 - 7.450    pCO2, Arterial 29.7 (L) 36.0 - 44.0 mm Hg    pO2, Arterial 126.8 75.0 - 129.0 mm Hg    HCO3, Arterial 22.6 22.0 - 28.0 mmol/L    Base Excess, Arterial 0.0 mmol/L    O2 Content, Arterial 13.9 (L) 16.0 - 23.0 mL/dL    O2 HGB,Arterial  98.0 (H) 94.0 - 97.0 %    SOURCE Line, Arterial     Vent Type- AC AC     AC Rate 16     Tidal Volume 400 ml    Inspired Air (FIO2) 40     PEEP 6    APTT    Collection Time: 03/14/24 11:06 AM   Result Value Ref Range    PTT 73 (H) 23 - 37 seconds   Blood gas, arterial    Collection Time: 03/14/24  1:46 PM   Result Value Ref Range    pH, Arterial 7.435 7.350 - 7.450    pCO2, Arterial 34.8 (L) 36.0 - 44.0 mm Hg    pO2, Arterial 118.4 75.0 - 129.0 mm Hg    HCO3, Arterial 22.8 22.0 - 28.0 mmol/L    Base Excess, Arterial -1.0 mmol/L     O2 Content, Arterial 14.8 (L) 16.0 - 23.0 mL/dL    O2 HGB,Arterial  97.7 (H) 94.0 - 97.0 %    SOURCE Line, Arterial     Vent Type- AC AC     AC Rate 16     Tidal Volume 400 ml    Inspired Air (FIO2) 40     PEEP 6    APTT    Collection Time: 03/14/24  4:59 PM   Result Value Ref Range    PTT 51 (H) 23 - 37 seconds   CBC and differential    Collection Time: 03/14/24  4:59 PM   Result Value Ref Range    WBC 21.94 (H) 4.31 - 10.16 Thousand/uL    RBC 3.50 (L) 3.88 - 5.62 Million/uL    Hemoglobin 9.9 (L) 12.0 - 17.0 g/dL    Hematocrit 31.2 (L) 36.5 - 49.3 %    MCV 89 82 - 98 fL    MCH 28.3 26.8 - 34.3 pg    MCHC 31.7 31.4 - 37.4 g/dL    RDW 15.0 11.6 - 15.1 %    MPV 10.2 8.9 - 12.7 fL    Platelets 479 (H) 149 - 390 Thousands/uL    nRBC 0 /100 WBCs    Neutrophils Relative 86 (H) 43 - 75 %    Immature Grans % 1 0 - 2 %    Lymphocytes Relative 5 (L) 14 - 44 %    Monocytes Relative 8 4 - 12 %    Eosinophils Relative 0 0 - 6 %    Basophils Relative 0 0 - 1 %    Neutrophils Absolute 18.96 (H) 1.85 - 7.62 Thousands/µL    Absolute Immature Grans 0.15 0.00 - 0.20 Thousand/uL    Absolute Lymphocytes 1.02 0.60 - 4.47 Thousands/µL    Absolute Monocytes 1.78 (H) 0.17 - 1.22 Thousand/µL    Eosinophils Absolute 0.00 0.00 - 0.61 Thousand/µL    Basophils Absolute 0.03 0.00 - 0.10 Thousands/µL   Magnesium    Collection Time: 03/14/24  4:59 PM   Result Value Ref Range    Magnesium 2.3 1.9 - 2.7 mg/dL   Phosphorus    Collection Time: 03/14/24  4:59 PM   Result Value Ref Range    Phosphorus 3.2 2.3 - 4.1 mg/dL   Calcium, ionized    Collection Time: 03/14/24  4:59 PM   Result Value Ref Range    Calcium, Ionized 1.05 (L) 1.12 - 1.32 mmol/L   Lactic acid, plasma (w/reflex if result > 2.0)    Collection Time: 03/14/24  4:59 PM   Result Value Ref Range    LACTIC ACID 0.8 0.5 - 2.0 mmol/L   Blood gas, arterial    Collection Time: 03/14/24  4:59 PM   Result Value Ref Range    pH, Arterial 7.381 7.350 - 7.450    pCO2, Arterial 36.5 36.0 - 44.0 mm  Hg    pO2, Arterial 150.5 (H) 75.0 - 129.0 mm Hg    HCO3, Arterial 21.2 (L) 22.0 - 28.0 mmol/L    Base Excess, Arterial -3.5 mmol/L    O2 Content, Arterial 15.5 (L) 16.0 - 23.0 mL/dL    O2 HGB,Arterial  98.5 (H) 94.0 - 97.0 %    SOURCE Line, Arterial    Basic metabolic panel    Collection Time: 03/14/24  5:09 PM   Result Value Ref Range    Sodium 141 135 - 147 mmol/L    Potassium 3.6 3.5 - 5.3 mmol/L    Chloride 107 96 - 108 mmol/L    CO2 21 21 - 32 mmol/L    ANION GAP 13 4 - 13 mmol/L    BUN 13 5 - 25 mg/dL    Creatinine 0.95 0.60 - 1.30 mg/dL    Glucose 116 65 - 140 mg/dL    Calcium 7.5 (L) 8.4 - 10.2 mg/dL    eGFR 71 ml/min/1.73sq m   Hepatic function panel    Collection Time: 03/14/24  5:09 PM   Result Value Ref Range    Total Bilirubin 0.66 0.20 - 1.00 mg/dL    Bilirubin, Direct 0.24 (H) 0.00 - 0.20 mg/dL    Alkaline Phosphatase 38 34 - 104 U/L    AST 21 13 - 39 U/L    ALT 12 7 - 52 U/L    Total Protein 4.7 (L) 6.4 - 8.4 g/dL    Albumin 3.2 (L) 3.5 - 5.0 g/dL   Protime-INR    Collection Time: 03/14/24  5:09 PM   Result Value Ref Range    Protime 18.6 (H) 11.6 - 14.5 seconds    INR 1.57 (H) 0.84 - 1.19   Hemoglobin and hematocrit, blood    Collection Time: 03/14/24  9:58 PM   Result Value Ref Range    Hemoglobin 11.0 (L) 12.0 - 17.0 g/dL    Hematocrit 34.2 (L) 36.5 - 49.3 %   APTT    Collection Time: 03/15/24 12:33 AM   Result Value Ref Range    PTT 57 (H) 23 - 37 seconds   CBC and differential    Collection Time: 03/15/24  4:30 AM   Result Value Ref Range    WBC 17.55 (H) 4.31 - 10.16 Thousand/uL    RBC 3.47 (L) 3.88 - 5.62 Million/uL    Hemoglobin 9.6 (L) 12.0 - 17.0 g/dL    Hematocrit 31.5 (L) 36.5 - 49.3 %    MCV 91 82 - 98 fL    MCH 27.7 26.8 - 34.3 pg    MCHC 30.5 (L) 31.4 - 37.4 g/dL    RDW 15.3 (H) 11.6 - 15.1 %    MPV 10.3 8.9 - 12.7 fL    Platelets 331 149 - 390 Thousands/uL    nRBC 0 /100 WBCs    Neutrophils Relative 88 (H) 43 - 75 %    Immature Grans % 1 0 - 2 %    Lymphocytes Relative 3 (L) 14 - 44  %    Monocytes Relative 8 4 - 12 %    Eosinophils Relative 0 0 - 6 %    Basophils Relative 0 0 - 1 %    Neutrophils Absolute 15.56 (H) 1.85 - 7.62 Thousands/µL    Absolute Immature Grans 0.08 0.00 - 0.20 Thousand/uL    Absolute Lymphocytes 0.55 (L) 0.60 - 4.47 Thousands/µL    Absolute Monocytes 1.33 (H) 0.17 - 1.22 Thousand/µL    Eosinophils Absolute 0.01 0.00 - 0.61 Thousand/µL    Basophils Absolute 0.02 0.00 - 0.10 Thousands/µL   Comprehensive metabolic panel    Collection Time: 03/15/24  4:30 AM   Result Value Ref Range    Sodium 140 135 - 147 mmol/L    Potassium 3.3 (L) 3.5 - 5.3 mmol/L    Chloride 106 96 - 108 mmol/L    CO2 21 21 - 32 mmol/L    ANION GAP 13 4 - 13 mmol/L    BUN 14 5 - 25 mg/dL    Creatinine 0.98 0.60 - 1.30 mg/dL    Glucose 107 65 - 140 mg/dL    Calcium 7.6 (L) 8.4 - 10.2 mg/dL    Corrected Calcium 8.2 (L) 8.3 - 10.1 mg/dL    AST 15 13 - 39 U/L    ALT 10 7 - 52 U/L    Alkaline Phosphatase 31 (L) 34 - 104 U/L    Total Protein 4.6 (L) 6.4 - 8.4 g/dL    Albumin 3.3 (L) 3.5 - 5.0 g/dL    Total Bilirubin 0.76 0.20 - 1.00 mg/dL    eGFR 69 ml/min/1.73sq m   Phosphorus    Collection Time: 03/15/24  4:30 AM   Result Value Ref Range    Phosphorus 3.4 2.3 - 4.1 mg/dL   Magnesium    Collection Time: 03/15/24  4:30 AM   Result Value Ref Range    Magnesium 2.3 1.9 - 2.7 mg/dL   Calcium, ionized    Collection Time: 03/15/24  4:30 AM   Result Value Ref Range    Calcium, Ionized 1.08 (L) 1.12 - 1.32 mmol/L   Prepare Leukoreduced RBC: 2 Units    Collection Time: 03/15/24  5:55 AM   Result Value Ref Range    Unit Product Code U9936X27     Unit Number [id number]     Unit ABO A     Unit RH POS     Crossmatch Compatible     Unit Dispense Status Crossmatched     Unit Product Volume 300 ml    Unit Product Code D4272O56     Unit Number [id number]     Unit ABO A     Unit RH POS     Crossmatch Compatible     Unit Dispense Status Presumed Trans     Unit Product Volume 350 ml   APTT    Collection Time: 03/15/24   7:23 AM   Result Value Ref Range    PTT 83 (H) 23 - 37 seconds

## 2024-03-15 NOTE — PROGRESS NOTES
Post Op Check:    88 y.o. male s/p ex lap, washout, small bowel anastomosis, abdominal closure.    The patient remains intubated and sedated. He is on 4 of levo. No complaints from nursing at this point in time. Hep gtt is running.    Vitals:    03/14/24 2200   BP: 104/73   Pulse: 74   Resp: 16   Temp: 98.6 °F (37 °C)   SpO2: 99%       General: Intubated.  HENT: NCAT MMM  Neck: supple, no JVD  CV: nl rate  Lungs: nl wob. No resp distress  ABD: Soft, nontender, nondistended. Midline dressing appears to be clean/dry/intact.  Extrem: No CCE    Plan:  Diet NPO  Continue IV fluids  Consider a bolus of fluids now  Pain and nausea control PRN  Continue NG/OG tube  Continue suprapubic cath  Continue arterial line  Continue central line  Appreciate ICU level of care    Manfred Iraheta MD  General Surgery Resident

## 2024-03-15 NOTE — PROGRESS NOTES
"Progress Note - General Surgery   [facility] Resident on [facility] Service   Jf Valera 88 y.o. male MRN: [id number]  Unit/Bed#: [facility] 514-01 Encounter: [id number]    Assessment:  88 y.o. male status post 313 ex lap, small bowel resection left in discontinuity, lysis of adhesions, suprapubic tube insertion, ABThera VAC placement and now s/p ex lap, washout, small bowel anastomosis, abdominal closure on 3/14.    Afebrile.VSS.  Urine output 1.2 L  NG tube output 275 cc  No bowel movements    Plan:  Was still with ET tube and on mechanical ventilation this morning  Plan for extubation this morning  Would discontinue arterial line after  Would discontinue central line after  Keep suprapubic Kerns  Appreciate ICU level of care    Subjective/Objective     Subjective: No acute events overnight.  Patient remained intubated.  He received 500 of LR, 500 albumin, then another 500 LR bolus.  Patient resting comfortably.  Nursing staff with no complaints.      Objective:     Blood pressure 102/58, pulse 85, temperature 99.7 °F (37.6 °C), resp. rate 21, height 5' 4\" (1.626 m), weight 57 kg (125 lb 10.6 oz), SpO2 95%.,Body mass index is 21.57 kg/m².      Intake/Output Summary (Last 24 hours) at 3/15/2024 1036  Last data filed at 3/15/2024 1000  Gross per 24 hour   Intake 6291.33 ml   Output 1645 ml   Net 4646.33 ml       Invasive Devices       Central Venous Catheter Line  Duration             CVC Central Lines 03/14/24 <1 day              Peripheral Intravenous Line  Duration             Peripheral IV 03/13/24 Left;Ventral (anterior) Forearm 2 days    Peripheral IV 03/13/24 Proximal;Right;Ventral (anterior) Forearm 2 days    Peripheral IV 03/13/24 Left;Upper;Ventral (anterior) Arm 1 day    Peripheral IV 03/13/24 Right Antecubital 1 day              Arterial Line  Duration             Arterial Line 03/13/24 Right Radial 1 day              Drain  Duration             NG/OG/Enteral Tube Nasogastric 16 Fr Left nare 1 day    " Suprapubic Catheter 18 Fr. 1 day              Airway  Duration             ETT  Oral 7.5 mm 1 day                    General: Intubated and sedated.  HENT: NCAT MMM  Neck: supple, no JVD  CV: nl rate  Lungs: nl wob. No resp distress  ABD: Soft, nontender, nondistended.  Midline dressing clean and dry.  Extrem: No CCE  Neuro: AAOx3       Scheduled Meds:  Current Facility-Administered Medications   Medication Dose Route Frequency Provider Last Rate    chlorhexidine  15 mL Mouth/Throat Q12H [facility] Manfred Iraheta MD      dexmedetomidine  0.1-0.7 mcg/kg/hr Intravenous Titrated Manfred Iraheta MD Stopped (03/15/24 1000)    fentaNYL  50 mcg Intravenous Q2H PRN Manfred Iraheta MD      heparin (porcine)  3-30 Units/kg/hr (Order-Specific) Intravenous Titrated IVAN Jefferson 21 Units/kg/hr (03/15/24 0116)    multi-electrolyte  75 mL/hr Intravenous Continuous Manfred Iraheta MD 75 mL/hr (03/14/24 1930)    piperacillin-tazobactam  4.5 g Intravenous Q8H Manfred Iraheta MD Stopped (03/15/24 0813)    potassium chloride  40 mEq Intravenous Once Richard Kilgore PA-C 40 mEq (03/15/24 0806)     Continuous Infusions:dexmedetomidine, 0.1-0.7 mcg/kg/hr, Last Rate: Stopped (03/15/24 1000)  heparin (porcine), 3-30 Units/kg/hr (Order-Specific), Last Rate: 21 Units/kg/hr (03/15/24 0116)  multi-electrolyte, 75 mL/hr, Last Rate: 75 mL/hr (03/14/24 1930)      PRN Meds:.  fentaNYL      Lab, Imaging and other studies:I have personally reviewed pertinent lab results.    VTE Pharmacologic Prophylaxis: Heparin  VTE Mechanical Prophylaxis: sequential compression device      Manfred Iraheta MD  3/15/2024 10:36 AM

## 2024-03-15 NOTE — QUICK NOTE
Progress Note - Palliative & Supportive Care       3/15/2024 2:07 PM -  Jf Valera's chart and case were reviewed by Yuridia Niño DO.  Mode of review included electronic chart check,.      Patient progressing to possible extubation today. No acute issues identified at this time.      Palliative care will return on Monday 3/18 for ongoing support and goals of care. .        For urgent issues or any questions/concerns, please notify on-call provider via [employer].  You may also call our answering service 24/7 at [phone].    Yuridia Niño DO  Palliative and Supportive Care  Clinic/Answering Service: [phone]  You can find me on [employer]!

## 2024-03-15 NOTE — PROGRESS NOTES
[facility]  Progress Note: Critical Care  Name: Jf Valera 88 y.o. male I MRN: [id number]  [facility] I Date of Admission: 3/13/2024   Date of Service: 3/15/2024 I Hospital Day: 2    Assessment/Plan   Neuro:   No acute issues   Continue sedation/analgesia   Fentanyl at 150mcg/gtt  Continue Precedex @ 0.7mcg/kg/hr  Fentanyl 50mcg Q2 hrs PRN pain/agitation  Wean sedation with goal for extubation today    CV:   Borderline hypotension, improved  Etiology unknown, suspected in the setting of GI volume loss  MAPs this AM in the 90s, off Levo  Propofol d/c'd x2 days ago  Continuous cardiac monitoring, MAP goal >65  Judicious fluid resuscitation as needed  Inaccurate/dampened a-line  D/c kelsey  Chronic hypertension   Holding home Amlodipine and Lisinopril in the setting of borderline hypertension      Pulm:  Post operative mechanical ventilation   Current setting: SPONT 12/6, 40%, TVs in the 400s, RSBI 50-80, Peak pressure 18  Continue mechanical ventilation with goals for extubation today  O2 sat goal >92%  ABGs as needed  CXR as needed  Respiratory alkalosis, resolved per last ABG yesterday  Goal for extubation today  ABG as needed    GI:   Mesenteric ischemia   3/13: ex lap, small bowel resection, lysis of adhesion, insertion of suprapubic cath with Dr. Hayes/Dr. Azul at [facility]  3/14: washout, control of liver bleed, abdominal closure  Continue to monitor abdominal exam for firmness/increased distention   General surgery following, appreciate assistance    :   LISA, resolved  Initial Cr 1.89   This AM Cr 0.98  Baseline Cr 0.95-1.10  Continue to monitor renal indices   Continue fluid resuscitation as needed    F/E/N:   Fluids: Isolyte 75 ml/hr  Electrolytes: replete as indicated for goal K>4, Phos >3, Mg >2  Nutrition: NPO, NGT    Heme/Onc:   Venous portal system thrombosis   3/13 CT A/P: Diffuse portal system thrombosis involving the main portal vein,  intrahepatic portal veins, splenic vein, SMV and SMV branches  Etiology unknown, to consider malignancy, underlying genetic hypercoagulability, etc.   Plan: Heparin gtt  Vascular surgery following, greatly appreciate assistance  Per vascular, continue medical management, no plan for procedural intervention at this time    Endo:   No acute issues  Maintain euglycemia; goal glucose 140-180  Hypoglycemia protocol    ID:   Leukocytosis  Initial WBC 38  Down trending, this AM 17  Likely secondary to mesenteric ischemia   Will continue to monitor WBC count and fever curve  Continue Zosyn 4.5mg Q8 hrs   Blood cultures negative to date    MSK/Skin:   No acute issues  Frequent turning/skin checks  PT/OT when able     Disposition: Critical care    ICU Core Measures     Vented Patient  VAP Bundle  VAP bundle ordered     A: Assess, Prevent, and Manage Pain Has pain been assessed? Yes  Need for changes to pain regimen? No   B: Both Spontaneous Awakening Trials (SATs) and Spontaneous Breathing Trials (SBTs) Plan to perform spontaneous awakening trial today? Yes   Plan to perform spontaneous breathing trial today? Yes   Obvious barriers to extubation? Yes   C: Choice of Sedation RASS Goal: 0 Alert and Calm  Need for changes to sedation or analgesia regimen? No   D: Delirium CAM-ICU: Negative   E: Early Mobility  Plan for early mobility? Yes   F: Family Engagement Plan for family engagement today? Yes       Antibiotic Review: Continue broad spectrum secondary to severity of illness.     Review of Invasive Devices:      Central access plan: Medications requiring central line Hemodynamic monitoring  [place] Plan: Discontinue arterial line    Prophylaxis:  VTE VTE covered by:  heparin (porcine), Intravenous, 21 Units/kg/hr at 03/15/24 0116       Stress Ulcer  not ordered      Significant 24hr Events     24hr events: Patient went back to the OR yesterday for abdominal washout, control of liver bleeding, and closure.  Yesterday, patient  received x 1 unit of packed red blood cells and x 1 500 cc bolus of isolyte.  Patient has remained off levo for the most recent nursing shift.  Seen and evaluated at bedside, following simple commands.  RT at bedside, placed on spontaneous breathing trial.      Subjective     Review of Systems   Unable to perform ROS: Intubated        Objective                            Vitals I/O      Most Recent Min/Max in 24hrs   Temp 99.1 °F (37.3 °C) Temp  Min: 96.1 °F (35.6 °C)  Max: 99.5 °F (37.5 °C)   Pulse 77 Pulse  Min: 66  Max: 121   Resp 16 Resp  Min: 16  Max: 34   /59 (map 80) BP  Min: 78/49  Max: 173/80   O2 Sat (!) 17 % SpO2  Min: 17 %  Max: 100 %      Intake/Output Summary (Last 24 hours) at 3/15/2024 0743  Last data filed at 3/15/2024 0645  Gross per 24 hour   Intake 6979.22 ml   Output 2285 ml   Net 4694.22 ml       Diet NPO    Invasive Monitoring           Physical Exam   Physical Exam  Vitals and nursing note reviewed.   Eyes:      Pupils: Pupils are equal, round, and reactive to light.   Skin:     General: Skin is warm and dry.   HENT:      Head: Normocephalic and atraumatic.      Nose: Nasogastric tube present. No rhinorrhea or epistaxis.   Neck:      Vascular: Central line present. No JVD.      Comments: Right IJ  Cardiovascular:      Rate and Rhythm: Normal rate and regular rhythm.      Pulses: Decreased pulses (2+ palpable radial pulses, no doppler signals to b/l LEs).      Heart sounds: Normal heart sounds.   Musculoskeletal:      Right lower leg: No edema.      Left lower leg: No edema.   Abdominal: General: There is distension.     Palpations: Abdomen is soft.      Tenderness: There is no abdominal tenderness. There is no guarding.   Constitutional:       Appearance: He is underweight. He is ill-appearing.      Interventions: He is sedated, intubated and restrained.   Pulmonary:      Effort: Pulmonary effort is normal. He is intubated.      Breath sounds: Normal breath sounds.   Neurological:       Mental Status: He is alert. He is calm.            Diagnostic Studies    EKG: sinus tach  Imaging:  I have personally reviewed pertinent films in PACS     Medications:  Scheduled PRN   calcium gluconate, 2 g, Once  chlorhexidine, 15 mL, Q12H RONALD  piperacillin-tazobactam, 4.5 g, Q8H  potassium chloride, 40 mEq, Once      fentaNYL, 50 mcg, Q2H PRN       Continuous    dexmedetomidine, 0.1-0.7 mcg/kg/hr, Last Rate: 0.7 mcg/kg/hr (03/15/24 0608)  fentaNYL, 150 mcg/hr, Last Rate: 150 mcg/hr (03/15/24 0350)  heparin (porcine), 3-30 Units/kg/hr (Order-Specific), Last Rate: 21 Units/kg/hr (03/15/24 0116)  multi-electrolyte, 75 mL/hr, Last Rate: 75 mL/hr (03/14/24 1930)  norepinephrine, 1-30 mcg/min, Last Rate: Stopped (03/15/24 0613)         Labs:  CBC    Recent Labs     03/14/24 1659 03/14/24  2158 03/15/24  0430   WBC 21.94*  --  17.55*   HGB 9.9* 11.0* 9.6*   HCT 31.2* 34.2* 31.5*   *  --  331     BMP    Recent Labs     03/14/24  1709 03/15/24  0430   SODIUM 141 140   K 3.6 3.3*    106   CO2 21 21   AGAP 13 13   BUN 13 14   CREATININE 0.95 0.98   CALCIUM 7.5* 7.6*       Coags    Recent Labs     03/13/24  1548 03/13/24  2215 03/14/24  1659 03/14/24  1709 03/15/24  0033   INR 1.51*  --   --  1.57*  --    *   < > 51*  --  57*    < > = values in this interval not displayed.        Additional Electrolytes  Recent Labs     03/14/24  1659 03/15/24  0430   MG 2.3 2.3   PHOS 3.2 3.4   CAIONIZED 1.05* 1.08*          Blood Gas    Recent Labs     03/14/24 1659   PHART 7.381   [id number] 36.5   PO2ART 150.5*   [id number] 21.2*   BEART -3.5   SOURCE Line, Arterial     Recent Labs     03/14/24  1659   SOURCE Line, Arterial    LFTs  Recent Labs     03/14/24  1709 03/15/24  0430   ALT 12 10   AST 21 15   ALKPHOS 38 31*   ALB 3.2* 3.3*   TBILI 0.66 0.76       Infectious  No recent results  Glucose  Recent Labs     03/13/24  1548 03/14/24  0427 03/14/24  1709 03/15/24  0430   GLUC 143* 120 116 107               Richard Machuca  JONA Kilgore

## 2024-03-16 ENCOUNTER — APPOINTMENT (INPATIENT)
Dept: RADIOLOGY | Facility: HOSPITAL | Age: 89
DRG: 329 | End: 2024-03-16

## 2024-03-16 LAB
ABO GROUP BLD BPU: NORMAL
ABO GROUP BLD BPU: NORMAL
ANION GAP SERPL CALCULATED.3IONS-SCNC: 16 MMOL/L (ref 4–13)
APTT PPP: 86 SECONDS (ref 23–37)
ATRIAL RATE: 65 BPM
B-OH-BUTYR SERPL-MCNC: 5.26 MMOL/L (ref 0.02–0.27)
BASOPHILS # BLD AUTO: 0.03 THOUSANDS/ÂΜL (ref 0–0.1)
BASOPHILS NFR BLD AUTO: 0 % (ref 0–1)
BPU ID: NORMAL
BPU ID: NORMAL
BUN SERPL-MCNC: 14 MG/DL (ref 5–25)
CA-I BLD-SCNC: 1.1 MMOL/L (ref 1.12–1.32)
CALCIUM SERPL-MCNC: 8.1 MG/DL (ref 8.4–10.2)
CHLORIDE SERPL-SCNC: 107 MMOL/L (ref 96–108)
CO2 SERPL-SCNC: 20 MMOL/L (ref 21–32)
CREAT SERPL-MCNC: 0.96 MG/DL (ref 0.6–1.3)
CROSSMATCH: NORMAL
CROSSMATCH: NORMAL
EOSINOPHIL # BLD AUTO: 0.01 THOUSAND/ÂΜL (ref 0–0.61)
EOSINOPHIL NFR BLD AUTO: 0 % (ref 0–6)
ERYTHROCYTE [DISTWIDTH] IN BLOOD BY AUTOMATED COUNT: 15.2 % (ref 11.6–15.1)
GFR SERPL CREATININE-BSD FRML MDRD: 70 ML/MIN/1.73SQ M
GLUCOSE SERPL-MCNC: 91 MG/DL (ref 65–140)
HCT VFR BLD AUTO: 31.1 % (ref 36.5–49.3)
HGB BLD-MCNC: 9.6 G/DL (ref 12–17)
IMM GRANULOCYTES # BLD AUTO: 0.1 THOUSAND/UL (ref 0–0.2)
IMM GRANULOCYTES NFR BLD AUTO: 1 % (ref 0–2)
LYMPHOCYTES # BLD AUTO: 0.83 THOUSANDS/ÂΜL (ref 0.6–4.47)
LYMPHOCYTES NFR BLD AUTO: 5 % (ref 14–44)
MAGNESIUM SERPL-MCNC: 2.4 MG/DL (ref 1.9–2.7)
MCH RBC QN AUTO: 27.9 PG (ref 26.8–34.3)
MCHC RBC AUTO-ENTMCNC: 30.9 G/DL (ref 31.4–37.4)
MCV RBC AUTO: 90 FL (ref 82–98)
MONOCYTES # BLD AUTO: 1.4 THOUSAND/ÂΜL (ref 0.17–1.22)
MONOCYTES NFR BLD AUTO: 8 % (ref 4–12)
NEUTROPHILS # BLD AUTO: 15.74 THOUSANDS/ÂΜL (ref 1.85–7.62)
NEUTS SEG NFR BLD AUTO: 86 % (ref 43–75)
NRBC BLD AUTO-RTO: 0 /100 WBCS
P AXIS: 69 DEGREES
PHOSPHATE SERPL-MCNC: 2.3 MG/DL (ref 2.3–4.1)
PLATELET # BLD AUTO: 310 THOUSANDS/UL (ref 149–390)
PMV BLD AUTO: 10.1 FL (ref 8.9–12.7)
POTASSIUM SERPL-SCNC: 3.3 MMOL/L (ref 3.5–5.3)
PR INTERVAL: 180 MS
QRS AXIS: -77 DEGREES
QRSD INTERVAL: 88 MS
QT INTERVAL: 424 MS
QTC INTERVAL: 440 MS
RBC # BLD AUTO: 3.44 MILLION/UL (ref 3.88–5.62)
SODIUM SERPL-SCNC: 143 MMOL/L (ref 135–147)
T WAVE AXIS: 2 DEGREES
UNIT DISPENSE STATUS: NORMAL
UNIT DISPENSE STATUS: NORMAL
UNIT PRODUCT CODE: NORMAL
UNIT PRODUCT CODE: NORMAL
UNIT PRODUCT VOLUME: 300 ML
UNIT PRODUCT VOLUME: 350 ML
UNIT RH: NORMAL
UNIT RH: NORMAL
VENTRICULAR RATE: 65 BPM
WBC # BLD AUTO: 18.11 THOUSAND/UL (ref 4.31–10.16)

## 2024-03-16 PROCEDURE — 99024 POSTOP FOLLOW-UP VISIT: CPT | Performed by: SURGERY

## 2024-03-16 PROCEDURE — 82010 KETONE BODYS QUAN: CPT | Performed by: EMERGENCY MEDICINE

## 2024-03-16 PROCEDURE — 83735 ASSAY OF MAGNESIUM: CPT | Performed by: STUDENT IN AN ORGANIZED HEALTH CARE EDUCATION/TRAINING PROGRAM

## 2024-03-16 PROCEDURE — 94669 MECHANICAL CHEST WALL OSCILL: CPT

## 2024-03-16 PROCEDURE — 80048 BASIC METABOLIC PNL TOTAL CA: CPT | Performed by: STUDENT IN AN ORGANIZED HEALTH CARE EDUCATION/TRAINING PROGRAM

## 2024-03-16 PROCEDURE — 99233 SBSQ HOSP IP/OBS HIGH 50: CPT | Performed by: EMERGENCY MEDICINE

## 2024-03-16 PROCEDURE — 85025 COMPLETE CBC W/AUTO DIFF WBC: CPT | Performed by: STUDENT IN AN ORGANIZED HEALTH CARE EDUCATION/TRAINING PROGRAM

## 2024-03-16 PROCEDURE — 94760 N-INVAS EAR/PLS OXIMETRY 1: CPT

## 2024-03-16 PROCEDURE — 93010 ELECTROCARDIOGRAM REPORT: CPT | Performed by: INTERNAL MEDICINE

## 2024-03-16 PROCEDURE — 94664 DEMO&/EVAL PT USE INHALER: CPT

## 2024-03-16 PROCEDURE — 84100 ASSAY OF PHOSPHORUS: CPT | Performed by: STUDENT IN AN ORGANIZED HEALTH CARE EDUCATION/TRAINING PROGRAM

## 2024-03-16 PROCEDURE — 71045 X-RAY EXAM CHEST 1 VIEW: CPT

## 2024-03-16 PROCEDURE — 85730 THROMBOPLASTIN TIME PARTIAL: CPT | Performed by: PHYSICIAN ASSISTANT

## 2024-03-16 PROCEDURE — 94640 AIRWAY INHALATION TREATMENT: CPT

## 2024-03-16 PROCEDURE — 82330 ASSAY OF CALCIUM: CPT

## 2024-03-16 RX ORDER — SODIUM CHLORIDE FOR INHALATION 0.9 %
VIAL, NEBULIZER (ML) INHALATION
Status: COMPLETED
Start: 2024-03-16 | End: 2024-03-16

## 2024-03-16 RX ORDER — CALCIUM GLUCONATE 20 MG/ML
2 INJECTION, SOLUTION INTRAVENOUS ONCE
Qty: 100 ML | Refills: 0 | Status: COMPLETED | OUTPATIENT
Start: 2024-03-16 | End: 2024-03-16

## 2024-03-16 RX ORDER — SIMETHICONE 40MG/0.6ML
40 SUSPENSION, DROPS(FINAL DOSAGE FORM)(ML) ORAL ONCE
Status: COMPLETED | OUTPATIENT
Start: 2024-03-16 | End: 2024-03-16

## 2024-03-16 RX ORDER — GUAIFENESIN 600 MG/1
600 TABLET, EXTENDED RELEASE ORAL EVERY 12 HOURS SCHEDULED
Status: DISCONTINUED | OUTPATIENT
Start: 2024-03-16 | End: 2024-03-16

## 2024-03-16 RX ORDER — OLANZAPINE 5 MG/1
5 TABLET, ORALLY DISINTEGRATING ORAL
Status: DISCONTINUED | OUTPATIENT
Start: 2024-03-16 | End: 2024-03-26 | Stop reason: HOSPADM

## 2024-03-16 RX ORDER — LEVALBUTEROL INHALATION SOLUTION 1.25 MG/3ML
1.25 SOLUTION RESPIRATORY (INHALATION)
Status: DISCONTINUED | OUTPATIENT
Start: 2024-03-16 | End: 2024-03-16

## 2024-03-16 RX ORDER — SIMETHICONE 40MG/0.6ML
40 SUSPENSION, DROPS(FINAL DOSAGE FORM)(ML) ORAL EVERY 6 HOURS PRN
Status: DISCONTINUED | OUTPATIENT
Start: 2024-03-16 | End: 2024-03-16

## 2024-03-16 RX ORDER — FUROSEMIDE 10 MG/ML
20 INJECTION INTRAMUSCULAR; INTRAVENOUS ONCE
Status: COMPLETED | OUTPATIENT
Start: 2024-03-16 | End: 2024-03-16

## 2024-03-16 RX ORDER — SODIUM CHLORIDE FOR INHALATION 3 %
4 VIAL, NEBULIZER (ML) INHALATION
Status: DISCONTINUED | OUTPATIENT
Start: 2024-03-16 | End: 2024-03-17

## 2024-03-16 RX ORDER — IPRATROPIUM BROMIDE AND ALBUTEROL SULFATE 2.5; .5 MG/3ML; MG/3ML
3 SOLUTION RESPIRATORY (INHALATION) ONCE
Status: COMPLETED | OUTPATIENT
Start: 2024-03-16 | End: 2024-03-16

## 2024-03-16 RX ORDER — LEVALBUTEROL INHALATION SOLUTION 1.25 MG/3ML
1.25 SOLUTION RESPIRATORY (INHALATION)
Status: DISCONTINUED | OUTPATIENT
Start: 2024-03-16 | End: 2024-03-17

## 2024-03-16 RX ORDER — GUAIFENESIN 100 MG/5ML
400 SOLUTION ORAL EVERY 12 HOURS SCHEDULED
Status: DISCONTINUED | OUTPATIENT
Start: 2024-03-16 | End: 2024-03-22

## 2024-03-16 RX ADMIN — Medication 1 SPRAY: at 00:09

## 2024-03-16 RX ADMIN — LEVALBUTEROL HYDROCHLORIDE 1.25 MG: 1.25 SOLUTION RESPIRATORY (INHALATION) at 13:15

## 2024-03-16 RX ADMIN — SODIUM CHLORIDE SOLN NEBU 3% 4 ML: 3 NEBU SOLN at 19:57

## 2024-03-16 RX ADMIN — CALCIUM GLUCONATE 2 G: 20 INJECTION, SOLUTION INTRAVENOUS at 12:00

## 2024-03-16 RX ADMIN — GUAIFENESIN 400 MG: 200 SOLUTION ORAL at 21:11

## 2024-03-16 RX ADMIN — CHLORHEXIDINE GLUCONATE 15 ML: 1.2 SOLUTION ORAL at 20:18

## 2024-03-16 RX ADMIN — PIPERACILLIN SODIUM AND TAZOBACTAM SODIUM 4.5 G: 36; 4.5 INJECTION, POWDER, LYOPHILIZED, FOR SOLUTION INTRAVENOUS at 03:57

## 2024-03-16 RX ADMIN — HEPARIN SODIUM 21 UNITS/KG/HR: 10000 INJECTION, SOLUTION INTRAVENOUS at 20:25

## 2024-03-16 RX ADMIN — ISODIUM CHLORIDE 3 ML: 0.03 SOLUTION RESPIRATORY (INHALATION) at 13:15

## 2024-03-16 RX ADMIN — SIMETHICONE 40 MG: 20 SUSPENSION/ DROPS ORAL at 21:11

## 2024-03-16 RX ADMIN — LEVALBUTEROL HYDROCHLORIDE 1.25 MG: 1.25 SOLUTION RESPIRATORY (INHALATION) at 06:06

## 2024-03-16 RX ADMIN — PIPERACILLIN SODIUM AND TAZOBACTAM SODIUM 4.5 G: 36; 4.5 INJECTION, POWDER, LYOPHILIZED, FOR SOLUTION INTRAVENOUS at 12:45

## 2024-03-16 RX ADMIN — CHLORHEXIDINE GLUCONATE 15 ML: 1.2 SOLUTION ORAL at 09:30

## 2024-03-16 RX ADMIN — GUAIFENESIN 400 MG: 200 SOLUTION ORAL at 12:00

## 2024-03-16 RX ADMIN — OLANZAPINE 5 MG: 5 TABLET, ORALLY DISINTEGRATING ORAL at 01:05

## 2024-03-16 RX ADMIN — LEVALBUTEROL HYDROCHLORIDE 1.25 MG: 1.25 SOLUTION RESPIRATORY (INHALATION) at 19:57

## 2024-03-16 RX ADMIN — Medication 1 SPRAY: at 05:37

## 2024-03-16 RX ADMIN — PIPERACILLIN SODIUM AND TAZOBACTAM SODIUM 4.5 G: 36; 4.5 INJECTION, POWDER, LYOPHILIZED, FOR SOLUTION INTRAVENOUS at 20:17

## 2024-03-16 RX ADMIN — SODIUM CHLORIDE, SODIUM GLUCONATE, SODIUM ACETATE, POTASSIUM CHLORIDE, MAGNESIUM CHLORIDE, SODIUM PHOSPHATE, DIBASIC, AND POTASSIUM PHOSPHATE 75 ML/HR: .53; .5; .37; .037; .03; .012; .00082 INJECTION, SOLUTION INTRAVENOUS at 00:08

## 2024-03-16 RX ADMIN — POTASSIUM PHOSPHATE, MONOBASIC POTASSIUM PHOSPHATE, DIBASIC 30 MMOL: 224; 236 INJECTION, SOLUTION, CONCENTRATE INTRAVENOUS at 09:30

## 2024-03-16 RX ADMIN — IPRATROPIUM BROMIDE AND ALBUTEROL SULFATE 3 ML: 2.5; .5 SOLUTION RESPIRATORY (INHALATION) at 10:00

## 2024-03-16 RX ADMIN — Medication 1 SPRAY: at 09:40

## 2024-03-16 RX ADMIN — FUROSEMIDE 20 MG: 10 INJECTION, SOLUTION INTRAMUSCULAR; INTRAVENOUS at 12:00

## 2024-03-16 RX ADMIN — OLANZAPINE 5 MG: 5 TABLET, ORALLY DISINTEGRATING ORAL at 21:13

## 2024-03-16 NOTE — PROGRESS NOTES
[facility]  Progress Note: Critical Care  Name: Jf Valera 88 y.o. male I MRN: [id number]  Unit/Bed#: [facility] 514-01 I Date of Admission: 3/13/2024   Date of Service: 3/16/2024 I Hospital Day: 3    Assessment/Plan   Neuro:   No acute issues   Extubated on 3/15, currently off sedation  Patient's not complaining of any pain      CV:   Borderline hypotension, improved  Etiology unknown, suspected in the setting of GI volume loss  MAPs @ goal for >24 hours off pressors  Continuous cardiac monitoring, MAP goal >65  Judicious fluid resuscitation as needed    Chronic hypertension   Holding home Amlodipine and Lisinopril in the setting of borderline hypotension        Pulm:  Increased work of breathing  Per nursing staff, intermittent episodes of tachypnea; improves with repositioning patient to chair  Increased sputum production, patient self-suctioning  Received 1x albuterol this am  Will give duo-neb due to expiratory wheezing on exam  CXR as needed     Post operative mechanical ventilation, resolved   Successfully extubated on 3/15   O2 sat goal >92% on 2 L NC  ABGs as needed  CXR as needed     GI:   Mesenteric ischemia   3/13: ex lap, small bowel resection, lysis of adhesion, insertion of suprapubic cath with Dr. Hayes/Dr. Azul at [facility]  3/14: washout, control of liver bleed, abdominal closure  Continue to monitor abdominal exam for firmness/increased distention   General surgery following, appreciate assistance     :   LISA, resolved  Initial Cr 1.89   This AM Cr 0.96  Baseline Cr 0.95-1.10  Continue to monitor Cr  Continue fluid resuscitation as needed     F/E/N:   Fluids: Isolyte 75 ml/hr  Electrolytes: replete as indicated for goal K>4, Phos >3, Mg >2  Nutrition: NPO, NGT     Heme/Onc:   Venous portal system thrombosis   3/13 CT A/P: Diffuse portal system thrombosis involving the main portal vein, intrahepatic portal veins, splenic vein, SMV and SMV  branches  Etiology unknown, to consider malignancy, underlying genetic hypercoagulability, etc.   Plan: Heparin gtt  Vascular surgery following, appreciate recs  Per vascular, continue medical management, no plan for procedural intervention at this time     Endo:   No acute issues  Maintain euglycemia; goal glucose 140-180  Hypoglycemia protocol     ID:   Leukocytosis  Initial WBC 38  18.11 this am  Likely secondary to mesenteric ischemia   Will continue to monitor WBC count and fever curve  Continue Zosyn 4.5mg Q8 hrs   Blood cultures negative to date     MSK/Skin:   No acute issues  Frequent turning/skin checks  PT/OT when able     Disposition: Critical care    ICU Core Measures     A: Assess, Prevent, and Manage Pain Has pain been assessed? Yes  Need for changes to pain regimen? No   B: Both SAT/SAT  N/A   C: Choice of Sedation RASS Goal: 0 Alert and Calm or N/A patient not on sedation  Need for changes to sedation or analgesia regimen? No   D: Delirium CAM-ICU: Negative   E: Early Mobility  Plan for early mobility? Yes   F: Family Engagement Plan for family engagement today? Yes       Antibiotic Review: Awaiting culture results.  and Continue broad spectrum secondary to severity of illness.     Review of Invasive Devices:      Central access plan: Medications requiring central line      Prophylaxis:  VTE VTE covered by:  heparin (porcine), Intravenous, 21 Units/kg/hr at 03/16/24 0400       Stress Ulcer  not ordered        Significant 24hr Events     24hr events: Per nursing staff and night team, no acute events overnight. Patient has remained off pressors with BP's at goal. Received zyprexa to help with sleep. Pain has been well controlled. Patient did have episode of increased work of breathing this morning; received albuterol and was moved from bed to chair with subsequent improvement.      Subjective     Review of Systems   Constitutional:  Negative for fever.   HENT:  Negative for congestion.    Respiratory:   Positive for cough.    Cardiovascular:  Negative for chest pain.   Gastrointestinal:  Negative for abdominal pain.   Genitourinary:  Negative for decreased urine volume.   Neurological:  Negative for headaches.   Psychiatric/Behavioral:  Negative for agitation.         Objective                            Vitals I/O      Most Recent Min/Max in 24hrs   Temp 97.8 °F (36.6 °C) Temp  Min: 97.8 °F (36.6 °C)  Max: 99.7 °F (37.6 °C)   Pulse 91 Pulse  Min: 80  Max: 109   Resp (!) 24 Resp  Min: 17  Max: 26   /61 BP  Min: 99/55  Max: 156/74   O2 Sat 94 % SpO2  Min: 92 %  Max: 99 %      Intake/Output Summary (Last 24 hours) at 3/16/2024 0733  Last data filed at 3/16/2024 0600  Gross per 24 hour   Intake 2575.08 ml   Output 1440 ml   Net 1135.08 ml       Diet NPO    Invasive Monitoring   Arterial Line  [place] BP 99/45  Arterial Line BP  Min: 99/45  Max: 99/45   MAP (!) 63 mmHg  Arterial Line MAP (mmHg)  Min: 63 mmHg  Max: 63 mmHg           Physical Exam   Physical Exam  Eyes:      Conjunctiva/sclera: Conjunctivae normal.   Skin:     General: Skin is warm.   HENT:      Head: Normocephalic and atraumatic.      Mouth/Throat:      Mouth: Mucous membranes are moist.   Neck:      Vascular: Central line present.   Cardiovascular:      Rate and Rhythm: Normal rate and regular rhythm.      Heart sounds: Normal heart sounds.   Musculoskeletal:         General: Normal range of motion.   Abdominal: General: There is no distension.      Palpations: Abdomen is rigid.      Tenderness: There is no abdominal tenderness.      Comments: Dressing in place over abdominal incision, scant bloody discharge. No erythema of surrounding area    Constitutional:       General: He is not in acute distress.     Appearance: He is not toxic-appearing.      Interventions: He is not sedated, not intubated and not restrained.  Pulmonary:      Effort: Pulmonary effort is normal. He is not intubated.      Breath sounds: Wheezing (expiratory, left>right)  present.   Neurological:      General: No focal deficit present.      Mental Status: He is alert and oriented to person, place and time. He is calm. He is not agitated.      Motor: Strength full and intact in all extremities.   Genitourinary/Anorectal:     Comments: Suprapubic catheter             Diagnostic Studies      EKG: last performed 3/13, reviewed  Imaging:  I have personally reviewed pertinent reports.       Medications:  Scheduled PRN   chlorhexidine, 15 mL, Q12H RONALD  OLANZapine, 5 mg, HS  piperacillin-tazobactam, 4.5 g, Q8H  potassium phosphate, 30 mmol, Once      levalbuterol, 1.25 mg, Q6H PRN  phenol, 1 spray, Q2H PRN       Continuous    heparin (porcine), 3-30 Units/kg/hr (Order-Specific), Last Rate: 21 Units/kg/hr (03/16/24 0400)  multi-electrolyte, 75 mL/hr, Last Rate: 75 mL/hr (03/16/24 0400)         Labs:    CBC    Recent Labs     03/15/24  0430 03/16/24  0424   WBC 17.55* 18.11*   HGB 9.6* 9.6*   HCT 31.5* 31.1*    310     BMP    Recent Labs     03/15/24  0430 03/16/24  0424   SODIUM 140 143   K 3.3* 3.3*    107   CO2 21 20*   AGAP 13 16*   BUN 14 14   CREATININE 0.98 0.96   CALCIUM 7.6* 8.1*       Coags    Recent Labs     03/14/24  1709 03/15/24  0033 03/15/24  1307 03/16/24  0410   INR 1.57*  --   --   --    PTT  --    < > 73* 86*    < > = values in this interval not displayed.        Additional Electrolytes  Recent Labs     03/14/24  1659 03/15/24  0430 03/16/24  0424   MG 2.3 2.3 2.4   PHOS 3.2 3.4 2.3   CAIONIZED 1.05* 1.08*  --           Blood Gas    Recent Labs     03/14/24  1659   PHART 7.381   [id number] 36.5   PO2ART 150.5*   [id number] 21.2*   BEART -3.5   SOURCE Line, Arterial     Recent Labs     03/14/24  1659   SOURCE Line, Arterial    LFTs  Recent Labs     03/14/24  1709 03/15/24  0430   ALT 12 10   AST 21 15   ALKPHOS 38 31*   ALB 3.2* 3.3*   TBILI 0.66 0.76       Infectious  No recent results  Glucose  Recent Labs     03/14/24  1709 03/15/24  0430 03/16/24  0424   GLUC 116  209 04               Rubia Jackson MD

## 2024-03-16 NOTE — PROGRESS NOTES
"Progress Note -General Surgery  Jf Valera 88 y.o. male MRN: [id number]  Unit/Bed#: [facility] 514-01 Encounter: [id number]    Assessment:  Patient is a 88 y.o. 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 No bowel function    Pertinent review of systems as above. All other review of systems negative.    Objective:    Blood pressure 131/61, pulse 91, temperature 97.8 °F (36.6 °C), temperature source Oral, resp. rate (!) 24, height 5' 4\" (1.626 m), weight 57 kg (125 lb 10.6 oz), SpO2 94%.,Body mass index is 21.57 kg/m².      Intake/Output Summary (Last 24 hours) at 3/16/2024 0707  Last data filed at 3/16/2024 0600  Gross per 24 hour   Intake 2575.08 ml   Output 1440 ml   Net 1135.08 ml       Invasive Devices       Central Venous Catheter Line  Duration             CVC Central Lines 03/14/24 1 day              Peripheral Intravenous Line  Duration             Peripheral IV 03/13/24 Left;Upper;Ventral (anterior) Arm 2 days    Peripheral IV 03/13/24 Left;Ventral (anterior) Forearm 2 days    Peripheral IV 03/13/24 Proximal;Right;Ventral (anterior) Forearm 2 days    Peripheral IV 03/13/24 Right Antecubital 2 days              Drain  Duration             Suprapubic Catheter 18 Fr. 2 days    NG/OG/Enteral Tube 16 Fr Left nare <1 day                    Physical Exam:   Gen:  NAD.  HEENT: NCAT. MMM  CV: well perfused  Lungs: Normal respiratory effort  Abd: soft, nt/nd,incisions cdi  Skin: warm/ dry  Extremities: no peripheral edema, no clubbing or cyanosis  Neuro: AxO x3      I have personally reviewed pertinent films in PACS.    "

## 2024-03-17 ENCOUNTER — APPOINTMENT (INPATIENT)
Dept: RADIOLOGY | Facility: HOSPITAL | Age: 89
DRG: 329 | End: 2024-03-17

## 2024-03-17 ENCOUNTER — APPOINTMENT (INPATIENT)
Dept: RADIOLOGY | Facility: HOSPITAL | Age: 89
DRG: 329 | End: 2024-03-17
Attending: RADIOLOGY

## 2024-03-17 LAB
ANION GAP SERPL CALCULATED.3IONS-SCNC: 20 MMOL/L (ref 4–13)
APTT PPP: 49 SECONDS (ref 23–37)
APTT PPP: 52 SECONDS (ref 23–37)
APTT PPP: 92 SECONDS (ref 23–37)
BACTERIA BLD CULT: NORMAL
BACTERIA BLD CULT: NORMAL
BASOPHILS # BLD AUTO: 0.02 THOUSANDS/ÂΜL (ref 0–0.1)
BASOPHILS NFR BLD AUTO: 0 % (ref 0–1)
BUN SERPL-MCNC: 12 MG/DL (ref 5–25)
CA-I BLD-SCNC: 1.07 MMOL/L (ref 1.12–1.32)
CALCIUM SERPL-MCNC: 8.6 MG/DL (ref 8.4–10.2)
CHLORIDE SERPL-SCNC: 106 MMOL/L (ref 96–108)
CO2 SERPL-SCNC: 20 MMOL/L (ref 21–32)
CREAT SERPL-MCNC: 0.79 MG/DL (ref 0.6–1.3)
EOSINOPHIL # BLD AUTO: 0.02 THOUSAND/ÂΜL (ref 0–0.61)
EOSINOPHIL NFR BLD AUTO: 0 % (ref 0–6)
ERYTHROCYTE [DISTWIDTH] IN BLOOD BY AUTOMATED COUNT: 15.2 % (ref 11.6–15.1)
GFR SERPL CREATININE-BSD FRML MDRD: 80 ML/MIN/1.73SQ M
GLUCOSE SERPL-MCNC: 96 MG/DL (ref 65–140)
HCT VFR BLD AUTO: 35 % (ref 36.5–49.3)
HGB BLD-MCNC: 10.5 G/DL (ref 12–17)
IMM GRANULOCYTES # BLD AUTO: 0.16 THOUSAND/UL (ref 0–0.2)
IMM GRANULOCYTES NFR BLD AUTO: 1 % (ref 0–2)
LYMPHOCYTES # BLD AUTO: 0.86 THOUSANDS/ÂΜL (ref 0.6–4.47)
LYMPHOCYTES NFR BLD AUTO: 4 % (ref 14–44)
MAGNESIUM SERPL-MCNC: 2.1 MG/DL (ref 1.9–2.7)
MCH RBC QN AUTO: 27.5 PG (ref 26.8–34.3)
MCHC RBC AUTO-ENTMCNC: 30 G/DL (ref 31.4–37.4)
MCV RBC AUTO: 92 FL (ref 82–98)
MONOCYTES # BLD AUTO: 1.44 THOUSAND/ÂΜL (ref 0.17–1.22)
MONOCYTES NFR BLD AUTO: 6 % (ref 4–12)
NEUTROPHILS # BLD AUTO: 20.92 THOUSANDS/ÂΜL (ref 1.85–7.62)
NEUTS SEG NFR BLD AUTO: 89 % (ref 43–75)
NRBC BLD AUTO-RTO: 0 /100 WBCS
PHOSPHATE SERPL-MCNC: 2.9 MG/DL (ref 2.3–4.1)
PLATELET # BLD AUTO: 356 THOUSANDS/UL (ref 149–390)
PMV BLD AUTO: 10.5 FL (ref 8.9–12.7)
POTASSIUM SERPL-SCNC: 3.2 MMOL/L (ref 3.5–5.3)
RBC # BLD AUTO: 3.82 MILLION/UL (ref 3.88–5.62)
SODIUM SERPL-SCNC: 146 MMOL/L (ref 135–147)
WBC # BLD AUTO: 23.42 THOUSAND/UL (ref 4.31–10.16)

## 2024-03-17 PROCEDURE — 84100 ASSAY OF PHOSPHORUS: CPT

## 2024-03-17 PROCEDURE — C1769 GUIDE WIRE: HCPCS

## 2024-03-17 PROCEDURE — 71045 X-RAY EXAM CHEST 1 VIEW: CPT

## 2024-03-17 PROCEDURE — NC001 PR NO CHARGE: Performed by: RADIOLOGY

## 2024-03-17 PROCEDURE — 85730 THROMBOPLASTIN TIME PARTIAL: CPT | Performed by: STUDENT IN AN ORGANIZED HEALTH CARE EDUCATION/TRAINING PROGRAM

## 2024-03-17 PROCEDURE — 94669 MECHANICAL CHEST WALL OSCILL: CPT

## 2024-03-17 PROCEDURE — 71250 CT THORAX DX C-: CPT

## 2024-03-17 PROCEDURE — 85025 COMPLETE CBC W/AUTO DIFF WBC: CPT

## 2024-03-17 PROCEDURE — C1729 CATH, DRAINAGE: HCPCS

## 2024-03-17 PROCEDURE — 99024 POSTOP FOLLOW-UP VISIT: CPT | Performed by: SURGERY

## 2024-03-17 PROCEDURE — 94640 AIRWAY INHALATION TREATMENT: CPT

## 2024-03-17 PROCEDURE — 97163 PT EVAL HIGH COMPLEX 45 MIN: CPT

## 2024-03-17 PROCEDURE — 82330 ASSAY OF CALCIUM: CPT

## 2024-03-17 PROCEDURE — 85730 THROMBOPLASTIN TIME PARTIAL: CPT

## 2024-03-17 PROCEDURE — 97166 OT EVAL MOD COMPLEX 45 MIN: CPT

## 2024-03-17 PROCEDURE — 80048 BASIC METABOLIC PNL TOTAL CA: CPT

## 2024-03-17 PROCEDURE — 94664 DEMO&/EVAL PT USE INHALER: CPT

## 2024-03-17 PROCEDURE — 85730 THROMBOPLASTIN TIME PARTIAL: CPT | Performed by: SURGERY

## 2024-03-17 PROCEDURE — 32557 INSERT CATH PLEURA W/ IMAGE: CPT | Performed by: RADIOLOGY

## 2024-03-17 PROCEDURE — 0W9930Z DRAINAGE OF RIGHT PLEURAL CAVITY WITH DRAINAGE DEVICE, PERCUTANEOUS APPROACH: ICD-10-PCS | Performed by: RADIOLOGY

## 2024-03-17 PROCEDURE — 94760 N-INVAS EAR/PLS OXIMETRY 1: CPT

## 2024-03-17 PROCEDURE — 32557 INSERT CATH PLEURA W/ IMAGE: CPT

## 2024-03-17 PROCEDURE — 83735 ASSAY OF MAGNESIUM: CPT

## 2024-03-17 RX ORDER — HYDROMORPHONE HCL IN WATER/PF 6 MG/30 ML
0.2 PATIENT CONTROLLED ANALGESIA SYRINGE INTRAVENOUS
Status: DISCONTINUED | OUTPATIENT
Start: 2024-03-17 | End: 2024-03-20

## 2024-03-17 RX ORDER — SODIUM CHLORIDE FOR INHALATION 3 %
4 VIAL, NEBULIZER (ML) INHALATION
Status: DISCONTINUED | OUTPATIENT
Start: 2024-03-18 | End: 2024-03-18

## 2024-03-17 RX ORDER — SODIUM CHLORIDE, SODIUM GLUCONATE, SODIUM ACETATE, POTASSIUM CHLORIDE, MAGNESIUM CHLORIDE, SODIUM PHOSPHATE, DIBASIC, AND POTASSIUM PHOSPHATE .53; .5; .37; .037; .03; .012; .00082 G/100ML; G/100ML; G/100ML; G/100ML; G/100ML; G/100ML; G/100ML
75 INJECTION, SOLUTION INTRAVENOUS CONTINUOUS
Status: DISCONTINUED | OUTPATIENT
Start: 2024-03-17 | End: 2024-03-20

## 2024-03-17 RX ORDER — LEVALBUTEROL INHALATION SOLUTION 1.25 MG/3ML
1.25 SOLUTION RESPIRATORY (INHALATION)
Status: DISCONTINUED | OUTPATIENT
Start: 2024-03-18 | End: 2024-03-18

## 2024-03-17 RX ORDER — HYDROMORPHONE HCL/PF 1 MG/ML
0.4 SYRINGE (ML) INJECTION
Status: DISCONTINUED | OUTPATIENT
Start: 2024-03-17 | End: 2024-03-20

## 2024-03-17 RX ADMIN — CHLORHEXIDINE GLUCONATE 15 ML: 1.2 SOLUTION ORAL at 21:37

## 2024-03-17 RX ADMIN — OLANZAPINE 5 MG: 5 TABLET, ORALLY DISINTEGRATING ORAL at 21:37

## 2024-03-17 RX ADMIN — LEVALBUTEROL HYDROCHLORIDE 1.25 MG: 1.25 SOLUTION RESPIRATORY (INHALATION) at 07:02

## 2024-03-17 RX ADMIN — SODIUM CHLORIDE, SODIUM GLUCONATE, SODIUM ACETATE, POTASSIUM CHLORIDE, MAGNESIUM CHLORIDE, SODIUM PHOSPHATE, DIBASIC, AND POTASSIUM PHOSPHATE 75 ML/HR: .53; .5; .37; .037; .03; .012; .00082 INJECTION, SOLUTION INTRAVENOUS at 18:42

## 2024-03-17 RX ADMIN — GUAIFENESIN 400 MG: 200 SOLUTION ORAL at 21:37

## 2024-03-17 RX ADMIN — PIPERACILLIN SODIUM AND TAZOBACTAM SODIUM 4.5 G: 36; 4.5 INJECTION, POWDER, LYOPHILIZED, FOR SOLUTION INTRAVENOUS at 04:37

## 2024-03-17 RX ADMIN — SODIUM CHLORIDE SOLN NEBU 3% 4 ML: 3 NEBU SOLN at 20:35

## 2024-03-17 RX ADMIN — LEVALBUTEROL HYDROCHLORIDE 1.25 MG: 1.25 SOLUTION RESPIRATORY (INHALATION) at 20:35

## 2024-03-17 RX ADMIN — SODIUM CHLORIDE, SODIUM GLUCONATE, SODIUM ACETATE, POTASSIUM CHLORIDE, MAGNESIUM CHLORIDE, SODIUM PHOSPHATE, DIBASIC, AND POTASSIUM PHOSPHATE 75 ML/HR: .53; .5; .37; .037; .03; .012; .00082 INJECTION, SOLUTION INTRAVENOUS at 05:27

## 2024-03-17 RX ADMIN — Medication 1 SPRAY: at 07:57

## 2024-03-17 RX ADMIN — GUAIFENESIN 400 MG: 200 SOLUTION ORAL at 11:25

## 2024-03-17 RX ADMIN — CHLORHEXIDINE GLUCONATE 15 ML: 1.2 SOLUTION ORAL at 11:25

## 2024-03-17 RX ADMIN — POTASSIUM PHOSPHATE, MONOBASIC POTASSIUM PHOSPHATE, DIBASIC 30 MMOL: 224; 236 INJECTION, SOLUTION, CONCENTRATE INTRAVENOUS at 12:07

## 2024-03-17 RX ADMIN — HYDROMORPHONE HYDROCHLORIDE 0.2 MG: 0.2 INJECTION, SOLUTION INTRAMUSCULAR; INTRAVENOUS; SUBCUTANEOUS at 16:04

## 2024-03-17 RX ADMIN — PIPERACILLIN SODIUM AND TAZOBACTAM SODIUM 4.5 G: 36; 4.5 INJECTION, POWDER, LYOPHILIZED, FOR SOLUTION INTRAVENOUS at 21:37

## 2024-03-17 RX ADMIN — PIPERACILLIN SODIUM AND TAZOBACTAM SODIUM 4.5 G: 36; 4.5 INJECTION, POWDER, LYOPHILIZED, FOR SOLUTION INTRAVENOUS at 12:07

## 2024-03-17 NOTE — SEDATION DOCUMENTATION
Right sided chest tube placed by Dr. Aguilar.  Patient tolerated well.  Guaze and tegaderm in place.  Report called to Carol Wade.

## 2024-03-17 NOTE — PROGRESS NOTES
"Progress Note - General Surgery   [facility] Resident on [facility]   Jf Valera 88 y.o. male MRN: [id number]  Unit/Bed#: [facility] 527-01 Encounter: [id number]    Assessment:  Patient is a 88 y.o. 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  Currently on 2L of NC saturating greater than 92%  UOP 1945 cc   cc bilious output    Plan:  Continue NPO/NGT  Continue IV fluids  Keep suprapubic gonzalez  PRN pain meds  PRN anti nausea meds  Heparin gtt  Encourage out of bed and ambulation  Encourage IS    Subjective/Objective     Subjective: No acute events overnight. Patient denies having nausea, vomiting, fevers, chills. Does endorse some mild chest pain and shortness of breath. Voiding without difficulty into gonzalez. No bowel movements and no flatus yet.      Objective:     Blood pressure 139/74, pulse 97, temperature 99.7 °F (37.6 °C), temperature source Oral, resp. rate 16, height 5' 4\" (1.626 m), weight 59.4 kg (130 lb 15.3 oz), SpO2 93%.,Body mass index is 22.48 kg/m².      Intake/Output Summary (Last 24 hours) at 3/17/2024 0649  Last data filed at 3/17/2024 0527  Gross per 24 hour   Intake 1047.51 ml   Output 2245 ml   Net -1197.49 ml       Invasive Devices       Peripheral Intravenous Line  Duration             Peripheral IV 03/13/24 Left;Upper;Ventral (anterior) Arm 3 days    Peripheral IV 03/17/24 Left;Proximal;Upper;Ventral (anterior) Arm <1 day              Drain  Duration             Suprapubic Catheter 18 Fr. 3 days    NG/OG/Enteral Tube 16 Fr Left nare 1 day                    General: NAD  HENT: NCAT MMM  Neck: supple, no JVD  CV: nl rate  Lungs: nl wob. No resp distress  ABD: Soft, nontender, mildly distended. Midline Mepilex dressing is clean and dry.  Extrem: No CCE  Neuro: AAOx3       Scheduled Meds:  Current Facility-Administered Medications "   Medication Dose Route Frequency Provider Last Rate    chlorhexidine  15 mL Mouth/Throat Q12H RONALD Zack Hernándezgal, PA-C      guaiFENesin  400 mg Oral Q12H RONALD Zack Hernándezgal, PA-C      heparin (porcine)  3-30 Units/kg/hr (Order-Specific) Intravenous Titrated Zack Hernándezgal, PA-C 19 Units/kg/hr (03/17/24 0520)    levalbuterol  1.25 mg Nebulization Q6H Zack Hernándezgal, PA-C      multi-electrolyte  75 mL/hr Intravenous Continuous Manfred Iraheta MD 75 mL/hr (03/17/24 0527)    OLANZapine  5 mg Oral HS Zack Murphy, PA-C      phenol  1 spray Mouth/Throat Q2H PRN Zack Murphy, PA-C      piperacillin-tazobactam  4.5 g Intravenous Q8H aZck Murphy, PA-C 4.5 g (03/17/24 0437)    sodium chloride  4 mL Nebulization Q6H Zack Murphy, PA-C       Continuous Infusions:heparin (porcine), 3-30 Units/kg/hr (Order-Specific), Last Rate: 19 Units/kg/hr (03/17/24 0520)  multi-electrolyte, 75 mL/hr, Last Rate: 75 mL/hr (03/17/24 0527)      PRN Meds:.  phenol      Lab, Imaging and other studies:I have personally reviewed pertinent lab results.    VTE Pharmacologic Prophylaxis: Heparin  VTE Mechanical Prophylaxis: sequential compression device      Manfred Iraheta MD  3/17/2024 6:49 AM

## 2024-03-17 NOTE — BRIEF OP NOTE (RAD/CATH)
Right Chest Tube Procedure Note    PATIENT NAME: Jf Valera  : 1935  MRN: [id number]     Pre-op Diagnosis:   1. HTN (hypertension)    2. Portal vein thrombosis    3. Mesenteric ischemia (HCC)    4. S/P exploratory laparotomy      Post-op Diagnosis:   1. HTN (hypertension)    2. Portal vein thrombosis    3. Mesenteric ischemia (HCC)    4. S/P exploratory laparotomy        Surgeon:   Grant Aguilar DO  Assistants:     No qualified resident was available.    Estimated Blood Loss: none  Findings: Right 10F pigtail chest tube placed, connected to Pleuravac.     Specimens: none    Complications:  none    Anesthesia: local    Grant Aguilar DO     Date: 3/17/2024  Time: 1:50 PM       
No

## 2024-03-17 NOTE — PLAN OF CARE
Problem: SAFETY,RESTRAINT: NV/NON-SELF DESTRUCTIVE BEHAVIOR  Goal: Remains free of harm/injury (restraint for non violent/non self-detsructive behavior)  Description: INTERVENTIONS:  - Instruct patient/family regarding restraint use   - Assess and monitor physiologic and psychological status   - Provide interventions and comfort measures to meet assessed patient needs   - Identify and implement measures to help patient regain control  - Assess readiness for release of restraint   Outcome: Progressing  Goal: Returns to optimal restraint-free functioning  Description: INTERVENTIONS:  - Assess the patient's behavior and symptoms that indicate continued need for restraint  - Identify and implement measures to help patient regain control  - Assess readiness for release of restraint   Outcome: Progressing     Problem: PAIN - ADULT  Goal: Verbalizes/displays adequate comfort level or baseline comfort level  Description: Interventions:  - Encourage patient to monitor pain and request assistance  - Assess pain using appropriate pain scale  - Administer analgesics based on type and severity of pain and evaluate response  - Implement non-pharmacological measures as appropriate and evaluate response  - Consider cultural and social influences on pain and pain management  - Notify physician/advanced practitioner if interventions unsuccessful or patient reports new pain  Outcome: Progressing     Problem: INFECTION - ADULT  Goal: Absence or prevention of progression during hospitalization  Description: INTERVENTIONS:  - Assess and monitor for signs and symptoms of infection  - Monitor lab/diagnostic results  - Monitor all insertion sites, i.e. indwelling lines, tubes, and drains  - Monitor endotracheal if appropriate and nasal secretions for changes in amount and color  - [place] appropriate cooling/warming therapies per order  - Administer medications as ordered  - Instruct and encourage patient and family to use good hand  hygiene technique  - Identify and instruct in appropriate isolation precautions for identified infection/condition  Outcome: Progressing  Goal: Absence of fever/infection during neutropenic period  Description: INTERVENTIONS:  - Monitor WBC    Outcome: Progressing     Problem: SAFETY ADULT  Goal: Patient will remain free of falls  Description: INTERVENTIONS:  - Educate patient/family on patient safety including physical limitations  - Instruct patient to call for assistance with activity   - Consult OT/PT to assist with strengthening/mobility   - Keep Call bell within reach  - Keep bed low and locked with side rails adjusted as appropriate  - Keep care items and personal belongings within reach  - Initiate and maintain comfort rounds  - Make Fall Risk Sign visible to staff  - Offer Toileting every  Hours, in advance of need  - Initiate/Maintain alarm  - Obtain necessary fall risk management equipment:   - Apply yellow socks and bracelet for high fall risk patients  - Consider moving patient to room near nurses station  Outcome: Progressing  Goal: Maintain or return to baseline ADL function  Description: INTERVENTIONS:  -  Assess patient's ability to carry out ADLs; assess patient's baseline for ADL function and identify physical deficits which impact ability to perform ADLs (bathing, care of mouth/teeth, toileting, grooming, dressing, etc.)  - Assess/evaluate cause of self-care deficits   - Assess range of motion  - Assess patient's mobility; develop plan if impaired  - Assess patient's need for assistive devices and provide as appropriate  - Encourage maximum independence but intervene and supervise when necessary  - Involve family in performance of ADLs  - Assess for home care needs following discharge   - Consider OT consult to assist with ADL evaluation and planning for discharge  - Provide patient education as appropriate  Outcome: Progressing  Goal: Maintains/Returns to pre admission functional  level  Description: INTERVENTIONS:  - Perform AM-PAC 6 Click Basic Mobility/ Daily Activity assessment daily.  - Set and communicate daily mobility goal to care team and patient/family/caregiver.   - Collaborate with rehabilitation services on mobility goals if consulted  - Perform Range of Motion  times a day.  - Reposition patient every  hours.  - Dangle patient  times a day  - Stand patient  times a day  - Ambulate patient  times a day  - Out of bed to chair  times a day   - Out of bed for meals  times a day  - Out of bed for toileting  - Record patient progress and toleration of activity level   Outcome: Progressing     Problem: DISCHARGE PLANNING  Goal: Discharge to home or other facility with appropriate resources  Description: INTERVENTIONS:  - Identify barriers to discharge w/patient and caregiver  - Arrange for needed discharge resources and transportation as appropriate  - Identify discharge learning needs (meds, wound care, etc.)  - Arrange for interpretive services to assist at discharge as needed  - Refer to Case Management Department for coordinating discharge planning if the patient needs post-hospital services based on physician/advanced practitioner order or complex needs related to functional status, cognitive ability, or social support system  Outcome: Progressing     Problem: Knowledge Deficit  Goal: Patient/family/caregiver demonstrates understanding of disease process, treatment plan, medications, and discharge instructions  Description: Complete learning assessment and assess knowledge base.  Interventions:  - Provide teaching at level of understanding  - Provide teaching via preferred learning methods  Outcome: Progressing     Problem: COPING  Goal: Pt/Family able to verbalize concerns and demonstrate effective coping strategies  Description: INTERVENTIONS:  - Assist patient/family to identify coping skills, available support systems and cultural and spiritual values  - Provide emotional  support, including active listening and acknowledgement of concerns of patient and caregivers  - Reduce environmental stimuli, as able  - Provide patient education  - Assess for spiritual pain/suffering and initiate spiritual care, including notification of Pastoral Care or timothy based community as needed  - Assess effectiveness of coping strategies  Outcome: Progressing  Goal: Will report anxiety at manageable levels  Description: INTERVENTIONS:  - Administer medication as ordered  - Teach and encourage coping skills  - Provide emotional support  - Assess patient/family for anxiety and ability to cope  Outcome: Progressing     Problem: DEATH & DYING  Goal: Pt/Family communicate acceptance of impending death and expresses psychological comfort and peace  Description: INTERVENTIONS:  - Assess patient/family anxiety and grief process related to end of life issues  - Provide emotional, spiritual and psychosocial support  - Provide information about the patient’s health status with consideration of family and cultural values  - Communicate willingness to discuss death and facilitate grief process  with patient/family as appropriate  - Emphasize sustaining relationships within family system and community, or timothy/spiritual traditions  - Initiate Spiritual Care, Pastoral care or other ancillary consults as needed  - Refer to community support groups as appropriate  Outcome: Progressing     Problem: CHANGE IN BODY IMAGE  Goal: Pt/Family communicate acceptance of loss or change in body image and expresses psychological comfort and peace  Description: INTERVENTIONS:  - Assess patient/family anxiety and grief process related to change in body image, loss of functional status and loss of sense of self  - Assess patient/family's coping skills and provide emotional, spiritual and psychosocial support  - Provide information about the patient's health status with consideration of family and cultural values  - Communicate  willingness to discuss loss and facilitate grief process with patient/family as appropriate  - Emphasize sustaining relationships within family system and community, or timothy/spiritual traditions  - Refer to community support groups as appropriate  - Initiate Spiritual Care, Pastoral care or other ancillary consults as needed  Outcome: Progressing     Problem: DECISION MAKING  Goal: Pt/Family able to effectively weigh alternatives and participate in decision making related to treatment and care  Description: INTERVENTIONS:  - Identify decision maker  - Determine when there are differences among patient's view, family's view, and healthcare provider's view of patient condition and care goals  - Facilitate patient/family articulation of goals for care  - Help patient/family identify pros/cons of alternative solutions  - Provide information as requested by patient/family  - Respect patient/family rights related to privacy and sharing information   - Serve as a liaison between patient, family and health care team  - Initiate consults as appropriate (Ethics Team, Palliative Care, Family Care Conference, etc.)  Outcome: Progressing     Problem: CONFUSION/THOUGHT DISTURBANCE  Goal: Thought disturbances (confusion, delirium, depression, dementia or psychosis) are managed to maintain or return to baseline mental status and functional level  Description: INTERVENTIONS:  - Assess for possible contributors to  thought disturbance, including but not limited to medications, infection, impaired vision or hearing, underlying metabolic abnormalities, dehydration, respiratory compromise,  psychiatric diagnoses and notify attending PHYSICAN/AP  - Monitor and intervene to maintain adequate nutrition, hydration, elimination, sleep and activity  - Decrease environmental stimuli, including noise as appropriate.  - Provide frequent contacts to provide refocusing, direction and reassurance as needed. Approach patient calmly with eye contact  and at their level.  - [place] high risk fall precautions, aspiration precautions and other safety measures, as indicated  - If delirium suspected, notify physician/AP of change in condition and request immediate in-person evaluation  - Pursue consults as appropriate including Geriatric (campus dependent), OT for cognitive evaluation/activity planning, psychiatric, pastoral care, etc.  Outcome: Progressing     Problem: BEHAVIOR  Goal: Pt/Family maintain appropriate behavior and adhere to behavioral management agreement, if implemented  Description: INTERVENTIONS:  - Assess the family dynamic   - Encourage verbalization of thoughts and concerns in a socially appropriate manner  - Assess patient/family's coping skills and non-compliant behavior (including use of illegal substances).  - Utilize positive, consistent limit setting strategies supporting safety of patient, staff and others  - Initiate consult with Case Management, Spiritual Care or other ancillary services as appropriate  - If a patient's/visitor's behavior jeopardizes the safety of the patient, staff, or others, refer to organization procedure.   - Notify Security of behavior or suspected illegal substances which indicate the need for search of the patient and/or belongings  - Encourage participation in the decision making process about a behavioral management agreement; implement if patient meets criteria  Outcome: Progressing     Problem: Depression - IP adult  Goal: Effects of depression will be minimized  Description: INTERVENTIONS:  - Assess impact of patient's symptoms on level of functioning, self-care needs and offer support as indicated  - Assess patient/family knowledge of depression, impact on illness and need for teaching  - Provide emotional support, presence and reassurance  - Assess for possible suicidal thoughts, ideation or self-harm. If patient expresses suicidal thoughts or statements do not leave alone, notify physician/AP  immediately, initiate Suicide Precautions, and determine need for continual observation.  - Initiate consults and referrals as appropriate (a mental health professional, Spiritual Care)  - Administer medication as ordered  Outcome: Progressing     Problem: SELF HARM  Goal: Effect of psychiatric condition will be minimized and patient will be protected from self harm  Description: INTERVENTIONS:  - Assess impact of patient's symptoms on level of functioning, self-care needs and offer support as indicated  - Assess patient/family knowledge of depression, impact on illness and need for teaching  - Provide emotional support, presence and reassurance  - Assess for possible suicidal thoughts, ideation or self-harm. If patient expresses suicidal thoughts or statements do not leave alone, notify physician/AP immediately, initiate suicide precautions, and determine need for continual observation.  - initiate consults and referrals as appropriate (a mental health professional, Spiritual Care  Outcome: Progressing     Problem: ABUSE/NEGLECT  Goal: Pt/Caregiver/Family aware of resources to assist with issues of abuse and neglect  Description: INTERVENTIONS:  - If child abuse and/or neglect is suspected, notify [employer]line directly  - Assess for level of risk and safety  - Initiate referral to Case Management  - Notify PHYSICIAN/AP and Nursing Supervisor  - Provide appropriate education and resources to patient and/or family  - Initiate referral to age appropriate protective services, i.e. [facility] or Child Protective Services  - Offer patient/caregiver the option to Opt Out of patient information directory  - Provide emotional support, including active listening and acknowledgment of concerns  - Provide the patient with information about supportive services i.e. shelters, community resources for domestic violence  Outcome: Progressing     Problem: SUBSTANCE USE/ABUSE  Goal: Will have no detox symptoms and will  verbalize plan for changing substance-related behavior  Description: INTERVENTIONS:  - Monitor physical status and assess for symptoms of withdrawal  - Administer medication as ordered  - Provide emotional support with 1 on 1 interaction with staff  - Encourage recovery focused program/ addiction education  - Assess for verbalization of changing behaviors related to substance abuse  - Initiate consults and referrals as appropriate (Case Management, Spiritual Care, etc.)  Outcome: Progressing  Goal: By discharge, will develop insight into their chemical dependency and sustain motivation to continue in recovery  Description: INTERVENTIONS:  - Attends all daily group sessions and scheduled AA groups  - Actively practices coping skills through participation in the therapeutic community and adherence to program rules  - Reviews and completes assignments from individual treatment plan  - Assist patient development of understanding of their personal cycle of addiction and relapse triggers  Outcome: Progressing  Goal: By discharge, patient will have ongoing treatment plan addressing chemical dependency  Description: INTERVENTIONS:  - Assist patient with resources and/or appointments for ongoing recovery based living  Outcome: Progressing     Problem: SPIRITUAL CARE  Goal: Pt/Family able to move forward in process of forgiving self, others and/or higher power  Description: INTERVENTIONS:  - Assist patient with any spiritual needs/requests such as communion, confession, anointing, etc  - Explore guilt and help patient/family identify possible spiritual/cultural beliefs and values  - Explore possibilities of making amends & reconciliation with self, others, and/or a greater power  - Guide patient/family in identifying painful feelings  - Help patient explore and identify spiritual beliefs, cultural understandings or values that may help or hinder letting go of issue  - Help patient explore feelings of anger, bitterness,  resentment, anxiety   Help patient/family identify and examine the situation in which these feelings are experienced  - Help patient/family identify destructive displacement of feelings onto other individuals  - Refer patient to formal counseling and/or to timothy [facility] for further support as needed or per request  Outcome: Progressing  Goal: Patient feels balance and connection with others and/or higher power that empowers the self during times of loss, guilt and fear  Description: INTERVENTIONS:  - Create safety for patient through empathic presence and non-judgmental listening  - Encourage patient to explore his/her values, beliefs and/or spiritual images and practices  - Encourage use of breath work, imagery, meditation, relaxation, reiki to ease distress and provide healing  - Encourage use of cultural and spiritual celebrations and rituals  - Facilitate discussion that helps patient sort out spiritual concerns  - Help patient identify where meaning/hope/comfort & strength are in his/her life  - Refer patient to [facility] for assistance, as appropriate  - Respond to patient/family need for prayer/ritual/sacrament/ceremony  Outcome: Progressing     Problem: Nutrition/Hydration-ADULT  Goal: Nutrient/Hydration intake appropriate for improving, restoring or maintaining nutritional needs  Description: Monitor and assess patient's nutrition/hydration status for malnutrition. Collaborate with interdisciplinary team and initiate plan and interventions as ordered.  Monitor patient's weight and dietary intake as ordered or per policy. Utilize nutrition screening tool and intervene as necessary. Determine patient's food preferences and provide high-protein, high-caloric foods as appropriate.     INTERVENTIONS:  - Monitor oral intake, urinary output, labs, and treatment plans  - Assess nutrition and hydration status and recommend course of action  - Evaluate amount of meals eaten  - Assist patient with eating if  necessary   - Allow adequate time for meals  - Recommend/ encourage appropriate diets, oral nutritional supplements, and vitamin/mineral supplements  - Order, calculate, and assess calorie counts as needed  - Recommend, monitor, and adjust tube feedings and TPN/PPN based on assessed needs  - Assess need for intravenous fluids  - Provide specific nutrition/hydration education as appropriate  - Include patient/family/caregiver in decisions related to nutrition  Outcome: Progressing     Problem: Prexisting or High Potential for Compromised Skin Integrity  Goal: Skin integrity is maintained or improved  Description: INTERVENTIONS:  - Identify patients at risk for skin breakdown  - Assess and monitor skin integrity  - Assess and monitor nutrition and hydration status  - Monitor labs   - Assess for incontinence   - Turn and reposition patient  - Assist with mobility/ambulation  - Relieve pressure over bony prominences  - Avoid friction and shearing  - Provide appropriate hygiene as needed including keeping skin clean and dry  - Evaluate need for skin moisturizer/barrier cream  - Collaborate with interdisciplinary team   - Patient/family teaching  - Consider wound care consult   Outcome: Progressing

## 2024-03-17 NOTE — PLAN OF CARE
Problem: SAFETY,RESTRAINT: NV/NON-SELF DESTRUCTIVE BEHAVIOR  Goal: Remains free of harm/injury (restraint for non violent/non self-detsructive behavior)  Description: INTERVENTIONS:  - Instruct patient/family regarding restraint use   - Assess and monitor physiologic and psychological status   - Provide interventions and comfort measures to meet assessed patient needs   - Identify and implement measures to help patient regain control  - Assess readiness for release of restraint   Outcome: Progressing  Goal: Returns to optimal restraint-free functioning  Description: INTERVENTIONS:  - Assess the patient's behavior and symptoms that indicate continued need for restraint  - Identify and implement measures to help patient regain control  - Assess readiness for release of restraint   Outcome: Progressing     Problem: PAIN - ADULT  Goal: Verbalizes/displays adequate comfort level or baseline comfort level  Description: Interventions:  - Encourage patient to monitor pain and request assistance  - Assess pain using appropriate pain scale  - Administer analgesics based on type and severity of pain and evaluate response  - Implement non-pharmacological measures as appropriate and evaluate response  - Consider cultural and social influences on pain and pain management  - Notify physician/advanced practitioner if interventions unsuccessful or patient reports new pain  Outcome: Progressing     Problem: INFECTION - ADULT  Goal: Absence or prevention of progression during hospitalization  Description: INTERVENTIONS:  - Assess and monitor for signs and symptoms of infection  - Monitor lab/diagnostic results  - Monitor all insertion sites, i.e. indwelling lines, tubes, and drains  - Monitor endotracheal if appropriate and nasal secretions for changes in amount and color  - [place] appropriate cooling/warming therapies per order  - Administer medications as ordered  - Instruct and encourage patient and family to use good hand  hygiene technique  - Identify and instruct in appropriate isolation precautions for identified infection/condition  Outcome: Progressing  Goal: Absence of fever/infection during neutropenic period  Description: INTERVENTIONS:  - Monitor WBC    Outcome: Progressing     Problem: SAFETY ADULT  Goal: Patient will remain free of falls  Description: INTERVENTIONS:  - Educate patient/family on patient safety including physical limitations  - Instruct patient to call for assistance with activity   - Consult OT/PT to assist with strengthening/mobility   - Keep Call bell within reach  - Keep bed low and locked with side rails adjusted as appropriate  - Keep care items and personal belongings within reach  - Initiate and maintain comfort rounds  - Make Fall Risk Sign visible to staff  - Offer Toileting every  Hours, in advance of need  - Initiate/Maintain alarm  - Obtain necessary fall risk management equipment:   - Apply yellow socks and bracelet for high fall risk patients  - Consider moving patient to room near nurses station  Outcome: Progressing  Goal: Maintain or return to baseline ADL function  Description: INTERVENTIONS:  -  Assess patient's ability to carry out ADLs; assess patient's baseline for ADL function and identify physical deficits which impact ability to perform ADLs (bathing, care of mouth/teeth, toileting, grooming, dressing, etc.)  - Assess/evaluate cause of self-care deficits   - Assess range of motion  - Assess patient's mobility; develop plan if impaired  - Assess patient's need for assistive devices and provide as appropriate  - Encourage maximum independence but intervene and supervise when necessary  - Involve family in performance of ADLs  - Assess for home care needs following discharge   - Consider OT consult to assist with ADL evaluation and planning for discharge  - Provide patient education as appropriate  Outcome: Progressing  Goal: Maintains/Returns to pre admission functional  level  Description: INTERVENTIONS:  - Perform AM-PAC 6 Click Basic Mobility/ Daily Activity assessment daily.  - Set and communicate daily mobility goal to care team and patient/family/caregiver.   - Collaborate with rehabilitation services on mobility goals if consulted  - Perform Range of Motion  times a day.  - Reposition patient every  hours.  - Dangle patient  times a day  - Stand patient  times a day  - Ambulate patient  times a day  - Out of bed to chair  times a day   - Out of bed for meals times a day  - Out of bed for toileting  - Record patient progress and toleration of activity level   Outcome: Progressing     Problem: DISCHARGE PLANNING  Goal: Discharge to home or other facility with appropriate resources  Description: INTERVENTIONS:  - Identify barriers to discharge w/patient and caregiver  - Arrange for needed discharge resources and transportation as appropriate  - Identify discharge learning needs (meds, wound care, etc.)  - Arrange for interpretive services to assist at discharge as needed  - Refer to Case Management Department for coordinating discharge planning if the patient needs post-hospital services based on physician/advanced practitioner order or complex needs related to functional status, cognitive ability, or social support system  Outcome: Progressing     Problem: Knowledge Deficit  Goal: Patient/family/caregiver demonstrates understanding of disease process, treatment plan, medications, and discharge instructions  Description: Complete learning assessment and assess knowledge base.  Interventions:  - Provide teaching at level of understanding  - Provide teaching via preferred learning methods  Outcome: Progressing     Problem: COPING  Goal: Pt/Family able to verbalize concerns and demonstrate effective coping strategies  Description: INTERVENTIONS:  - Assist patient/family to identify coping skills, available support systems and cultural and spiritual values  - Provide emotional  support, including active listening and acknowledgement of concerns of patient and caregivers  - Reduce environmental stimuli, as able  - Provide patient education  - Assess for spiritual pain/suffering and initiate spiritual care, including notification of Pastoral Care or timothy based community as needed  - Assess effectiveness of coping strategies  Outcome: Progressing  Goal: Will report anxiety at manageable levels  Description: INTERVENTIONS:  - Administer medication as ordered  - Teach and encourage coping skills  - Provide emotional support  - Assess patient/family for anxiety and ability to cope  Outcome: Progressing     Problem: DEATH & DYING  Goal: Pt/Family communicate acceptance of impending death and expresses psychological comfort and peace  Description: INTERVENTIONS:  - Assess patient/family anxiety and grief process related to end of life issues  - Provide emotional, spiritual and psychosocial support  - Provide information about the patient’s health status with consideration of family and cultural values  - Communicate willingness to discuss death and facilitate grief process  with patient/family as appropriate  - Emphasize sustaining relationships within family system and community, or timothy/spiritual traditions  - Initiate Spiritual Care, Pastoral care or other ancillary consults as needed  - Refer to community support groups as appropriate  Outcome: Progressing     Problem: CHANGE IN BODY IMAGE  Goal: Pt/Family communicate acceptance of loss or change in body image and expresses psychological comfort and peace  Description: INTERVENTIONS:  - Assess patient/family anxiety and grief process related to change in body image, loss of functional status and loss of sense of self  - Assess patient/family's coping skills and provide emotional, spiritual and psychosocial support  - Provide information about the patient's health status with consideration of family and cultural values  - Communicate  willingness to discuss loss and facilitate grief process with patient/family as appropriate  - Emphasize sustaining relationships within family system and community, or timothy/spiritual traditions  - Refer to community support groups as appropriate  - Initiate Spiritual Care, Pastoral care or other ancillary consults as needed  Outcome: Progressing     Problem: DECISION MAKING  Goal: Pt/Family able to effectively weigh alternatives and participate in decision making related to treatment and care  Description: INTERVENTIONS:  - Identify decision maker  - Determine when there are differences among patient's view, family's view, and healthcare provider's view of patient condition and care goals  - Facilitate patient/family articulation of goals for care  - Help patient/family identify pros/cons of alternative solutions  - Provide information as requested by patient/family  - Respect patient/family rights related to privacy and sharing information   - Serve as a liaison between patient, family and health care team  - Initiate consults as appropriate (Ethics Team, Palliative Care, Family Care Conference, etc.)  Outcome: Progressing     Problem: CONFUSION/THOUGHT DISTURBANCE  Goal: Thought disturbances (confusion, delirium, depression, dementia or psychosis) are managed to maintain or return to baseline mental status and functional level  Description: INTERVENTIONS:  - Assess for possible contributors to  thought disturbance, including but not limited to medications, infection, impaired vision or hearing, underlying metabolic abnormalities, dehydration, respiratory compromise,  psychiatric diagnoses and notify attending PHYSICAN/AP  - Monitor and intervene to maintain adequate nutrition, hydration, elimination, sleep and activity  - Decrease environmental stimuli, including noise as appropriate.  - Provide frequent contacts to provide refocusing, direction and reassurance as needed. Approach patient calmly with eye contact  and at their level.  - [place] high risk fall precautions, aspiration precautions and other safety measures, as indicated  - If delirium suspected, notify physician/AP of change in condition and request immediate in-person evaluation  - Pursue consults as appropriate including Geriatric (campus dependent), OT for cognitive evaluation/activity planning, psychiatric, pastoral care, etc.  Outcome: Progressing     Problem: BEHAVIOR  Goal: Pt/Family maintain appropriate behavior and adhere to behavioral management agreement, if implemented  Description: INTERVENTIONS:  - Assess the family dynamic   - Encourage verbalization of thoughts and concerns in a socially appropriate manner  - Assess patient/family's coping skills and non-compliant behavior (including use of illegal substances).  - Utilize positive, consistent limit setting strategies supporting safety of patient, staff and others  - Initiate consult with Case Management, Spiritual Care or other ancillary services as appropriate  - If a patient's/visitor's behavior jeopardizes the safety of the patient, staff, or others, refer to organization procedure.   - Notify Security of behavior or suspected illegal substances which indicate the need for search of the patient and/or belongings  - Encourage participation in the decision making process about a behavioral management agreement; implement if patient meets criteria  Outcome: Progressing     Problem: Depression - IP adult  Goal: Effects of depression will be minimized  Description: INTERVENTIONS:  - Assess impact of patient's symptoms on level of functioning, self-care needs and offer support as indicated  - Assess patient/family knowledge of depression, impact on illness and need for teaching  - Provide emotional support, presence and reassurance  - Assess for possible suicidal thoughts, ideation or self-harm. If patient expresses suicidal thoughts or statements do not leave alone, notify physician/AP  immediately, initiate Suicide Precautions, and determine need for continual observation.  - Initiate consults and referrals as appropriate (a mental health professional, Spiritual Care)  - Administer medication as ordered  Outcome: Progressing     Problem: SELF HARM  Goal: Effect of psychiatric condition will be minimized and patient will be protected from self harm  Description: INTERVENTIONS:  - Assess impact of patient's symptoms on level of functioning, self-care needs and offer support as indicated  - Assess patient/family knowledge of depression, impact on illness and need for teaching  - Provide emotional support, presence and reassurance  - Assess for possible suicidal thoughts, ideation or self-harm. If patient expresses suicidal thoughts or statements do not leave alone, notify physician/AP immediately, initiate suicide precautions, and determine need for continual observation.  - initiate consults and referrals as appropriate (a mental health professional, Spiritual Care  Outcome: Progressing     Problem: ABUSE/NEGLECT  Goal: Pt/Caregiver/Family aware of resources to assist with issues of abuse and neglect  Description: INTERVENTIONS:  - If child abuse and/or neglect is suspected, notify [employer] directly  - Assess for level of risk and safety  - Initiate referral to Case Management  - Notify PHYSICIAN/AP and Nursing Supervisor  - Provide appropriate education and resources to patient and/or family  - Initiate referral to age appropriate protective services, i.e. [facility] or Child Protective Services  - Offer patient/caregiver the option to Opt Out of patient information directory  - Provide emotional support, including active listening and acknowledgment of concerns  - Provide the patient with information about supportive services i.e. shelters, community resources for domestic violence  Outcome: Progressing     Problem: SUBSTANCE USE/ABUSE  Goal: Will have no detox symptoms and will  verbalize plan for changing substance-related behavior  Description: INTERVENTIONS:  - Monitor physical status and assess for symptoms of withdrawal  - Administer medication as ordered  - Provide emotional support with 1 on 1 interaction with staff  - Encourage recovery focused program/ addiction education  - Assess for verbalization of changing behaviors related to substance abuse  - Initiate consults and referrals as appropriate (Case Management, Spiritual Care, etc.)  Outcome: Progressing  Goal: By discharge, will develop insight into their chemical dependency and sustain motivation to continue in recovery  Description: INTERVENTIONS:  - Attends all daily group sessions and scheduled AA groups  - Actively practices coping skills through participation in the therapeutic community and adherence to program rules  - Reviews and completes assignments from individual treatment plan  - Assist patient development of understanding of their personal cycle of addiction and relapse triggers  Outcome: Progressing  Goal: By discharge, patient will have ongoing treatment plan addressing chemical dependency  Description: INTERVENTIONS:  - Assist patient with resources and/or appointments for ongoing recovery based living  Outcome: Progressing     Problem: SPIRITUAL CARE  Goal: Pt/Family able to move forward in process of forgiving self, others and/or higher power  Description: INTERVENTIONS:  - Assist patient with any spiritual needs/requests such as communion, confession, anointing, etc  - Explore guilt and help patient/family identify possible spiritual/cultural beliefs and values  - Explore possibilities of making amends & reconciliation with self, others, and/or a greater power  - Guide patient/family in identifying painful feelings  - Help patient explore and identify spiritual beliefs, cultural understandings or values that may help or hinder letting go of issue  - Help patient explore feelings of anger, bitterness,  resentment, anxiety   Help patient/family identify and examine the situation in which these feelings are experienced  - Help patient/family identify destructive displacement of feelings onto other individuals  - Refer patient to formal counseling and/or to timothy [facility] for further support as needed or per request  Outcome: Progressing  Goal: Patient feels balance and connection with others and/or higher power that empowers the self during times of loss, guilt and fear  Description: INTERVENTIONS:  - Create safety for patient through empathic presence and non-judgmental listening  - Encourage patient to explore his/her values, beliefs and/or spiritual images and practices  - Encourage use of breath work, imagery, meditation, relaxation, reiki to ease distress and provide healing  - Encourage use of cultural and spiritual celebrations and rituals  - Facilitate discussion that helps patient sort out spiritual concerns  - Help patient identify where meaning/hope/comfort & strength are in his/her life  - Refer patient to [facility] for assistance, as appropriate  - Respond to patient/family need for prayer/ritual/sacrament/ceremony  Outcome: Progressing     Problem: Nutrition/Hydration-ADULT  Goal: Nutrient/Hydration intake appropriate for improving, restoring or maintaining nutritional needs  Description: Monitor and assess patient's nutrition/hydration status for malnutrition. Collaborate with interdisciplinary team and initiate plan and interventions as ordered.  Monitor patient's weight and dietary intake as ordered or per policy. Utilize nutrition screening tool and intervene as necessary. Determine patient's food preferences and provide high-protein, high-caloric foods as appropriate.     INTERVENTIONS:  - Monitor oral intake, urinary output, labs, and treatment plans  - Assess nutrition and hydration status and recommend course of action  - Evaluate amount of meals eaten  - Assist patient with eating if  necessary   - Allow adequate time for meals  - Recommend/ encourage appropriate diets, oral nutritional supplements, and vitamin/mineral supplements  - Order, calculate, and assess calorie counts as needed  - Recommend, monitor, and adjust tube feedings and TPN/PPN based on assessed needs  - Assess need for intravenous fluids  - Provide specific nutrition/hydration education as appropriate  - Include patient/family/caregiver in decisions related to nutrition  Outcome: Progressing     Problem: Prexisting or High Potential for Compromised Skin Integrity  Goal: Skin integrity is maintained or improved  Description: INTERVENTIONS:  - Identify patients at risk for skin breakdown  - Assess and monitor skin integrity  - Assess and monitor nutrition and hydration status  - Monitor labs   - Assess for incontinence   - Turn and reposition patient  - Assist with mobility/ambulation  - Relieve pressure over bony prominences  - Avoid friction and shearing  - Provide appropriate hygiene as needed including keeping skin clean and dry  - Evaluate need for skin moisturizer/barrier cream  - Collaborate with interdisciplinary team   - Patient/family teaching  - Consider wound care consult   Outcome: Progressing

## 2024-03-17 NOTE — OCCUPATIONAL THERAPY NOTE
Occupational Therapy Evaluation     Patient Name: Jf Valera  Today's Date: 3/17/2024  Problem List  Principal Problem:    Portal vein thrombosis  Active Problems:    HTN (hypertension)    S/P exploratory laparotomy    Mesenteric ischemia (HCC)    H/O prostate cancer    Palliative care by specialist    Goals of care, counseling/discussion    Past Medical History  Past Medical History:   Diagnosis Date    Hypertension      Past Surgical History  Past Surgical History:   Procedure Laterality Date    LAPAROTOMY N/A 3/13/2024    Procedure: LAPAROTOMY EXPLORATORY; SMALL BOWEL RESECTION; LYSIS OF ADHESIONS; SUPRPAPUBIC TUBE INSERTION;  Surgeon: Marvin Azul MD;  Location: [facility];  Service: General    LAPAROTOMY N/A 3/14/2024    Procedure: EXPLORATORY LAPAROTOMY, WASHOUT. SMALL BOWEL ANASTOMOSIS, CONTROL OF LIVER BLEED, CLOSURE OF ABDOMINAL WOUND;  Surgeon: Beltran Lorenz DO;  Location:  MAIN OR;  Service: General         03/17/24 1004   OT Last Visit   OT Visit Date 03/17/24   Note Type   Note type Evaluation   Pain Assessment   Pain Assessment Tool FLACC   Pain Location/Orientation Orientation: Left;Other (Comment)  (throat)   Effect of Pain on Daily Activities pain on L side of throat w/ swallowing   Patient's Stated Pain Goal No pain   Pain Rating: FLACC (Rest) - Face 1   Pain Rating: FLACC (Rest) - Legs 0   Pain Rating: FLACC (Rest) - Activity 0   Pain Rating: FLACC (Rest) - Cry 0   Pain Rating: FLACC (Rest) - Consolability 0   Score: FLACC (Rest) 1   Pain Rating: FLACC (Activity) - Face 1   Pain Rating: FLACC (Activity) - Legs 0   Pain Rating: FLACC (Activity) - Activity 0   Pain Rating: FLACC (Activity) - Cry 0   Pain Rating: FLACC (Activity) - Consolability 0   Score: FLACC (Activity) 1   Restrictions/Precautions   Weight Bearing Precautions Per Order No   Other Precautions Telemetry;Multiple lines;Fall Risk;Pain;O2  (NGT to suction; 3L O2; suprapubic cath)   Home Living   Type of Home  House   Home Layout Two level;Bed/bath upstairs;1/2 bath on main level  (2-3 LAKESHA)   Bathroom Shower/Tub Tub/shower unit   Bathroom Toilet Standard   Bathroom Equipment Other (Comment)  (denies any)   Home Equipment Other (Comment)  (denies any)   Additional Comments Pt reports living in 2 SH, 2-3 LAKESHA, FFOS up to main bed/bath   Prior Function   Level of [place] Independent with ADLs;Independent with functional mobility;Needs assistance with IADLS   Lives With Spouse   Receives Help From Family   IADLs Family/Friend/Other provides transportation;Family/Friend/Other provides meals;Family/Friend/Other provides medication management  (pt is I w/ laundry, spouse cooks; hired help for cleaning and yardwork; (-) )   Falls in the last 6 months 0   Vocational Retired   Lifestyle   Autonomy I w/ ADLS, assist IADLS, I w/ transfers and functional mobility PTA   Reciprocal Relationships pt lives w/ his spouse; has family nearby   Service to Others Retired-    Intrinsic Gratification Likes to read books   General   Family/Caregiver Present Yes  (son)   ADL   Eating Assistance Unable to assess   Eating Deficit NPO  (NGT)   Grooming Assistance 5  Supervision/Setup   UB Bathing Assistance 5  Supervision/Setup   LB Bathing Assistance 4  Minimal Assistance   UB Dressing Assistance 5  Supervision/Setup   LB Dressing Assistance 4  Minimal Assistance   Toileting Assistance  4  Minimal Assistance   Functional Assistance 4  Minimal Assistance   Functional Deficit Steadying;Supervision/safety;Verbal cueing;Increased time to complete   Bed Mobility   Supine to Sit Unable to assess   Sit to Supine Unable to assess   Additional Comments pt seated up OOB in chair prior to and end of session   Transfers   Sit to Stand 4  Minimal assistance   Additional items Assist x 1;Increased time required;Verbal cues   Stand to Sit 4  Minimal assistance   Additional items Assist x 1;Increased time required;Verbal cues   Additional Comments w  RW   Functional Mobility   Functional Mobility 4  Minimal assistance   Additional Comments Pt engaged in short household distance functional mobility w/ Min A x1; use of RW; assist for line management   Additional items Rolling walker   Balance   Static Sitting Fair   Dynamic Sitting Fair -   Static Standing Poor +   Dynamic Standing Poor +   Ambulatory Poor +   Activity Tolerance   Activity Tolerance Patient limited by fatigue;Patient limited by pain   Medical Staff Made Aware Kayla CHADWICK   Nurse Made Aware yes   RUE Assessment   RUE Assessment WFL   LUE Assessment   LUE Assessment WFL   Hand Function   Gross Motor Coordination Functional   Fine Motor Coordination Functional   Sensation   Light Touch No apparent deficits   Vision-Basic Assessment   Current Vision No visual deficits   Psychosocial   Psychosocial (WDL) WDL   Cognition   Overall Cognitive Status WFL   Arousal/Participation Responsive;Cooperative   Attention Within functional limits   Orientation Level Oriented X4   Memory Decreased recall of precautions   Following Commands Follows one step commands without difficulty   Comments pt is pleasant and cooperative   Assessment   Limitation Decreased ADL status;Decreased endurance;Decreased Safe judgement during ADL;Decreased self-care trans;Decreased high-level ADLs   Prognosis Fair   Assessment Pt is a 87 y/o male seen for OT eval s/p [facility] from [facility] where he presented w/ n/v, abdominal pain and BRRBPR 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 Pt  has a past medical history of Hypertension. Pt with active OT orders and activity as tolerated orders. Pt lives with his spouse in 2 , 2-3 LAKESHA, FFOS to bed/bath 2nd floor. Pt was I w/  ADLS and has assist for IADLS, does not drive (kids transport) & required no use of DME PTA. Pt is currently demonstrating the following  occupational deficits: S UB ADLs, Min A LB ADLS, Min A transfers and functional mobility w/ RW. These deficits that are impacting pt's baseline areas of occupation are a result of the following impairments: pain, endurance, activity tolerance, functional mobility, forward functional reach, balance, trunk control, functional standing tolerance, decreased I w/ ADLS/IADLS, and decreased safety awareness.The following Occupational Performance Areas to address include: eating, grooming, bathing/shower, toilet hygiene, dressing, medication management, socialization, health maintenance, functional mobility, and clothing management. Recommend home OT upon D/C, pending progress. Pt to continue to benefit from acute immediate OT services to address the following goals 2-3x/week to  w/in 10-14 days:   Goals   Patient Goals to be independent   LTG Time Frame 10-   Long Term Goal #1 see below listed goals   Plan   Treatment Interventions ADL retraining;Functional transfer training;UE strengthening/ROM;Endurance training;Cognitive reorientation;Patient/family training;Equipment evaluation/education;Compensatory technique education;Continued evaluation;Energy conservation;Activityengagement   Goal Expiration Date 24   OT Frequency 2-3x/wk   Discharge Recommendation   Rehab Resource Intensity Level, OT III (Minimum Resource Intensity)   Additional Comments  The patient's raw score on the AM-PAC Daily Activity Inpatient Short Form is 20. A raw score of greater than or equal to 19 suggests the patient may benefit from discharge to home. Please refer to the recommendation of the Occupational Therapist for safe discharge planning.   Additional Comments 2 Pt seen as a co-session due to the patient's co-morbidities, clinically unstable presentation, and present impairments which are a regression from the patient's baseline.   AM-PAC Daily Activity Inpatient   Lower Body Dressing 3   Bathing 3   Toileting 3   Upper Body  Dressing 3   Grooming 4   Eating 4   Daily Activity Raw Score 20   Daily Activity Standardized Score (Calc for Raw Score >=11) 42.03   AM-PAC Applied Cognition Inpatient   Following a Speech/Presentation 3   Understanding Ordinary Conversation 4   Taking Medications 4   Remembering Where Things Are Placed or Put Away 4   Remembering List of 4-5 Errands 4   Taking Care of Complicated Tasks 3   Applied Cognition Raw Score 22   Applied Cognition Standardized Score 47.83   End of Consult   Education Provided Yes;Family or social support of family present for education by provider   Patient Position at End of Consult Bedside chair;Bed/Chair alarm activated;All needs within reach   Nurse Communication Nurse aware of consult      GOALS    1) Pt will improve activity tolerance to G for 30 min txment sessions for increase engagement in functional tasks  2) Pt will complete UB/LB dressing/self care w/ mod I using adaptive device and DME as needed  3) Pt will complete bathing w/ Mod I w/ use of AE and DME as needed  4) Pt will complete toileting w/ mod I w/ G hygiene/thoroughness using DME as needed  5) Pt will improve functional transfers to Mod I on/off all surfaces using DME as needed w/ G balance/safety   6) Pt will improve functional mobility during ADL/IADL/leisure tasks to Mod I using DME as needed w/ G balance/safety   7) Pt will be attentive 100% of the time during ongoing cognitive assessment w/ G participation to assist w/ safe d/c planning/recommendations  8) Pt will demonstrate G carryover of pt/caregiver education and training as appropriate w/o cues w/ good tolerance to increase safety during functional tasks  9) Pt will demonstrate 100% carryover of energy conservation techniques t/o functional I/ADL/leisure tasks w/o cues s/p skilled education to increase endurance during functional tasks     Leesa Cruz MS, OTR/L

## 2024-03-17 NOTE — PROGRESS NOTES
"Patient arrived to IR for right chest tube placement.    /70 (BP Location: Right arm)   Pulse 91   Temp (!) 97.1 °F (36.2 °C) (Oral)   Resp 18   Ht 5' 4\" (1.626 m)   Wt 59.4 kg (130 lb 15.3 oz) Comment: new bed.  SpO2 94%   BMI 22.48 kg/m²       The procedure and risks were discussed with the patient.  All questions were answered. Informed consent was obtained.    "

## 2024-03-17 NOTE — PLAN OF CARE
Problem: PHYSICAL THERAPY ADULT  Goal: Performs mobility at highest level of function for planned discharge setting.  See evaluation for individualized goals.  Description: Treatment/Interventions: ADL retraining, Functional transfer training, LE strengthening/ROM, Therapeutic exercise, Endurance training, Patient/family training, Equipment eval/education, Bed mobility, Gait training, Spoke to nursing, Spoke to case management, OT, Elevations  Equipment Recommended: Walker       See flowsheet documentation for full assessment, interventions and recommendations.  Outcome: Progressing  Note: Prognosis: Good  Problem List: Decreased strength, Decreased endurance, Impaired balance, Decreased mobility, Pain  Assessment: Pt is a 88 y.o. male seen for a high complexity PT evaluation due to Ongoing medical management for primary dx, Increased reliance on more restrictive AD compared to baseline, Decreased activity tolerance compared to baseline, Fall risk, Increased assistance needed from caregiver at current time, Increased O2 via NC from pts baseline, s/p surgical intervention. Patient is s/p admit to [facility] on 3/13/2024 for dvt of portal vein. Patient  has a past medical history of Hypertension..     PT now consulted to assess functional mobility and needs for safe d/c planning. Prior to admission, pt independent with functional mobility, independent ADLs, and needs assistance IADLs. Personal factors affecting status include fall risk, steps to enter home, steps to negotiate within home, and medical status     Currently pt requires minimal assistance x1 for functional transfers with rolling walker ; minimal assistance x1 for ambulation with rolling walker. Pt presents functioning below baseline and w/ overall mobility deficits 2* to: decreased strength, decreased endurance, decreased mobility, impaired balance. These impairments place pt at risk for falls.     Pt will continue to benefit from skilled PT  interventions to address stated impairments; to maximize functional potential; for ongoing pt/family education; and DME needs. The patient's AM-PAC Basic Mobility Inpatient Short Form Raw Score Is 15. PT is currently recommending Level 3 - Minimum Resource Intensity on d/c from hospital. Will continue to follow as able.        Rehab Resource Intensity Level, PT: III (Minimum Resource Intensity)    See flowsheet documentation for full assessment.

## 2024-03-17 NOTE — PROGRESS NOTES
Pt incontinent large soft tarry black bm while pt being assisted to bathroom by pca and nurse pt sats dropped briefly into 60's 02 increased from 3 lnc to 6 l nc sats came back into high 90's pt tachycardic 100's no resp distress noted bp 147/70 pt assisted back to recliner chair no complaints pt 02 turned down to 3l nc sats remains in 90's Dr Carranza Red surgery notified  portable cxr ordered and pending  Pt's son at bedside

## 2024-03-17 NOTE — PLAN OF CARE
Problem: OCCUPATIONAL THERAPY ADULT  Goal: Performs self-care activities at highest level of function for planned discharge setting.  See evaluation for individualized goals.  Description: Treatment Interventions: ADL retraining, Functional transfer training, UE strengthening/ROM, Endurance training, Cognitive reorientation, Patient/family training, Equipment evaluation/education, Compensatory technique education, Continued evaluation, Energy conservation, Activityengagement          See flowsheet documentation for full assessment, interventions and recommendations.   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 Pt  has a past medical history of Hypertension. Pt with active OT orders and activity as tolerated orders. Pt lives with his spouse in 2 , 2-3 [place], FFOS to bed/bath 2nd floor. Pt was I w/  ADLS and has assist for IADLS, does not drive (kids transport) & required no use of DME PTA. Pt is currently demonstrating the following occupational deficits: S UB ADLs, Min A LB ADLS, Min A transfers and functional mobility w/ RW. These deficits that are impacting pt's baseline areas of occupation are a result of the following impairments: pain, endurance, activity tolerance, functional mobility, forward functional reach, balance, trunk control, functional standing tolerance, decreased I w/ ADLS/IADLS, and decreased safety awareness.The following Occupational Performance Areas to address include: eating, grooming, bathing/shower, toilet hygiene, dressing, medication management, socialization,  health maintenance, functional mobility, and clothing management. Recommend home OT upon D/C, pending progress. Pt to continue to benefit from acute immediate OT services to address the following goals 2-3x/week to  w/in 10-14 days:     Rehab Resource Intensity Level, OT: III (Minimum Resource Intensity)        Leesa Cruz MS, OTR/L

## 2024-03-18 PROBLEM — J94.2 HEMOPNEUMOTHORAX ON RIGHT: Status: ACTIVE | Noted: 2024-03-18

## 2024-03-18 LAB
ANION GAP SERPL CALCULATED.3IONS-SCNC: 17 MMOL/L (ref 4–13)
APTT PPP: 43 SECONDS (ref 23–37)
APTT PPP: 52 SECONDS (ref 23–37)
APTT PPP: 83 SECONDS (ref 23–37)
APTT PPP: >210 SECONDS (ref 23–37)
BACTERIA BLD CULT: NORMAL
BACTERIA BLD CULT: NORMAL
BASOPHILS # BLD AUTO: 0.03 THOUSANDS/ÂΜL (ref 0–0.1)
BASOPHILS NFR BLD AUTO: 0 % (ref 0–1)
BUN SERPL-MCNC: 11 MG/DL (ref 5–25)
CALCIUM SERPL-MCNC: 8.3 MG/DL (ref 8.4–10.2)
CHLORIDE SERPL-SCNC: 109 MMOL/L (ref 96–108)
CO2 SERPL-SCNC: 24 MMOL/L (ref 21–32)
CREAT SERPL-MCNC: 0.79 MG/DL (ref 0.6–1.3)
EOSINOPHIL # BLD AUTO: 0.06 THOUSAND/ÂΜL (ref 0–0.61)
EOSINOPHIL NFR BLD AUTO: 0 % (ref 0–6)
ERYTHROCYTE [DISTWIDTH] IN BLOOD BY AUTOMATED COUNT: 15.3 % (ref 11.6–15.1)
GFR SERPL CREATININE-BSD FRML MDRD: 80 ML/MIN/1.73SQ M
GLUCOSE SERPL-MCNC: 80 MG/DL (ref 65–140)
HCT VFR BLD AUTO: 32 % (ref 36.5–49.3)
HGB BLD-MCNC: 10.1 G/DL (ref 12–17)
IMM GRANULOCYTES # BLD AUTO: 0.09 THOUSAND/UL (ref 0–0.2)
IMM GRANULOCYTES NFR BLD AUTO: 1 % (ref 0–2)
LYMPHOCYTES # BLD AUTO: 0.93 THOUSANDS/ÂΜL (ref 0.6–4.47)
LYMPHOCYTES NFR BLD AUTO: 5 % (ref 14–44)
MCH RBC QN AUTO: 28 PG (ref 26.8–34.3)
MCHC RBC AUTO-ENTMCNC: 31.6 G/DL (ref 31.4–37.4)
MCV RBC AUTO: 89 FL (ref 82–98)
MONOCYTES # BLD AUTO: 1.06 THOUSAND/ÂΜL (ref 0.17–1.22)
MONOCYTES NFR BLD AUTO: 6 % (ref 4–12)
NEUTROPHILS # BLD AUTO: 15.09 THOUSANDS/ÂΜL (ref 1.85–7.62)
NEUTS SEG NFR BLD AUTO: 88 % (ref 43–75)
NRBC BLD AUTO-RTO: 0 /100 WBCS
PLATELET # BLD AUTO: 353 THOUSANDS/UL (ref 149–390)
PMV BLD AUTO: 10.2 FL (ref 8.9–12.7)
POTASSIUM SERPL-SCNC: 2.8 MMOL/L (ref 3.5–5.3)
POTASSIUM SERPL-SCNC: 3.2 MMOL/L (ref 3.5–5.3)
RBC # BLD AUTO: 3.61 MILLION/UL (ref 3.88–5.62)
SODIUM SERPL-SCNC: 150 MMOL/L (ref 135–147)
WBC # BLD AUTO: 17.26 THOUSAND/UL (ref 4.31–10.16)

## 2024-03-18 PROCEDURE — 94640 AIRWAY INHALATION TREATMENT: CPT

## 2024-03-18 PROCEDURE — 99232 SBSQ HOSP IP/OBS MODERATE 35: CPT | Performed by: INTERNAL MEDICINE

## 2024-03-18 PROCEDURE — 85730 THROMBOPLASTIN TIME PARTIAL: CPT | Performed by: SURGERY

## 2024-03-18 PROCEDURE — 85025 COMPLETE CBC W/AUTO DIFF WBC: CPT | Performed by: SURGERY

## 2024-03-18 PROCEDURE — 99232 SBSQ HOSP IP/OBS MODERATE 35: CPT | Performed by: PHYSICIAN ASSISTANT

## 2024-03-18 PROCEDURE — 99024 POSTOP FOLLOW-UP VISIT: CPT | Performed by: SURGERY

## 2024-03-18 PROCEDURE — 85730 THROMBOPLASTIN TIME PARTIAL: CPT

## 2024-03-18 PROCEDURE — 94760 N-INVAS EAR/PLS OXIMETRY 1: CPT

## 2024-03-18 PROCEDURE — 84132 ASSAY OF SERUM POTASSIUM: CPT | Performed by: STUDENT IN AN ORGANIZED HEALTH CARE EDUCATION/TRAINING PROGRAM

## 2024-03-18 PROCEDURE — 80048 BASIC METABOLIC PNL TOTAL CA: CPT | Performed by: SURGERY

## 2024-03-18 RX ORDER — POTASSIUM CHLORIDE 14.9 MG/ML
20 INJECTION INTRAVENOUS
Qty: 300 ML | Refills: 0 | Status: COMPLETED | OUTPATIENT
Start: 2024-03-18 | End: 2024-03-18

## 2024-03-18 RX ORDER — POTASSIUM CHLORIDE 14.9 MG/ML
20 INJECTION INTRAVENOUS ONCE
Status: COMPLETED | OUTPATIENT
Start: 2024-03-18 | End: 2024-03-18

## 2024-03-18 RX ADMIN — SODIUM CHLORIDE, SODIUM GLUCONATE, SODIUM ACETATE, POTASSIUM CHLORIDE, MAGNESIUM CHLORIDE, SODIUM PHOSPHATE, DIBASIC, AND POTASSIUM PHOSPHATE 75 ML/HR: .53; .5; .37; .037; .03; .012; .00082 INJECTION, SOLUTION INTRAVENOUS at 08:07

## 2024-03-18 RX ADMIN — GUAIFENESIN 400 MG: 200 SOLUTION ORAL at 20:52

## 2024-03-18 RX ADMIN — SODIUM CHLORIDE SOLN NEBU 3% 4 ML: 3 NEBU SOLN at 19:44

## 2024-03-18 RX ADMIN — SODIUM CHLORIDE, SODIUM GLUCONATE, SODIUM ACETATE, POTASSIUM CHLORIDE, MAGNESIUM CHLORIDE, SODIUM PHOSPHATE, DIBASIC, AND POTASSIUM PHOSPHATE 75 ML/HR: .53; .5; .37; .037; .03; .012; .00082 INJECTION, SOLUTION INTRAVENOUS at 21:40

## 2024-03-18 RX ADMIN — SODIUM CHLORIDE SOLN NEBU 3% 4 ML: 3 NEBU SOLN at 07:25

## 2024-03-18 RX ADMIN — CHLORHEXIDINE GLUCONATE 15 ML: 1.2 SOLUTION ORAL at 20:53

## 2024-03-18 RX ADMIN — PIPERACILLIN SODIUM AND TAZOBACTAM SODIUM 4.5 G: 36; 4.5 INJECTION, POWDER, LYOPHILIZED, FOR SOLUTION INTRAVENOUS at 06:37

## 2024-03-18 RX ADMIN — GUAIFENESIN 400 MG: 200 SOLUTION ORAL at 08:05

## 2024-03-18 RX ADMIN — POTASSIUM CHLORIDE 20 MEQ: 14.9 INJECTION, SOLUTION INTRAVENOUS at 15:40

## 2024-03-18 RX ADMIN — POTASSIUM CHLORIDE 20 MEQ: 14.9 INJECTION, SOLUTION INTRAVENOUS at 07:55

## 2024-03-18 RX ADMIN — POTASSIUM CHLORIDE 20 MEQ: 14.9 INJECTION, SOLUTION INTRAVENOUS at 21:39

## 2024-03-18 RX ADMIN — LEVALBUTEROL HYDROCHLORIDE 1.25 MG: 1.25 SOLUTION RESPIRATORY (INHALATION) at 19:44

## 2024-03-18 RX ADMIN — POTASSIUM CHLORIDE 20 MEQ: 14.9 INJECTION, SOLUTION INTRAVENOUS at 11:16

## 2024-03-18 RX ADMIN — CHLORHEXIDINE GLUCONATE 15 ML: 1.2 SOLUTION ORAL at 08:04

## 2024-03-18 RX ADMIN — HEPARIN SODIUM 21 UNITS/KG/HR: 10000 INJECTION, SOLUTION INTRAVENOUS at 02:11

## 2024-03-18 RX ADMIN — LEVALBUTEROL HYDROCHLORIDE 1.25 MG: 1.25 SOLUTION RESPIRATORY (INHALATION) at 14:10

## 2024-03-18 RX ADMIN — LEVALBUTEROL HYDROCHLORIDE 1.25 MG: 1.25 SOLUTION RESPIRATORY (INHALATION) at 07:25

## 2024-03-18 RX ADMIN — SODIUM CHLORIDE SOLN NEBU 3% 4 ML: 3 NEBU SOLN at 14:10

## 2024-03-18 RX ADMIN — OLANZAPINE 5 MG: 5 TABLET, ORALLY DISINTEGRATING ORAL at 20:56

## 2024-03-18 NOTE — PROGRESS NOTES
Progress Note -Interventional Radiology  Jf Valera 88 y.o. 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  Patient with right pneumothorax noted on chest x-ray on 3/16.  Yesterday patient had some shortness of breath and chest pain, requiring 2 L NC.  Patient had x-ray and CT chest which noted large right hydropneumothorax with severe collapse of the right lung.  Patient s/p IR right 10 Italian pigtail chest tube placement yesterday.    -Patient reports feeling better compared to yesterday with no shortness of breath and only trace chest pain with deep breathing, primarily at chest tube entry site.  Offers no other complaints.  -Right chest tube in place, nontender to palpation.  No surrounding swelling or crepitus noted.  Chest tube to suction, no air leak noted.  -Patient maintaining O2 sat on 1 L NC, VSS, afebrile.  Hemoglobin stable. there has been 0 output from chest tube.  -Continue chest tube management as per surgery  -Please reach out to IR with any questions/concerns    Patient Active Problem List   Diagnosis    Cataracts, bilateral    HTN (hypertension)    TIA (transient ischemic attack)    Protein calorie malnutrition (HCC)    S/P exploratory laparotomy    Mesenteric ischemia (HCC)    Portal vein thrombosis    H/O prostate cancer    Palliative care by specialist    Goals of care, counseling/discussion          Subjective: Jf Valera is a 88 y.o. 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  Patient s/p IR right 10 Belarusian pigtail chest tube placement yesterday. Patient reports feeling better compared to yesterday with no shortness of breath and only trace chest pain with deep breathing. Offers no other complaints.    Objective:    Vitals:  BP (!) 179/81   Pulse 77   Temp 98.7 °F (37.1 °C) (Oral)   Resp 20   Ht 5' 4\" (1.626 m)   Wt 59.4 kg (130 lb 15.3 oz) Comment: new bed.  SpO2 95%   BMI 22.48 kg/m²   Body mass index is 22.48 kg/m².  Weight (last 2 days)       Date/Time Weight    03/17/24 0535 59.4 (130.95)     Weight: new bed. at 03/17/24 0535            I/Os:    Intake/Output Summary (Last 24 hours) at 3/18/2024 1106  Last data filed at 3/18/2024 0701  Gross per 24 hour   Intake 1791.46 ml   Output 880 ml   Net 911.46 ml       Invasive Devices       Peripheral Intravenous Line  Duration             Peripheral IV 03/17/24 Left;Proximal;Upper;Ventral (anterior) Arm 1 day    Peripheral IV 03/17/24 Right Antecubital <1 day    Peripheral IV 03/17/24 Right;Upper;Ventral (anterior) Arm <1 day              Drain  Duration             Suprapubic Catheter 18 Fr. 4 days    NG/OG/Enteral Tube 16 Fr Left nare 2 days    Chest Tube Right Pleural 10 Fr. <1 day                    Physical Exam:  /68   Pulse 83   Temp 98.7 °F (37.1 °C) (Oral)   Resp 20   Ht 5' 4\" (1.626 m)   Wt 59.4 kg (130 lb 15.3 oz) Comment: new bed.  SpO2 95%   BMI 22.48 kg/m²   General appearance: alert and oriented, in no acute distress  Head: Normocephalic, without obvious abnormality  Lungs: clear to auscultation bilaterally and equal chest rise b/l, no work of breathing on 1L NC  Chest wall: no tenderness, right chest tube in place with dressing, CDI. Non-tender to palpation. No surrounding swelling, erythema, crepitus noted.  Heart: regular rate and rhythm  Chest tube to suction. No output noted. No air leak noted.              Lab Results and Cultures:   CBC with diff:   Lab Results " "  Component Value Date    WBC 17.26 (H) 03/18/2024    HGB 10.1 (L) 03/18/2024    HCT 32.0 (L) 03/18/2024    MCV 89 03/18/2024     03/18/2024    RBC 3.61 (L) 03/18/2024    MCH 28.0 03/18/2024    MCHC 31.6 03/18/2024    RDW 15.3 (H) 03/18/2024    MPV 10.2 03/18/2024    NRBC 0 03/18/2024      BMP/CMP:  Lab Results   Component Value Date    K 2.8 (L) 03/18/2024     (H) 03/18/2024    CO2 24 03/18/2024    CO2 19 (L) 03/13/2024    BUN 11 03/18/2024    CREATININE 0.79 03/18/2024    GLUCOSE 158 (H) 03/13/2024    CALCIUM 8.3 (L) 03/18/2024    AST 15 03/15/2024    ALT 10 03/15/2024    ALKPHOS 31 (L) 03/15/2024    EGFR 80 03/18/2024   ,     Coags:   Lab Results   Component Value Date    PTT >210 (HH) 03/18/2024    INR 1.57 (H) 03/14/2024   ,   Results from last 7 days   Lab Units 03/18/24  0914 03/18/24  0133 03/17/24  1850 03/17/24  1136 03/17/24  0435 03/15/24  0033 03/14/24  1709 03/13/24  2215 03/13/24  1548 03/13/24  1157   PTT seconds >210* 83* 52* 49* 92*   < >  --    < > 116* 38*   INR   --   --   --   --   --   --  1.57*  --  1.51* 1.31*    < > = values in this interval not displayed.        Lipid Panel: No results found for: \"CHOL\"  Lab Results   Component Value Date    HDL 27 (L) 03/11/2023     Lab Results   Component Value Date    HDL 27 (L) 03/11/2023     Lab Results   Component Value Date    LDLCALC 100 03/11/2023     Lab Results   Component Value Date    TRIG 75 03/11/2023       HgbA1c:   Lab Results   Component Value Date    HGBA1C 5.5 03/11/2023       Blood Culture:   Lab Results   Component Value Date    BLOODCX No Growth After 4 Days. 03/13/2024    BLOODCX No Growth After 4 Days. 03/13/2024   ,   Urinalysis:   Lab Results   Component Value Date    COLORU Yellow 03/13/2024    CLARITYU Clear 03/13/2024    SPECGRAV <=1.005 03/13/2024    PHUR 6.0 03/13/2024    LEUKOCYTESUR Negative 03/13/2024    NITRITE Negative 03/13/2024    GLUCOSEU Negative 03/13/2024    KETONESU Negative 03/13/2024    BILIRUBINUR " "Negative 03/13/2024    BLOODU Trace-Intact (A) 03/13/2024   ,   Urine Culture: No results found for: \"URINECX\",   Wound Culure:  No results found for: \"WOUNDCULT\"      Thank you for allowing me to participate in the care of Jf Valera. Please don't hesitate to call, text, email, or TigerText with any questions.     This text is generated with voice recognition software. There may be translation, syntax,  or grammatical errors. If you have any questions, please contact the dictating provider.    "

## 2024-03-18 NOTE — QUICK NOTE
Nurse-Patient-Provider rounds were completed with the patient's nurse today, Lars.    We discussed the plan is to continue to monitor on sips of liquids only following removal of NG tube earlier today.  The patient does appear to have some abdominal distention, but denies any significant pain or bloating.  He also denies any nausea or vomiting following NG tube removal and does note that he is still passing flatus and has had multiple bowel movements over the last 24 hours.  Consider advancement of diet pending stable or improved abdominal distention and continued bowel function.  In the interim, continue supportive care.  Right sided thoracostomy tube transition to waterseal earlier today with no escalating oxygen requirements or shortness of breath noted.  Continue to monitor chest tube output.  Continue heparin infusion at this time.  Antibiotic course completed with discontinuation of antibiotics earlier today.    Additionally, I did evaluate the patient suprapubic catheter site at the nurses request and there is a small clean pink healthy based wound adjacent to the suprapubic catheter with no surrounding skin changes, no tenderness and serous drainage only.  Continue to monitor the site.    We reviewed all of the invasive devices/lines/telemetry orders.  - Maintain right-sided thoracostomy tube.  - Maintain suprapubic catheter.    DVT Prophylaxis:  - Anticoagulated with heparin infusion.    Pain Assessment / Plan:  - Continue current analgesic regimen.    Mobility Assessment / Plan:  - Activity as tolerated.    Goals / Barriers for discharge:  - Not yet appropriate for discharge.  - Case management following; case and discharge needs discussed.    All questions and concerns were addressed.  I did update the patient's son-in-law, who was at bedside at the time of rounds.    I spent greater than 20 minutes reviewing the plan with the patient and the nurse, and coordinating care for the day.    Cale Henderson,  JONA  3/18/2024 2:41 PM

## 2024-03-18 NOTE — PROGRESS NOTES
Progress note - Palliative and Supportive Care   Jf Valera 88 y.o. male [id number]  Goals of care, counseling/discussion  Assessment & Plan  No limits on cares at this time       Code Status: Full- Level 1   Decisional apparatus:  Patient is competent on my exam today.  If competence is lost, patient's substitute decision maker would default to wife by [place].   Advance Directive / Living Will / POLST: No    Palliative care by specialist  Assessment & Plan  Provided supportive listening and normalized experience of patient/family    Our team will continue to be available as needed. Please do not hesitate to reach our on call provider via [employer] or through our clinic answering service at [phone] should you have questions or concerns.    Mesenteric ischemia (HCC)  Assessment & Plan  S/p ex lap 3/13, for repeat OR today with hopeful anastomosis and closure      NARRATIVE AND INTERVAL HISTORY:     Jf underwent small bowel anastomosis and abdominal closure on 3/14.  Postoperative course complicated by R hydropneumothorax on 3/17 status post IR chest tube placement.  He reports feeling well this morning.  He endorses right-sided chest discomfort with deep inspiration and denies abdominal pain.  He finds his current pain regimen effective and has had 4 bowel movements in the last 24 hours.  He had his NG tube removed and has not noted subsequent nausea.    MEDICATIONS / ALLERGIES:     all current active meds have been reviewed and current meds:   Current Facility-Administered Medications   Medication Dose Route Frequency    chlorhexidine (PERIDEX) 0.12 % oral rinse 15 mL  15 mL Mouth/Throat Q12H RONALD    guaiFENesin (ROBITUSSIN) oral liquid 400 mg  400 mg Oral Q12H RONALD    heparin (porcine) 25,000 units in 0.45% NaCl 250 mL infusion (premix)  3-30 Units/kg/hr (Order-Specific) Intravenous Titrated    HYDROmorphone (DILAUDID) injection 0.4 mg  0.4 mg Intravenous Q3H PRN    HYDROmorphone HCl (DILAUDID) injection  0.2 mg  0.2 mg Intravenous Q3H PRN    levalbuterol (XOPENEX) inhalation solution 1.25 mg  1.25 mg Nebulization TID    multi-electrolyte (PLASMALYTE-A/ISOLYTE-S PH 7.4) IV solution  75 mL/hr Intravenous Continuous    OLANZapine (ZyPREXA ZYDIS) dispersible tablet 5 mg  5 mg Oral HS    phenol (CHLORASEPTIC) 1.4 % mucosal liquid 1 spray  1 spray Mouth/Throat Q2H PRN    piperacillin-tazobactam (ZOSYN) 4.5 g in sodium chloride 0.9 % 100 mL IVPB (EXTENDED INFUSION)  4.5 g Intravenous Q8H    potassium chloride 20 mEq IVPB (premix)  20 mEq Intravenous TID With Meals    sodium chloride 3 % inhalation solution 4 mL  4 mL Nebulization TID       No Known Allergies    OBJECTIVE:    Physical Exam  Physical Exam  Constitutional:       Appearance: He is ill-appearing.   HENT:      Head: Normocephalic and atraumatic.   Eyes:      Conjunctiva/sclera: Conjunctivae normal.   Cardiovascular:      Rate and Rhythm: Normal rate.   Pulmonary:      Effort: Pulmonary effort is normal. No respiratory distress.   Skin:     General: Skin is warm and dry.   Neurological:      General: No focal deficit present.      Mental Status: He is alert.   Psychiatric:         Mood and Affect: Mood normal.         Behavior: Behavior normal.         Lab Results: I have personally reviewed pertinent labs., CBC:   Lab Results   Component Value Date    WBC 17.26 (H) 03/18/2024    HGB 10.1 (L) 03/18/2024    HCT 32.0 (L) 03/18/2024    MCV 89 03/18/2024     03/18/2024    RBC 3.61 (L) 03/18/2024    MCH 28.0 03/18/2024    MCHC 31.6 03/18/2024    RDW 15.3 (H) 03/18/2024    MPV 10.2 03/18/2024    NRBC 0 03/18/2024   , CMP:   Lab Results   Component Value Date    SODIUM 150 (H) 03/18/2024    K 2.8 (L) 03/18/2024     (H) 03/18/2024    CO2 24 03/18/2024    BUN 11 03/18/2024    CREATININE 0.79 03/18/2024    CALCIUM 8.3 (L) 03/18/2024    EGFR 80 03/18/2024     Imaging Studies: Reviewed  EKG, Pathology, and Other Studies: Reviewed    Counseling / Coordination of  Care    Total floor / unit time spent today 20 minutes. Greater than 50% of total time was spent with the patient and / or family counseling and / or coordination of care. A description of the counseling / coordination of care: Chart review, patient assessment, supportive listening, pt and family counseling, coordination with nursing.

## 2024-03-18 NOTE — PROGRESS NOTES
"Progress Note -General Surgery  Jf Valera 88 y.o. male MRN: [id number]  Unit/Bed#: [facility] 527-01 Encounter: [id number]    Assessment:  Patient is a 88 y.o. 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 chest tube placed. Respiratory status stable. Had BM.     Pertinent review of systems as above. All other review of systems negative.    Objective:    Blood pressure 108/57, pulse 78, temperature 98.6 °F (37 °C), temperature source Oral, resp. rate 20, height 5' 4\" (1.626 m), weight 59.4 kg (130 lb 15.3 oz), SpO2 98%.,Body mass index is 22.48 kg/m².      Intake/Output Summary (Last 24 hours) at 3/18/2024 0617  Last data filed at 3/18/2024 0218  Gross per 24 hour   Intake 1791.46 ml   Output 755 ml   Net 1036.46 ml       Invasive Devices       Peripheral Intravenous Line  Duration             Peripheral IV 03/13/24 Left;Upper;Ventral (anterior) Arm 4 days    Peripheral IV 03/17/24 Left;Proximal;Upper;Ventral (anterior) Arm 1 day    Peripheral IV 03/17/24 Right Antecubital <1 day    Peripheral IV 03/17/24 Right;Upper;Ventral (anterior) Arm <1 day              Drain  Duration             Suprapubic Catheter 18 Fr. 4 days    NG/OG/Enteral Tube 16 Fr Left nare 2 days    Chest Tube Right Pleural 10 Fr. <1 day                  Physical Exam:   Gen:  NAD.  HEENT: NCAT. MMM.  CV: well perfused, pulses palpable  Lungs: Normal respiratory effort  Abd: soft, nt/nd, incisions cdi  Skin: warm/ dry  Extremities: " no peripheral edema, no cyanosis  Neuro: AxO x3        I have personally reviewed pertinent films in PACS.

## 2024-03-19 ENCOUNTER — APPOINTMENT (INPATIENT)
Dept: RADIOLOGY | Facility: HOSPITAL | Age: 89
DRG: 329 | End: 2024-03-19

## 2024-03-19 ENCOUNTER — NURSE TRIAGE (OUTPATIENT)
Dept: SURGERY | Facility: CLINIC | Age: 89
End: 2024-03-19

## 2024-03-19 LAB
ANION GAP SERPL CALCULATED.3IONS-SCNC: 19 MMOL/L (ref 4–13)
APTT PPP: 69 SECONDS (ref 23–37)
APTT PPP: 78 SECONDS (ref 23–37)
BACTERIA BLD CULT: NORMAL
BACTERIA BLD CULT: NORMAL
BUN SERPL-MCNC: 9 MG/DL (ref 5–25)
CALCIUM SERPL-MCNC: 8.3 MG/DL (ref 8.4–10.2)
CHLORIDE SERPL-SCNC: 112 MMOL/L (ref 96–108)
CO2 SERPL-SCNC: 21 MMOL/L (ref 21–32)
CREAT SERPL-MCNC: 0.71 MG/DL (ref 0.6–1.3)
ERYTHROCYTE [DISTWIDTH] IN BLOOD BY AUTOMATED COUNT: 15.6 % (ref 11.6–15.1)
GFR SERPL CREATININE-BSD FRML MDRD: 84 ML/MIN/1.73SQ M
GLUCOSE SERPL-MCNC: 58 MG/DL (ref 65–140)
HCT VFR BLD AUTO: 32 % (ref 36.5–49.3)
HGB BLD-MCNC: 9.8 G/DL (ref 12–17)
MAGNESIUM SERPL-MCNC: 2 MG/DL (ref 1.9–2.7)
MCH RBC QN AUTO: 28.1 PG (ref 26.8–34.3)
MCHC RBC AUTO-ENTMCNC: 30.6 G/DL (ref 31.4–37.4)
MCV RBC AUTO: 92 FL (ref 82–98)
PHOSPHATE SERPL-MCNC: 2.2 MG/DL (ref 2.3–4.1)
PLATELET # BLD AUTO: 432 THOUSANDS/UL (ref 149–390)
PMV BLD AUTO: 10.3 FL (ref 8.9–12.7)
POTASSIUM SERPL-SCNC: 3.1 MMOL/L (ref 3.5–5.3)
RBC # BLD AUTO: 3.49 MILLION/UL (ref 3.88–5.62)
SODIUM SERPL-SCNC: 152 MMOL/L (ref 135–147)
WBC # BLD AUTO: 17.79 THOUSAND/UL (ref 4.31–10.16)

## 2024-03-19 PROCEDURE — 97530 THERAPEUTIC ACTIVITIES: CPT

## 2024-03-19 PROCEDURE — 99024 POSTOP FOLLOW-UP VISIT: CPT | Performed by: SURGERY

## 2024-03-19 PROCEDURE — 83735 ASSAY OF MAGNESIUM: CPT | Performed by: PHYSICIAN ASSISTANT

## 2024-03-19 PROCEDURE — 74022 RADEX COMPL AQT ABD SERIES: CPT

## 2024-03-19 PROCEDURE — 85730 THROMBOPLASTIN TIME PARTIAL: CPT | Performed by: SURGERY

## 2024-03-19 PROCEDURE — 71046 X-RAY EXAM CHEST 2 VIEWS: CPT

## 2024-03-19 PROCEDURE — 80048 BASIC METABOLIC PNL TOTAL CA: CPT | Performed by: PHYSICIAN ASSISTANT

## 2024-03-19 PROCEDURE — 85027 COMPLETE CBC AUTOMATED: CPT | Performed by: PHYSICIAN ASSISTANT

## 2024-03-19 PROCEDURE — NC001 PR NO CHARGE: Performed by: SURGERY

## 2024-03-19 PROCEDURE — 97116 GAIT TRAINING THERAPY: CPT

## 2024-03-19 PROCEDURE — 71045 X-RAY EXAM CHEST 1 VIEW: CPT

## 2024-03-19 PROCEDURE — 84100 ASSAY OF PHOSPHORUS: CPT | Performed by: PHYSICIAN ASSISTANT

## 2024-03-19 RX ORDER — POTASSIUM CHLORIDE 20 MEQ/1
40 TABLET, EXTENDED RELEASE ORAL ONCE
Status: COMPLETED | OUTPATIENT
Start: 2024-03-19 | End: 2024-03-19

## 2024-03-19 RX ORDER — POTASSIUM CHLORIDE 20 MEQ/1
20 TABLET, EXTENDED RELEASE ORAL ONCE
Status: COMPLETED | OUTPATIENT
Start: 2024-03-19 | End: 2024-03-19

## 2024-03-19 RX ADMIN — CHLORHEXIDINE GLUCONATE 15 ML: 1.2 SOLUTION ORAL at 09:10

## 2024-03-19 RX ADMIN — CHLORHEXIDINE GLUCONATE 15 ML: 1.2 SOLUTION ORAL at 21:24

## 2024-03-19 RX ADMIN — GUAIFENESIN 400 MG: 200 SOLUTION ORAL at 09:10

## 2024-03-19 RX ADMIN — HEPARIN SODIUM 25 UNITS/KG/HR: 10000 INJECTION, SOLUTION INTRAVENOUS at 02:30

## 2024-03-19 RX ADMIN — POTASSIUM & SODIUM PHOSPHATES POWDER PACK 280-160-250 MG 1 PACKET: 280-160-250 PACK at 09:10

## 2024-03-19 RX ADMIN — POTASSIUM CHLORIDE 20 MEQ: 1500 TABLET, EXTENDED RELEASE ORAL at 13:29

## 2024-03-19 RX ADMIN — POTASSIUM CHLORIDE 40 MEQ: 1500 TABLET, EXTENDED RELEASE ORAL at 09:10

## 2024-03-19 NOTE — PHYSICAL THERAPY NOTE
"   PT Treatment       03/19/24 1435   PT Last Visit   PT Visit Date 03/19/24   Note Type   Note Type Treatment   Pain Assessment   Pain Assessment Tool FLACC   Pain Rating: FLACC (Rest) - Face 0   Pain Rating: FLACC (Rest) - Legs 0   Pain Rating: FLACC (Rest) - Activity 0   Pain Rating: FLACC (Rest) - Cry 0   Pain Rating: FLACC (Rest) - Consolability 0   Score: FLACC (Rest) 0   Pain Rating: FLACC (Activity) - Face 0   Pain Rating: FLACC (Activity) - Legs 0   Pain Rating: FLACC (Activity) - Activity 0   Pain Rating: FLACC (Activity) - Cry 0   Pain Rating: FLACC (Activity) - Consolability 0   Score: FLACC (Activity) 0   Restrictions/Precautions   Other Precautions Fall Risk;Multiple lines  (gonzalez)   General   Chart Reviewed Yes   Response to Previous Treatment Patient with no complaints from previous session.   Family/Caregiver Present Yes   Cognition   Overall Cognitive Status WFL   Subjective   Subjective \"we can walk\"   Bed Mobility   Rolling R 5  Supervision   Additional items Bedrails   Rolling L 5  Supervision   Additional items Bedrails   Supine to Sit 5  Supervision   Additional items HOB elevated;Increased time required   Sit to Supine Unable to assess   Additional Comments patient is step down patient and requires increased time for management of lines pre/post/during mobility. received in bed on bed pan and performed rolling toward left and right sides S level w/ use of bed rail while dependent pier-hygiene care performed. patient encouraged to utlilize commode/toilet with staff rather than bed pan as he is mobile. S level for supine-->sit transfer. post treatment patient OOB in chair.   Transfers   Sit to Stand 5  Supervision   Additional items Increased time required;Verbal cues  (VC to push from bed rather than pull on RW.)   Stand to Sit 5  Supervision   Additional items Increased time required;Verbal cues   Additional Comments 1 sit<-->stand transfer with RW   Ambulation/Elevation   Gait pattern " Excessively slow;Decreased foot clearance   Gait Assistance 5  Supervision   Additional items Verbal cues;Assist x 1  (therapist managing lines)   Assistive Device Rolling walker   Distance 80 feet x 2 + 80 feet x 2   Ambulation/Elevation Additional Comments gait training with use of RW. no overt LOB. VC to encourage increased LE clearance.   Balance   Static Sitting Good   Static Standing Fair   Ambulatory Fair -   Activity Tolerance   Activity Tolerance Patient tolerated treatment well   Nurse Made Aware letty to see per RN Rimi   Assessment   Prognosis Good   Problem List Decreased strength;Decreased endurance;Impaired balance;Decreased mobility   Assessment PT initiated treatment session in order to assist patient in achieving goals to improve transfers, gait training, and overall activity tolerance. Patient demonstrated progress toward achieving functional mobility goals as evidenced by increased ambulatory distance and requiring less physical assistance. He performed bed mobility, sit<-->stand transfers, and ambulation at a supervision level with VC for safety. He walked  total distance of 320 feet with use of RW. Plan to trial stairs in future PT sessions as appropriate as well as progress from RW to SPC versus no AD. PT d/c recommendation remains for home with family. Will require RW for d/c home at this time. Patient will continue to benefit from continued skilled PT this admission to achieve maximal function and safety.   Goals   Patient Goals to walk   STG Expiration Date 03/31/24   PT Treatment Day 1   Plan   Treatment/Interventions Functional transfer training;LE strengthening/ROM;Elevations;Therapeutic exercise;Endurance training;Patient/family training;Equipment eval/education;Bed mobility;Gait training;OT;Spoke to nursing   Progress Progressing toward goals   PT Frequency 3-5x/wk   Discharge Recommendation   Rehab Resource Intensity Level, PT III (Minimum Resource Intensity)  (hhpt)   Equipment  Recommended (S)  Walker  (will require RW for d/c home)   Walker Package Recommended Wheeled walker   Change/add to Walker Package? No   AM-PAC Basic Mobility Inpatient   Turning in Flat Bed Without Bedrails 4   Lying on Back to Sitting on Edge of Flat Bed Without Bedrails 4   Moving Bed to Chair 3   Standing Up From Chair Using Arms 4   Walk in Room 3   Climb 3-5 Stairs With Railing 3   Basic Mobility Inpatient Raw Score 21   Basic Mobility Standardized Score 45.55   [facility] Highest Level Of Mobility   -HLM Goal 6: Walk 10 steps or more   -HLM Achieved 8: Walk 250 feet ot more       The patient's AM-PAC Basic Mobility Inpatient Standardized Score is greater than 42.9, suggesting this patient may benefit from discharge to home. Please also refer to the recommendation of the Physical Therapist for safe discharge planning.    JESSICA RENTERIA PT, DPT

## 2024-03-19 NOTE — CASE MANAGEMENT
Case Management Assessment & Discharge Planning Note    Patient name Jf Valera  Location [facility] 527/[facility] 527-01 MRN [id number]  : 1935 Date 3/19/2024       Current Admission Date: 3/13/2024  Current Admission Diagnosis:Portal vein thrombosis   Patient Active Problem List    Diagnosis Date Noted    Hemopneumothorax on right 2024    Palliative care by specialist 2024    Goals of care, counseling/discussion 2024    S/P exploratory laparotomy 2024    Mesenteric ischemia (HCC) 2024    Portal vein thrombosis 2024    H/O prostate cancer 2024    TIA (transient ischemic attack) 03/10/2023    Protein calorie malnutrition (HCC) 03/10/2023    Cataracts, bilateral 2013    HTN (hypertension) 2013      LOS (days): 6  Geometric Mean LOS (GMLOS) (days):   Days to GMLOS:     OBJECTIVE:    Risk of Unplanned Readmission Score: 19.06         Current admission status: Inpatient       Preferred Pharmacy:   [facility] [address] [place] [address] - [address]  [address]  [facility] [address]  Phone: [phone] Fax: [phone]    [facility] [place] - [address]  [address]  [place] [address]  Phone: [phone] Fax: [phone]    Primary Care Provider: No primary care provider on file.    Primary Insurance:   Secondary Insurance:     ASSESSMENT:  Active Health Care Proxies    There are no active Health Care Proxies on file.                 Readmission Root Cause  30 Day Readmission: No    Patient Information  Admitted from:: Home  Mental Status: Alert  During Assessment patient was accompanied by: Spouse, Son (patient deferred to son Zack at bedside to answer questions for this consultation)  Assessment information provided by:: Son  Support Systems: Self, Spouse/significant other, Family members  County of Residence: [place]  What city do you live in?: [place]  Type of Current Residence: [address] [place] [address]  home  Upon entering residence, is there a bedroom on the main floor (no further steps)?: No  A bedroom is located on the following floor levels of residence (select all that apply):: 2nd Floor  Upon entering residence, is there a bathroom on the main floor (no further steps)?: Yes (half bath)  Number of steps to 2nd floor from main floor: One Flight  Is patient a ?: No    Activities of Daily Living Prior to Admission  Functional Status: Independent  Completes ADLs independently?: Yes  Ambulates independently?: Yes  Does patient use assisted devices?: No  Does patient currently own DME?: No  Does patient have a history of Outpatient Therapy (PT/OT)?: No  Does the patient have a history of Short-Term Rehab?: No  Does patient have a history of HHC?: No         Patient Information Continued  Income Source: Other (Comment) (family supports)  Does patient have prescription coverage?: No  Does patient receive dialysis treatments?: No  Does patient have a history of substance abuse?: No  Does patient have a history of Mental Health Diagnosis?: No         Means of Transportation  Means of Transport to Appts:: Family transport      Social Determinants of Health (SDOH)      Flowsheet Row Most Recent Value   Housing Stability    In the last 12 months, was there a time when you were not able to pay the mortgage or rent on time? N   In the last 12 months, how many places have you lived? 1   In the last 12 months, was there a time when you did not have a steady place to sleep or slept in a shelter (including now)? N   Transportation Needs    In the past 12 months, has lack of transportation kept you from medical appointments or from getting medications? no   In the past 12 months, has lack of transportation kept you from meetings, work, or from getting things needed for daily living? No   Food Insecurity    Within the past 12 months, you worried that your food would run out before you got the money to buy more. Never true    Within the past 12 months, the food you bought just didn't last and you didn't have money to get more. Never true   Utilities    In the past 12 months has the electric, gas, oil, or water company threatened to shut off services in your home? No            DISCHARGE DETAILS:    Discharge planning discussed with:: Rajesh Dixon at bedside  Freedom of Choice: Yes        Additional Comments: Rajesh Dixon states patient and his wife live with their daughter and son in law [facility] [place] [phone].  Zack states patient has [place]  Care and [facility] is working on trying to get [place] Medicaid for the patient.  A VM was left for [facility] to get further details regarding [place] Medicaid and awaiting a call back.  P5 CM Avi Richardson was made aware of the insurance information issue for DC needs.  CM to be available

## 2024-03-19 NOTE — PROGRESS NOTES
03/19/24    Procedure: R Chest tube removal    Chest tube removed in routine fashion without incident.  The patient tolerated the procedure well. A dry, sterile dressing was placed. Will check a pa/lat chest x-ray.       Manfred Iraheta MD  General Surgery Resident

## 2024-03-19 NOTE — OCCUPATIONAL THERAPY NOTE
Occupational Therapy Cancel        Patient Name: Jf Valera  Today's Date: 3/19/2024       03/19/24 1336   OT Last Visit   OT Visit Date 03/19/24   Note Type   Note Type Treatment   Cancel Reasons Refusal       OT attempted to see pt for OT treatment however upon arrival, pt politely declining, reporting that he has not be able to rest today. Offered to downgrade functional activity however pt continuing to decline. Will continue to follow on caseload and see when able.    Brigette Worthy MS, OTR/L

## 2024-03-19 NOTE — TELEPHONE ENCOUNTER
EXPLORATORY LAPAROTOMY, WASHOUT. SMALL BOWEL ANASTOMOSIS, CONTROL OF LIVER BLEED, CLOSURE OF ABDOMINAL WOUND (Abdomen) -3/14/24, Dr. Lorenz.    [facility] Pharmacist calling to clarify Eliquis dose ordered by a Dr. Iraheta.  RN reviewed chart.  Pt had surgery on 3/14/24 with Dr. Lorenz.   While checking where Dr. Iraheta practices, pharmacist hung up.  RN was unable to get any more information prior to pharmacist ending call.

## 2024-03-19 NOTE — RESTORATIVE TECHNICIAN NOTE
Restorative Technician Note      Patient Name: Jf Valera     Deferring ambulation at this time, repositioned in marcin. Requesting follow up in PM.

## 2024-03-19 NOTE — RESTORATIVE TECHNICIAN NOTE
Restorative Technician Note      Patient Name: Jf Valera     Continues to defer ambulation at this time

## 2024-03-19 NOTE — PLAN OF CARE
Problem: PHYSICAL THERAPY ADULT  Goal: Performs mobility at highest level of function for planned discharge setting.  See evaluation for individualized goals.  Description: Treatment/Interventions: ADL retraining, Functional transfer training, LE strengthening/ROM, Therapeutic exercise, Endurance training, Patient/family training, Equipment eval/education, Bed mobility, Gait training, Spoke to nursing, Spoke to case management, OT, Elevations  Equipment Recommended: Walker       See flowsheet documentation for full assessment, interventions and recommendations.  Outcome: Progressing  Note: Prognosis: Good  Problem List: Decreased strength, Decreased endurance, Impaired balance, Decreased mobility  Assessment: PT initiated treatment session in order to assist patient in achieving goals to improve transfers, gait training, and overall activity tolerance. Patient demonstrated progress toward achieving functional mobility goals as evidenced by increased ambulatory distance and requiring less physical assistance. He performed bed mobility, sit<-->stand transfers, and ambulation at a supervision level with VC for safety. He walked  total distance of 320 feet with use of RW. Plan to trial stairs in future PT sessions as appropriate as well as progress from RW to SPC versus no AD. PT d/c recommendation remains for home with family. Will require RW for d/c home at this time. Patient will continue to benefit from continued skilled PT this admission to achieve maximal function and safety.        Rehab Resource Intensity Level, PT: III (Minimum Resource Intensity) (hhpt)    See flowsheet documentation for full assessment.

## 2024-03-19 NOTE — QUICK NOTE
Nurse-Patient-Provider rounds were completed with the patient's nurse today, Lars.    At the time my evaluation, the patient had just undergone right thoracostomy tube removal.  Though he did take clear liquids with some toast and crackers this morning, he felt full following the oral intake and continues to feel full at this time.  He did have some belching that resolved spontaneously and denies any nausea or vomiting.  Though he has had continued bowel function and has passed some flatus today, he has had increased abdominal bloating following the oral intake this morning.  On exam, his abdomen is increasingly distended and more tense than yesterday with minimal tenderness noted.    We discussed the plan is to discontinue his current diet and keep him n.p.o. except for sips of liquids for comfort and medications.  Continue IV fluids for hydration.  Continue to closely monitor abdominal exam and for ongoing bowel function.  In addition to obtaining PA and lateral chest x-ray for reassessment after thoracostomy tube removal, will also include obstruction series for evaluation of bowel gas pattern.  Continue heparin infusion for anticoagulation with eventual transition to oral anticoagulation for discharge.    We reviewed all of the invasive devices/lines/telemetry orders.  - Right-sided thoracostomy tube removed this morning.  - Maintain suprapubic catheter indefinitely.    DVT Prophylaxis:  - Anticoagulated with heparin infusion.    Pain Assessment / Plan:  - Continue current analgesic regimen.    Mobility Assessment / Plan:  - Activity as tolerate.    Goals / Barriers for discharge:  - Not yet appropriate for discharge.  - Case management following; case and discharge needs discussed.    All questions and concerns were addressed.    I spent greater than 20 minutes reviewing the plan with the patient and the nurse, and coordinating care for the day.    Cale Henderson PA-C  3/19/2024 10:59 AM

## 2024-03-19 NOTE — RESPIRATORY THERAPY NOTE
03/18/24 1949   Inhalation Therapy Tx   $ Inhalation Therapy Performed Yes   $ Pulse Oximetry Spot Check Charge Completed   SpO2 95 %   Pre-Treatment Pulse 80   Pre-Treatment Respirations 20   Duration 15   Breath Sounds Pre-Treatment Bilateral Clear   Post-Treatment Pulse 80   Post-Treatment Respirations 20   Delivery Source Oxygen;UDN   Position High Gandhi's   Treatment Tolerance Tolerated well   Resp Comments Will d/c xopenex, 3%, and vest therapy. Pt has had clear/diminshed breath sounds >24hrs.

## 2024-03-19 NOTE — PROGRESS NOTES
"Progress Note -General Surgery  Jf Valera 88 y.o. male MRN: [id number]  Unit/Bed#: [facility] 527-01 Encounter: [id number]    Assessment:  Patient is a 88 y.o. 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 Tolerating sips. Having bowel function. Has been OOB.    Pertinent review of systems as above. All other review of systems negative.    Objective:    Blood pressure 151/79, pulse 78, temperature 98.6 °F (37 °C), temperature source Oral, resp. rate 20, height 5' 4\" (1.626 m), weight 59.4 kg (130 lb 15.3 oz), SpO2 95%.,Body mass index is 22.48 kg/m².      Intake/Output Summary (Last 24 hours) at 3/19/2024 0542  Last data filed at 3/18/2024 2000  Gross per 24 hour   Intake 2248.02 ml   Output 775 ml   Net 1473.02 ml       Invasive Devices       Peripheral Intravenous Line  Duration             Peripheral IV 03/17/24 Left;Proximal;Upper;Ventral (anterior) Arm 2 days    Peripheral IV 03/17/24 Right Antecubital 1 day    Peripheral IV 03/17/24 Right;Upper;Ventral (anterior) Arm 1 day              Drain  Duration             Suprapubic Catheter 18 Fr. 5 days    Chest Tube Right Pleural 10 Fr. 1 day                  Physical Exam:   Gen:  NAD.  HEENT: NCAT. MMM.  CV: well perfused, pulses palpable.  Lungs: Normal respiratory effort. Chest tube intact  Abd: soft, nt/nd, incisions cdi.  Skin: warm/ dry.  Extremities: no peripheral edema, no cyanosis.  Neuro: AxO x3.        I have personally reviewed labs and pertinent films " in PACS.

## 2024-03-20 LAB
ANION GAP SERPL CALCULATED.3IONS-SCNC: 11 MMOL/L (ref 4–13)
ANION GAP SERPL CALCULATED.3IONS-SCNC: 15 MMOL/L (ref 4–13)
BASOPHILS # BLD AUTO: 0.02 THOUSANDS/ÂΜL (ref 0–0.1)
BASOPHILS NFR BLD AUTO: 0 % (ref 0–1)
BUN SERPL-MCNC: 7 MG/DL (ref 5–25)
BUN SERPL-MCNC: 9 MG/DL (ref 5–25)
CA-I BLD-SCNC: 1.11 MMOL/L (ref 1.12–1.32)
CALCIUM SERPL-MCNC: 8 MG/DL (ref 8.4–10.2)
CALCIUM SERPL-MCNC: 8 MG/DL (ref 8.4–10.2)
CHLORIDE SERPL-SCNC: 112 MMOL/L (ref 96–108)
CHLORIDE SERPL-SCNC: 113 MMOL/L (ref 96–108)
CO2 SERPL-SCNC: 24 MMOL/L (ref 21–32)
CO2 SERPL-SCNC: 24 MMOL/L (ref 21–32)
CREAT SERPL-MCNC: 0.48 MG/DL (ref 0.6–1.3)
CREAT SERPL-MCNC: 0.62 MG/DL (ref 0.6–1.3)
EOSINOPHIL # BLD AUTO: 0.17 THOUSAND/ÂΜL (ref 0–0.61)
EOSINOPHIL NFR BLD AUTO: 1 % (ref 0–6)
ERYTHROCYTE [DISTWIDTH] IN BLOOD BY AUTOMATED COUNT: 15.6 % (ref 11.6–15.1)
GFR SERPL CREATININE-BSD FRML MDRD: 89 ML/MIN/1.73SQ M
GFR SERPL CREATININE-BSD FRML MDRD: 99 ML/MIN/1.73SQ M
GLUCOSE SERPL-MCNC: 102 MG/DL (ref 65–140)
GLUCOSE SERPL-MCNC: 79 MG/DL (ref 65–140)
HCT VFR BLD AUTO: 31.3 % (ref 36.5–49.3)
HGB BLD-MCNC: 9.3 G/DL (ref 12–17)
IMM GRANULOCYTES # BLD AUTO: 0.28 THOUSAND/UL (ref 0–0.2)
IMM GRANULOCYTES NFR BLD AUTO: 2 % (ref 0–2)
LYMPHOCYTES # BLD AUTO: 1.29 THOUSANDS/ÂΜL (ref 0.6–4.47)
LYMPHOCYTES NFR BLD AUTO: 8 % (ref 14–44)
MAGNESIUM SERPL-MCNC: 1.9 MG/DL (ref 1.9–2.7)
MAGNESIUM SERPL-MCNC: 2.3 MG/DL (ref 1.9–2.7)
MCH RBC QN AUTO: 27.6 PG (ref 26.8–34.3)
MCHC RBC AUTO-ENTMCNC: 29.7 G/DL (ref 31.4–37.4)
MCV RBC AUTO: 93 FL (ref 82–98)
MONOCYTES # BLD AUTO: 0.99 THOUSAND/ÂΜL (ref 0.17–1.22)
MONOCYTES NFR BLD AUTO: 6 % (ref 4–12)
NEUTROPHILS # BLD AUTO: 13.45 THOUSANDS/ÂΜL (ref 1.85–7.62)
NEUTS SEG NFR BLD AUTO: 83 % (ref 43–75)
NRBC BLD AUTO-RTO: 0 /100 WBCS
PHOSPHATE SERPL-MCNC: 1.9 MG/DL (ref 2.3–4.1)
PHOSPHATE SERPL-MCNC: 2.1 MG/DL (ref 2.3–4.1)
PLATELET # BLD AUTO: 444 THOUSANDS/UL (ref 149–390)
PMV BLD AUTO: 10.4 FL (ref 8.9–12.7)
POTASSIUM SERPL-SCNC: 2.9 MMOL/L (ref 3.5–5.3)
POTASSIUM SERPL-SCNC: 3.8 MMOL/L (ref 3.5–5.3)
RBC # BLD AUTO: 3.37 MILLION/UL (ref 3.88–5.62)
SODIUM SERPL-SCNC: 148 MMOL/L (ref 135–147)
SODIUM SERPL-SCNC: 151 MMOL/L (ref 135–147)
WBC # BLD AUTO: 16.2 THOUSAND/UL (ref 4.31–10.16)

## 2024-03-20 PROCEDURE — 84100 ASSAY OF PHOSPHORUS: CPT | Performed by: SURGERY

## 2024-03-20 PROCEDURE — 99024 POSTOP FOLLOW-UP VISIT: CPT | Performed by: SURGERY

## 2024-03-20 PROCEDURE — 80048 BASIC METABOLIC PNL TOTAL CA: CPT

## 2024-03-20 PROCEDURE — 84100 ASSAY OF PHOSPHORUS: CPT

## 2024-03-20 PROCEDURE — 83735 ASSAY OF MAGNESIUM: CPT | Performed by: SURGERY

## 2024-03-20 PROCEDURE — 85025 COMPLETE CBC W/AUTO DIFF WBC: CPT

## 2024-03-20 PROCEDURE — 80048 BASIC METABOLIC PNL TOTAL CA: CPT | Performed by: SURGERY

## 2024-03-20 PROCEDURE — 97535 SELF CARE MNGMENT TRAINING: CPT

## 2024-03-20 PROCEDURE — 97530 THERAPEUTIC ACTIVITIES: CPT

## 2024-03-20 PROCEDURE — 82330 ASSAY OF CALCIUM: CPT | Performed by: SURGERY

## 2024-03-20 PROCEDURE — 83735 ASSAY OF MAGNESIUM: CPT

## 2024-03-20 RX ORDER — POTASSIUM CHLORIDE 20 MEQ/1
40 TABLET, EXTENDED RELEASE ORAL ONCE
Status: DISCONTINUED | OUTPATIENT
Start: 2024-03-20 | End: 2024-03-20

## 2024-03-20 RX ORDER — LANOLIN ALCOHOL/MO/W.PET/CERES
400 CREAM (GRAM) TOPICAL ONCE
Status: COMPLETED | OUTPATIENT
Start: 2024-03-20 | End: 2024-03-20

## 2024-03-20 RX ORDER — POTASSIUM CHLORIDE 20 MEQ/1
40 TABLET, EXTENDED RELEASE ORAL ONCE
Status: COMPLETED | OUTPATIENT
Start: 2024-03-20 | End: 2024-03-20

## 2024-03-20 RX ORDER — POTASSIUM CHLORIDE 20 MEQ/1
20 TABLET, EXTENDED RELEASE ORAL
Status: DISCONTINUED | OUTPATIENT
Start: 2024-03-20 | End: 2024-03-20

## 2024-03-20 RX ORDER — OXYCODONE HYDROCHLORIDE 5 MG/1
5 TABLET ORAL EVERY 4 HOURS PRN
Status: DISCONTINUED | OUTPATIENT
Start: 2024-03-20 | End: 2024-03-26 | Stop reason: HOSPADM

## 2024-03-20 RX ORDER — DEXTROSE MONOHYDRATE, SODIUM CHLORIDE, AND POTASSIUM CHLORIDE 50; 1.49; 4.5 G/1000ML; G/1000ML; G/1000ML
75 INJECTION, SOLUTION INTRAVENOUS CONTINUOUS
Status: DISCONTINUED | OUTPATIENT
Start: 2024-03-20 | End: 2024-03-22

## 2024-03-20 RX ORDER — HYDROMORPHONE HCL IN WATER/PF 6 MG/30 ML
0.2 PATIENT CONTROLLED ANALGESIA SYRINGE INTRAVENOUS
Status: DISCONTINUED | OUTPATIENT
Start: 2024-03-20 | End: 2024-03-26 | Stop reason: HOSPADM

## 2024-03-20 RX ORDER — POTASSIUM CHLORIDE 14.9 MG/ML
20 INJECTION INTRAVENOUS 3 TIMES DAILY
Status: DISCONTINUED | OUTPATIENT
Start: 2024-03-20 | End: 2024-03-20

## 2024-03-20 RX ORDER — POTASSIUM CHLORIDE 14.9 MG/ML
20 INJECTION INTRAVENOUS
Qty: 200 ML | Refills: 0 | Status: COMPLETED | OUTPATIENT
Start: 2024-03-20 | End: 2024-03-20

## 2024-03-20 RX ORDER — CALCIUM GLUCONATE 20 MG/ML
2 INJECTION, SOLUTION INTRAVENOUS ONCE
Status: COMPLETED | OUTPATIENT
Start: 2024-03-20 | End: 2024-03-20

## 2024-03-20 RX ORDER — MAGNESIUM SULFATE HEPTAHYDRATE 40 MG/ML
2 INJECTION, SOLUTION INTRAVENOUS ONCE
Status: COMPLETED | OUTPATIENT
Start: 2024-03-20 | End: 2024-03-20

## 2024-03-20 RX ADMIN — GUAIFENESIN 400 MG: 200 SOLUTION ORAL at 08:13

## 2024-03-20 RX ADMIN — POTASSIUM PHOSPHATE, MONOBASIC POTASSIUM PHOSPHATE, DIBASIC 21 MMOL: 224; 236 INJECTION, SOLUTION, CONCENTRATE INTRAVENOUS at 08:36

## 2024-03-20 RX ADMIN — POTASSIUM CHLORIDE 20 MEQ: 14.9 INJECTION, SOLUTION INTRAVENOUS at 14:36

## 2024-03-20 RX ADMIN — MAGNESIUM OXIDE TAB 400 MG (241.3 MG ELEMENTAL MG) 400 MG: 400 (241.3 MG) TAB at 08:13

## 2024-03-20 RX ADMIN — HEPARIN SODIUM 25 UNITS/KG/HR: 10000 INJECTION, SOLUTION INTRAVENOUS at 19:55

## 2024-03-20 RX ADMIN — DEXTROSE, SODIUM CHLORIDE, AND POTASSIUM CHLORIDE 75 ML/HR: 5; .45; .15 INJECTION INTRAVENOUS at 20:34

## 2024-03-20 RX ADMIN — POTASSIUM CHLORIDE 40 MEQ: 1500 TABLET, EXTENDED RELEASE ORAL at 08:13

## 2024-03-20 RX ADMIN — DEXTROSE, SODIUM CHLORIDE, AND POTASSIUM CHLORIDE 75 ML/HR: 5; .45; .15 INJECTION INTRAVENOUS at 07:59

## 2024-03-20 RX ADMIN — OLANZAPINE 5 MG: 5 TABLET, ORALLY DISINTEGRATING ORAL at 21:50

## 2024-03-20 RX ADMIN — SODIUM CHLORIDE, SODIUM GLUCONATE, SODIUM ACETATE, POTASSIUM CHLORIDE, MAGNESIUM CHLORIDE, SODIUM PHOSPHATE, DIBASIC, AND POTASSIUM PHOSPHATE 75 ML/HR: .53; .5; .37; .037; .03; .012; .00082 INJECTION, SOLUTION INTRAVENOUS at 00:57

## 2024-03-20 RX ADMIN — CALCIUM GLUCONATE 2 G: 20 INJECTION, SOLUTION INTRAVENOUS at 19:57

## 2024-03-20 RX ADMIN — HEPARIN SODIUM 25 UNITS/KG/HR: 10000 INJECTION, SOLUTION INTRAVENOUS at 00:37

## 2024-03-20 RX ADMIN — POTASSIUM PHOSPHATE, MONOBASIC POTASSIUM PHOSPHATE, DIBASIC 9 MMOL: 224; 236 INJECTION, SOLUTION, CONCENTRATE INTRAVENOUS at 19:55

## 2024-03-20 RX ADMIN — GUAIFENESIN 400 MG: 200 SOLUTION ORAL at 21:50

## 2024-03-20 RX ADMIN — MAGNESIUM SULFATE HEPTAHYDRATE 2 G: 40 INJECTION, SOLUTION INTRAVENOUS at 10:30

## 2024-03-20 RX ADMIN — CHLORHEXIDINE GLUCONATE 15 ML: 1.2 SOLUTION ORAL at 21:50

## 2024-03-20 RX ADMIN — CHLORHEXIDINE GLUCONATE 15 ML: 1.2 SOLUTION ORAL at 08:13

## 2024-03-20 RX ADMIN — POTASSIUM CHLORIDE 20 MEQ: 14.9 INJECTION, SOLUTION INTRAVENOUS at 12:07

## 2024-03-20 NOTE — PLAN OF CARE
Problem: OCCUPATIONAL THERAPY ADULT  Goal: Performs self-care activities at highest level of function for planned discharge setting.  See evaluation for individualized goals.  Description: Treatment Interventions: ADL retraining, Functional transfer training, UE strengthening/ROM, Endurance training, Cognitive reorientation, Patient/family training, Equipment evaluation/education, Compensatory technique education, Continued evaluation, Energy conservation, Activityengagement          See flowsheet documentation for full assessment, interventions and recommendations.   Outcome: Progressing  Note: Limitation: Decreased ADL status, Decreased endurance, Decreased Safe judgement during ADL, Decreased self-care trans, Decreased high-level ADLs  Prognosis: Fair  Assessment: Pt seen on this date for OT session focusing on ADL retraining, cognitive reorientation, body mechanics, transfer retraining, increasing activity tolerance/endurance and EOB sitting to increase ability to participate in ADL/functional tasks. Pt was found in bathroom and was left in chair w/ all needs within reach, chair alarm on. Pt completed toilet transfers w min A from commode. Stood at sink about 10 mins for adl tasks, rq min A for UB bathing/S for UB dressing, Mod A for LB bathing, max A for LB dressing. Pt w abdomen staples w oozing, RN aware. Pt w/ improvements in adl task completion, endurance, however is still limited 2* decreased ADL/High-level ADL status, decreased activity tolerance/endurance, decreased cognition, decreased self-care trans, decreased safety awareness and insight to condition.   The patient's raw score on the -PAC Daily Activity Inpatient Short Form is 20. A raw score of greater than or equal to 19 suggests the patient may benefit from discharge to home. Please refer to the recommendation of the Occupational Therapist for safe discharge planning. Pt will continue to benefit from acute OT services to meet goals.     Rehab  Resource Intensity Level, OT: III (Minimum Resource Intensity)

## 2024-03-20 NOTE — PROGRESS NOTES
"Progress Note -General Surgery  Jf Valera 88 y.o. male MRN: [id number]  Unit/Bed#: [facility] 527-01 Encounter: [id number]    Assessment:  Patient is a 88 y.o. 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 Flatus and BM. Distended yesterday. Tolerating diet.    Pertinent review of systems as above. All other review of systems negative.    Objective:    Blood pressure 148/69, pulse 82, temperature 99 °F (37.2 °C), temperature source Oral, resp. rate 20, height 5' 4\" (1.626 m), weight 59.4 kg (130 lb 15.3 oz), SpO2 95%.,Body mass index is 22.48 kg/m².      Intake/Output Summary (Last 24 hours) at 3/20/2024 0643  Last data filed at 3/20/2024 0600  Gross per 24 hour   Intake 1633.75 ml   Output 925 ml   Net 708.75 ml       Invasive Devices       Peripheral Intravenous Line  Duration             Peripheral IV 03/17/24 Left;Proximal;Upper;Ventral (anterior) Arm 3 days    Peripheral IV 03/17/24 Right Antecubital 2 days    Peripheral IV 03/17/24 Right;Upper;Ventral (anterior) Arm 2 days              Drain  Duration             Suprapubic Catheter 18 Fr. 6 days    Chest Tube Right Pleural 10 Fr. 2 days                  Physical Exam:   Gen:  NAD.  HEENT: NCAT. MMM.  CV: well perfused, pulses palpable  Lungs: Normal respiratory effort  Abd: soft, nt/ mild distension incisions cdi  Skin: warm/ dry  Extremities: no peripheral edema, no cyanosis  Neuro: AxO x3          I have personally reviewed labs and pertinent films in PACS.    "

## 2024-03-20 NOTE — OCCUPATIONAL THERAPY NOTE
Occupational Therapy Progress Note     Patient Name: Jf Valera  Today's Date: 3/20/2024  Problem List  Principal Problem:    Portal vein thrombosis  Active Problems:    HTN (hypertension)    S/P exploratory laparotomy    Mesenteric ischemia (HCC)    H/O prostate cancer    Palliative care by specialist    Goals of care, counseling/discussion    Hemopneumothorax on right            03/20/24 0956   OT Last Visit   OT Visit Date 03/20/24   Note Type   Note Type Treatment   Pain Assessment   Pain Assessment Tool 0-10   Pain Score No Pain   Restrictions/Precautions   Weight Bearing Precautions Per Order No   Other Precautions Multiple lines;Telemetry;Fall Risk;Chair Alarm;Bed Alarm  (abdomen stapled shut, was leaking during todays session. RN aware)   ADL   Where Assessed Standing at sink   UB Bathing Assistance 4  Minimal Assistance   UB Bathing Deficit Setup;Increased time to complete;Chest;Right arm;Left arm;Abdomen   UB Bathing Comments min A for abdomen 2' staples   LB Bathing Assistance 3  Moderate Assistance   LB Bathing Deficit Perineal area;Buttocks;Right upper leg;Left upper leg;Right lower leg including foot;Left lower leg including foot   LB Bathing Comments mod A for lower LE and feet   UB Dressing Assistance 5  Supervision/Setup   UB Dressing Deficit Thread LUE;Thread RUE   LB Dressing Assistance 2  Maximal Assistance   LB Dressing Deficit Don/doff L sock;Don/doff R sock   Transfers   Sit to Stand 5  Supervision   Stand to Sit 5  Supervision   Toilet transfer 4  Minimal assistance   Additional items Commode   Additional Comments rw. pt impulsive at times and requires vc to have rw w him at all times.   Functional Mobility   Functional Mobility 4  Minimal assistance   Additional Comments ax1,EOB<>Bath   Additional items Rolling walker   Toilet Transfers   Toilet Transfer From Rolling walker   Toilet Transfer Type To and from   Toilet Transfer to Standard bedside commode   Toilet Transfer Technique  Ambulating   Toilet Transfers Minimal assistance   Cognition   Overall Cognitive Status WFL   Arousal/Participation Alert;Responsive   Attention Within functional limits   Orientation Level Oriented X4   Memory Decreased recall of precautions   Following Commands Follows one step commands without difficulty   Comments pt cooperative, minimally impulsive at times with RW   Activity Tolerance   Activity Tolerance Patient tolerated treatment well   Medical Staff Made Aware RN aware   Assessment   Assessment Pt seen on this date for OT session focusing on ADL retraining, cognitive reorientation, body mechanics, transfer retraining, increasing activity tolerance/endurance and EOB sitting to increase ability to participate in ADL/functional tasks. Pt was found in bathroom and was left in chair w/ all needs within reach, chair alarm on. Pt completed toilet transfers w min A from commode. Stood at sink about 10 mins for adl tasks, rq min A for UB bathing/S for UB dressing, Mod A for LB bathing, max A for LB dressing. Pt w abdomen staples w oozing, RN aware. Pt w/ improvements in adl task completion, endurance, however is still limited 2* decreased ADL/High-level ADL status, decreased activity tolerance/endurance, decreased cognition, decreased self-care trans, decreased safety awareness and insight to condition.   The patient's raw score on the AM-PAC Daily Activity Inpatient Short Form is 20. A raw score of greater than or equal to 19 suggests the patient may benefit from discharge to home. Please refer to the recommendation of the Occupational Therapist for safe discharge planning. Pt will continue to benefit from acute OT services to meet goals.   Plan   Treatment Interventions ADL retraining;Functional transfer training;Endurance training;Patient/family training;Compensatory technique education;Energy conservation;Activityengagement   Goal Expiration Date 03/31/24   OT Treatment Day 2   OT Frequency 2-3x/wk   Discharge  Recommendation   Rehab Resource Intensity Level, OT III (Minimum Resource Intensity)   AM-PAC Daily Activity Inpatient   Lower Body Dressing 2   Bathing 3   Toileting 3   Upper Body Dressing 4   Grooming 4   Eating 4   Daily Activity Raw Score 20   Daily Activity Standardized Score (Calc for Raw Score >=11) 42.03   AM-PAC Applied Cognition Inpatient   Following a Speech/Presentation 4   Understanding Ordinary Conversation 4   Taking Medications 3   Remembering Where Things Are Placed or Put Away 3   Remembering List of 4-5 Errands 3   Taking Care of Complicated Tasks 3   Applied Cognition Raw Score 20   Applied Cognition Standardized Score 41.76   Modified Reymundo Scale   Modified Wayne Scale 4   End of Consult   Education Provided Yes   Patient Position at End of Consult Bedside chair;Bed/Chair alarm activated;All needs within reach   Nurse Communication Nurse aware of consult       CHIN Resendiz, OTR/L

## 2024-03-20 NOTE — QUICK NOTE
Nurse-Patient-Provider rounds were completed with the patient's nurse today, Lars.    We discussed the plan is to continue conservative management and diet restriction. Continue current level of care. With the patient's improved abdominal exam overnight, he was allowed clear liquid diet again this morning.  Following minimal intake, he again felt full quickly and bloated.  At the time my evaluation, his abdominal exam was again noted to have increased distention and tenderness.  The patient denied any nausea or belching/hiccuping at this time and only notes minimal abdominal pain.    Patient transition back to n.p.o. status except for sips with liquids.  He has had persistent hypokalemia and though oral potassium supplementation was administered, it is unlikely that he is adequately absorbing oral medication intake at this time.  Additional electrolyte repletion includes potassium phosphate and will add both IV magnesium and IV potassium supplementation with repeat labs later today.  Continue to monitor abdominal exam and for ongoing bowel function.    We reviewed all of the invasive devices/lines/telemetry orders.  - Maintain suprapubic catheter.    DVT Prophylaxis:  - Anticoagulated with heparin infusion.    Pain Assessment / Plan:  - Continue current analgesic regimen.    Mobility Assessment / Plan:  - Activity as tolerated.    Goals / Barriers for discharge:  - Not yet appropriate for discharge due to inability to tolerate oral intake and transition to oral medications.  - Case management following; case and discharge needs discussed.    All questions and concerns were addressed.    I spent greater than 25 minutes reviewing the plan with the patient and the nurse, and coordinating care for the day.    Cale Henderson PA-C  3/20/2024 08:14 AM

## 2024-03-21 LAB
ANION GAP SERPL CALCULATED.3IONS-SCNC: 13 MMOL/L (ref 4–13)
APTT PPP: 77 SECONDS (ref 23–37)
BUN SERPL-MCNC: 6 MG/DL (ref 5–25)
CALCIUM SERPL-MCNC: 8.3 MG/DL (ref 8.4–10.2)
CHLORIDE SERPL-SCNC: 111 MMOL/L (ref 96–108)
CO2 SERPL-SCNC: 24 MMOL/L (ref 21–32)
CREAT SERPL-MCNC: 0.63 MG/DL (ref 0.6–1.3)
ERYTHROCYTE [DISTWIDTH] IN BLOOD BY AUTOMATED COUNT: 16 % (ref 11.6–15.1)
GFR SERPL CREATININE-BSD FRML MDRD: 88 ML/MIN/1.73SQ M
GLUCOSE SERPL-MCNC: 123 MG/DL (ref 65–140)
HCT VFR BLD AUTO: 35.9 % (ref 36.5–49.3)
HGB BLD-MCNC: 10.8 G/DL (ref 12–17)
MAGNESIUM SERPL-MCNC: 2.1 MG/DL (ref 1.9–2.7)
MCH RBC QN AUTO: 27.8 PG (ref 26.8–34.3)
MCHC RBC AUTO-ENTMCNC: 30.1 G/DL (ref 31.4–37.4)
MCV RBC AUTO: 93 FL (ref 82–98)
PHOSPHATE SERPL-MCNC: 2.6 MG/DL (ref 2.3–4.1)
PLATELET # BLD AUTO: 526 THOUSANDS/UL (ref 149–390)
PMV BLD AUTO: 10.6 FL (ref 8.9–12.7)
POTASSIUM SERPL-SCNC: 3.8 MMOL/L (ref 3.5–5.3)
RBC # BLD AUTO: 3.88 MILLION/UL (ref 3.88–5.62)
SODIUM SERPL-SCNC: 148 MMOL/L (ref 135–147)
WBC # BLD AUTO: 18.87 THOUSAND/UL (ref 4.31–10.16)

## 2024-03-21 PROCEDURE — 85730 THROMBOPLASTIN TIME PARTIAL: CPT | Performed by: SURGERY

## 2024-03-21 PROCEDURE — 99024 POSTOP FOLLOW-UP VISIT: CPT | Performed by: SURGERY

## 2024-03-21 PROCEDURE — 02HV33Z INSERTION OF INFUSION DEVICE INTO SUPERIOR VENA CAVA, PERCUTANEOUS APPROACH: ICD-10-PCS | Performed by: SURGERY

## 2024-03-21 PROCEDURE — 85027 COMPLETE CBC AUTOMATED: CPT | Performed by: PHYSICIAN ASSISTANT

## 2024-03-21 PROCEDURE — C1751 CATH, INF, PER/CENT/MIDLINE: HCPCS

## 2024-03-21 PROCEDURE — 36569 INSJ PICC 5 YR+ W/O IMAGING: CPT

## 2024-03-21 PROCEDURE — 84100 ASSAY OF PHOSPHORUS: CPT | Performed by: SURGERY

## 2024-03-21 PROCEDURE — 83735 ASSAY OF MAGNESIUM: CPT | Performed by: SURGERY

## 2024-03-21 PROCEDURE — 80048 BASIC METABOLIC PNL TOTAL CA: CPT | Performed by: SURGERY

## 2024-03-21 RX ORDER — CALCIUM GLUCONATE 20 MG/ML
1 INJECTION, SOLUTION INTRAVENOUS ONCE
Status: COMPLETED | OUTPATIENT
Start: 2024-03-21 | End: 2024-03-21

## 2024-03-21 RX ADMIN — HEPARIN SODIUM 25 UNITS/KG/HR: 10000 INJECTION, SOLUTION INTRAVENOUS at 12:31

## 2024-03-21 RX ADMIN — DEXTROSE, SODIUM CHLORIDE, AND POTASSIUM CHLORIDE 75 ML/HR: 5; .45; .15 INJECTION INTRAVENOUS at 23:43

## 2024-03-21 RX ADMIN — CALCIUM GLUCONATE 1 G: 20 INJECTION, SOLUTION INTRAVENOUS at 12:31

## 2024-03-21 RX ADMIN — DEXTROSE, SODIUM CHLORIDE, AND POTASSIUM CHLORIDE 75 ML/HR: 5; .45; .15 INJECTION INTRAVENOUS at 09:42

## 2024-03-21 RX ADMIN — OLANZAPINE 5 MG: 5 TABLET, ORALLY DISINTEGRATING ORAL at 21:36

## 2024-03-21 RX ADMIN — GUAIFENESIN 400 MG: 200 SOLUTION ORAL at 21:36

## 2024-03-21 RX ADMIN — Medication: at 21:26

## 2024-03-21 RX ADMIN — GUAIFENESIN 400 MG: 200 SOLUTION ORAL at 10:00

## 2024-03-21 RX ADMIN — CHLORHEXIDINE GLUCONATE 15 ML: 1.2 SOLUTION ORAL at 10:00

## 2024-03-21 RX ADMIN — CHLORHEXIDINE GLUCONATE 15 ML: 1.2 SOLUTION ORAL at 21:36

## 2024-03-21 NOTE — RESTORATIVE TECHNICIAN NOTE
Restorative Technician Note      Patient Name: Jf VIEIRA Soto     Currently deferring OOB or ambulation open to follow up in PM

## 2024-03-21 NOTE — PROGRESS NOTES
"Progress Note -General Surgery  Jf Valera 88 y.o. male MRN: [id number]  Unit/Bed#: [facility] 527-01 Encounter: [id number]    Assessment:  Patient is a 88 y.o. 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 Having bowel function. Distenstion yesterday after eating.    Pertinent review of systems as above. All other review of systems negative.    Objective:    Blood pressure 126/63, pulse 78, temperature 97.6 °F (36.4 °C), temperature source Oral, resp. rate 20, height 5' 4\" (1.626 m), weight 60.3 kg (132 lb 15 oz), SpO2 100%.,Body mass index is 22.82 kg/m².      Intake/Output Summary (Last 24 hours) at 3/21/2024 0849  Last data filed at 3/21/2024 0618  Gross per 24 hour   Intake 2063.14 ml   Output 1125 ml   Net 938.14 ml       Invasive Devices       Peripheral Intravenous Line  Duration             Peripheral IV 03/17/24 Left;Proximal;Upper;Ventral (anterior) Arm 4 days    Peripheral IV 03/17/24 Right;Upper;Ventral (anterior) Arm 3 days    Peripheral IV 03/21/24 Right Antecubital <1 day              Drain  Duration             Suprapubic Catheter 18 Fr. 7 days                  Physical Exam:   Gen:  NAD  HEENT: NCAT. MMM.  CV: well perfused, pulses palpable.  Lungs: Normal respiratory effort.  Abd: soft, nt/ mild distension incisions cdi.  Skin: warm/ dry.  Extremities: no peripheral edema, no cyanosis  Neuro: AxO x3.        I have personally reviewed labs and pertinent films in PACS.    "

## 2024-03-21 NOTE — PLAN OF CARE
Problem: PAIN - ADULT  Goal: Verbalizes/displays adequate comfort level or baseline comfort level  Description: Interventions:  - Encourage patient to monitor pain and request assistance  - Assess pain using appropriate pain scale  - Administer analgesics based on type and severity of pain and evaluate response  - Implement non-pharmacological measures as appropriate and evaluate response  - Consider cultural and social influences on pain and pain management  - Notify physician/advanced practitioner if interventions unsuccessful or patient reports new pain  Outcome: Progressing     Problem: INFECTION - ADULT  Goal: Absence or prevention of progression during hospitalization  Description: INTERVENTIONS:  - Assess and monitor for signs and symptoms of infection  - Monitor lab/diagnostic results  - Monitor all insertion sites, i.e. indwelling lines, tubes, and drains  - Monitor endotracheal if appropriate and nasal secretions for changes in amount and color  - [place] appropriate cooling/warming therapies per order  - Administer medications as ordered  - Instruct and encourage patient and family to use good hand hygiene technique  - Identify and instruct in appropriate isolation precautions for identified infection/condition  Outcome: Progressing     Problem: SAFETY ADULT  Goal: Patient will remain free of falls  Description: INTERVENTIONS:  - Educate patient/family on patient safety including physical limitations  - Instruct patient to call for assistance with activity   - Consult OT/PT to assist with strengthening/mobility   - Keep Call bell within reach  - Keep bed low and locked with side rails adjusted as appropriate  - Keep care items and personal belongings within reach  - Initiate and maintain comfort rounds  - Make Fall Risk Sign visible to staff  - Offer Toileting every  Hours, in advance of need  - Initiate/Maintain alarm  - Obtain necessary fall risk management equipment:   - Apply yellow socks and  bracelet for high fall risk patients  - Consider moving patient to room near nurses station  Outcome: Progressing  Goal: Maintain or return to baseline ADL function  Description: INTERVENTIONS:  -  Assess patient's ability to carry out ADLs; assess patient's baseline for ADL function and identify physical deficits which impact ability to perform ADLs (bathing, care of mouth/teeth, toileting, grooming, dressing, etc.)  - Assess/evaluate cause of self-care deficits   - Assess range of motion  - Assess patient's mobility; develop plan if impaired  - Assess patient's need for assistive devices and provide as appropriate  - Encourage maximum independence but intervene and supervise when necessary  - Involve family in performance of ADLs  - Assess for home care needs following discharge   - Consider OT consult to assist with ADL evaluation and planning for discharge  - Provide patient education as appropriate  Outcome: Progressing  Goal: Maintains/Returns to pre admission functional level  Description: INTERVENTIONS:  - Perform AM-PAC 6 Click Basic Mobility/ Daily Activity assessment daily.  - Set and communicate daily mobility goal to care team and patient/family/caregiver.   - Collaborate with rehabilitation services on mobility goals if consulted  - Perform Range of Motion  times a day.  - Reposition patient every  hours.  - Dangle patient  times a day  - Stand patient  times a day  - Ambulate patient  times a day  - Out of bed to chair  times a day   - Out of bed for meals times a day  - Out of bed for toileting  - Record patient progress and toleration of activity level   Outcome: Progressing     Problem: DISCHARGE PLANNING  Goal: Discharge to home or other facility with appropriate resources  Description: INTERVENTIONS:  - Identify barriers to discharge w/patient and caregiver  - Arrange for needed discharge resources and transportation as appropriate  - Identify discharge learning needs (meds, wound care, etc.)  -  Arrange for interpretive services to assist at discharge as needed  - Refer to Case Management Department for coordinating discharge planning if the patient needs post-hospital services based on physician/advanced practitioner order or complex needs related to functional status, cognitive ability, or social support system  Outcome: Progressing     Problem: Knowledge Deficit  Goal: Patient/family/caregiver demonstrates understanding of disease process, treatment plan, medications, and discharge instructions  Description: Complete learning assessment and assess knowledge base.  Interventions:  - Provide teaching at level of understanding  - Provide teaching via preferred learning methods  Outcome: Progressing     Problem: Prexisting or High Potential for Compromised Skin Integrity  Goal: Skin integrity is maintained or improved  Description: INTERVENTIONS:  - Identify patients at risk for skin breakdown  - Assess and monitor skin integrity  - Assess and monitor nutrition and hydration status  - Monitor labs   - Assess for incontinence   - Turn and reposition patient  - Assist with mobility/ambulation  - Relieve pressure over bony prominences  - Avoid friction and shearing  - Provide appropriate hygiene as needed including keeping skin clean and dry  - Evaluate need for skin moisturizer/barrier cream  - Collaborate with interdisciplinary team   - Patient/family teaching  - Consider wound care consult   Outcome: Progressing

## 2024-03-21 NOTE — PROCEDURES
Insert Complex Venous Access Line    Date/Time: 3/21/2024 11:29 AM    Performed by: Nilda Mojica RN  Authorized by: Manfred Iraheta MD    Patient location:  Bedside  Other Assisting Provider: Yes (comment) (HAL [facility])    Consent:     Consent obtained:  Written    Consent given by:  Patient    Risks discussed:  Arterial puncture, bleeding, infection, incorrect placement, nerve damage and pneumothorax    Alternatives discussed:  No treatment, delayed treatment and observation  Universal protocol:     Procedure explained and questions answered to patient or proxy's satisfaction: yes      Immediately prior to procedure, a time out was called: yes      Relevant documents present and verified: yes      Test results available and properly labeled: yes      Radiology Images displayed and confirmed.  If images not available, report reviewed: yes      Required blood products, implants, devices, and special equipment available: yes      Site/side marked: yes      Patient identity confirmed:  Verbally with patient, arm band, provided demographic data and hospital-assigned identification number  Pre-procedure details:     Hand hygiene: Hand hygiene performed prior to insertion      Sterile barrier technique: All elements of maximal sterile technique followed      Skin preparation:  ChloraPrep    Skin preparation agent: Skin preparation agent completely dried prior to procedure    Procedure details:     Complex Venous Access Line Type: PICC      Complex Venous Access Line Indications: total parenteral nutrition      Catheter tip vessel location: atriocaval junction      Orientation:  Left    Location:  Basilic    Procedural supplies:  Double lumen    Catheter size:  5 Fr    Total catheter length (cm):  40    Catheter out on skin (cm):  0    Max flow rate:  999 mL/hr    Arm circumference:  25    Patient evaluated for contraindications to access (i.e. fistula, thrombosis, etc): Yes      Site selection rationale:  Largest,  most accessible vein    Approach: percutaneous technique used      Patient position:  Flat    Ultrasound image availability:  Not saved    Sterile ultrasound techniques: Sterile gel and sterile probe covers were used      Number of attempts:  1    Successful placement: yes      Landmarks identified: yes      Vessel of catheter tip end:  Sherlock 3CG confirmed (Ok to use. sherlock 3Cg confirmed placement. Results saved to chart.)  Anesthesia (see MAR for exact dosages):     Anesthesia method:  Local infiltration    Local anesthetic:  Lidocaine 1% w/o epi (2 mL administered)    Sedation type: None.  Post-procedure details:     Post-procedure:  Dressing applied and securement device placed    Assessment:  Blood return through all ports and free fluid flow    Post-procedure complications: none      Patient tolerance of procedure:  Tolerated well, no immediate complications    Observer: Yes

## 2024-03-21 NOTE — PHYSICAL THERAPY NOTE
Physical Therapy Cancellation Note    The patient was attempted, but declining any mobility at this time. Will continue to follow.    Selvin Jim, PTA

## 2024-03-21 NOTE — PROGRESS NOTES
Pt trialed clear liquids, had distension and discomfort. Plan for pt to have PICC line placed today for TPN. Recommend continuing to replete electrolytes, and obtaining baseline triglcyeride lab. Once electrolytes are WNL, recommend initiating standard bag.     TPN Recommendations:    Day 1- AA15% 333 mL, D30% 500 mL, 20% lipids 100 mL. Provides 910 kcals, 50 g PRO, 933 mL, GIR=1.73 mg/kg/min, lipid load=0.33 g/kg.     Day 2- AA15% 550 mL, D30% 650 mL, 20% lipids 200 mL. Provides 1393 kcals, 83 g PRO, 1400 mL, GIR=2.25 mg/kg/min, lipid load=0.66 g/kg.     Day 3- AA15% 550 mL, D30% 800 mL, 20% lipids 250 mL. Provides 1646 kcals, 83 g PRO, 1600 mL, GIR=2.76 mg/kg/min, lipid load=0.83 g/kg.     Continue to monitor electrolytes WNL and replete as needs, glucose <180, and triglycerides <400.

## 2024-03-22 LAB
ANION GAP SERPL CALCULATED.3IONS-SCNC: 8 MMOL/L (ref 4–13)
APTT PPP: 109 SECONDS (ref 23–37)
APTT PPP: 110 SECONDS (ref 23–37)
APTT PPP: 89 SECONDS (ref 23–37)
BASOPHILS # BLD AUTO: 0.03 THOUSANDS/ÂΜL (ref 0–0.1)
BASOPHILS NFR BLD AUTO: 0 % (ref 0–1)
BUN SERPL-MCNC: 5 MG/DL (ref 5–25)
CALCIUM SERPL-MCNC: 8 MG/DL (ref 8.4–10.2)
CHLORIDE SERPL-SCNC: 106 MMOL/L (ref 96–108)
CO2 SERPL-SCNC: 26 MMOL/L (ref 21–32)
CREAT SERPL-MCNC: 0.56 MG/DL (ref 0.6–1.3)
EOSINOPHIL # BLD AUTO: 0.36 THOUSAND/ÂΜL (ref 0–0.61)
EOSINOPHIL NFR BLD AUTO: 2 % (ref 0–6)
ERYTHROCYTE [DISTWIDTH] IN BLOOD BY AUTOMATED COUNT: 15.9 % (ref 11.6–15.1)
GFR SERPL CREATININE-BSD FRML MDRD: 93 ML/MIN/1.73SQ M
GLUCOSE SERPL-MCNC: 110 MG/DL (ref 65–140)
HCT VFR BLD AUTO: 33.4 % (ref 36.5–49.3)
HGB BLD-MCNC: 10.6 G/DL (ref 12–17)
IMM GRANULOCYTES # BLD AUTO: 0.22 THOUSAND/UL (ref 0–0.2)
IMM GRANULOCYTES NFR BLD AUTO: 1 % (ref 0–2)
LYMPHOCYTES # BLD AUTO: 1.84 THOUSANDS/ÂΜL (ref 0.6–4.47)
LYMPHOCYTES NFR BLD AUTO: 11 % (ref 14–44)
MAGNESIUM SERPL-MCNC: 1.8 MG/DL (ref 1.9–2.7)
MCH RBC QN AUTO: 28.6 PG (ref 26.8–34.3)
MCHC RBC AUTO-ENTMCNC: 31.7 G/DL (ref 31.4–37.4)
MCV RBC AUTO: 90 FL (ref 82–98)
MONOCYTES # BLD AUTO: 1.19 THOUSAND/ÂΜL (ref 0.17–1.22)
MONOCYTES NFR BLD AUTO: 7 % (ref 4–12)
NEUTROPHILS # BLD AUTO: 13.55 THOUSANDS/ÂΜL (ref 1.85–7.62)
NEUTS SEG NFR BLD AUTO: 79 % (ref 43–75)
NRBC BLD AUTO-RTO: 0 /100 WBCS
PHOSPHATE SERPL-MCNC: 2.8 MG/DL (ref 2.3–4.1)
PLATELET # BLD AUTO: 455 THOUSANDS/UL (ref 149–390)
PMV BLD AUTO: 9.8 FL (ref 8.9–12.7)
POTASSIUM SERPL-SCNC: 3.2 MMOL/L (ref 3.5–5.3)
RBC # BLD AUTO: 3.71 MILLION/UL (ref 3.88–5.62)
SODIUM SERPL-SCNC: 140 MMOL/L (ref 135–147)
WBC # BLD AUTO: 17.19 THOUSAND/UL (ref 4.31–10.16)

## 2024-03-22 PROCEDURE — 97535 SELF CARE MNGMENT TRAINING: CPT

## 2024-03-22 PROCEDURE — 99024 POSTOP FOLLOW-UP VISIT: CPT | Performed by: SURGERY

## 2024-03-22 PROCEDURE — 85025 COMPLETE CBC W/AUTO DIFF WBC: CPT

## 2024-03-22 PROCEDURE — 84100 ASSAY OF PHOSPHORUS: CPT

## 2024-03-22 PROCEDURE — 80048 BASIC METABOLIC PNL TOTAL CA: CPT

## 2024-03-22 PROCEDURE — 97116 GAIT TRAINING THERAPY: CPT

## 2024-03-22 PROCEDURE — 97530 THERAPEUTIC ACTIVITIES: CPT

## 2024-03-22 PROCEDURE — 83735 ASSAY OF MAGNESIUM: CPT

## 2024-03-22 PROCEDURE — 85730 THROMBOPLASTIN TIME PARTIAL: CPT | Performed by: SURGERY

## 2024-03-22 RX ORDER — DEXTROSE MONOHYDRATE, SODIUM CHLORIDE, AND POTASSIUM CHLORIDE 50; 1.49; 4.5 G/1000ML; G/1000ML; G/1000ML
20 INJECTION, SOLUTION INTRAVENOUS CONTINUOUS
Status: DISCONTINUED | OUTPATIENT
Start: 2024-03-22 | End: 2024-03-22

## 2024-03-22 RX ORDER — MAGNESIUM SULFATE HEPTAHYDRATE 40 MG/ML
2 INJECTION, SOLUTION INTRAVENOUS ONCE
Status: COMPLETED | OUTPATIENT
Start: 2024-03-22 | End: 2024-03-22

## 2024-03-22 RX ORDER — POTASSIUM CHLORIDE 29.8 MG/ML
40 INJECTION INTRAVENOUS ONCE
Status: COMPLETED | OUTPATIENT
Start: 2024-03-22 | End: 2024-03-22

## 2024-03-22 RX ADMIN — GUAIFENESIN 400 MG: 200 SOLUTION ORAL at 08:58

## 2024-03-22 RX ADMIN — DEXTROSE, SODIUM CHLORIDE, AND POTASSIUM CHLORIDE 20 ML/HR: 5; .45; .15 INJECTION INTRAVENOUS at 07:09

## 2024-03-22 RX ADMIN — POTASSIUM PHOSPHATE, MONOBASIC POTASSIUM PHOSPHATE, DIBASIC 9 MMOL: 224; 236 INJECTION, SOLUTION, CONCENTRATE INTRAVENOUS at 10:04

## 2024-03-22 RX ADMIN — CHLORHEXIDINE GLUCONATE 15 ML: 1.2 SOLUTION ORAL at 20:59

## 2024-03-22 RX ADMIN — Medication: at 20:59

## 2024-03-22 RX ADMIN — CHLORHEXIDINE GLUCONATE 15 ML: 1.2 SOLUTION ORAL at 08:58

## 2024-03-22 RX ADMIN — HEPARIN SODIUM 25 UNITS/KG/HR: 10000 INJECTION, SOLUTION INTRAVENOUS at 04:35

## 2024-03-22 RX ADMIN — POTASSIUM CHLORIDE 40 MEQ: 29.8 INJECTION, SOLUTION INTRAVENOUS at 07:27

## 2024-03-22 RX ADMIN — MAGNESIUM SULFATE HEPTAHYDRATE 2 G: 40 INJECTION, SOLUTION INTRAVENOUS at 07:20

## 2024-03-22 RX ADMIN — HEPARIN SODIUM 23 UNITS/KG/HR: 10000 INJECTION, SOLUTION INTRAVENOUS at 21:04

## 2024-03-22 RX ADMIN — OLANZAPINE 5 MG: 5 TABLET, ORALLY DISINTEGRATING ORAL at 21:00

## 2024-03-22 NOTE — PLAN OF CARE
Problem: PAIN - ADULT  Goal: Verbalizes/displays adequate comfort level or baseline comfort level  Description: Interventions:  - Encourage patient to monitor pain and request assistance  - Assess pain using appropriate pain scale  - Administer analgesics based on type and severity of pain and evaluate response  - Implement non-pharmacological measures as appropriate and evaluate response  - Consider cultural and social influences on pain and pain management  - Notify physician/advanced practitioner if interventions unsuccessful or patient reports new pain  Outcome: Progressing     Problem: INFECTION - ADULT  Goal: Absence or prevention of progression during hospitalization  Description: INTERVENTIONS:  - Assess and monitor for signs and symptoms of infection  - Monitor lab/diagnostic results  - Monitor all insertion sites, i.e. indwelling lines, tubes, and drains  - Monitor endotracheal if appropriate and nasal secretions for changes in amount and color  - [place] appropriate cooling/warming therapies per order  - Administer medications as ordered  - Instruct and encourage patient and family to use good hand hygiene technique  - Identify and instruct in appropriate isolation precautions for identified infection/condition  Outcome: Progressing     Problem: SAFETY ADULT  Goal: Patient will remain free of falls  Description: INTERVENTIONS:  - Educate patient/family on patient safety including physical limitations  - Instruct patient to call for assistance with activity   - Consult OT/PT to assist with strengthening/mobility   - Keep Call bell within reach  - Keep bed low and locked with side rails adjusted as appropriate  - Keep care items and personal belongings within reach  - Initiate and maintain comfort rounds  - Make Fall Risk Sign visible to staff  - Offer Toileting every  Hours, in advance of need  - Initiate/Maintain alarm  - Obtain necessary fall risk management equipment:   - Apply yellow socks and  bracelet for high fall risk patients  - Consider moving patient to room near nurses station  Outcome: Progressing  Goal: Maintain or return to baseline ADL function  Description: INTERVENTIONS:  -  Assess patient's ability to carry out ADLs; assess patient's baseline for ADL function and identify physical deficits which impact ability to perform ADLs (bathing, care of mouth/teeth, toileting, grooming, dressing, etc.)  - Assess/evaluate cause of self-care deficits   - Assess range of motion  - Assess patient's mobility; develop plan if impaired  - Assess patient's need for assistive devices and provide as appropriate  - Encourage maximum independence but intervene and supervise when necessary  - Involve family in performance of ADLs  - Assess for home care needs following discharge   - Consider OT consult to assist with ADL evaluation and planning for discharge  - Provide patient education as appropriate  Outcome: Progressing  Goal: Maintains/Returns to pre admission functional level  Description: INTERVENTIONS:  - Perform AM-PAC 6 Click Basic Mobility/ Daily Activity assessment daily.  - Set and communicate daily mobility goal to care team and patient/family/caregiver.   - Collaborate with rehabilitation services on mobility goals if consulted  - Perform Range of Motion  times a day.  - Reposition patient every  hours.  - Dangle patient  times a day  - Stand patient  times a day  - Ambulate patient  times a day  - Out of bed to chair  times a day   - Out of bed for meals  times a day  - Out of bed for toileting  - Record patient progress and toleration of activity level   Outcome: Progressing     Problem: DISCHARGE PLANNING  Goal: Discharge to home or other facility with appropriate resources  Description: INTERVENTIONS:  - Identify barriers to discharge w/patient and caregiver  - Arrange for needed discharge resources and transportation as appropriate  - Identify discharge learning needs (meds, wound care, etc.)  -  Arrange for interpretive services to assist at discharge as needed  - Refer to Case Management Department for coordinating discharge planning if the patient needs post-hospital services based on physician/advanced practitioner order or complex needs related to functional status, cognitive ability, or social support system  Outcome: Progressing     Problem: Knowledge Deficit  Goal: Patient/family/caregiver demonstrates understanding of disease process, treatment plan, medications, and discharge instructions  Description: Complete learning assessment and assess knowledge base.  Interventions:  - Provide teaching at level of understanding  - Provide teaching via preferred learning methods  Outcome: Progressing     Problem: Nutrition/Hydration-ADULT  Goal: Nutrient/Hydration intake appropriate for improving, restoring or maintaining nutritional needs  Description: Monitor and assess patient's nutrition/hydration status for malnutrition. Collaborate with interdisciplinary team and initiate plan and interventions as ordered.  Monitor patient's weight and dietary intake as ordered or per policy. Utilize nutrition screening tool and intervene as necessary. Determine patient's food preferences and provide high-protein, high-caloric foods as appropriate.     INTERVENTIONS:  - Monitor oral intake, urinary output, labs, and treatment plans  - Assess nutrition and hydration status and recommend course of action  - Evaluate amount of meals eaten  - Assist patient with eating if necessary   - Allow adequate time for meals  - Recommend/ encourage appropriate diets, oral nutritional supplements, and vitamin/mineral supplements  - Order, calculate, and assess calorie counts as needed  - Recommend, monitor, and adjust tube feedings and TPN/PPN based on assessed needs  - Assess need for intravenous fluids  - Provide specific nutrition/hydration education as appropriate  - Include patient/family/caregiver in decisions related to  nutrition  Outcome: Progressing     Problem: Prexisting or High Potential for Compromised Skin Integrity  Goal: Skin integrity is maintained or improved  Description: INTERVENTIONS:  - Identify patients at risk for skin breakdown  - Assess and monitor skin integrity  - Assess and monitor nutrition and hydration status  - Monitor labs   - Assess for incontinence   - Turn and reposition patient  - Assist with mobility/ambulation  - Relieve pressure over bony prominences  - Avoid friction and shearing  - Provide appropriate hygiene as needed including keeping skin clean and dry  - Evaluate need for skin moisturizer/barrier cream  - Collaborate with interdisciplinary team   - Patient/family teaching  - Consider wound care consult   Outcome: Progressing

## 2024-03-22 NOTE — PLAN OF CARE
Problem: OCCUPATIONAL THERAPY ADULT  Goal: Performs self-care activities at highest level of function for planned discharge setting.  See evaluation for individualized goals.  Description: Treatment Interventions: ADL retraining, Functional transfer training, UE strengthening/ROM, Endurance training, Cognitive reorientation, Patient/family training, Equipment evaluation/education, Compensatory technique education, Continued evaluation, Energy conservation, Activityengagement          See flowsheet documentation for full assessment, interventions and recommendations.   Outcome: Progressing  Note: Limitation: Decreased ADL status, Decreased endurance, Decreased Safe judgement during ADL, Decreased self-care trans, Decreased high-level ADLs  Prognosis: Fair  Assessment: Pt seen on this date for OT session focusing on ADL retraining, body mechanics, transfer retraining, increasing activity tolerance/endurance to increase ability to participate in ADL/functional tasks. Pt was found in bed and was left in chair w/ all needs within reach, chair alarm on. Pt completed bed mob w S/min A for LE/line mgmt. Sts and fm w min ax1 c rw for support, vc for hand placement. Pt completed toilet transfers w min A from commode w grab bars use. Required max a for perineal hygiene, unable to turn trunk 2' staples in abdomen. Pt then performed community distance w min A c rw for support. Pt w/ improvements in adl task completion, transfer ability, adl task completion, however is still limited 2* decreased ADL/High-level ADL status, decreased activity tolerance/endurance,  decreased self-care trans.   The patient's raw score on the -PAC Daily Activity Inpatient Short Form is 19. A raw score of greater than or equal to 19 suggests the patient may benefit from discharge to home. Please refer to the recommendation of the Occupational Therapist for safe discharge planning.  Pt will continue to benefit from acute OT services to meet goals.      Rehab Resource Intensity Level, OT: III (Minimum Resource Intensity)

## 2024-03-22 NOTE — PHYSICAL THERAPY NOTE
"   PT Treatment       03/22/24 1405   PT Last Visit   PT Visit Date 03/22/24   Note Type   Note Type Treatment   Pain Assessment   Pain Assessment Tool FLACC   Pain Rating: FLACC (Rest) - Face 0   Pain Rating: FLACC (Rest) - Legs 0   Pain Rating: FLACC (Rest) - Activity 0   Pain Rating: FLACC (Rest) - Cry 0   Pain Rating: FLACC (Rest) - Consolability 0   Score: FLACC (Rest) 0   Pain Rating: FLACC (Activity) - Face 0   Pain Rating: FLACC (Activity) - Legs 0   Pain Rating: FLACC (Activity) - Activity 0   Pain Rating: FLACC (Activity) - Cry 0   Pain Rating: FLACC (Activity) - Consolability 0   Score: FLACC (Activity) 0   Restrictions/Precautions   Other Precautions Telemetry;Multiple lines;Fall Risk  (step down patient)   General   Chart Reviewed Yes   Response to Previous Treatment Patient with no complaints from previous session.   Family/Caregiver Present Yes  (spouse)   Cognition   Arousal/Participation Alert;Cooperative   Subjective   Subjective \"we can walk\"   Bed Mobility   Supine to Sit Unable to assess   Sit to Supine 4  Minimal assistance   Additional items Assist x 1;LE management   Additional Comments patient received OOB in chair and post treatment was supine in bed with alarm active. min-AX1 to manage b/l back to bed   Transfers   Sit to Stand 5  Supervision   Additional items Verbal cues   Stand to Sit 5  Supervision   Additional items Verbal cues   Additional Comments transfers with RW; VC for  hand placement   Ambulation/Elevation   Gait pattern Inconsistent julisa;Decreased foot clearance  (quick gait speed- VC to reduce for safety in setting of multiple lines)   Gait Assistance 5  Supervision   Additional items Verbal cues   Assistive Device Rolling walker   Distance 70 feet (standing rest break) + 90 feet (standing rest break) + 90 feet (standing rest break) + 70 feet   Ambulation/Elevation Additional Comments gait training with use of RW: VC to decrease gait speed for improved safety and to " increase LE clearance   Balance   Static Sitting Good   Static Standing Fair   Ambulatory Fair -   Activity Tolerance   Activity Tolerance Patient tolerated treatment well   Nurse Made Aware letty to see per RN (Dennis) and f/u post- patient/spouse requesting abdominal dressing be changed   Assessment   Prognosis Good   Problem List Decreased strength;Decreased endurance;Impaired balance;Decreased mobility   Assessment PT initiated treatment session in order to assist patient in achieving goals to improve transfers, gait training, and overall activity tolerance. Patient requires S for standing transfers and min-AX1 for ambulation. With use of RW patient is ambulating household and community distances. Plan to trial stairs in future sessions (limited by lines at this time). Throughout treatment session patient required both verbal and tactile cuing to improve safety, efficiency, and mechanics of mobility in addition to hands on assistance for all aspects of functional mobility. Additionally, he remains a step down patient and required increased time for safe management of lines and to execute specific mobility tasks with rest breaks in between secondary to gross fatigue and weakness. PT d/c recommendation remains for home with home PT/family. Patient will continue to benefit from continued skilled PT this admission to achieve maximal function and safety.   Goals   Patient Goals to get back into bed   STG Expiration Date 03/31/24   PT Treatment Day 2   Plan   Treatment/Interventions Functional transfer training;LE strengthening/ROM;Therapeutic exercise;Endurance training;Patient/family training;Equipment eval/education;Gait training;Bed mobility;Elevations;OT;Spoke to nursing   Progress Progressing toward goals   PT Frequency 3-5x/wk   Discharge Recommendation   Rehab Resource Intensity Level, PT III (Minimum Resource Intensity)   Equipment Recommended (S)  Walker  (recommend RW for d/c home)   AM-PAC Basic Mobility  Inpatient   Turning in Flat Bed Without Bedrails 4   Lying on Back to Sitting on Edge of Flat Bed Without Bedrails 4   Moving Bed to Chair 3   Standing Up From Chair Using Arms 4   Walk in Room 3   Climb 3-5 Stairs With Railing 3   Basic Mobility Inpatient Raw Score 21   Basic Mobility Standardized Score 45.55   [facility] Highest Level Of Mobility   -HLM Goal 6: Walk 10 steps or more   [facility]-HLM Achieved 8: Walk 250 feet ot more       The patient's AM-PAC Basic Mobility Inpatient Standardized Score is greater than 42.9, suggesting this patient may benefit from discharge to home. Please also refer to the recommendation of the Physical Therapist for safe discharge planning.    JESSICA RENTERIA PT, DPT

## 2024-03-22 NOTE — PLAN OF CARE
Problem: PHYSICAL THERAPY ADULT  Goal: Performs mobility at highest level of function for planned discharge setting.  See evaluation for individualized goals.  Description: Treatment/Interventions: ADL retraining, Functional transfer training, LE strengthening/ROM, Therapeutic exercise, Endurance training, Patient/family training, Equipment eval/education, Bed mobility, Gait training, Spoke to nursing, Spoke to case management, OT, Elevations  Equipment Recommended: Walker       See flowsheet documentation for full assessment, interventions and recommendations.  Outcome: Progressing  Note: Prognosis: Good  Problem List: Decreased strength, Decreased endurance, Impaired balance, Decreased mobility  Assessment: PT initiated treatment session in order to assist patient in achieving goals to improve transfers, gait training, and overall activity tolerance. Patient requires S for standing transfers and min-AX1 for ambulation. With use of RW patient is ambulating household and community distances. Plan to trial stairs in future sessions (limited by lines at this time). Throughout treatment session patient required both verbal and tactile cuing to improve safety, efficiency, and mechanics of mobility in addition to hands on assistance for all aspects of functional mobility. Additionally, he remains a step down patient and required increased time for safe management of lines and to execute specific mobility tasks with rest breaks in between secondary to gross fatigue and weakness. PT d/c recommendation remains for home with home PT/family. Patient will continue to benefit from continued skilled PT this admission to achieve maximal function and safety.        Rehab Resource Intensity Level, PT: III (Minimum Resource Intensity)    See flowsheet documentation for full assessment.

## 2024-03-22 NOTE — OCCUPATIONAL THERAPY NOTE
Occupational Therapy Progress Note     Patient Name: Jf Valera  Today's Date: 3/22/2024  Problem List  Principal Problem:    Portal vein thrombosis  Active Problems:    HTN (hypertension)    S/P exploratory laparotomy    Mesenteric ischemia (HCC)    H/O prostate cancer    Palliative care by specialist    Goals of care, counseling/discussion    Hemopneumothorax on right            03/22/24 1110   OT Last Visit   OT Visit Date 03/22/24   Note Type   Note Type Treatment   Pain Assessment   Pain Assessment Tool 0-10   Pain Score No Pain   Restrictions/Precautions   Other Precautions Multiple lines;Telemetry;Fall Risk  (pt w staples on abdomen, leaking fluid out of staples again today. RN aware and dressing changed.)   ADL   Where Assessed Commode   LB Bathing Assistance 4  Minimal Assistance   LB Bathing Deficit Right upper leg;Left upper leg   LB Bathing Comments min A to wipe down upper LE 2' weeping stomach abdomen @ stitches.   UB Dressing Assistance 5  Supervision/Setup   UB Dressing Deficit Thread RUE;Thread LUE   Toileting Assistance  2  Maximal Assistance   Toileting Deficit Perineal hygiene   Toileting Comments pt cont to require max A for perienal hygiene at this time. trouble w turning trunk 2' abdomen staples.   Transfers   Sit to Stand 4  Minimal assistance   Stand to Sit 4  Minimal assistance   Additional Comments rw   Functional Mobility   Functional Mobility 4  Minimal assistance   Additional Comments HHA EOB<>Bath. no AD 2' pt urgency. should have rw in future.   Toilet Transfers   Toilet Transfer From Rolling walker   Toilet Transfer Type To and from   Toilet Transfer to Standard bedside commode   Toilet Transfer Technique Ambulating   Toilet Transfers Minimal assistance   Toilet Transfers Comments vc for hand placement on commode during transfer.   Cognition   Overall Cognitive Status WFL   Arousal/Participation Alert;Responsive;Cooperative   Attention Within functional limits   Orientation Level  Oriented X4   Memory Within functional limits   Following Commands Follows one step commands without difficulty   Comments remains pleasant and cooperative w overall G safety awareness and insight to condition.   Activity Tolerance   Activity Tolerance Patient tolerated treatment well   Medical Staff Made Aware RN   Assessment   Assessment Pt seen on this date for OT session focusing on ADL retraining, body mechanics, transfer retraining, increasing activity tolerance/endurance to increase ability to participate in ADL/functional tasks. Pt was found in bed and was left in chair w/ all needs within reach, chair alarm on. Pt completed bed mob w S/min A for LE/line mgmt. Sts and fm w min ax1 c rw for support, vc for hand placement. Pt completed toilet transfers w min A from commode w grab bars use. Required max a for perineal hygiene, unable to turn trunk 2' staples in abdomen. Pt then performed community distance w min A c rw for support. Pt w/ improvements in adl task completion, transfer ability, adl task completion, however is still limited 2* decreased ADL/High-level ADL status, decreased activity tolerance/endurance,  decreased self-care trans.   The patient's raw score on the AM-PAC Daily Activity Inpatient Short Form is 19. A raw score of greater than or equal to 19 suggests the patient may benefit from discharge to home. Please refer to the recommendation of the Occupational Therapist for safe discharge planning.  Pt will continue to benefit from acute OT services to meet goals.   Plan   Treatment Interventions ADL retraining;Functional transfer training;Endurance training;Patient/family training;Equipment evaluation/education;Compensatory technique education;Energy conservation;Activityengagement   Goal Expiration Date 03/31/24   OT Treatment Day 2   OT Frequency 2-3x/wk   Discharge Recommendation   Rehab Resource Intensity Level, OT III (Minimum Resource Intensity)   AM-PAC Daily Activity Inpatient   Lower  Body Dressing 2   Bathing 3   Toileting 3   Upper Body Dressing 3   Grooming 4   Eating 4   Daily Activity Raw Score 19   Daily Activity Standardized Score (Calc for Raw Score >=11) 40.22   AM-PAC Applied Cognition Inpatient   Following a Speech/Presentation 4   Understanding Ordinary Conversation 4   Taking Medications 3   Remembering Where Things Are Placed or Put Away 3   Remembering List of 4-5 Errands 3   Taking Care of Complicated Tasks 3   Applied Cognition Raw Score 20   Applied Cognition Standardized Score 41.76   Modified [place] Scale   Modified [place] Scale 4   End of Consult   Education Provided Yes   Patient Position at End of Consult Bedside chair;Bed/Chair alarm activated;All needs within reach   Nurse Communication Nurse aware of consult         CHIN Resendiz, OTR/L

## 2024-03-22 NOTE — PROGRESS NOTES
"Progress Note - General Surgery   SOCOR[facility] Resident on RED Service   Jf DARIEL Valera 88 y.o. male MRN: [id number]  Unit/Bed#: [facility] 527-01 Encounter: [id number]    Assessment:  Patient is a 88 y.o. 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  UOP 3250 cc  Stool 2x    Plan:  Continue NPO, sips with meds  Continue IV fluids  Continue TPN  PRN pain meds  PRN anti nausea meds  DVT prophylaxis  Continue to assess midline abdominal wound    Subjective/Objective     Subjective: No acute events overnight. Patient denies having nausea, vomiting, fevers, chills, chest pain, shortness of breath. Tolerating PO intake. Having bowel movements and passing flatus. Voiding without difficulty.       Objective:     Blood pressure 152/89, pulse 65, temperature 97.6 °F (36.4 °C), resp. rate 18, height 5' 4\" (1.626 m), weight 59.8 kg (131 lb 13.4 oz), SpO2 98%.,Body mass index is 22.63 kg/m².      Intake/Output Summary (Last 24 hours) at 3/22/2024 1019  Last data filed at 3/22/2024 0901  Gross per 24 hour   Intake 2768.36 ml   Output 3900 ml   Net -1131.64 ml       Invasive Devices       Peripherally Inserted Central Catheter Line  Duration             PICC Line 03/21/24 Left Basilic <1 day              Peripheral Intravenous Line  Duration             Peripheral IV 03/17/24 Right;Upper;Ventral (anterior) Arm 4 days    Peripheral IV 03/21/24 Right Antecubital 1 day              Drain  Duration             Suprapubic Catheter 18 Fr. 8 days                    General: NAD  HENT: NCAT MMM  Neck: supple, no JVD  CV: nl rate  Lungs: nl wob. No resp distress  ABD: Soft, nontender, mildly distended. Incision is intact. Some serous drainage appreciated from the midline.   Extrem: No CCE  Neuro: AAOx3       Scheduled Meds:  Current " Facility-Administered Medications   Medication Dose Route Frequency Provider Last Rate    Adult 3-in-1 TPN (custom base / standard electrolytes)   Intravenous Continuous TPN Manfred Iraheta MD 42 mL/hr at 03/21/24 2126    chlorhexidine  15 mL Mouth/Throat Q12H RONALD Zack Murphy PA-C      dextrose 5 % and sodium chloride 0.45 % with KCl 20 mEq/L  20 mL/hr Intravenous Continuous Manfred Iraheta MD 20 mL/hr (03/22/24 0709)    guaiFENesin  400 mg Oral Q12H RONALD Zack Murphy PA-C      heparin (porcine)  3-30 Units/kg/hr (Order-Specific) Intravenous Titrated Zack Murphy PA-C 23 Units/kg/hr (03/22/24 0558)    HYDROmorphone  0.2 mg Intravenous Q3H PRN Zack Meadows MD      OLANZapine  5 mg Oral HS Zack Murphy PA-C      oxyCODONE  5 mg Oral Q4H PRN Zack Meadows MD      oxyCODONE  2.5 mg Oral Q4H PRN Zack Meadows MD      phenol  1 spray Mouth/Throat Q2H PRN Zack Murphy PA-C      potassium chloride  40 mEq Intravenous Once Kaya Guillen MD 40 mEq (03/22/24 0727)    potassium phosphate  9 mmol Intravenous Once Kaya Guillen MD 9 mmol (03/22/24 1004)     Continuous Infusions:Adult 3-in-1 TPN (custom base / standard electrolytes), , Last Rate: 42 mL/hr at 03/21/24 2126  dextrose 5 % and sodium chloride 0.45 % with KCl 20 mEq/L, 20 mL/hr, Last Rate: 20 mL/hr (03/22/24 0709)  heparin (porcine), 3-30 Units/kg/hr (Order-Specific), Last Rate: 23 Units/kg/hr (03/22/24 0558)      PRN Meds:.  HYDROmorphone    oxyCODONE    oxyCODONE    phenol      Lab, Imaging and other studies:I have personally reviewed pertinent lab results.    VTE Pharmacologic Prophylaxis: Heparin  VTE Mechanical Prophylaxis: sequential compression device      Manfred Iraheta MD  3/22/2024 10:19 AM

## 2024-03-22 NOTE — PLAN OF CARE
Problem: SAFETY,RESTRAINT: NV/NON-SELF DESTRUCTIVE BEHAVIOR  Goal: Remains free of harm/injury (restraint for non violent/non self-detsructive behavior)  Description: INTERVENTIONS:  - Instruct patient/family regarding restraint use   - Assess and monitor physiologic and psychological status   - Provide interventions and comfort measures to meet assessed patient needs   - Identify and implement measures to help patient regain control  - Assess readiness for release of restraint   Outcome: Progressing  Goal: Returns to optimal restraint-free functioning  Description: INTERVENTIONS:  - Assess the patient's behavior and symptoms that indicate continued need for restraint  - Identify and implement measures to help patient regain control  - Assess readiness for release of restraint   Outcome: Progressing     Problem: PAIN - ADULT  Goal: Verbalizes/displays adequate comfort level or baseline comfort level  Description: Interventions:  - Encourage patient to monitor pain and request assistance  - Assess pain using appropriate pain scale  - Administer analgesics based on type and severity of pain and evaluate response  - Implement non-pharmacological measures as appropriate and evaluate response  - Consider cultural and social influences on pain and pain management  - Notify physician/advanced practitioner if interventions unsuccessful or patient reports new pain  Outcome: Progressing     Problem: INFECTION - ADULT  Goal: Absence or prevention of progression during hospitalization  Description: INTERVENTIONS:  - Assess and monitor for signs and symptoms of infection  - Monitor lab/diagnostic results  - Monitor all insertion sites, i.e. indwelling lines, tubes, and drains  - Monitor endotracheal if appropriate and nasal secretions for changes in amount and color  - Hanna appropriate cooling/warming therapies per order  - Administer medications as ordered  - Instruct and encourage patient and family to use good hand  hygiene technique  - Identify and instruct in appropriate isolation precautions for identified infection/condition  Outcome: Progressing  Goal: Absence of fever/infection during neutropenic period  Description: INTERVENTIONS:  - Monitor WBC    Outcome: Progressing     Problem: SAFETY ADULT  Goal: Patient will remain free of falls  Description: INTERVENTIONS:  - Educate patient/family on patient safety including physical limitations  - Instruct patient to call for assistance with activity   - Consult OT/PT to assist with strengthening/mobility   - Keep Call bell within reach  - Keep bed low and locked with side rails adjusted as appropriate  - Keep care items and personal belongings within reach  - Initiate and maintain comfort rounds  - Make Fall Risk Sign visible to staff  - Offer Toileting every Hours, in advance of need  - Initiate/Maintain alarm  - Obtain necessary fall risk management equipment:   - Apply yellow socks and bracelet for high fall risk patients  - Consider moving patient to room near nurses station  Outcome: Progressing  Goal: Maintain or return to baseline ADL function  Description: INTERVENTIONS:  -  Assess patient's ability to carry out ADLs; assess patient's baseline for ADL function and identify physical deficits which impact ability to perform ADLs (bathing, care of mouth/teeth, toileting, grooming, dressing, etc.)  - Assess/evaluate cause of self-care deficits   - Assess range of motion  - Assess patient's mobility; develop plan if impaired  - Assess patient's need for assistive devices and provide as appropriate  - Encourage maximum independence but intervene and supervise when necessary  - Involve family in performance of ADLs  - Assess for home care needs following discharge   - Consider OT consult to assist with ADL evaluation and planning for discharge  - Provide patient education as appropriate  Outcome: Progressing  Goal: Maintains/Returns to pre admission functional  level  Description: INTERVENTIONS:  - Perform AM-PAC 6 Click Basic Mobility/ Daily Activity assessment daily.  - Set and communicate daily mobility goal to care team and patient/family/caregiver.   - Collaborate with rehabilitation services on mobility goals if consulted  - Perform Range of Motion  times a day.  - Reposition patient every  hours.  - Dangle patient  times a day  - Stand patient  times a day  - Ambulate patient  times a day  - Out of bed to chair  times a day   - Out of bed for meals  times a day  - Out of bed for toileting  - Record patient progress and toleration of activity level   Outcome: Progressing     Problem: DISCHARGE PLANNING  Goal: Discharge to home or other facility with appropriate resources  Description: INTERVENTIONS:  - Identify barriers to discharge w/patient and caregiver  - Arrange for needed discharge resources and transportation as appropriate  - Identify discharge learning needs (meds, wound care, etc.)  - Arrange for interpretive services to assist at discharge as needed  - Refer to Case Management Department for coordinating discharge planning if the patient needs post-hospital services based on physician/advanced practitioner order or complex needs related to functional status, cognitive ability, or social support system  Outcome: Progressing     Problem: Knowledge Deficit  Goal: Patient/family/caregiver demonstrates understanding of disease process, treatment plan, medications, and discharge instructions  Description: Complete learning assessment and assess knowledge base.  Interventions:  - Provide teaching at level of understanding  - Provide teaching via preferred learning methods  Outcome: Progressing     Problem: COPING  Goal: Pt/Family able to verbalize concerns and demonstrate effective coping strategies  Description: INTERVENTIONS:  - Assist patient/family to identify coping skills, available support systems and cultural and spiritual values  - Provide emotional  support, including active listening and acknowledgement of concerns of patient and caregivers  - Reduce environmental stimuli, as able  - Provide patient education  - Assess for spiritual pain/suffering and initiate spiritual care, including notification of Pastoral Care or timothy based community as needed  - Assess effectiveness of coping strategies  Outcome: Progressing  Goal: Will report anxiety at manageable levels  Description: INTERVENTIONS:  - Administer medication as ordered  - Teach and encourage coping skills  - Provide emotional support  - Assess patient/family for anxiety and ability to cope  Outcome: Progressing     Problem: DEATH & DYING  Goal: Pt/Family communicate acceptance of impending death and expresses psychological comfort and peace  Description: INTERVENTIONS:  - Assess patient/family anxiety and grief process related to end of life issues  - Provide emotional, spiritual and psychosocial support  - Provide information about the patient’s health status with consideration of family and cultural values  - Communicate willingness to discuss death and facilitate grief process  with patient/family as appropriate  - Emphasize sustaining relationships within family system and community, or timothy/spiritual traditions  - Initiate Spiritual Care, Pastoral care or other ancillary consults as needed  - Refer to community support groups as appropriate  Outcome: Progressing     Problem: CHANGE IN BODY IMAGE  Goal: Pt/Family communicate acceptance of loss or change in body image and expresses psychological comfort and peace  Description: INTERVENTIONS:  - Assess patient/family anxiety and grief process related to change in body image, loss of functional status and loss of sense of self  - Assess patient/family's coping skills and provide emotional, spiritual and psychosocial support  - Provide information about the patient's health status with consideration of family and cultural values  - Communicate  willingness to discuss loss and facilitate grief process with patient/family as appropriate  - Emphasize sustaining relationships within family system and community, or timothy/spiritual traditions  - Refer to community support groups as appropriate  - Initiate Spiritual Care, Pastoral care or other ancillary consults as needed  Outcome: Progressing     Problem: DECISION MAKING  Goal: Pt/Family able to effectively weigh alternatives and participate in decision making related to treatment and care  Description: INTERVENTIONS:  - Identify decision maker  - Determine when there are differences among patient's view, family's view, and healthcare provider's view of patient condition and care goals  - Facilitate patient/family articulation of goals for care  - Help patient/family identify pros/cons of alternative solutions  - Provide information as requested by patient/family  - Respect patient/family rights related to privacy and sharing information   - Serve as a liaison between patient, family and health care team  - Initiate consults as appropriate (Ethics Team, Palliative Care, Family Care Conference, etc.)  Outcome: Progressing     Problem: CONFUSION/THOUGHT DISTURBANCE  Goal: Thought disturbances (confusion, delirium, depression, dementia or psychosis) are managed to maintain or return to baseline mental status and functional level  Description: INTERVENTIONS:  - Assess for possible contributors to  thought disturbance, including but not limited to medications, infection, impaired vision or hearing, underlying metabolic abnormalities, dehydration, respiratory compromise,  psychiatric diagnoses and notify attending PHYSICAN/AP  - Monitor and intervene to maintain adequate nutrition, hydration, elimination, sleep and activity  - Decrease environmental stimuli, including noise as appropriate.  - Provide frequent contacts to provide refocusing, direction and reassurance as needed. Approach patient calmly with eye contact  and at their level.  - Dana high risk fall precautions, aspiration precautions and other safety measures, as indicated  - If delirium suspected, notify physician/AP of change in condition and request immediate in-person evaluation  - Pursue consults as appropriate including Geriatric (campus dependent), OT for cognitive evaluation/activity planning, psychiatric, pastoral care, etc.  Outcome: Progressing     Problem: BEHAVIOR  Goal: Pt/Family maintain appropriate behavior and adhere to behavioral management agreement, if implemented  Description: INTERVENTIONS:  - Assess the family dynamic   - Encourage verbalization of thoughts and concerns in a socially appropriate manner  - Assess patient/family's coping skills and non-compliant behavior (including use of illegal substances).  - Utilize positive, consistent limit setting strategies supporting safety of patient, staff and others  - Initiate consult with Case Management, Spiritual Care or other ancillary services as appropriate  - If a patient's/visitor's behavior jeopardizes the safety of the patient, staff, or others, refer to organization procedure.   - Notify Security of behavior or suspected illegal substances which indicate the need for search of the patient and/or belongings  - Encourage participation in the decision making process about a behavioral management agreement; implement if patient meets criteria  Outcome: Progressing     Problem: Depression - IP adult  Goal: Effects of depression will be minimized  Description: INTERVENTIONS:  - Assess impact of patient's symptoms on level of functioning, self-care needs and offer support as indicated  - Assess patient/family knowledge of depression, impact on illness and need for teaching  - Provide emotional support, presence and reassurance  - Assess for possible suicidal thoughts, ideation or self-harm. If patient expresses suicidal thoughts or statements do not leave alone, notify physician/AP  immediately, initiate Suicide Precautions, and determine need for continual observation.  - Initiate consults and referrals as appropriate (a mental health professional, Spiritual Care)  - Administer medication as ordered  Outcome: Progressing     Problem: SELF HARM  Goal: Effect of psychiatric condition will be minimized and patient will be protected from self harm  Description: INTERVENTIONS:  - Assess impact of patient's symptoms on level of functioning, self-care needs and offer support as indicated  - Assess patient/family knowledge of depression, impact on illness and need for teaching  - Provide emotional support, presence and reassurance  - Assess for possible suicidal thoughts, ideation or self-harm. If patient expresses suicidal thoughts or statements do not leave alone, notify physician/AP immediately, initiate suicide precautions, and determine need for continual observation.  - initiate consults and referrals as appropriate (a mental health professional, Spiritual Care  Outcome: Progressing     Problem: ABUSE/NEGLECT  Goal: Pt/Caregiver/Family aware of resources to assist with issues of abuse and neglect  Description: INTERVENTIONS:  - If child abuse and/or neglect is suspected, notify Childline directly  - Assess for level of risk and safety  - Initiate referral to Case Management  - Notify PHYSICIAN/AP and Nursing Supervisor  - Provide appropriate education and resources to patient and/or family  - Initiate referral to age appropriate protective services, i.e. [facility] or Child Protective Services  - Offer patient/caregiver the option to Opt Out of patient information directory  - Provide emotional support, including active listening and acknowledgment of concerns  - Provide the patient with information about supportive services i.e. shelters, community resources for domestic violence  Outcome: Progressing     Problem: SUBSTANCE USE/ABUSE  Goal: Will have no detox symptoms and will  verbalize plan for changing substance-related behavior  Description: INTERVENTIONS:  - Monitor physical status and assess for symptoms of withdrawal  - Administer medication as ordered  - Provide emotional support with 1 on 1 interaction with staff  - Encourage recovery focused program/ addiction education  - Assess for verbalization of changing behaviors related to substance abuse  - Initiate consults and referrals as appropriate (Case Management, Spiritual Care, etc.)  Outcome: Progressing  Goal: By discharge, will develop insight into their chemical dependency and sustain motivation to continue in recovery  Description: INTERVENTIONS:  - Attends all daily group sessions and scheduled AA groups  - Actively practices coping skills through participation in the therapeutic community and adherence to program rules  - Reviews and completes assignments from individual treatment plan  - Assist patient development of understanding of their personal cycle of addiction and relapse triggers  Outcome: Progressing  Goal: By discharge, patient will have ongoing treatment plan addressing chemical dependency  Description: INTERVENTIONS:  - Assist patient with resources and/or appointments for ongoing recovery based living  Outcome: Progressing     Problem: SPIRITUAL CARE  Goal: Pt/Family able to move forward in process of forgiving self, others and/or higher power  Description: INTERVENTIONS:  - Assist patient with any spiritual needs/requests such as communion, confession, anointing, etc  - Explore guilt and help patient/family identify possible spiritual/cultural beliefs and values  - Explore possibilities of making amends & reconciliation with self, others, and/or a greater power  - Guide patient/family in identifying painful feelings  - Help patient explore and identify spiritual beliefs, cultural understandings or values that may help or hinder letting go of issue  - Help patient explore feelings of anger, bitterness,  resentment, anxiety   Help patient/family identify and examine the situation in which these feelings are experienced  - Help patient/family identify destructive displacement of feelings onto other individuals  - Refer patient to formal counseling and/or to timothy [facility] for further support as needed or per request  Outcome: Progressing  Goal: Patient feels balance and connection with others and/or higher power that empowers the self during times of loss, guilt and fear  Description: INTERVENTIONS:  - Create safety for patient through empathic presence and non-judgmental listening  - Encourage patient to explore his/her values, beliefs and/or spiritual images and practices  - Encourage use of breath work, imagery, meditation, relaxation, reiki to ease distress and provide healing  - Encourage use of cultural and spiritual celebrations and rituals  - Facilitate discussion that helps patient sort out spiritual concerns  - Help patient identify where meaning/hope/comfort & strength are in his/her life  - Refer patient to [facility] for assistance, as appropriate  - Respond to patient/family need for prayer/ritual/sacrament/ceremony  Outcome: Progressing     Problem: Nutrition/Hydration-ADULT  Goal: Nutrient/Hydration intake appropriate for improving, restoring or maintaining nutritional needs  Description: Monitor and assess patient's nutrition/hydration status for malnutrition. Collaborate with interdisciplinary team and initiate plan and interventions as ordered.  Monitor patient's weight and dietary intake as ordered or per policy. Utilize nutrition screening tool and intervene as necessary. Determine patient's food preferences and provide high-protein, high-caloric foods as appropriate.     INTERVENTIONS:  - Monitor oral intake, urinary output, labs, and treatment plans  - Assess nutrition and hydration status and recommend course of action  - Evaluate amount of meals eaten  - Assist patient with eating if  necessary   - Allow adequate time for meals  - Recommend/ encourage appropriate diets, oral nutritional supplements, and vitamin/mineral supplements  - Order, calculate, and assess calorie counts as needed  - Recommend, monitor, and adjust tube feedings and TPN/PPN based on assessed needs  - Assess need for intravenous fluids  - Provide specific nutrition/hydration education as appropriate  - Include patient/family/caregiver in decisions related to nutrition  Outcome: Progressing     Problem: Prexisting or High Potential for Compromised Skin Integrity  Goal: Skin integrity is maintained or improved  Description: INTERVENTIONS:  - Identify patients at risk for skin breakdown  - Assess and monitor skin integrity  - Assess and monitor nutrition and hydration status  - Monitor labs   - Assess for incontinence   - Turn and reposition patient  - Assist with mobility/ambulation  - Relieve pressure over bony prominences  - Avoid friction and shearing  - Provide appropriate hygiene as needed including keeping skin clean and dry  - Evaluate need for skin moisturizer/barrier cream  - Collaborate with interdisciplinary team   - Patient/family teaching  - Consider wound care consult   Outcome: Progressing

## 2024-03-23 LAB
ANION GAP SERPL CALCULATED.3IONS-SCNC: 6 MMOL/L (ref 4–13)
APTT PPP: 100 SECONDS (ref 23–37)
APTT PPP: 52 SECONDS (ref 23–37)
APTT PPP: 66 SECONDS (ref 23–37)
BASOPHILS # BLD AUTO: 0.04 THOUSANDS/ÂΜL (ref 0–0.1)
BASOPHILS NFR BLD AUTO: 0 % (ref 0–1)
BUN SERPL-MCNC: 8 MG/DL (ref 5–25)
CALCIUM SERPL-MCNC: 7.7 MG/DL (ref 8.4–10.2)
CHLORIDE SERPL-SCNC: 109 MMOL/L (ref 96–108)
CO2 SERPL-SCNC: 26 MMOL/L (ref 21–32)
CREAT SERPL-MCNC: 0.52 MG/DL (ref 0.6–1.3)
EOSINOPHIL # BLD AUTO: 0.31 THOUSAND/ÂΜL (ref 0–0.61)
EOSINOPHIL NFR BLD AUTO: 2 % (ref 0–6)
ERYTHROCYTE [DISTWIDTH] IN BLOOD BY AUTOMATED COUNT: 16.2 % (ref 11.6–15.1)
GFR SERPL CREATININE-BSD FRML MDRD: 95 ML/MIN/1.73SQ M
GLUCOSE SERPL-MCNC: 106 MG/DL (ref 65–140)
HCT VFR BLD AUTO: 31.2 % (ref 36.5–49.3)
HGB BLD-MCNC: 9.5 G/DL (ref 12–17)
IMM GRANULOCYTES # BLD AUTO: 0.23 THOUSAND/UL (ref 0–0.2)
IMM GRANULOCYTES NFR BLD AUTO: 1 % (ref 0–2)
LYMPHOCYTES # BLD AUTO: 1.41 THOUSANDS/ÂΜL (ref 0.6–4.47)
LYMPHOCYTES NFR BLD AUTO: 8 % (ref 14–44)
MAGNESIUM SERPL-MCNC: 2.1 MG/DL (ref 1.9–2.7)
MCH RBC QN AUTO: 28 PG (ref 26.8–34.3)
MCHC RBC AUTO-ENTMCNC: 30.4 G/DL (ref 31.4–37.4)
MCV RBC AUTO: 92 FL (ref 82–98)
MONOCYTES # BLD AUTO: 1.2 THOUSAND/ÂΜL (ref 0.17–1.22)
MONOCYTES NFR BLD AUTO: 7 % (ref 4–12)
NEUTROPHILS # BLD AUTO: 13.81 THOUSANDS/ÂΜL (ref 1.85–7.62)
NEUTS SEG NFR BLD AUTO: 82 % (ref 43–75)
NRBC BLD AUTO-RTO: 0 /100 WBCS
PHOSPHATE SERPL-MCNC: 2.9 MG/DL (ref 2.3–4.1)
PLATELET # BLD AUTO: 410 THOUSANDS/UL (ref 149–390)
PMV BLD AUTO: 9.7 FL (ref 8.9–12.7)
POTASSIUM SERPL-SCNC: 3.4 MMOL/L (ref 3.5–5.3)
RBC # BLD AUTO: 3.39 MILLION/UL (ref 3.88–5.62)
SODIUM SERPL-SCNC: 141 MMOL/L (ref 135–147)
WBC # BLD AUTO: 17 THOUSAND/UL (ref 4.31–10.16)

## 2024-03-23 PROCEDURE — 84100 ASSAY OF PHOSPHORUS: CPT | Performed by: SURGERY

## 2024-03-23 PROCEDURE — 97530 THERAPEUTIC ACTIVITIES: CPT

## 2024-03-23 PROCEDURE — 85025 COMPLETE CBC W/AUTO DIFF WBC: CPT | Performed by: SURGERY

## 2024-03-23 PROCEDURE — 97116 GAIT TRAINING THERAPY: CPT

## 2024-03-23 PROCEDURE — 99024 POSTOP FOLLOW-UP VISIT: CPT | Performed by: SURGERY

## 2024-03-23 PROCEDURE — 80048 BASIC METABOLIC PNL TOTAL CA: CPT | Performed by: SURGERY

## 2024-03-23 PROCEDURE — 83735 ASSAY OF MAGNESIUM: CPT | Performed by: SURGERY

## 2024-03-23 PROCEDURE — 85730 THROMBOPLASTIN TIME PARTIAL: CPT | Performed by: SURGERY

## 2024-03-23 RX ORDER — POTASSIUM CHLORIDE 20 MEQ/1
40 TABLET, EXTENDED RELEASE ORAL ONCE
Status: DISCONTINUED | OUTPATIENT
Start: 2024-03-23 | End: 2024-03-23

## 2024-03-23 RX ORDER — POTASSIUM CHLORIDE 20MEQ/15ML
40 LIQUID (ML) ORAL ONCE
Status: COMPLETED | OUTPATIENT
Start: 2024-03-23 | End: 2024-03-23

## 2024-03-23 RX ADMIN — CHLORHEXIDINE GLUCONATE 15 ML: 1.2 SOLUTION ORAL at 20:56

## 2024-03-23 RX ADMIN — POTASSIUM CHLORIDE 40 MEQ: 1.5 SOLUTION ORAL at 09:03

## 2024-03-23 RX ADMIN — CHLORHEXIDINE GLUCONATE 15 ML: 1.2 SOLUTION ORAL at 08:03

## 2024-03-23 RX ADMIN — Medication: at 21:22

## 2024-03-23 RX ADMIN — HEPARIN SODIUM 21 UNITS/KG/HR: 10000 INJECTION, SOLUTION INTRAVENOUS at 20:59

## 2024-03-23 RX ADMIN — OLANZAPINE 5 MG: 5 TABLET, ORALLY DISINTEGRATING ORAL at 21:04

## 2024-03-23 NOTE — PROGRESS NOTES
"Progress Note - General Surgery   [facility] Resident on RED Service   Jf DARIEL Valera 88 y.o. male MRN: [id number]  Unit/Bed#: [facility] 527-01 Encounter: [id number]    Assessment:  Patient is a 88 y.o. 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 Tolerating clears without n/v. Is having several BM almost immediately after eating or drinking. Having good bowel function. Feels bloated.      Objective:     Blood pressure 122/64, pulse 67, temperature 98.5 °F (36.9 °C), temperature source Oral, resp. rate 16, height 5' 4\" (1.626 m), weight 57.8 kg (127 lb 6.8 oz), SpO2 99%.,Body mass index is 21.87 kg/m².      Intake/Output Summary (Last 24 hours) at 3/23/2024 0717  Last data filed at 3/23/2024 0603  Gross per 24 hour   Intake 1975.77 ml   Output 4025 ml   Net -2049.23 ml       Invasive Devices       Peripherally Inserted Central Catheter Line  Duration             PICC Line 03/21/24 Left Basilic 1 day              Peripheral Intravenous Line  Duration             Peripheral IV 03/21/24 Right Antecubital 2 days              Drain  Duration             Suprapubic Catheter 18 Fr. 9 days                    Physical Exam:  General: No acute distress  Neuro: alert and oriented  HEENT: moist mucous membranes  CV: Well perfused, regular rate and rhythm  Lungs: Normal work of breathing, no increased respiratory effort  Abdomen: Soft, non-tender, distended. Incision clean, dry and " intact. Suprapubic drain in place to gonzalez  Extremities: No edema, clubbing or cyanosis  Skin: Warm, dry        Scheduled Meds:  Current Facility-Administered Medications   Medication Dose Route Frequency Provider Last Rate    Adult TPN (CUSTOM BASE/STANDARD ELECTROLYTE)   Intravenous Continuous TPN IVAN Galvan 61.4 mL/hr at 03/22/24 2059    chlorhexidine  15 mL Mouth/Throat Q12H RONALD Zack Murphy PA-C      heparin (porcine)  3-30 Units/kg/hr (Order-Specific) Intravenous Titrated Zack Murphy PA-C 19 Units/kg/hr (03/23/24 0425)    HYDROmorphone  0.2 mg Intravenous Q3H PRN Zack Meadows MD      OLANZapine  5 mg Oral HS Zack Murphy PA-C      oxyCODONE  5 mg Oral Q4H PRN Zack Meadows MD      oxyCODONE  2.5 mg Oral Q4H PRN Zack Meadows MD      phenol  1 spray Mouth/Throat Q2H PRN Zack Murphy PA-C       Continuous Infusions:Adult TPN (CUSTOM BASE/STANDARD ELECTROLYTE), , Last Rate: 61.4 mL/hr at 03/22/24 2059  heparin (porcine), 3-30 Units/kg/hr (Order-Specific), Last Rate: 19 Units/kg/hr (03/23/24 0425)      PRN Meds:.  HYDROmorphone    oxyCODONE    oxyCODONE    phenol      Lab, Imaging and other studies:I have personally reviewed pertinent lab results.    VTE Pharmacologic Prophylaxis: Heparin  VTE Mechanical Prophylaxis: sequential compression device      Niya Gamboa MD  3/23/2024 7:17 AM

## 2024-03-23 NOTE — PLAN OF CARE
Problem: PHYSICAL THERAPY ADULT  Goal: Performs mobility at highest level of function for planned discharge setting.  See evaluation for individualized goals.  Description: Treatment/Interventions: ADL retraining, Functional transfer training, LE strengthening/ROM, Therapeutic exercise, Endurance training, Patient/family training, Equipment eval/education, Bed mobility, Gait training, Spoke to nursing, Spoke to case management, OT, Elevations  Equipment Recommended: Walker       See flowsheet documentation for full assessment, interventions and recommendations.  Outcome: Progressing  Note: Prognosis: Good  Problem List: Decreased strength, Decreased endurance, Impaired balance, Decreased mobility  Assessment: Pt seen for PT treatment session w/ interventions consisting of t/f training, gait training, + stair training. Pt demonstrated progress this session as he was able to participate in simulated stair training and complete 5 small steps. Pt demonstrated safe gait speed throughout session. Cues required for safe hand placement w/ t/f. From a PT standpoint continue to recommend d/c w/ HHPT + RW when medically cleared.        Rehab Resource Intensity Level, PT: III (Minimum Resource Intensity)    See flowsheet documentation for full assessment.

## 2024-03-23 NOTE — PLAN OF CARE
Problem: SAFETY,RESTRAINT: NV/NON-SELF DESTRUCTIVE BEHAVIOR  Goal: Remains free of harm/injury (restraint for non violent/non self-detsructive behavior)  Description: INTERVENTIONS:  - Instruct patient/family regarding restraint use   - Assess and monitor physiologic and psychological status   - Provide interventions and comfort measures to meet assessed patient needs   - Identify and implement measures to help patient regain control  - Assess readiness for release of restraint   Outcome: Progressing  Goal: Returns to optimal restraint-free functioning  Description: INTERVENTIONS:  - Assess the patient's behavior and symptoms that indicate continued need for restraint  - Identify and implement measures to help patient regain control  - Assess readiness for release of restraint   Outcome: Progressing     Problem: PAIN - ADULT  Goal: Verbalizes/displays adequate comfort level or baseline comfort level  Description: Interventions:  - Encourage patient to monitor pain and request assistance  - Assess pain using appropriate pain scale  - Administer analgesics based on type and severity of pain and evaluate response  - Implement non-pharmacological measures as appropriate and evaluate response  - Consider cultural and social influences on pain and pain management  - Notify physician/advanced practitioner if interventions unsuccessful or patient reports new pain  Outcome: Progressing     Problem: INFECTION - ADULT  Goal: Absence or prevention of progression during hospitalization  Description: INTERVENTIONS:  - Assess and monitor for signs and symptoms of infection  - Monitor lab/diagnostic results  - Monitor all insertion sites, i.e. indwelling lines, tubes, and drains  - Monitor endotracheal if appropriate and nasal secretions for changes in amount and color  - White appropriate cooling/warming therapies per order  - Administer medications as ordered  - Instruct and encourage patient and family to use good hand  hygiene technique  - Identify and instruct in appropriate isolation precautions for identified infection/condition  Outcome: Progressing  Goal: Absence of fever/infection during neutropenic period  Description: INTERVENTIONS:  - Monitor WBC    Outcome: Progressing     Problem: SAFETY ADULT  Goal: Patient will remain free of falls  Description: INTERVENTIONS:  - Educate patient/family on patient safety including physical limitations  - Instruct patient to call for assistance with activity   - Consult OT/PT to assist with strengthening/mobility   - Keep Call bell within reach  - Keep bed low and locked with side rails adjusted as appropriate  - Keep care items and personal belongings within reach  - Initiate and maintain comfort rounds  - Make Fall Risk Sign visible to staff  - Offer Toileting every  Hours, in advance of need  - Initiate/Maintain alarm  - Obtain necessary fall risk management equipment:   - Apply yellow socks and bracelet for high fall risk patients  - Consider moving patient to room near nurses station  Outcome: Progressing  Goal: Maintain or return to baseline ADL function  Description: INTERVENTIONS:  -  Assess patient's ability to carry out ADLs; assess patient's baseline for ADL function and identify physical deficits which impact ability to perform ADLs (bathing, care of mouth/teeth, toileting, grooming, dressing, etc.)  - Assess/evaluate cause of self-care deficits   - Assess range of motion  - Assess patient's mobility; develop plan if impaired  - Assess patient's need for assistive devices and provide as appropriate  - Encourage maximum independence but intervene and supervise when necessary  - Involve family in performance of ADLs  - Assess for home care needs following discharge   - Consider OT consult to assist with ADL evaluation and planning for discharge  - Provide patient education as appropriate  Outcome: Progressing  Goal: Maintains/Returns to pre admission functional  level  Description: INTERVENTIONS:  - Perform AM-PAC 6 Click Basic Mobility/ Daily Activity assessment daily.  - Set and communicate daily mobility goal to care team and patient/family/caregiver.   - Collaborate with rehabilitation services on mobility goals if consulted  - Perform Range of Motion  times a day.  - Reposition patient every  hours.  - Dangle patient  times a day  - Stand patient  times a day  - Ambulate patient  times a day  - Out of bed to chair  times a day   - Out of bed for meals times a day  - Out of bed for toileting  - Record patient progress and toleration of activity level   Outcome: Progressing     Problem: DISCHARGE PLANNING  Goal: Discharge to home or other facility with appropriate resources  Description: INTERVENTIONS:  - Identify barriers to discharge w/patient and caregiver  - Arrange for needed discharge resources and transportation as appropriate  - Identify discharge learning needs (meds, wound care, etc.)  - Arrange for interpretive services to assist at discharge as needed  - Refer to Case Management Department for coordinating discharge planning if the patient needs post-hospital services based on physician/advanced practitioner order or complex needs related to functional status, cognitive ability, or social support system  Outcome: Progressing     Problem: Knowledge Deficit  Goal: Patient/family/caregiver demonstrates understanding of disease process, treatment plan, medications, and discharge instructions  Description: Complete learning assessment and assess knowledge base.  Interventions:  - Provide teaching at level of understanding  - Provide teaching via preferred learning methods  Outcome: Progressing     Problem: COPING  Goal: Pt/Family able to verbalize concerns and demonstrate effective coping strategies  Description: INTERVENTIONS:  - Assist patient/family to identify coping skills, available support systems and cultural and spiritual values  - Provide emotional  support, including active listening and acknowledgement of concerns of patient and caregivers  - Reduce environmental stimuli, as able  - Provide patient education  - Assess for spiritual pain/suffering and initiate spiritual care, including notification of Pastoral Care or timothy based community as needed  - Assess effectiveness of coping strategies  Outcome: Progressing  Goal: Will report anxiety at manageable levels  Description: INTERVENTIONS:  - Administer medication as ordered  - Teach and encourage coping skills  - Provide emotional support  - Assess patient/family for anxiety and ability to cope  Outcome: Progressing     Problem: DEATH & DYING  Goal: Pt/Family communicate acceptance of impending death and expresses psychological comfort and peace  Description: INTERVENTIONS:  - Assess patient/family anxiety and grief process related to end of life issues  - Provide emotional, spiritual and psychosocial support  - Provide information about the patient’s health status with consideration of family and cultural values  - Communicate willingness to discuss death and facilitate grief process  with patient/family as appropriate  - Emphasize sustaining relationships within family system and community, or timothy/spiritual traditions  - Initiate Spiritual Care, Pastoral care or other ancillary consults as needed  - Refer to community support groups as appropriate  Outcome: Progressing     Problem: CHANGE IN BODY IMAGE  Goal: Pt/Family communicate acceptance of loss or change in body image and expresses psychological comfort and peace  Description: INTERVENTIONS:  - Assess patient/family anxiety and grief process related to change in body image, loss of functional status and loss of sense of self  - Assess patient/family's coping skills and provide emotional, spiritual and psychosocial support  - Provide information about the patient's health status with consideration of family and cultural values  - Communicate  willingness to discuss loss and facilitate grief process with patient/family as appropriate  - Emphasize sustaining relationships within family system and community, or timothy/spiritual traditions  - Refer to community support groups as appropriate  - Initiate Spiritual Care, Pastoral care or other ancillary consults as needed  Outcome: Progressing     Problem: DECISION MAKING  Goal: Pt/Family able to effectively weigh alternatives and participate in decision making related to treatment and care  Description: INTERVENTIONS:  - Identify decision maker  - Determine when there are differences among patient's view, family's view, and healthcare provider's view of patient condition and care goals  - Facilitate patient/family articulation of goals for care  - Help patient/family identify pros/cons of alternative solutions  - Provide information as requested by patient/family  - Respect patient/family rights related to privacy and sharing information   - Serve as a liaison between patient, family and health care team  - Initiate consults as appropriate (Ethics Team, Palliative Care, Family Care Conference, etc.)  Outcome: Progressing     Problem: CONFUSION/THOUGHT DISTURBANCE  Goal: Thought disturbances (confusion, delirium, depression, dementia or psychosis) are managed to maintain or return to baseline mental status and functional level  Description: INTERVENTIONS:  - Assess for possible contributors to  thought disturbance, including but not limited to medications, infection, impaired vision or hearing, underlying metabolic abnormalities, dehydration, respiratory compromise,  psychiatric diagnoses and notify attending PHYSICAN/AP  - Monitor and intervene to maintain adequate nutrition, hydration, elimination, sleep and activity  - Decrease environmental stimuli, including noise as appropriate.  - Provide frequent contacts to provide refocusing, direction and reassurance as needed. Approach patient calmly with eye contact  and at their level.  - [place] high risk fall precautions, aspiration precautions and other safety measures, as indicated  - If delirium suspected, notify physician/AP of change in condition and request immediate in-person evaluation  - Pursue consults as appropriate including Geriatric (campus dependent), OT for cognitive evaluation/activity planning, psychiatric, pastoral care, etc.  Outcome: Progressing     Problem: BEHAVIOR  Goal: Pt/Family maintain appropriate behavior and adhere to behavioral management agreement, if implemented  Description: INTERVENTIONS:  - Assess the family dynamic   - Encourage verbalization of thoughts and concerns in a socially appropriate manner  - Assess patient/family's coping skills and non-compliant behavior (including use of illegal substances).  - Utilize positive, consistent limit setting strategies supporting safety of patient, staff and others  - Initiate consult with Case Management, Spiritual Care or other ancillary services as appropriate  - If a patient's/visitor's behavior jeopardizes the safety of the patient, staff, or others, refer to organization procedure.   - Notify Security of behavior or suspected illegal substances which indicate the need for search of the patient and/or belongings  - Encourage participation in the decision making process about a behavioral management agreement; implement if patient meets criteria  Outcome: Progressing     Problem: Depression - IP adult  Goal: Effects of depression will be minimized  Description: INTERVENTIONS:  - Assess impact of patient's symptoms on level of functioning, self-care needs and offer support as indicated  - Assess patient/family knowledge of depression, impact on illness and need for teaching  - Provide emotional support, presence and reassurance  - Assess for possible suicidal thoughts, ideation or self-harm. If patient expresses suicidal thoughts or statements do not leave alone, notify physician/AP  immediately, initiate Suicide Precautions, and determine need for continual observation.  - Initiate consults and referrals as appropriate (a mental health professional, Spiritual Care)  - Administer medication as ordered  Outcome: Progressing     Problem: SELF HARM  Goal: Effect of psychiatric condition will be minimized and patient will be protected from self harm  Description: INTERVENTIONS:  - Assess impact of patient's symptoms on level of functioning, self-care needs and offer support as indicated  - Assess patient/family knowledge of depression, impact on illness and need for teaching  - Provide emotional support, presence and reassurance  - Assess for possible suicidal thoughts, ideation or self-harm. If patient expresses suicidal thoughts or statements do not leave alone, notify physician/AP immediately, initiate suicide precautions, and determine need for continual observation.  - initiate consults and referrals as appropriate (a mental health professional, Spiritual Care  Outcome: Progressing     Problem: ABUSE/NEGLECT  Goal: Pt/Caregiver/Family aware of resources to assist with issues of abuse and neglect  Description: INTERVENTIONS:  - If child abuse and/or neglect is suspected, notify Childline directly  - Assess for level of risk and safety  - Initiate referral to Case Management  - Notify PHYSICIAN/AP and Nursing Supervisor  - Provide appropriate education and resources to patient and/or family  - Initiate referral to age appropriate protective services, i.e. [facility] or Child Protective Services  - Offer patient/caregiver the option to Opt Out of patient information directory  - Provide emotional support, including active listening and acknowledgment of concerns  - Provide the patient with information about supportive services i.e. shelters, community resources for domestic violence  Outcome: Progressing     Problem: SUBSTANCE USE/ABUSE  Goal: Will have no detox symptoms and will  verbalize plan for changing substance-related behavior  Description: INTERVENTIONS:  - Monitor physical status and assess for symptoms of withdrawal  - Administer medication as ordered  - Provide emotional support with 1 on 1 interaction with staff  - Encourage recovery focused program/ addiction education  - Assess for verbalization of changing behaviors related to substance abuse  - Initiate consults and referrals as appropriate (Case Management, Spiritual Care, etc.)  Outcome: Progressing  Goal: By discharge, will develop insight into their chemical dependency and sustain motivation to continue in recovery  Description: INTERVENTIONS:  - Attends all daily group sessions and scheduled AA groups  - Actively practices coping skills through participation in the therapeutic community and adherence to program rules  - Reviews and completes assignments from individual treatment plan  - Assist patient development of understanding of their personal cycle of addiction and relapse triggers  Outcome: Progressing  Goal: By discharge, patient will have ongoing treatment plan addressing chemical dependency  Description: INTERVENTIONS:  - Assist patient with resources and/or appointments for ongoing recovery based living  Outcome: Progressing     Problem: SPIRITUAL CARE  Goal: Pt/Family able to move forward in process of forgiving self, others and/or higher power  Description: INTERVENTIONS:  - Assist patient with any spiritual needs/requests such as communion, confession, anointing, etc  - Explore guilt and help patient/family identify possible spiritual/cultural beliefs and values  - Explore possibilities of making amends & reconciliation with self, others, and/or a greater power  - Guide patient/family in identifying painful feelings  - Help patient explore and identify spiritual beliefs, cultural understandings or values that may help or hinder letting go of issue  - Help patient explore feelings of anger, bitterness,  resentment, anxiety   Help patient/family identify and examine the situation in which these feelings are experienced  - Help patient/family identify destructive displacement of feelings onto other individuals  - Refer patient to formal counseling and/or to timothy [facility] for further support as needed or per request  Outcome: Progressing  Goal: Patient feels balance and connection with others and/or higher power that empowers the self during times of loss, guilt and fear  Description: INTERVENTIONS:  - Create safety for patient through empathic presence and non-judgmental listening  - Encourage patient to explore his/her values, beliefs and/or spiritual images and practices  - Encourage use of breath work, imagery, meditation, relaxation, reiki to ease distress and provide healing  - Encourage use of cultural and spiritual celebrations and rituals  - Facilitate discussion that helps patient sort out spiritual concerns  - Help patient identify where meaning/hope/comfort & strength are in his/her life  - Refer patient to [facility] for assistance, as appropriate  - Respond to patient/family need for prayer/ritual/sacrament/ceremony  Outcome: Progressing     Problem: Nutrition/Hydration-ADULT  Goal: Nutrient/Hydration intake appropriate for improving, restoring or maintaining nutritional needs  Description: Monitor and assess patient's nutrition/hydration status for malnutrition. Collaborate with interdisciplinary team and initiate plan and interventions as ordered.  Monitor patient's weight and dietary intake as ordered or per policy. Utilize nutrition screening tool and intervene as necessary. Determine patient's food preferences and provide high-protein, high-caloric foods as appropriate.     INTERVENTIONS:  - Monitor oral intake, urinary output, labs, and treatment plans  - Assess nutrition and hydration status and recommend course of action  - Evaluate amount of meals eaten  - Assist patient with eating if  necessary   - Allow adequate time for meals  - Recommend/ encourage appropriate diets, oral nutritional supplements, and vitamin/mineral supplements  - Order, calculate, and assess calorie counts as needed  - Recommend, monitor, and adjust tube feedings and TPN/PPN based on assessed needs  - Assess need for intravenous fluids  - Provide specific nutrition/hydration education as appropriate  - Include patient/family/caregiver in decisions related to nutrition  Outcome: Progressing     Problem: Prexisting or High Potential for Compromised Skin Integrity  Goal: Skin integrity is maintained or improved  Description: INTERVENTIONS:  - Identify patients at risk for skin breakdown  - Assess and monitor skin integrity  - Assess and monitor nutrition and hydration status  - Monitor labs   - Assess for incontinence   - Turn and reposition patient  - Assist with mobility/ambulation  - Relieve pressure over bony prominences  - Avoid friction and shearing  - Provide appropriate hygiene as needed including keeping skin clean and dry  - Evaluate need for skin moisturizer/barrier cream  - Collaborate with interdisciplinary team   - Patient/family teaching  - Consider wound care consult   Outcome: Progressing

## 2024-03-23 NOTE — PHYSICAL THERAPY NOTE
Physical Therapy Treatment Note    Patient's Name: Jf Valera  : 1935     03/23/24 1034   PT Last Visit   PT Visit Date 24   Note Type   Note Type Treatment   Pain Assessment   Pain Assessment Tool FLACC   Pain Rating: FLACC (Rest) - Face 0   Pain Rating: FLACC (Rest) - Legs 0   Pain Rating: FLACC (Rest) - Activity 0   Pain Rating: FLACC (Rest) - Cry 0   Pain Rating: FLACC (Rest) - Consolability 0   Score: FLACC (Rest) 0   Pain Rating: FLACC (Activity) - Face 0   Pain Rating: FLACC (Activity) - Legs 0   Pain Rating: FLACC (Activity) - Activity 0   Pain Rating: FLACC (Activity) - Cry 0   Pain Rating: FLACC (Activity) - Consolability 0   Score: FLACC (Activity) 0   Restrictions/Precautions   Weight Bearing Precautions Per Order No   Other Precautions Limb alert;Multiple lines;Telemetry;Fall Risk  (LUE limb alert)   General   Chart Reviewed Yes   Response to Previous Treatment Patient with no complaints from previous session.   Family/Caregiver Present No   Subjective   Subjective Agreeable to mobilize w/ education.   Bed Mobility   Supine to Sit Unable to assess   Sit to Supine Unable to assess   Additional Comments Pt greeted in chair.   Transfers   Sit to Stand 5  Supervision   Additional items Verbal cues;Armrests   Stand to Sit 5  Supervision   Additional items Verbal cues;Armrests   Additional Comments RW   Ambulation/Elevation   Gait pattern Decreased foot clearance   Gait Assistance 5  Supervision   Additional items Verbal cues   Assistive Device Rolling walker   Distance 100'x3   Stair Management Assistance   (CGA)   Additional items Assist x 1;Verbal cues   Stair Management Technique Nonreciprocal  (up/down scale ascending forwards/descending backwards w/ BUE support)   Number of Stairs 5   Balance   Static Sitting Good   Dynamic Sitting Fair +   Static Standing Fair   Dynamic Standing Fair -   Ambulatory Fair -  (RW)   Endurance Deficit   Endurance Deficit Yes   Endurance Deficit  Description weakness, fatigue   Activity Tolerance   Activity Tolerance Patient limited by fatigue   Nurse Made Aware yes - cleared + updated that pt's abdominal wound leaking and requesting dressing be changed   Assessment   Prognosis Good   Problem List Decreased strength;Decreased endurance;Impaired balance;Decreased mobility   Assessment Pt seen for PT treatment session w/ interventions consisting of t/f training, gait training, + stair training. Pt demonstrated progress this session as he was able to participate in simulated stair training and complete 5 small steps. Pt demonstrated safe gait speed throughout session. Cues required for safe hand placement w/ t/f. From a PT standpoint continue to recommend d/c w/ HHPT + RW when medically cleared.   Goals   Patient Goals none expressed   PT Treatment Day 3   Plan   Treatment/Interventions Functional transfer training;LE strengthening/ROM;Elevations;Therapeutic exercise;Endurance training;Patient/family training;Equipment eval/education;Bed mobility;Gait training;Compensatory technique education;Spoke to nursing   Progress Progressing toward goals   PT Frequency 3-5x/wk   Discharge Recommendation   Rehab Resource Intensity Level, PT III (Minimum Resource Intensity)   Equipment Recommended Walker   Walker Package Recommended Wheeled walker   Change/add to Walker Package? No   AM-PAC Basic Mobility Inpatient   Turning in Flat Bed Without Bedrails 4   Lying on Back to Sitting on Edge of Flat Bed Without Bedrails 4   Moving Bed to Chair 3   Standing Up From Chair Using Arms 3   Walk in Room 3   Climb 3-5 Stairs With Railing 3   Basic Mobility Inpatient Raw Score 20   Basic Mobility Standardized Score 43.99   [facility] Highest Level Of Mobility   -HLM Goal 6: Walk 10 steps or more   JH-HLM Achieved 8: Walk 250 feet ot more   Education   Education Provided Mobility training;Assistive device   Patient Demonstrates acceptance/verbal understanding;Reinforcement needed    End of Consult   Patient Position at End of Consult Bedside chair;All needs within reach  (all lines in tact)     Juanita Horne, PT, DPT

## 2024-03-24 LAB
ANION GAP SERPL CALCULATED.3IONS-SCNC: 7 MMOL/L (ref 4–13)
APTT PPP: 67 SECONDS (ref 23–37)
APTT PPP: 68 SECONDS (ref 23–37)
APTT PPP: 95 SECONDS (ref 23–37)
BASOPHILS # BLD AUTO: 0.04 THOUSANDS/ÂΜL (ref 0–0.1)
BASOPHILS NFR BLD AUTO: 0 % (ref 0–1)
BUN SERPL-MCNC: 13 MG/DL (ref 5–25)
CALCIUM SERPL-MCNC: 7.8 MG/DL (ref 8.4–10.2)
CHLORIDE SERPL-SCNC: 109 MMOL/L (ref 96–108)
CO2 SERPL-SCNC: 25 MMOL/L (ref 21–32)
CREAT SERPL-MCNC: 0.56 MG/DL (ref 0.6–1.3)
EOSINOPHIL # BLD AUTO: 0.24 THOUSAND/ÂΜL (ref 0–0.61)
EOSINOPHIL NFR BLD AUTO: 1 % (ref 0–6)
ERYTHROCYTE [DISTWIDTH] IN BLOOD BY AUTOMATED COUNT: 16.8 % (ref 11.6–15.1)
GFR SERPL CREATININE-BSD FRML MDRD: 93 ML/MIN/1.73SQ M
GLUCOSE SERPL-MCNC: 109 MG/DL (ref 65–140)
GLUCOSE SERPL-MCNC: 125 MG/DL (ref 65–140)
HCT VFR BLD AUTO: 29.3 % (ref 36.5–49.3)
HGB BLD-MCNC: 9.2 G/DL (ref 12–17)
IMM GRANULOCYTES # BLD AUTO: 0.33 THOUSAND/UL (ref 0–0.2)
IMM GRANULOCYTES NFR BLD AUTO: 2 % (ref 0–2)
LYMPHOCYTES # BLD AUTO: 1.37 THOUSANDS/ÂΜL (ref 0.6–4.47)
LYMPHOCYTES NFR BLD AUTO: 8 % (ref 14–44)
MAGNESIUM SERPL-MCNC: 1.9 MG/DL (ref 1.9–2.7)
MCH RBC QN AUTO: 28.5 PG (ref 26.8–34.3)
MCHC RBC AUTO-ENTMCNC: 31.4 G/DL (ref 31.4–37.4)
MCV RBC AUTO: 91 FL (ref 82–98)
MONOCYTES # BLD AUTO: 1.25 THOUSAND/ÂΜL (ref 0.17–1.22)
MONOCYTES NFR BLD AUTO: 7 % (ref 4–12)
NEUTROPHILS # BLD AUTO: 13.77 THOUSANDS/ÂΜL (ref 1.85–7.62)
NEUTS SEG NFR BLD AUTO: 82 % (ref 43–75)
NRBC BLD AUTO-RTO: 0 /100 WBCS
PHOSPHATE SERPL-MCNC: 2.8 MG/DL (ref 2.3–4.1)
PLATELET # BLD AUTO: 377 THOUSANDS/UL (ref 149–390)
PMV BLD AUTO: 9.9 FL (ref 8.9–12.7)
POTASSIUM SERPL-SCNC: 3.6 MMOL/L (ref 3.5–5.3)
RBC # BLD AUTO: 3.23 MILLION/UL (ref 3.88–5.62)
SODIUM SERPL-SCNC: 141 MMOL/L (ref 135–147)
WBC # BLD AUTO: 17 THOUSAND/UL (ref 4.31–10.16)

## 2024-03-24 PROCEDURE — 83735 ASSAY OF MAGNESIUM: CPT

## 2024-03-24 PROCEDURE — 85730 THROMBOPLASTIN TIME PARTIAL: CPT | Performed by: SURGERY

## 2024-03-24 PROCEDURE — 84100 ASSAY OF PHOSPHORUS: CPT

## 2024-03-24 PROCEDURE — 80048 BASIC METABOLIC PNL TOTAL CA: CPT

## 2024-03-24 PROCEDURE — 99024 POSTOP FOLLOW-UP VISIT: CPT | Performed by: SURGERY

## 2024-03-24 PROCEDURE — 82948 REAGENT STRIP/BLOOD GLUCOSE: CPT

## 2024-03-24 PROCEDURE — 85025 COMPLETE CBC W/AUTO DIFF WBC: CPT

## 2024-03-24 RX ORDER — POTASSIUM CHLORIDE 20MEQ/15ML
20 LIQUID (ML) ORAL ONCE
Status: COMPLETED | OUTPATIENT
Start: 2024-03-24 | End: 2024-03-24

## 2024-03-24 RX ORDER — POTASSIUM CHLORIDE 20 MEQ/1
20 TABLET, EXTENDED RELEASE ORAL ONCE
Status: DISCONTINUED | OUTPATIENT
Start: 2024-03-24 | End: 2024-03-24

## 2024-03-24 RX ADMIN — POTASSIUM CHLORIDE 20 MEQ: 1.5 SOLUTION ORAL at 08:57

## 2024-03-24 RX ADMIN — POTASSIUM PHOSPHATE, MONOBASIC POTASSIUM PHOSPHATE, DIBASIC 9 MMOL: 224; 236 INJECTION, SOLUTION, CONCENTRATE INTRAVENOUS at 11:55

## 2024-03-24 RX ADMIN — OLANZAPINE 5 MG: 5 TABLET, ORALLY DISINTEGRATING ORAL at 21:33

## 2024-03-24 RX ADMIN — CHLORHEXIDINE GLUCONATE 15 ML: 1.2 SOLUTION ORAL at 08:12

## 2024-03-24 RX ADMIN — CHLORHEXIDINE GLUCONATE 15 ML: 1.2 SOLUTION ORAL at 21:33

## 2024-03-24 RX ADMIN — HEPARIN SODIUM 19 UNITS/KG/HR: 10000 INJECTION, SOLUTION INTRAVENOUS at 22:41

## 2024-03-24 RX ADMIN — Medication: at 21:37

## 2024-03-24 NOTE — PROGRESS NOTES
"Progress Note - General Surgery   [facility] Resident on [employer] Service   Jf DARIEL Valera 88 y.o. male MRN: [id number]  Unit/Bed#: [facility] 527-01 Encounter: [id number]    Assessment:  Patient is a 88 y.o. 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 s/p  -3/13 exlap, SBR, AUREA, Suprapubic tube insertion, ABthera VAC   -3/14 exlap, small bowel-small bowel anastomosis, abdominal closure     Afebrile, vitals normal on RA  UOP 1.5L per suprapubic tube  Stool 1x  WBC 17 (17)  Hgb 9.2 (9.5)  Creatinine 0.56 (0.52)    Plan:  Cont low res diet  Heparin gtt for PV thrombus, will need to price doac  Continue IV fluids  Continue TPN, will plan to half TPN today  PRN pain meds  PRN anti nausea meds  DVT prophylaxis  Continue to assess midline abdominal wound, appreciate dressing changes per nursing  Anticipate discharge within 24-48 hours  PT/OT level III     Subjective/Objective     Subjective: No acute events overnight. Enjoyed some regular food yesterday without n/v. Minimal pain. Has been ambulating the halls. Still having BM but have decreased in frequency, more formed.       Objective:     Blood pressure 132/75, pulse 96, temperature 98.9 °F (37.2 °C), temperature source Oral, resp. rate (!) 23, height 5' 4\" (1.626 m), weight 56.5 kg (124 lb 9 oz), SpO2 100%.,Body mass index is 21.38 kg/m².      Intake/Output Summary (Last 24 hours) at 3/24/2024 0931  Last data filed at 3/24/2024 0520  Gross per 24 hour   Intake 710.42 ml   Output 1475 ml   Net -764.58 ml       Invasive Devices       Peripherally Inserted Central Catheter Line  Duration             PICC Line 03/21/24 Left Basilic 2 days              Peripheral Intravenous Line  Duration             Peripheral IV 03/21/24 Right Antecubital 3 days              Drain  Duration             Suprapubic Catheter 18 Fr. 10 days                    Physical Exam:  General: No acute distress  Neuro: alert and oriented  HEENT: moist mucous membranes  CV: Well perfused, regular rate " and rhythm  Lungs: Normal work of breathing, no increased respiratory effort  Abdomen: Soft, non-tender, distended. Incision clean, dry and intact with some serous drainage from midline. Suprapubic cath in place  Extremities: No edema, clubbing or cyanosis  Skin: Warm, dry          Scheduled Meds:  Current Facility-Administered Medications   Medication Dose Route Frequency Provider Last Rate    Adult 3-in-1 TPN (custom base / standard electrolytes)   Intravenous Continuous TPN Niya Gamboa MD 69.8 mL/hr at 03/23/24 2122    Adult 3-in-1 TPN (custom base / standard electrolytes)   Intravenous Continuous TPN Kaya Guillen MD      chlorhexidine  15 mL Mouth/Throat Q12H RONALD Zack Murphy PA-C      heparin (porcine)  3-30 Units/kg/hr (Order-Specific) Intravenous Titrated Zack Murphy PA-C 19 Units/kg/hr (03/24/24 0039)    HYDROmorphone  0.2 mg Intravenous Q3H PRN Zack Meadows MD      OLANZapine  5 mg Oral HS Zack Murphy PA-C      oxyCODONE  5 mg Oral Q4H PRN Zack Meadows MD      oxyCODONE  2.5 mg Oral Q4H PRN Zack Meadows MD      phenol  1 spray Mouth/Throat Q2H PRN Zack Murphy PA-C      potassium phosphate  9 mmol Intravenous Once Kaya Guillen MD       Continuous Infusions:Adult 3-in-1 TPN (custom base / standard electrolytes), , Last Rate: 69.8 mL/hr at 03/23/24 2122  Adult 3-in-1 TPN (custom base / standard electrolytes),   heparin (porcine), 3-30 Units/kg/hr (Order-Specific), Last Rate: 19 Units/kg/hr (03/24/24 0039)      PRN Meds:.  HYDROmorphone    oxyCODONE    oxyCODONE    phenol      Lab, Imaging and other studies:I have personally reviewed pertinent lab results.    VTE Pharmacologic Prophylaxis: Heparin  VTE Mechanical Prophylaxis: sequential compression device      Niya Gamboa MD  3/24/2024 9:31 AM

## 2024-03-24 NOTE — PLAN OF CARE
Problem: SAFETY,RESTRAINT: NV/NON-SELF DESTRUCTIVE BEHAVIOR  Goal: Remains free of harm/injury (restraint for non violent/non self-detsructive behavior)  Description: INTERVENTIONS:  - Instruct patient/family regarding restraint use   - Assess and monitor physiologic and psychological status   - Provide interventions and comfort measures to meet assessed patient needs   - Identify and implement measures to help patient regain control  - Assess readiness for release of restraint   Outcome: Progressing  Goal: Returns to optimal restraint-free functioning  Description: INTERVENTIONS:  - Assess the patient's behavior and symptoms that indicate continued need for restraint  - Identify and implement measures to help patient regain control  - Assess readiness for release of restraint   Outcome: Progressing     Problem: PAIN - ADULT  Goal: Verbalizes/displays adequate comfort level or baseline comfort level  Description: Interventions:  - Encourage patient to monitor pain and request assistance  - Assess pain using appropriate pain scale  - Administer analgesics based on type and severity of pain and evaluate response  - Implement non-pharmacological measures as appropriate and evaluate response  - Consider cultural and social influences on pain and pain management  - Notify physician/advanced practitioner if interventions unsuccessful or patient reports new pain  Outcome: Progressing     Problem: INFECTION - ADULT  Goal: Absence or prevention of progression during hospitalization  Description: INTERVENTIONS:  - Assess and monitor for signs and symptoms of infection  - Monitor lab/diagnostic results  - Monitor all insertion sites, i.e. indwelling lines, tubes, and drains  - Monitor endotracheal if appropriate and nasal secretions for changes in amount and color  - Noble appropriate cooling/warming therapies per order  - Administer medications as ordered  - Instruct and encourage patient and family to use good hand  hygiene technique  - Identify and instruct in appropriate isolation precautions for identified infection/condition  Outcome: Progressing  Goal: Absence of fever/infection during neutropenic period  Description: INTERVENTIONS:  - Monitor WBC    Outcome: Progressing     Problem: SAFETY ADULT  Goal: Patient will remain free of falls  Description: INTERVENTIONS:  - Educate patient/family on patient safety including physical limitations  - Instruct patient to call for assistance with activity   - Consult OT/PT to assist with strengthening/mobility   - Keep Call bell within reach  - Keep bed low and locked with side rails adjusted as appropriate  - Keep care items and personal belongings within reach  - Initiate and maintain comfort rounds  - Make Fall Risk Sign visible to staff  - Offer Toileting every  Hours, in advance of need  - Initiate/Maintain alarm  - Obtain necessary fall risk management equipment:   - Apply yellow socks and bracelet for high fall risk patients  - Consider moving patient to room near nurses station  Outcome: Progressing  Goal: Maintain or return to baseline ADL function  Description: INTERVENTIONS:  -  Assess patient's ability to carry out ADLs; assess patient's baseline for ADL function and identify physical deficits which impact ability to perform ADLs (bathing, care of mouth/teeth, toileting, grooming, dressing, etc.)  - Assess/evaluate cause of self-care deficits   - Assess range of motion  - Assess patient's mobility; develop plan if impaired  - Assess patient's need for assistive devices and provide as appropriate  - Encourage maximum independence but intervene and supervise when necessary  - Involve family in performance of ADLs  - Assess for home care needs following discharge   - Consider OT consult to assist with ADL evaluation and planning for discharge  - Provide patient education as appropriate  Outcome: Progressing  Goal: Maintains/Returns to pre admission functional  level  Description: INTERVENTIONS:  - Perform AM-PAC 6 Click Basic Mobility/ Daily Activity assessment daily.  - Set and communicate daily mobility goal to care team and patient/family/caregiver.   - Collaborate with rehabilitation services on mobility goals if consulted  - Perform Range of Motion  times a day.  - Reposition patient every  hours.  - Dangle patient  times a day  - Stand patient  times a day  - Ambulate patient times a day  - Out of bed to chair  times a day   - Out of bed for meals  times a day  - Out of bed for toileting  - Record patient progress and toleration of activity level   Outcome: Progressing     Problem: DISCHARGE PLANNING  Goal: Discharge to home or other facility with appropriate resources  Description: INTERVENTIONS:  - Identify barriers to discharge w/patient and caregiver  - Arrange for needed discharge resources and transportation as appropriate  - Identify discharge learning needs (meds, wound care, etc.)  - Arrange for interpretive services to assist at discharge as needed  - Refer to Case Management Department for coordinating discharge planning if the patient needs post-hospital services based on physician/advanced practitioner order or complex needs related to functional status, cognitive ability, or social support system  Outcome: Progressing     Problem: Knowledge Deficit  Goal: Patient/family/caregiver demonstrates understanding of disease process, treatment plan, medications, and discharge instructions  Description: Complete learning assessment and assess knowledge base.  Interventions:  - Provide teaching at level of understanding  - Provide teaching via preferred learning methods  Outcome: Progressing     Problem: COPING  Goal: Pt/Family able to verbalize concerns and demonstrate effective coping strategies  Description: INTERVENTIONS:  - Assist patient/family to identify coping skills, available support systems and cultural and spiritual values  - Provide emotional  support, including active listening and acknowledgement of concerns of patient and caregivers  - Reduce environmental stimuli, as able  - Provide patient education  - Assess for spiritual pain/suffering and initiate spiritual care, including notification of Pastoral Care or timothy based community as needed  - Assess effectiveness of coping strategies  Outcome: Progressing  Goal: Will report anxiety at manageable levels  Description: INTERVENTIONS:  - Administer medication as ordered  - Teach and encourage coping skills  - Provide emotional support  - Assess patient/family for anxiety and ability to cope  Outcome: Progressing     Problem: DEATH & DYING  Goal: Pt/Family communicate acceptance of impending death and expresses psychological comfort and peace  Description: INTERVENTIONS:  - Assess patient/family anxiety and grief process related to end of life issues  - Provide emotional, spiritual and psychosocial support  - Provide information about the patient’s health status with consideration of family and cultural values  - Communicate willingness to discuss death and facilitate grief process  with patient/family as appropriate  - Emphasize sustaining relationships within family system and community, or timothy/spiritual traditions  - Initiate Spiritual Care, Pastoral care or other ancillary consults as needed  - Refer to community support groups as appropriate  Outcome: Progressing     Problem: CHANGE IN BODY IMAGE  Goal: Pt/Family communicate acceptance of loss or change in body image and expresses psychological comfort and peace  Description: INTERVENTIONS:  - Assess patient/family anxiety and grief process related to change in body image, loss of functional status and loss of sense of self  - Assess patient/family's coping skills and provide emotional, spiritual and psychosocial support  - Provide information about the patient's health status with consideration of family and cultural values  - Communicate  willingness to discuss loss and facilitate grief process with patient/family as appropriate  - Emphasize sustaining relationships within family system and community, or timothy/spiritual traditions  - Refer to community support groups as appropriate  - Initiate Spiritual Care, Pastoral care or other ancillary consults as needed  Outcome: Progressing     Problem: DECISION MAKING  Goal: Pt/Family able to effectively weigh alternatives and participate in decision making related to treatment and care  Description: INTERVENTIONS:  - Identify decision maker  - Determine when there are differences among patient's view, family's view, and healthcare provider's view of patient condition and care goals  - Facilitate patient/family articulation of goals for care  - Help patient/family identify pros/cons of alternative solutions  - Provide information as requested by patient/family  - Respect patient/family rights related to privacy and sharing information   - Serve as a liaison between patient, family and health care team  - Initiate consults as appropriate (Ethics Team, Palliative Care, Family Care Conference, etc.)  Outcome: Progressing     Problem: CONFUSION/THOUGHT DISTURBANCE  Goal: Thought disturbances (confusion, delirium, depression, dementia or psychosis) are managed to maintain or return to baseline mental status and functional level  Description: INTERVENTIONS:  - Assess for possible contributors to  thought disturbance, including but not limited to medications, infection, impaired vision or hearing, underlying metabolic abnormalities, dehydration, respiratory compromise,  psychiatric diagnoses and notify attending PHYSICAN/AP  - Monitor and intervene to maintain adequate nutrition, hydration, elimination, sleep and activity  - Decrease environmental stimuli, including noise as appropriate.  - Provide frequent contacts to provide refocusing, direction and reassurance as needed. Approach patient calmly with eye contact  and at their level.  - Hyndman high risk fall precautions, aspiration precautions and other safety measures, as indicated  - If delirium suspected, notify physician/AP of change in condition and request immediate in-person evaluation  - Pursue consults as appropriate including Geriatric (campus dependent), OT for cognitive evaluation/activity planning, psychiatric, pastoral care, etc.  Outcome: Progressing     Problem: BEHAVIOR  Goal: Pt/Family maintain appropriate behavior and adhere to behavioral management agreement, if implemented  Description: INTERVENTIONS:  - Assess the family dynamic   - Encourage verbalization of thoughts and concerns in a socially appropriate manner  - Assess patient/family's coping skills and non-compliant behavior (including use of illegal substances).  - Utilize positive, consistent limit setting strategies supporting safety of patient, staff and others  - Initiate consult with Case Management, Spiritual Care or other ancillary services as appropriate  - If a patient's/visitor's behavior jeopardizes the safety of the patient, staff, or others, refer to organization procedure.   - Notify Security of behavior or suspected illegal substances which indicate the need for search of the patient and/or belongings  - Encourage participation in the decision making process about a behavioral management agreement; implement if patient meets criteria  Outcome: Progressing     Problem: Depression - IP adult  Goal: Effects of depression will be minimized  Description: INTERVENTIONS:  - Assess impact of patient's symptoms on level of functioning, self-care needs and offer support as indicated  - Assess patient/family knowledge of depression, impact on illness and need for teaching  - Provide emotional support, presence and reassurance  - Assess for possible suicidal thoughts, ideation or self-harm. If patient expresses suicidal thoughts or statements do not leave alone, notify physician/AP  immediately, initiate Suicide Precautions, and determine need for continual observation.  - Initiate consults and referrals as appropriate (a mental health professional, Spiritual Care)  - Administer medication as ordered  Outcome: Progressing     Problem: SELF HARM  Goal: Effect of psychiatric condition will be minimized and patient will be protected from self harm  Description: INTERVENTIONS:  - Assess impact of patient's symptoms on level of functioning, self-care needs and offer support as indicated  - Assess patient/family knowledge of depression, impact on illness and need for teaching  - Provide emotional support, presence and reassurance  - Assess for possible suicidal thoughts, ideation or self-harm. If patient expresses suicidal thoughts or statements do not leave alone, notify physician/AP immediately, initiate suicide precautions, and determine need for continual observation.  - initiate consults and referrals as appropriate (a mental health professional, Spiritual Care  Outcome: Progressing     Problem: ABUSE/NEGLECT  Goal: Pt/Caregiver/Family aware of resources to assist with issues of abuse and neglect  Description: INTERVENTIONS:  - If child abuse and/or neglect is suspected, notify Childline directly  - Assess for level of risk and safety  - Initiate referral to Case Management  - Notify PHYSICIAN/AP and Nursing Supervisor  - Provide appropriate education and resources to patient and/or family  - Initiate referral to age appropriate protective services, i.e. [facility] or Child Protective Services  - Offer patient/caregiver the option to Opt Out of patient information directory  - Provide emotional support, including active listening and acknowledgment of concerns  - Provide the patient with information about supportive services i.e. shelters, community resources for domestic violence  Outcome: Progressing     Problem: SUBSTANCE USE/ABUSE  Goal: Will have no detox symptoms and will  verbalize plan for changing substance-related behavior  Description: INTERVENTIONS:  - Monitor physical status and assess for symptoms of withdrawal  - Administer medication as ordered  - Provide emotional support with 1 on 1 interaction with staff  - Encourage recovery focused program/ addiction education  - Assess for verbalization of changing behaviors related to substance abuse  - Initiate consults and referrals as appropriate (Case Management, Spiritual Care, etc.)  Outcome: Progressing  Goal: By discharge, will develop insight into their chemical dependency and sustain motivation to continue in recovery  Description: INTERVENTIONS:  - Attends all daily group sessions and scheduled AA groups  - Actively practices coping skills through participation in the therapeutic community and adherence to program rules  - Reviews and completes assignments from individual treatment plan  - Assist patient development of understanding of their personal cycle of addiction and relapse triggers  Outcome: Progressing  Goal: By discharge, patient will have ongoing treatment plan addressing chemical dependency  Description: INTERVENTIONS:  - Assist patient with resources and/or appointments for ongoing recovery based living  Outcome: Progressing     Problem: SPIRITUAL CARE  Goal: Pt/Family able to move forward in process of forgiving self, others and/or higher power  Description: INTERVENTIONS:  - Assist patient with any spiritual needs/requests such as communion, confession, anointing, etc  - Explore guilt and help patient/family identify possible spiritual/cultural beliefs and values  - Explore possibilities of making amends & reconciliation with self, others, and/or a greater power  - Guide patient/family in identifying painful feelings  - Help patient explore and identify spiritual beliefs, cultural understandings or values that may help or hinder letting go of issue  - Help patient explore feelings of anger, bitterness,  resentment, anxiety   Help patient/family identify and examine the situation in which these feelings are experienced  - Help patient/family identify destructive displacement of feelings onto other individuals  - Refer patient to formal counseling and/or to timothy [facility] for further support as needed or per request  Outcome: Progressing  Goal: Patient feels balance and connection with others and/or higher power that empowers the self during times of loss, guilt and fear  Description: INTERVENTIONS:  - Create safety for patient through empathic presence and non-judgmental listening  - Encourage patient to explore his/her values, beliefs and/or spiritual images and practices  - Encourage use of breath work, imagery, meditation, relaxation, reiki to ease distress and provide healing  - Encourage use of cultural and spiritual celebrations and rituals  - Facilitate discussion that helps patient sort out spiritual concerns  - Help patient identify where meaning/hope/comfort & strength are in his/her life  - Refer patient to [facility] for assistance, as appropriate  - Respond to patient/family need for prayer/ritual/sacrament/ceremony  Outcome: Progressing     Problem: Nutrition/Hydration-ADULT  Goal: Nutrient/Hydration intake appropriate for improving, restoring or maintaining nutritional needs  Description: Monitor and assess patient's nutrition/hydration status for malnutrition. Collaborate with interdisciplinary team and initiate plan and interventions as ordered.  Monitor patient's weight and dietary intake as ordered or per policy. Utilize nutrition screening tool and intervene as necessary. Determine patient's food preferences and provide high-protein, high-caloric foods as appropriate.     INTERVENTIONS:  - Monitor oral intake, urinary output, labs, and treatment plans  - Assess nutrition and hydration status and recommend course of action  - Evaluate amount of meals eaten  - Assist patient with eating if  necessary   - Allow adequate time for meals  - Recommend/ encourage appropriate diets, oral nutritional supplements, and vitamin/mineral supplements  - Order, calculate, and assess calorie counts as needed  - Recommend, monitor, and adjust tube feedings and TPN/PPN based on assessed needs  - Assess need for intravenous fluids  - Provide specific nutrition/hydration education as appropriate  - Include patient/family/caregiver in decisions related to nutrition  Outcome: Progressing     Problem: Prexisting or High Potential for Compromised Skin Integrity  Goal: Skin integrity is maintained or improved  Description: INTERVENTIONS:  - Identify patients at risk for skin breakdown  - Assess and monitor skin integrity  - Assess and monitor nutrition and hydration status  - Monitor labs   - Assess for incontinence   - Turn and reposition patient  - Assist with mobility/ambulation  - Relieve pressure over bony prominences  - Avoid friction and shearing  - Provide appropriate hygiene as needed including keeping skin clean and dry  - Evaluate need for skin moisturizer/barrier cream  - Collaborate with interdisciplinary team   - Patient/family teaching  - Consider wound care consult   Outcome: Progressing

## 2024-03-24 NOTE — PLAN OF CARE
Problem: SAFETY,RESTRAINT: NV/NON-SELF DESTRUCTIVE BEHAVIOR  Goal: Remains free of harm/injury (restraint for non violent/non self-detsructive behavior)  Description: INTERVENTIONS:  - Instruct patient/family regarding restraint use   - Assess and monitor physiologic and psychological status   - Provide interventions and comfort measures to meet assessed patient needs   - Identify and implement measures to help patient regain control  - Assess readiness for release of restraint   Outcome: Progressing  Goal: Returns to optimal restraint-free functioning  Description: INTERVENTIONS:  - Assess the patient's behavior and symptoms that indicate continued need for restraint  - Identify and implement measures to help patient regain control  - Assess readiness for release of restraint   Outcome: Progressing     Problem: PAIN - ADULT  Goal: Verbalizes/displays adequate comfort level or baseline comfort level  Description: Interventions:  - Encourage patient to monitor pain and request assistance  - Assess pain using appropriate pain scale  - Administer analgesics based on type and severity of pain and evaluate response  - Implement non-pharmacological measures as appropriate and evaluate response  - Consider cultural and social influences on pain and pain management  - Notify physician/advanced practitioner if interventions unsuccessful or patient reports new pain  Outcome: Progressing     Problem: INFECTION - ADULT  Goal: Absence or prevention of progression during hospitalization  Description: INTERVENTIONS:  - Assess and monitor for signs and symptoms of infection  - Monitor lab/diagnostic results  - Monitor all insertion sites, i.e. indwelling lines, tubes, and drains  - Monitor endotracheal if appropriate and nasal secretions for changes in amount and color  - Kake appropriate cooling/warming therapies per order  - Administer medications as ordered  - Instruct and encourage patient and family to use good hand  hygiene technique  - Identify and instruct in appropriate isolation precautions for identified infection/condition  Outcome: Progressing  Goal: Absence of fever/infection during neutropenic period  Description: INTERVENTIONS:  - Monitor WBC    Outcome: Progressing     Problem: SAFETY ADULT  Goal: Patient will remain free of falls  Description: INTERVENTIONS:  - Educate patient/family on patient safety including physical limitations  - Instruct patient to call for assistance with activity   - Consult OT/PT to assist with strengthening/mobility   - Keep Call bell within reach  - Keep bed low and locked with side rails adjusted as appropriate  - Keep care items and personal belongings within reach  - Initiate and maintain comfort rounds  - Make Fall Risk Sign visible to staff  - Offer Toileting ever Hours, in advance of need  - Initiate/Maintain alarm  - Obtain necessary fall risk management equipment:   - Apply yellow socks and bracelet for high fall risk patients  - Consider moving patient to room near nurses station  Outcome: Progressing  Goal: Maintain or return to baseline ADL function  Description: INTERVENTIONS:  -  Assess patient's ability to carry out ADLs; assess patient's baseline for ADL function and identify physical deficits which impact ability to perform ADLs (bathing, care of mouth/teeth, toileting, grooming, dressing, etc.)  - Assess/evaluate cause of self-care deficits   - Assess range of motion  - Assess patient's mobility; develop plan if impaired  - Assess patient's need for assistive devices and provide as appropriate  - Encourage maximum independence but intervene and supervise when necessary  - Involve family in performance of ADLs  - Assess for home care needs following discharge   - Consider OT consult to assist with ADL evaluation and planning for discharge  - Provide patient education as appropriate  Outcome: Progressing  Goal: Maintains/Returns to pre admission functional  level  Description: INTERVENTIONS:  - Perform AM-PAC 6 Click Basic Mobility/ Daily Activity assessment daily.  - Set and communicate daily mobility goal to care team and patient/family/caregiver.   - Collaborate with rehabilitation services on mobility goals if consulted  - Perform Range of Motion  times a day.  - Reposition patient every hours.  - Dangle patient  times a day  - Stand patient  times a day  - Ambulate patient  times a day  - Out of bed to chair  times a day   - Out of bed for meals  times a day  - Out of bed for toileting  - Record patient progress and toleration of activity level   Outcome: Progressing     Problem: DISCHARGE PLANNING  Goal: Discharge to home or other facility with appropriate resources  Description: INTERVENTIONS:  - Identify barriers to discharge w/patient and caregiver  - Arrange for needed discharge resources and transportation as appropriate  - Identify discharge learning needs (meds, wound care, etc.)  - Arrange for interpretive services to assist at discharge as needed  - Refer to Case Management Department for coordinating discharge planning if the patient needs post-hospital services based on physician/advanced practitioner order or complex needs related to functional status, cognitive ability, or social support system  Outcome: Progressing     Problem: Knowledge Deficit  Goal: Patient/family/caregiver demonstrates understanding of disease process, treatment plan, medications, and discharge instructions  Description: Complete learning assessment and assess knowledge base.  Interventions:  - Provide teaching at level of understanding  - Provide teaching via preferred learning methods  Outcome: Progressing     Problem: COPING  Goal: Pt/Family able to verbalize concerns and demonstrate effective coping strategies  Description: INTERVENTIONS:  - Assist patient/family to identify coping skills, available support systems and cultural and spiritual values  - Provide emotional  support, including active listening and acknowledgement of concerns of patient and caregivers  - Reduce environmental stimuli, as able  - Provide patient education  - Assess for spiritual pain/suffering and initiate spiritual care, including notification of Pastoral Care or timothy based community as needed  - Assess effectiveness of coping strategies  Outcome: Progressing  Goal: Will report anxiety at manageable levels  Description: INTERVENTIONS:  - Administer medication as ordered  - Teach and encourage coping skills  - Provide emotional support  - Assess patient/family for anxiety and ability to cope  Outcome: Progressing     Problem: DEATH & DYING  Goal: Pt/Family communicate acceptance of impending death and expresses psychological comfort and peace  Description: INTERVENTIONS:  - Assess patient/family anxiety and grief process related to end of life issues  - Provide emotional, spiritual and psychosocial support  - Provide information about the patient’s health status with consideration of family and cultural values  - Communicate willingness to discuss death and facilitate grief process  with patient/family as appropriate  - Emphasize sustaining relationships within family system and community, or timothy/spiritual traditions  - Initiate Spiritual Care, Pastoral care or other ancillary consults as needed  - Refer to community support groups as appropriate  Outcome: Progressing     Problem: CHANGE IN BODY IMAGE  Goal: Pt/Family communicate acceptance of loss or change in body image and expresses psychological comfort and peace  Description: INTERVENTIONS:  - Assess patient/family anxiety and grief process related to change in body image, loss of functional status and loss of sense of self  - Assess patient/family's coping skills and provide emotional, spiritual and psychosocial support  - Provide information about the patient's health status with consideration of family and cultural values  - Communicate  willingness to discuss loss and facilitate grief process with patient/family as appropriate  - Emphasize sustaining relationships within family system and community, or timothy/spiritual traditions  - Refer to community support groups as appropriate  - Initiate Spiritual Care, Pastoral care or other ancillary consults as needed  Outcome: Progressing     Problem: DECISION MAKING  Goal: Pt/Family able to effectively weigh alternatives and participate in decision making related to treatment and care  Description: INTERVENTIONS:  - Identify decision maker  - Determine when there are differences among patient's view, family's view, and healthcare provider's view of patient condition and care goals  - Facilitate patient/family articulation of goals for care  - Help patient/family identify pros/cons of alternative solutions  - Provide information as requested by patient/family  - Respect patient/family rights related to privacy and sharing information   - Serve as a liaison between patient, family and health care team  - Initiate consults as appropriate (Ethics Team, Palliative Care, Family Care Conference, etc.)  Outcome: Progressing     Problem: CONFUSION/THOUGHT DISTURBANCE  Goal: Thought disturbances (confusion, delirium, depression, dementia or psychosis) are managed to maintain or return to baseline mental status and functional level  Description: INTERVENTIONS:  - Assess for possible contributors to  thought disturbance, including but not limited to medications, infection, impaired vision or hearing, underlying metabolic abnormalities, dehydration, respiratory compromise,  psychiatric diagnoses and notify attending PHYSICAN/AP  - Monitor and intervene to maintain adequate nutrition, hydration, elimination, sleep and activity  - Decrease environmental stimuli, including noise as appropriate.  - Provide frequent contacts to provide refocusing, direction and reassurance as needed. Approach patient calmly with eye contact  and at their level.  - Olive high risk fall precautions, aspiration precautions and other safety measures, as indicated  - If delirium suspected, notify physician/AP of change in condition and request immediate in-person evaluation  - Pursue consults as appropriate including Geriatric (campus dependent), OT for cognitive evaluation/activity planning, psychiatric, pastoral care, etc.  Outcome: Progressing     Problem: BEHAVIOR  Goal: Pt/Family maintain appropriate behavior and adhere to behavioral management agreement, if implemented  Description: INTERVENTIONS:  - Assess the family dynamic   - Encourage verbalization of thoughts and concerns in a socially appropriate manner  - Assess patient/family's coping skills and non-compliant behavior (including use of illegal substances).  - Utilize positive, consistent limit setting strategies supporting safety of patient, staff and others  - Initiate consult with Case Management, Spiritual Care or other ancillary services as appropriate  - If a patient's/visitor's behavior jeopardizes the safety of the patient, staff, or others, refer to organization procedure.   - Notify Security of behavior or suspected illegal substances which indicate the need for search of the patient and/or belongings  - Encourage participation in the decision making process about a behavioral management agreement; implement if patient meets criteria  Outcome: Progressing     Problem: Depression - IP adult  Goal: Effects of depression will be minimized  Description: INTERVENTIONS:  - Assess impact of patient's symptoms on level of functioning, self-care needs and offer support as indicated  - Assess patient/family knowledge of depression, impact on illness and need for teaching  - Provide emotional support, presence and reassurance  - Assess for possible suicidal thoughts, ideation or self-harm. If patient expresses suicidal thoughts or statements do not leave alone, notify physician/AP  immediately, initiate Suicide Precautions, and determine need for continual observation.  - Initiate consults and referrals as appropriate (a mental health professional, Spiritual Care)  - Administer medication as ordered  Outcome: Progressing     Problem: SELF HARM  Goal: Effect of psychiatric condition will be minimized and patient will be protected from self harm  Description: INTERVENTIONS:  - Assess impact of patient's symptoms on level of functioning, self-care needs and offer support as indicated  - Assess patient/family knowledge of depression, impact on illness and need for teaching  - Provide emotional support, presence and reassurance  - Assess for possible suicidal thoughts, ideation or self-harm. If patient expresses suicidal thoughts or statements do not leave alone, notify physician/AP immediately, initiate suicide precautions, and determine need for continual observation.  - initiate consults and referrals as appropriate (a mental health professional, Spiritual Care  Outcome: Progressing     Problem: ABUSE/NEGLECT  Goal: Pt/Caregiver/Family aware of resources to assist with issues of abuse and neglect  Description: INTERVENTIONS:  - If child abuse and/or neglect is suspected, notify [employer]line directly  - Assess for level of risk and safety  - Initiate referral to Case Management  - Notify PHYSICIAN/AP and Nursing Supervisor  - Provide appropriate education and resources to patient and/or family  - Initiate referral to age appropriate protective services, i.e. [facility] or Child Protective Services  - Offer patient/caregiver the option to Opt Out of patient information directory  - Provide emotional support, including active listening and acknowledgment of concerns  - Provide the patient with information about supportive services i.e. shelters, community resources for domestic violence  Outcome: Progressing     Problem: SUBSTANCE USE/ABUSE  Goal: Will have no detox symptoms and will  verbalize plan for changing substance-related behavior  Description: INTERVENTIONS:  - Monitor physical status and assess for symptoms of withdrawal  - Administer medication as ordered  - Provide emotional support with 1 on 1 interaction with staff  - Encourage recovery focused program/ addiction education  - Assess for verbalization of changing behaviors related to substance abuse  - Initiate consults and referrals as appropriate (Case Management, Spiritual Care, etc.)  Outcome: Progressing  Goal: By discharge, will develop insight into their chemical dependency and sustain motivation to continue in recovery  Description: INTERVENTIONS:  - Attends all daily group sessions and scheduled AA groups  - Actively practices coping skills through participation in the therapeutic community and adherence to program rules  - Reviews and completes assignments from individual treatment plan  - Assist patient development of understanding of their personal cycle of addiction and relapse triggers  Outcome: Progressing  Goal: By discharge, patient will have ongoing treatment plan addressing chemical dependency  Description: INTERVENTIONS:  - Assist patient with resources and/or appointments for ongoing recovery based living  Outcome: Progressing     Problem: SPIRITUAL CARE  Goal: Pt/Family able to move forward in process of forgiving self, others and/or higher power  Description: INTERVENTIONS:  - Assist patient with any spiritual needs/requests such as communion, confession, anointing, etc  - Explore guilt and help patient/family identify possible spiritual/cultural beliefs and values  - Explore possibilities of making amends & reconciliation with self, others, and/or a greater power  - Guide patient/family in identifying painful feelings  - Help patient explore and identify spiritual beliefs, cultural understandings or values that may help or hinder letting go of issue  - Help patient explore feelings of anger, bitterness,  resentment, anxiety   Help patient/family identify and examine the situation in which these feelings are experienced  - Help patient/family identify destructive displacement of feelings onto other individuals  - Refer patient to formal counseling and/or to timothy [facility] for further support as needed or per request  Outcome: Progressing  Goal: Patient feels balance and connection with others and/or higher power that empowers the self during times of loss, guilt and fear  Description: INTERVENTIONS:  - Create safety for patient through empathic presence and non-judgmental listening  - Encourage patient to explore his/her values, beliefs and/or spiritual images and practices  - Encourage use of breath work, imagery, meditation, relaxation, reiki to ease distress and provide healing  - Encourage use of cultural and spiritual celebrations and rituals  - Facilitate discussion that helps patient sort out spiritual concerns  - Help patient identify where meaning/hope/comfort & strength are in his/her life  - Refer patient to [facility] for assistance, as appropriate  - Respond to patient/family need for prayer/ritual/sacrament/ceremony  Outcome: Progressing     Problem: Nutrition/Hydration-ADULT  Goal: Nutrient/Hydration intake appropriate for improving, restoring or maintaining nutritional needs  Description: Monitor and assess patient's nutrition/hydration status for malnutrition. Collaborate with interdisciplinary team and initiate plan and interventions as ordered.  Monitor patient's weight and dietary intake as ordered or per policy. Utilize nutrition screening tool and intervene as necessary. Determine patient's food preferences and provide high-protein, high-caloric foods as appropriate.     INTERVENTIONS:  - Monitor oral intake, urinary output, labs, and treatment plans  - Assess nutrition and hydration status and recommend course of action  - Evaluate amount of meals eaten  - Assist patient with eating if  necessary   - Allow adequate time for meals  - Recommend/ encourage appropriate diets, oral nutritional supplements, and vitamin/mineral supplements  - Order, calculate, and assess calorie counts as needed  - Recommend, monitor, and adjust tube feedings and TPN/PPN based on assessed needs  - Assess need for intravenous fluids  - Provide specific nutrition/hydration education as appropriate  - Include patient/family/caregiver in decisions related to nutrition  Outcome: Progressing     Problem: Prexisting or High Potential for Compromised Skin Integrity  Goal: Skin integrity is maintained or improved  Description: INTERVENTIONS:  - Identify patients at risk for skin breakdown  - Assess and monitor skin integrity  - Assess and monitor nutrition and hydration status  - Monitor labs   - Assess for incontinence   - Turn and reposition patient  - Assist with mobility/ambulation  - Relieve pressure over bony prominences  - Avoid friction and shearing  - Provide appropriate hygiene as needed including keeping skin clean and dry  - Evaluate need for skin moisturizer/barrier cream  - Collaborate with interdisciplinary team   - Patient/family teaching  - Consider wound care consult   Outcome: Progressing

## 2024-03-25 PROBLEM — E44.0 MODERATE PROTEIN-CALORIE MALNUTRITION (HCC): Status: ACTIVE | Noted: 2024-03-25

## 2024-03-25 LAB
ANION GAP SERPL CALCULATED.3IONS-SCNC: 6 MMOL/L (ref 4–13)
APTT PPP: 65 SECONDS (ref 23–37)
BASOPHILS # BLD AUTO: 0.04 THOUSANDS/ÂΜL (ref 0–0.1)
BASOPHILS NFR BLD AUTO: 0 % (ref 0–1)
BUN SERPL-MCNC: 13 MG/DL (ref 5–25)
CALCIUM SERPL-MCNC: 7.9 MG/DL (ref 8.4–10.2)
CHLORIDE SERPL-SCNC: 108 MMOL/L (ref 96–108)
CO2 SERPL-SCNC: 26 MMOL/L (ref 21–32)
CREAT SERPL-MCNC: 0.6 MG/DL (ref 0.6–1.3)
EOSINOPHIL # BLD AUTO: 0.19 THOUSAND/ÂΜL (ref 0–0.61)
EOSINOPHIL NFR BLD AUTO: 1 % (ref 0–6)
ERYTHROCYTE [DISTWIDTH] IN BLOOD BY AUTOMATED COUNT: 17.2 % (ref 11.6–15.1)
GFR SERPL CREATININE-BSD FRML MDRD: 90 ML/MIN/1.73SQ M
GLUCOSE SERPL-MCNC: 97 MG/DL (ref 65–140)
HCT VFR BLD AUTO: 30.1 % (ref 36.5–49.3)
HGB BLD-MCNC: 9.3 G/DL (ref 12–17)
IMM GRANULOCYTES # BLD AUTO: 0.34 THOUSAND/UL (ref 0–0.2)
IMM GRANULOCYTES NFR BLD AUTO: 2 % (ref 0–2)
INR PPP: 1.17 (ref 0.84–1.19)
LYMPHOCYTES # BLD AUTO: 1.72 THOUSANDS/ÂΜL (ref 0.6–4.47)
LYMPHOCYTES NFR BLD AUTO: 9 % (ref 14–44)
MAGNESIUM SERPL-MCNC: 1.9 MG/DL (ref 1.9–2.7)
MCH RBC QN AUTO: 28.8 PG (ref 26.8–34.3)
MCHC RBC AUTO-ENTMCNC: 30.9 G/DL (ref 31.4–37.4)
MCV RBC AUTO: 93 FL (ref 82–98)
MONOCYTES # BLD AUTO: 1.4 THOUSAND/ÂΜL (ref 0.17–1.22)
MONOCYTES NFR BLD AUTO: 8 % (ref 4–12)
NEUTROPHILS # BLD AUTO: 14.83 THOUSANDS/ÂΜL (ref 1.85–7.62)
NEUTS SEG NFR BLD AUTO: 80 % (ref 43–75)
NRBC BLD AUTO-RTO: 0 /100 WBCS
PHOSPHATE SERPL-MCNC: 3.1 MG/DL (ref 2.3–4.1)
PLATELET # BLD AUTO: 384 THOUSANDS/UL (ref 149–390)
PMV BLD AUTO: 10.3 FL (ref 8.9–12.7)
POTASSIUM SERPL-SCNC: 3.8 MMOL/L (ref 3.5–5.3)
PROTHROMBIN TIME: 14.8 SECONDS (ref 11.6–14.5)
RBC # BLD AUTO: 3.23 MILLION/UL (ref 3.88–5.62)
SODIUM SERPL-SCNC: 140 MMOL/L (ref 135–147)
WBC # BLD AUTO: 18.52 THOUSAND/UL (ref 4.31–10.16)

## 2024-03-25 PROCEDURE — 85025 COMPLETE CBC W/AUTO DIFF WBC: CPT | Performed by: STUDENT IN AN ORGANIZED HEALTH CARE EDUCATION/TRAINING PROGRAM

## 2024-03-25 PROCEDURE — 80048 BASIC METABOLIC PNL TOTAL CA: CPT | Performed by: STUDENT IN AN ORGANIZED HEALTH CARE EDUCATION/TRAINING PROGRAM

## 2024-03-25 PROCEDURE — 99024 POSTOP FOLLOW-UP VISIT: CPT | Performed by: SURGERY

## 2024-03-25 PROCEDURE — 97110 THERAPEUTIC EXERCISES: CPT

## 2024-03-25 PROCEDURE — 97116 GAIT TRAINING THERAPY: CPT

## 2024-03-25 PROCEDURE — 83735 ASSAY OF MAGNESIUM: CPT | Performed by: STUDENT IN AN ORGANIZED HEALTH CARE EDUCATION/TRAINING PROGRAM

## 2024-03-25 PROCEDURE — 84100 ASSAY OF PHOSPHORUS: CPT | Performed by: STUDENT IN AN ORGANIZED HEALTH CARE EDUCATION/TRAINING PROGRAM

## 2024-03-25 PROCEDURE — 85610 PROTHROMBIN TIME: CPT | Performed by: SURGERY

## 2024-03-25 PROCEDURE — 85730 THROMBOPLASTIN TIME PARTIAL: CPT | Performed by: SURGERY

## 2024-03-25 RX ORDER — ENOXAPARIN SODIUM 100 MG/ML
1.5 INJECTION SUBCUTANEOUS
Status: DISCONTINUED | OUTPATIENT
Start: 2024-03-26 | End: 2024-03-26

## 2024-03-25 RX ORDER — WARFARIN SODIUM 5 MG/1
5 TABLET ORAL
Status: DISCONTINUED | OUTPATIENT
Start: 2024-03-25 | End: 2024-03-26

## 2024-03-25 RX ADMIN — WARFARIN SODIUM 5 MG: 5 TABLET ORAL at 20:13

## 2024-03-25 RX ADMIN — CHLORHEXIDINE GLUCONATE 15 ML: 1.2 SOLUTION ORAL at 09:13

## 2024-03-25 RX ADMIN — CHLORHEXIDINE GLUCONATE 15 ML: 1.2 SOLUTION ORAL at 20:13

## 2024-03-25 RX ADMIN — OLANZAPINE 5 MG: 5 TABLET, ORALLY DISINTEGRATING ORAL at 23:13

## 2024-03-25 NOTE — MALNUTRITION/BMI
This medical record reflects one or more clinical indicators suggestive of malnutrition and/or morbid obesity.    Malnutrition Findings:   Adult Malnutrition type: Chronic illness  Adult Degree of Malnutrition: Malnutrition of moderate degree  Malnutrition Characteristics: Fat loss, Muscle loss                  360 Statement: Chronic moderate malnutrition d/t condition as evidence by mild to moderate muscle/fat loss at temples, clavicles, orbitals, currently on TPN and oral diet, will add ensure plus BID and initiate calorie count.    BMI Findings:           Body mass index is 21.61 kg/m².     See Nutrition note dated 3/25/24 for additional details.  Completed nutrition assessment is viewable in the nutrition documentation.

## 2024-03-25 NOTE — PHYSICAL THERAPY NOTE
PT Treatment       03/25/24 1330   PT Last Visit   PT Visit Date 03/25/24   Note Type   Note Type Treatment   Pain Assessment   Pain Assessment Tool 0-10   Pain Score No Pain   Restrictions/Precautions   Other Precautions Fall Risk;Multiple lines;Bed Alarm  (gonzalez, IV/TPN)   General   Chart Reviewed Yes   Response to Previous Treatment Patient with no complaints from previous session.   Family/Caregiver Present No   Cognition   Overall Cognitive Status WFL   Subjective   Subjective agreeable to ambulation with encouragement   Bed Mobility   Sit to Supine 5  Supervision   Additional items HOB elevated;Increased time required   Additional Comments patient received OOB in chair. extended time to manage multiple lines pre/post/during mobility. post treatment patient supine in bed with alarm active. able to perform sit-->supine transfer unassisted with increased time.   Transfers   Sit to Stand 5  Supervision   Additional items Verbal cues;Armrests   Stand to Sit 5  Supervision   Additional items Armrests;Verbal cues   Additional Comments transfers with RW   Ambulation/Elevation   Gait pattern Inconsistent julisa;Forward Flexion;Decreased foot clearance   Gait Assistance 5  Supervision   Additional items Verbal cues   Assistive Device Rolling walker   Distance 70 feet x2 (140 feet) x 3 performances for total of 420 feet   Ambulation/Elevation Additional Comments continue to encourage use of RW for community distance ambulation at this time. plan to trial SPC versus no AD in future sessions for household distances. also plan to have f/u session on stair training with presense of spouse (not here today) to make sure there are not any concerns from family. today with use of RW patient ambulating with increased gait speed. VC to encourage widened MARILYN and increased LE clearance   Balance   Static Sitting Good   Static Standing Fair +   Ambulatory Fair   Endurance Deficit   Endurance Deficit No   Activity Tolerance    Activity Tolerance Patient tolerated treatment well   Nurse Made Aware letty to see per RN Ronna   Exercises   Hip Abduction Sitting;15 reps;Bilateral;AROM   Knee AROM Long Arc Quad Sitting;15 reps;Bilateral;AROM   Ankle Pumps Sitting;Bilateral;AROM;20 reps  (+ 20 heel raises b/l)   Assessment   Prognosis Good   Problem List Impaired balance;Decreased mobility;Decreased skin integrity   Assessment PT initiated treatment session in order to assist patient in achieving goals to improve LE strength, gait training, and overall activity tolerance. Today patient was supervision level for bed mobility, transfers, and ambulation. With use of RW he is ambulating household and community distances. Plan is for patient to d/c home with his spouse where he will have access to RW on at least one level of the home. In future PT sessions plan to assess household level mobility with use of SPC to see this is safe recommendation for home as well. Patient has performed stairs with PT one time this admission and plan to trail again with supervision of family to address any questions. Anticipate d/c home with family support once medically cleared for d/c. Patient will continue to benefit from continued skilled PT this admission to achieve maximal function and safety.   Goals   Patient Goals to get back into bed   STG Expiration Date 03/31/24   PT Treatment Day 4   Plan   Treatment/Interventions Elevations;Gait training;Patient/family training;Equipment eval/education   Progress Progressing toward goals   PT Frequency 2-3x/wk  (patient demonstrates mobility at supervision level; PT frequency 2-3x/week with additional ambulation with RN/restorative staff. plan to have 1-2 more f/u visits to perform stairs with family present and trial lesser AD and then PT will likely sign off)   Discharge Recommendation   Rehab Resource Intensity Level, PT No post-acute rehabilitation needs  (home w/ family)   Equipment Recommended Walker  (recommend  RW for d/c)   AM-PAC Basic Mobility Inpatient   Turning in Flat Bed Without Bedrails 4   Lying on Back to Sitting on Edge of Flat Bed Without Bedrails 4   Moving Bed to Chair 3   Standing Up From Chair Using Arms 4   Walk in Room 3   Climb 3-5 Stairs With Railing 3   Basic Mobility Inpatient Raw Score 21   Basic Mobility Standardized Score 45.55   [facility] Highest Level Of Mobility   -HLM Goal 6: Walk 10 steps or more   -HLM Achieved 8: Walk 250 feet ot more         The patient's AM-PAC Basic Mobility Inpatient Standardized Score is greater than 42.9, suggesting this patient may benefit from discharge to home. Please also refer to the recommendation of the Physical Therapist for safe discharge planning.    JESSICA RENTERIA PT, DPT

## 2024-03-25 NOTE — PROGRESS NOTES
"Progress Note - General Surgery   [facility] Resident on [employer] Service   Jf Valera 88 y.o. male MRN: [id number]  Unit/Bed#: [facility] 527-01 Encounter: [id number]    Assessment:  Patient is a 88 y.o. 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  No acute complaints.  Tolerating diet  No n/v  +BMs      Objective:     Blood pressure 133/71, pulse 94, temperature 98.8 °F (37.1 °C), resp. rate 18, height 5' 4\" (1.626 m), weight 57.1 kg (125 lb 14.1 oz), SpO2 98%.,Body mass index is 21.61 kg/m².      Intake/Output Summary (Last 24 hours) at 3/25/2024 0856  Last data filed at 3/25/2024 0541  Gross per 24 hour   Intake 120 ml   Output 650 ml   Net -530 ml       Invasive Devices       Peripherally Inserted Central Catheter Line  Duration             PICC Line 03/21/24 Left Basilic 3 days              Drain  Duration             Suprapubic Catheter 18 Fr. 11 days                    Physical Exam:  General - no acute distress, responsive and cooperative  CV - warm, regular rate  Pulm - normal work of breathing, no respiratory distress  Abd - soft, mildly distended, nontender, staples c/d/I, suprapubic cath  Neuro - m/s grossly intact, cn grossly intact  Ext - moving all extremities            Scheduled Meds:  Current Facility-Administered Medications   Medication Dose Route Frequency Provider Last Rate    Adult 3-in-1 TPN (custom base / standard electrolytes)   Intravenous Continuous TPN Kaya Guillen MD 34.4 mL/hr at 03/24/24 2137    chlorhexidine  15 mL Mouth/Throat Q12H RONALD PEOPLES" JONA Murphy      heparin (porcine)  3-30 Units/kg/hr (Order-Specific) Intravenous Titrated Zack Murphy PA-C 19 Units/kg/hr (03/24/24 2241)    HYDROmorphone  0.2 mg Intravenous Q3H PRN Zack Meadows MD      OLANZapine  5 mg Oral HS Zack Murphy PA-C      oxyCODONE  5 mg Oral Q4H PRN Zack Meadows MD      oxyCODONE  2.5 mg Oral Q4H PRN Zack Meadows MD      phenol  1 spray Mouth/Throat Q2H PRN Zack Murphy PA-C       Continuous Infusions:Adult 3-in-1 TPN (custom base / standard electrolytes), , Last Rate: 34.4 mL/hr at 03/24/24 2137  heparin (porcine), 3-30 Units/kg/hr (Order-Specific), Last Rate: 19 Units/kg/hr (03/24/24 2241)      PRN Meds:.  HYDROmorphone    oxyCODONE    oxyCODONE    phenol      Lab, Imaging and other studies:I have personally reviewed pertinent lab results.    VTE Pharmacologic Prophylaxis: Heparin  VTE Mechanical Prophylaxis: sequential compression device

## 2024-03-25 NOTE — PLAN OF CARE
Problem: SAFETY,RESTRAINT: NV/NON-SELF DESTRUCTIVE BEHAVIOR  Goal: Remains free of harm/injury (restraint for non violent/non self-detsructive behavior)  Description: INTERVENTIONS:  - Instruct patient/family regarding restraint use   - Assess and monitor physiologic and psychological status   - Provide interventions and comfort measures to meet assessed patient needs   - Identify and implement measures to help patient regain control  - Assess readiness for release of restraint   Outcome: Progressing  Goal: Returns to optimal restraint-free functioning  Description: INTERVENTIONS:  - Assess the patient's behavior and symptoms that indicate continued need for restraint  - Identify and implement measures to help patient regain control  - Assess readiness for release of restraint   Outcome: Progressing     Problem: PAIN - ADULT  Goal: Verbalizes/displays adequate comfort level or baseline comfort level  Description: Interventions:  - Encourage patient to monitor pain and request assistance  - Assess pain using appropriate pain scale  - Administer analgesics based on type and severity of pain and evaluate response  - Implement non-pharmacological measures as appropriate and evaluate response  - Consider cultural and social influences on pain and pain management  - Notify physician/advanced practitioner if interventions unsuccessful or patient reports new pain  Outcome: Progressing     Problem: INFECTION - ADULT  Goal: Absence or prevention of progression during hospitalization  Description: INTERVENTIONS:  - Assess and monitor for signs and symptoms of infection  - Monitor lab/diagnostic results  - Monitor all insertion sites, i.e. indwelling lines, tubes, and drains  - Monitor endotracheal if appropriate and nasal secretions for changes in amount and color  - [place] appropriate cooling/warming therapies per order  - Administer medications as ordered  - Instruct and encourage patient and family to use good hand  hygiene technique  - Identify and instruct in appropriate isolation precautions for identified infection/condition  Outcome: Progressing  Goal: Absence of fever/infection during neutropenic period  Description: INTERVENTIONS:  - Monitor WBC    Outcome: Progressing     Problem: SAFETY ADULT  Goal: Patient will remain free of falls  Description: INTERVENTIONS:  - Educate patient/family on patient safety including physical limitations  - Instruct patient to call for assistance with activity   - Consult OT/PT to assist with strengthening/mobility   - Keep Call bell within reach  - Keep bed low and locked with side rails adjusted as appropriate  - Keep care items and personal belongings within reach  - Initiate and maintain comfort rounds  - Make Fall Risk Sign visible to staff  - Offer Toileting every  Hours, in advance of need  - Initiate/Maintain alarm  - Obtain necessary fall risk management equipment:   - Apply yellow socks and bracelet for high fall risk patients  - Consider moving patient to room near nurses station  Outcome: Progressing  Goal: Maintain or return to baseline ADL function  Description: INTERVENTIONS:  -  Assess patient's ability to carry out ADLs; assess patient's baseline for ADL function and identify physical deficits which impact ability to perform ADLs (bathing, care of mouth/teeth, toileting, grooming, dressing, etc.)  - Assess/evaluate cause of self-care deficits   - Assess range of motion  - Assess patient's mobility; develop plan if impaired  - Assess patient's need for assistive devices and provide as appropriate  - Encourage maximum independence but intervene and supervise when necessary  - Involve family in performance of ADLs  - Assess for home care needs following discharge   - Consider OT consult to assist with ADL evaluation and planning for discharge  - Provide patient education as appropriate  Outcome: Progressing  Goal: Maintains/Returns to pre admission functional  level  Description: INTERVENTIONS:  - Perform AM-PAC 6 Click Basic Mobility/ Daily Activity assessment daily.  - Set and communicate daily mobility goal to care team and patient/family/caregiver.   - Collaborate with rehabilitation services on mobility goals if consulted  - Perform Range of Motion  times a day.  - Reposition patient every  hours.  - Dangle patient times a day  - Stand patient  times a day  - Ambulate patient  times a day  - Out of bed to chair  times a day   - Out of bed for meals times a day  - Out of bed for toileting  - Record patient progress and toleration of activity level   Outcome: Progressing     Problem: DISCHARGE PLANNING  Goal: Discharge to home or other facility with appropriate resources  Description: INTERVENTIONS:  - Identify barriers to discharge w/patient and caregiver  - Arrange for needed discharge resources and transportation as appropriate  - Identify discharge learning needs (meds, wound care, etc.)  - Arrange for interpretive services to assist at discharge as needed  - Refer to Case Management Department for coordinating discharge planning if the patient needs post-hospital services based on physician/advanced practitioner order or complex needs related to functional status, cognitive ability, or social support system  Outcome: Progressing     Problem: Knowledge Deficit  Goal: Patient/family/caregiver demonstrates understanding of disease process, treatment plan, medications, and discharge instructions  Description: Complete learning assessment and assess knowledge base.  Interventions:  - Provide teaching at level of understanding  - Provide teaching via preferred learning methods  Outcome: Progressing     Problem: COPING  Goal: Pt/Family able to verbalize concerns and demonstrate effective coping strategies  Description: INTERVENTIONS:  - Assist patient/family to identify coping skills, available support systems and cultural and spiritual values  - Provide emotional  support, including active listening and acknowledgement of concerns of patient and caregivers  - Reduce environmental stimuli, as able  - Provide patient education  - Assess for spiritual pain/suffering and initiate spiritual care, including notification of Pastoral Care or timothy based community as needed  - Assess effectiveness of coping strategies  Outcome: Progressing  Goal: Will report anxiety at manageable levels  Description: INTERVENTIONS:  - Administer medication as ordered  - Teach and encourage coping skills  - Provide emotional support  - Assess patient/family for anxiety and ability to cope  Outcome: Progressing     Problem: DEATH & DYING  Goal: Pt/Family communicate acceptance of impending death and expresses psychological comfort and peace  Description: INTERVENTIONS:  - Assess patient/family anxiety and grief process related to end of life issues  - Provide emotional, spiritual and psychosocial support  - Provide information about the patient’s health status with consideration of family and cultural values  - Communicate willingness to discuss death and facilitate grief process  with patient/family as appropriate  - Emphasize sustaining relationships within family system and community, or timothy/spiritual traditions  - Initiate Spiritual Care, Pastoral care or other ancillary consults as needed  - Refer to community support groups as appropriate  Outcome: Progressing     Problem: CHANGE IN BODY IMAGE  Goal: Pt/Family communicate acceptance of loss or change in body image and expresses psychological comfort and peace  Description: INTERVENTIONS:  - Assess patient/family anxiety and grief process related to change in body image, loss of functional status and loss of sense of self  - Assess patient/family's coping skills and provide emotional, spiritual and psychosocial support  - Provide information about the patient's health status with consideration of family and cultural values  - Communicate  willingness to discuss loss and facilitate grief process with patient/family as appropriate  - Emphasize sustaining relationships within family system and community, or timothy/spiritual traditions  - Refer to community support groups as appropriate  - Initiate Spiritual Care, Pastoral care or other ancillary consults as needed  Outcome: Progressing     Problem: DECISION MAKING  Goal: Pt/Family able to effectively weigh alternatives and participate in decision making related to treatment and care  Description: INTERVENTIONS:  - Identify decision maker  - Determine when there are differences among patient's view, family's view, and healthcare provider's view of patient condition and care goals  - Facilitate patient/family articulation of goals for care  - Help patient/family identify pros/cons of alternative solutions  - Provide information as requested by patient/family  - Respect patient/family rights related to privacy and sharing information   - Serve as a liaison between patient, family and health care team  - Initiate consults as appropriate (Ethics Team, Palliative Care, Family Care Conference, etc.)  Outcome: Progressing     Problem: CONFUSION/THOUGHT DISTURBANCE  Goal: Thought disturbances (confusion, delirium, depression, dementia or psychosis) are managed to maintain or return to baseline mental status and functional level  Description: INTERVENTIONS:  - Assess for possible contributors to  thought disturbance, including but not limited to medications, infection, impaired vision or hearing, underlying metabolic abnormalities, dehydration, respiratory compromise,  psychiatric diagnoses and notify attending PHYSICAN/AP  - Monitor and intervene to maintain adequate nutrition, hydration, elimination, sleep and activity  - Decrease environmental stimuli, including noise as appropriate.  - Provide frequent contacts to provide refocusing, direction and reassurance as needed. Approach patient calmly with eye contact  and at their level.  - [place] high risk fall precautions, aspiration precautions and other safety measures, as indicated  - If delirium suspected, notify physician/AP of change in condition and request immediate in-person evaluation  - Pursue consults as appropriate including Geriatric (campus dependent), OT for cognitive evaluation/activity planning, psychiatric, pastoral care, etc.  Outcome: Progressing     Problem: BEHAVIOR  Goal: Pt/Family maintain appropriate behavior and adhere to behavioral management agreement, if implemented  Description: INTERVENTIONS:  - Assess the family dynamic   - Encourage verbalization of thoughts and concerns in a socially appropriate manner  - Assess patient/family's coping skills and non-compliant behavior (including use of illegal substances).  - Utilize positive, consistent limit setting strategies supporting safety of patient, staff and others  - Initiate consult with Case Management, Spiritual Care or other ancillary services as appropriate  - If a patient's/visitor's behavior jeopardizes the safety of the patient, staff, or others, refer to organization procedure.   - Notify Security of behavior or suspected illegal substances which indicate the need for search of the patient and/or belongings  - Encourage participation in the decision making process about a behavioral management agreement; implement if patient meets criteria  Outcome: Progressing     Problem: Depression - IP adult  Goal: Effects of depression will be minimized  Description: INTERVENTIONS:  - Assess impact of patient's symptoms on level of functioning, self-care needs and offer support as indicated  - Assess patient/family knowledge of depression, impact on illness and need for teaching  - Provide emotional support, presence and reassurance  - Assess for possible suicidal thoughts, ideation or self-harm. If patient expresses suicidal thoughts or statements do not leave alone, notify physician/AP  immediately, initiate Suicide Precautions, and determine need for continual observation.  - Initiate consults and referrals as appropriate (a mental health professional, Spiritual Care)  - Administer medication as ordered  Outcome: Progressing     Problem: SELF HARM  Goal: Effect of psychiatric condition will be minimized and patient will be protected from self harm  Description: INTERVENTIONS:  - Assess impact of patient's symptoms on level of functioning, self-care needs and offer support as indicated  - Assess patient/family knowledge of depression, impact on illness and need for teaching  - Provide emotional support, presence and reassurance  - Assess for possible suicidal thoughts, ideation or self-harm. If patient expresses suicidal thoughts or statements do not leave alone, notify physician/AP immediately, initiate suicide precautions, and determine need for continual observation.  - initiate consults and referrals as appropriate (a mental health professional, Spiritual Care  Outcome: Progressing     Problem: ABUSE/NEGLECT  Goal: Pt/Caregiver/Family aware of resources to assist with issues of abuse and neglect  Description: INTERVENTIONS:  - If child abuse and/or neglect is suspected, notify Childline directly  - Assess for level of risk and safety  - Initiate referral to Case Management  - Notify PHYSICIAN/AP and Nursing Supervisor  - Provide appropriate education and resources to patient and/or family  - Initiate referral to age appropriate protective services, i.e. [facility] or Child Protective Services  - Offer patient/caregiver the option to Opt Out of patient information directory  - Provide emotional support, including active listening and acknowledgment of concerns  - Provide the patient with information about supportive services i.e. shelters, community resources for domestic violence  Outcome: Progressing     Problem: SUBSTANCE USE/ABUSE  Goal: Will have no detox symptoms and will  verbalize plan for changing substance-related behavior  Description: INTERVENTIONS:  - Monitor physical status and assess for symptoms of withdrawal  - Administer medication as ordered  - Provide emotional support with 1 on 1 interaction with staff  - Encourage recovery focused program/ addiction education  - Assess for verbalization of changing behaviors related to substance abuse  - Initiate consults and referrals as appropriate (Case Management, Spiritual Care, etc.)  Outcome: Progressing  Goal: By discharge, will develop insight into their chemical dependency and sustain motivation to continue in recovery  Description: INTERVENTIONS:  - Attends all daily group sessions and scheduled AA groups  - Actively practices coping skills through participation in the therapeutic community and adherence to program rules  - Reviews and completes assignments from individual treatment plan  - Assist patient development of understanding of their personal cycle of addiction and relapse triggers  Outcome: Progressing  Goal: By discharge, patient will have ongoing treatment plan addressing chemical dependency  Description: INTERVENTIONS:  - Assist patient with resources and/or appointments for ongoing recovery based living  Outcome: Progressing     Problem: SPIRITUAL CARE  Goal: Pt/Family able to move forward in process of forgiving self, others and/or higher power  Description: INTERVENTIONS:  - Assist patient with any spiritual needs/requests such as communion, confession, anointing, etc  - Explore guilt and help patient/family identify possible spiritual/cultural beliefs and values  - Explore possibilities of making amends & reconciliation with self, others, and/or a greater power  - Guide patient/family in identifying painful feelings  - Help patient explore and identify spiritual beliefs, cultural understandings or values that may help or hinder letting go of issue  - Help patient explore feelings of anger, bitterness,  resentment, anxiety   Help patient/family identify and examine the situation in which these feelings are experienced  - Help patient/family identify destructive displacement of feelings onto other individuals  - Refer patient to formal counseling and/or to timothy [facility] for further support as needed or per request  Outcome: Progressing  Goal: Patient feels balance and connection with others and/or higher power that empowers the self during times of loss, guilt and fear  Description: INTERVENTIONS:  - Create safety for patient through empathic presence and non-judgmental listening  - Encourage patient to explore his/her values, beliefs and/or spiritual images and practices  - Encourage use of breath work, imagery, meditation, relaxation, reiki to ease distress and provide healing  - Encourage use of cultural and spiritual celebrations and rituals  - Facilitate discussion that helps patient sort out spiritual concerns  - Help patient identify where meaning/hope/comfort & strength are in his/her life  - Refer patient to [facility] for assistance, as appropriate  - Respond to patient/family need for prayer/ritual/sacrament/ceremony  Outcome: Progressing     Problem: Nutrition/Hydration-ADULT  Goal: Nutrient/Hydration intake appropriate for improving, restoring or maintaining nutritional needs  Description: Monitor and assess patient's nutrition/hydration status for malnutrition. Collaborate with interdisciplinary team and initiate plan and interventions as ordered.  Monitor patient's weight and dietary intake as ordered or per policy. Utilize nutrition screening tool and intervene as necessary. Determine patient's food preferences and provide high-protein, high-caloric foods as appropriate.     INTERVENTIONS:  - Monitor oral intake, urinary output, labs, and treatment plans  - Assess nutrition and hydration status and recommend course of action  - Evaluate amount of meals eaten  - Assist patient with eating if  necessary   - Allow adequate time for meals  - Recommend/ encourage appropriate diets, oral nutritional supplements, and vitamin/mineral supplements  - Order, calculate, and assess calorie counts as needed  - Recommend, monitor, and adjust tube feedings and TPN/PPN based on assessed needs  - Assess need for intravenous fluids  - Provide specific nutrition/hydration education as appropriate  - Include patient/family/caregiver in decisions related to nutrition  Outcome: Progressing     Problem: Prexisting or High Potential for Compromised Skin Integrity  Goal: Skin integrity is maintained or improved  Description: INTERVENTIONS:  - Identify patients at risk for skin breakdown  - Assess and monitor skin integrity  - Assess and monitor nutrition and hydration status  - Monitor labs   - Assess for incontinence   - Turn and reposition patient  - Assist with mobility/ambulation  - Relieve pressure over bony prominences  - Avoid friction and shearing  - Provide appropriate hygiene as needed including keeping skin clean and dry  - Evaluate need for skin moisturizer/barrier cream  - Collaborate with interdisciplinary team   - Patient/family teaching  - Consider wound care consult   Outcome: Progressing

## 2024-03-25 NOTE — PLAN OF CARE
Problem: PHYSICAL THERAPY ADULT  Goal: Performs mobility at highest level of function for planned discharge setting.  See evaluation for individualized goals.  Description: Treatment/Interventions: ADL retraining, Functional transfer training, LE strengthening/ROM, Therapeutic exercise, Endurance training, Patient/family training, Equipment eval/education, Bed mobility, Gait training, Spoke to nursing, Spoke to case management, OT, Elevations  Equipment Recommended: Walker       See flowsheet documentation for full assessment, interventions and recommendations.  Outcome: Progressing  Note: Prognosis: Good  Problem List: Impaired balance, Decreased mobility, Decreased skin integrity  Assessment: PT initiated treatment session in order to assist patient in achieving goals to improve LE strength, gait training, and overall activity tolerance. Today patient was supervision level for bed mobility, transfers, and ambulation. With use of RW he is ambulating household and community distances. Plan is for patient to d/c home with his spouse where he will have access to RW on at least one level of the home. In future PT sessions plan to assess household level mobility with use of SPC to see this is safe recommendation for home as well. Patient has performed stairs with PT one time this admission and plan to trail again with supervision of family to address any questions. Anticipate d/c home with family support once medically cleared for d/c. Patient will continue to benefit from continued skilled PT this admission to achieve maximal function and safety.        Rehab Resource Intensity Level, PT: No post-acute rehabilitation needs (home w/ family)    See flowsheet documentation for full assessment.

## 2024-03-26 VITALS
HEIGHT: 64 IN | BODY MASS INDEX: 21.49 KG/M2 | OXYGEN SATURATION: 97 % | DIASTOLIC BLOOD PRESSURE: 75 MMHG | HEART RATE: 91 BPM | TEMPERATURE: 98.6 F | SYSTOLIC BLOOD PRESSURE: 133 MMHG | RESPIRATION RATE: 16 BRPM | WEIGHT: 125.88 LBS

## 2024-03-26 LAB
INR PPP: 1.11 (ref 0.84–1.19)
PROTHROMBIN TIME: 14.2 SECONDS (ref 11.6–14.5)

## 2024-03-26 PROCEDURE — 99024 POSTOP FOLLOW-UP VISIT: CPT | Performed by: SURGERY

## 2024-03-26 PROCEDURE — 85610 PROTHROMBIN TIME: CPT | Performed by: SURGERY

## 2024-03-26 RX ORDER — ENOXAPARIN SODIUM 100 MG/ML
1.5 INJECTION SUBCUTANEOUS
Qty: 5 ML | Refills: 0 | Status: SHIPPED | OUTPATIENT
Start: 2024-03-27 | End: 2024-03-26

## 2024-03-26 RX ORDER — WARFARIN SODIUM 5 MG/1
10 TABLET ORAL
Status: DISCONTINUED | OUTPATIENT
Start: 2024-03-26 | End: 2024-03-26

## 2024-03-26 RX ADMIN — ENOXAPARIN SODIUM 90 MG: 100 INJECTION SUBCUTANEOUS at 08:39

## 2024-03-26 RX ADMIN — CHLORHEXIDINE GLUCONATE 15 ML: 1.2 SOLUTION ORAL at 08:39

## 2024-03-26 NOTE — DISCHARGE SUMMARY
Discharge Summary - General Surgery   Jf Valera 88 y.o. male MRN: [id number]  Unit/Bed#: [facility] 527-01 Encounter: [id number]    Admission Date: 3/13/2024     Discharge Date: ***    Admitting Diagnosis: dvt of portal vein    Discharge Diagnosis: ***    Attending and Service:  ***, Acute Care Surgical Services.    Consulting Physician(s): ***    Imaging and Procedures Performed:   Orders Placed This Encounter   Procedures    Central Line       IR chest tube placement    Result Date: 3/18/2024  Impression: Right chest tube placement. Connected to Pleur-evac. _______________________________________________________________ COMPARISON: Same day CT chest PROCEDURE DETAILS: Operators: Dr. Aguilar, VIR attending, performed the procedure. Anesthesia: Local anesthesia medications: 1% lidocaine Contrast: 0 mL of Omnipaque 300 Fluoroscopy time: 1.14 minutes Images: 1 COMMENTS: The patient was placed supine on the imaging table.  A preprocedure timeout was performed per [facility] protocol.   radiograph of the chest was performed to demarcate the pneumothorax.  After the skin was marked, the site was prepped and draped in the usual sterile fashion. Lidocaine was administered to the skin and a small skin incision was made. Under direct fluoroscopic guidance, a 19 gauge needle connected to a saline filled syringe was advanced into the pleural space until air bubbles were identified in the saline.  Amplatz wire was then advanced through the needle into the pleural space.  Following needle removal, a pigtail drain was advanced with pigtail formed in the pleural space.  The tube was sutured to the skin with 2-0 Prolene and connected to a Pleur-evac. Sterile dressings were applied. Workstation performed: [id number]     XR chest portable    Result Date: 3/17/2024  Impression: Moderate right pneumothorax, markedly decreased from prior. Groundglass opacity in the left lung. Small left effusion with bibasilar atelectasis.  Workstation performed: [id number]     CT chest wo contrast    Result Date: 3/17/2024  Impression: 1. Very large right hydropneumothorax with severe collapse of the right lung. At the time of this dictation the right chest tube has been placed by interventional radiology. Pulmonary emphysema and scarring. 2. Lingular infiltrates and left effusion. 3. Left chest wall lipoma. Workstation performed: [id number]     XR chest portable ICU    Result Date: 3/17/2024  Impression: Interval extubation with large right pneumothorax and small pleural effusion. I personally discussed this study with surgery resident LEO RODRIGUEZ MD on 3/17/2024 9:47 AM. Resident: ERMA FULLER I, the attending radiologist, have reviewed the images and agree with the final report above. Workstation performed: [id number]     XR chest portable ICU    Result Date: 3/15/2024  Impression: Right jugular catheter at cavoatrial junction with no pneumothorax. Moderate bibasilar opacity which may be due to atelectasis or pneumonia in the appropriate setting. Workstation performed: [id number]     XR abdomen 1 view kub    Result Date: 3/14/2024  Impression: No evidence of opaque foreign body on this intraoperative study. Report was called to nurse Anna Mooney at approximately 4:05 p.m. Workstation performed: [id number]     XR chest portable ICU    Result Date: 3/14/2024  Impression: ETT and NGT in place. No acute cardiopulmonary disease. 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 Resulting diffuse small bowel wall thickening, with distal small bowel hypoenhancement most consistent with venoocclusive ischemia. 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 Left SFA occlusion.  "Concern for an embolic etiology. Moderate ascites Markedly distended stomach with dilated fluid-filled esophagus. Markedly enlarged prostate with irregular enhancement. Findings were discussed with Dr Foley from the emergency department at 11:00 a.m. on 3/13/2024 Study initially reviewed and reported by Dr. Topete. Workstation performed: [id number]       1. ***    Pathology: ***    Hospital Course: Jf Valera is a ***-year-old ***    On discharge, the patient is instructed to follow-up with the patient's primary care provider within the next 2 weeks to review the events of the recent hospitalization. The patient is instructed to follow-up with the Acute Care Surgical Services as scheduled on *** at ***. The patient is instructed to follow the provided discharge instructions.     Condition at Discharge: {condition:67654}     Discharge instructions/Information to patient and family:   See after visit summary for information provided to patient and family.      Provisions for Follow-Up Care:  See after visit summary for information related to follow-up care and any pertinent home health orders.      Disposition: {Discharge Disposition:89308}    Planned Readmission: {EXAM; YES/NO:04040::\"No\"}    Discharge Statement   I spent *** minutes discharging the patient. This time was spent on the day of discharge. I had direct contact with the patient on the day of discharge. Additional documentation is required if more than 30 minutes were spent on discharge.     Discharge Medications:  See after visit summary for reconciled discharge medications provided to patient and family.    Cale Henderson PA-C  3/26/2024  3:01 PM    " During the hospital encounter, vascular surgery was consulted and assisted with his interventions and care.  Additionally, the palliative care service was consulted and assisted with goals of care discussion and management throughout his hospital stay.  He did have a prolonged hospital stay with delayed return of normal bowel function.  Once he did have return of normal bowel function, he was started on an oral diet.  Once tolerating oral intake, his IV fluids were discontinued and he was transition to an oral analgesic regimen.  He was anticoagulated with a heparin infusion initially before being transitioned to an oral anticoagulant regimen prior to discharge.  PT and OT followed him throughout his hospital encounter.  Case management assisted with disposition planning, and he was deemed stable for discharge on 3/26/2024.  For further details of his hospital encounter, please see his complete medical records.    On discharge, the patient is instructed to follow-up with the patient's primary care provider within the next 2 weeks to review the events of the recent hospitalization. The patient is instructed to follow-up with the Acute Care Surgical Services as scheduled on 4/8/2024 at 10:15 AM.  The patient is instructed to follow-up with the vascular surgery service in 2 to 3 weeks to review the hospital encounter and for further management as indicated.  The patient is instructed to follow-up with urology in about 1 week for reevaluation. The patient is instructed to follow the provided discharge instructions.     Condition at Discharge: stable     Discharge instructions/Information to patient and family:   See after visit summary for information provided to patient and family.      Provisions for Follow-Up Care:  See after visit summary for information related to follow-up care and any pertinent home health orders.      Disposition: See After Visit Summary for discharge disposition information.    Planned  Readmission: No    Discharge Statement   I spent 30 minutes discharging the patient. This time was spent on the day of discharge. I had direct contact with the patient on the day of discharge. Additional documentation is required if more than 30 minutes were spent on discharge.     Discharge Medications:  See after visit summary for reconciled discharge medications provided to patient and family.    Cale Henderson PA-C  3/26/2024  3:01 PM

## 2024-03-26 NOTE — QUICK NOTE
Nurse-Patient-Provider rounds were completed with the patient's nurse today, Dennis.    We discussed the plan is to continue the low residue diet today and add Metamucil.  Continue anticoagulation with therapeutic Lovenox with plan to bridge to Warfarin/Coumadin.  Prescriptions sent for both Eliquis and Xarelto as well for price evaluation at the request of family, though they were informed that the cost will likely be significant as he is not insured.    Anticipate potential discharge later today.    We reviewed all of the invasive devices/lines/telemetry orders.  - Maintain suprapubic catheter indefinitely.    DVT Prophylaxis:  - Anticoagulated with Lovenox.    Pain Assessment / Plan:  - Continue current analgesic regimen.    Mobility Assessment / Plan:  - Activity as tolerated.    Goals / Barriers for discharge:  - Awaiting final plan for anticoagulation on discharge.  - Case management following; case and discharge needs discussed.    All questions and concerns were addressed.    I spent greater than 20 minutes reviewing the plan with the patient and the nurse, and coordinating care for the day.    Cale Henderson PA-C  3/26/2024 09:09 AM

## 2024-03-26 NOTE — PROGRESS NOTES
"Progress Note - General Surgery   [facility] Resident on RED Service   Jf Valera 88 y.o. male MRN: [id number]  Unit/Bed#: [facility] 527-01 Encounter: [id number]    Assessment:  Patient is a 88 y.o. 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  Having bowel function.  Tolerating diet.  Pain well-controlled.      Objective:     Blood pressure 133/75, pulse 91, temperature 98.6 °F (37 °C), temperature source Oral, resp. rate 16, height 5' 4\" (1.626 m), weight 57.1 kg (125 lb 14.1 oz), SpO2 97%.,Body mass index is 21.61 kg/m².      Intake/Output Summary (Last 24 hours) at 3/26/2024 0859  Last data filed at 3/26/2024 0847  Gross per 24 hour   Intake 1688.53 ml   Output 1500 ml   Net 188.53 ml       Invasive Devices       Peripherally Inserted Central Catheter Line  Duration             PICC Line 03/21/24 Left Basilic 4 days              Drain  Duration             Suprapubic Catheter 18 Fr. 12 days                    Physical Exam:  Physical Exam:   Gen:  NAD.  HEENT: NCAT. MMM.  CV: well perfused, pulses palpable  Lungs: Normal respiratory effort  Abd: soft, nt/nd, incisions cdi  Skin: warm/ dry  Extremities: no peripheral edema, no cyanosis  Neuro: AxO x3.              Scheduled Meds:  Current Facility-Administered Medications   Medication Dose Route Frequency Provider Last Rate    chlorhexidine  15 mL Mouth/Throat Q12H RONALD Murphy PA-C      enoxaparin  1.5 mg/kg Subcutaneous Q24H RONALD Cale Henderson PA-C      HYDROmorphone  0.2 mg Intravenous " Q3H PRN Zack Meadows MD      OLANZapine  5 mg Oral HS Zack Murphy PA-C      oxyCODONE  5 mg Oral Q4H PRN Zack Meadows MD      oxyCODONE  2.5 mg Oral Q4H PRN Zack Meadows MD      phenol  1 spray Mouth/Throat Q2H PRN Zack Murphy PA-C      warfarin  10 mg Oral Daily (warfarin) Kaya Guillen MD       Continuous Infusions:     PRN Meds:.  HYDROmorphone    oxyCODONE    oxyCODONE    phenol      Lab, Imaging and other studies:I have personally reviewed pertinent lab results.    VTE Pharmacologic Prophylaxis: Heparin  VTE Mechanical Prophylaxis: sequential compression device

## 2024-03-26 NOTE — PLAN OF CARE
Problem: SAFETY,RESTRAINT: NV/NON-SELF DESTRUCTIVE BEHAVIOR  Goal: Remains free of harm/injury (restraint for non violent/non self-detsructive behavior)  Description: INTERVENTIONS:  - Instruct patient/family regarding restraint use   - Assess and monitor physiologic and psychological status   - Provide interventions and comfort measures to meet assessed patient needs   - Identify and implement measures to help patient regain control  - Assess readiness for release of restraint   Outcome: Progressing  Goal: Returns to optimal restraint-free functioning  Description: INTERVENTIONS:  - Assess the patient's behavior and symptoms that indicate continued need for restraint  - Identify and implement measures to help patient regain control  - Assess readiness for release of restraint   Outcome: Progressing     Problem: PAIN - ADULT  Goal: Verbalizes/displays adequate comfort level or baseline comfort level  Description: Interventions:  - Encourage patient to monitor pain and request assistance  - Assess pain using appropriate pain scale  - Administer analgesics based on type and severity of pain and evaluate response  - Implement non-pharmacological measures as appropriate and evaluate response  - Consider cultural and social influences on pain and pain management  - Notify physician/advanced practitioner if interventions unsuccessful or patient reports new pain  Outcome: Progressing     Problem: INFECTION - ADULT  Goal: Absence or prevention of progression during hospitalization  Description: INTERVENTIONS:  - Assess and monitor for signs and symptoms of infection  - Monitor lab/diagnostic results  - Monitor all insertion sites, i.e. indwelling lines, tubes, and drains  - Monitor endotracheal if appropriate and nasal secretions for changes in amount and color  - [place] appropriate cooling/warming therapies per order  - Administer medications as ordered  - Instruct and encourage patient and family to use good hand  hygiene technique  - Identify and instruct in appropriate isolation precautions for identified infection/condition  Outcome: Progressing  Goal: Absence of fever/infection during neutropenic period  Description: INTERVENTIONS:  - Monitor WBC    Outcome: Progressing     Problem: SAFETY ADULT  Goal: Patient will remain free of falls  Description: INTERVENTIONS:  - Educate patient/family on patient safety including physical limitations  - Instruct patient to call for assistance with activity   - Consult OT/PT to assist with strengthening/mobility   - Keep Call bell within reach  - Keep bed low and locked with side rails adjusted as appropriate  - Keep care items and personal belongings within reach  - Initiate and maintain comfort rounds  - Make Fall Risk Sign visible to staff  - Offer Toileting every  Hours, in advance of need  - Initiate/Maintain alarm  - Obtain necessary fall risk management equipment:   - Apply yellow socks and bracelet for high fall risk patients  - Consider moving patient to room near nurses station  Outcome: Progressing  Goal: Maintain or return to baseline ADL function  Description: INTERVENTIONS:  -  Assess patient's ability to carry out ADLs; assess patient's baseline for ADL function and identify physical deficits which impact ability to perform ADLs (bathing, care of mouth/teeth, toileting, grooming, dressing, etc.)  - Assess/evaluate cause of self-care deficits   - Assess range of motion  - Assess patient's mobility; develop plan if impaired  - Assess patient's need for assistive devices and provide as appropriate  - Encourage maximum independence but intervene and supervise when necessary  - Involve family in performance of ADLs  - Assess for home care needs following discharge   - Consider OT consult to assist with ADL evaluation and planning for discharge  - Provide patient education as appropriate  Outcome: Progressing  Goal: Maintains/Returns to pre admission functional  level  Description: INTERVENTIONS:  - Perform AM-PAC 6 Click Basic Mobility/ Daily Activity assessment daily.  - Set and communicate daily mobility goal to care team and patient/family/caregiver.   - Collaborate with rehabilitation services on mobility goals if consulted  - Perform Range of Motion  times a day.  - Reposition patient every  hours.  - Dangle patient  times a day  - Stand patient  times a day  - Ambulate patient  times a day  - Out of bed to chair  times a day   - Out of bed for meals times a day  - Out of bed for toileting  - Record patient progress and toleration of activity level   Outcome: Progressing     Problem: DISCHARGE PLANNING  Goal: Discharge to home or other facility with appropriate resources  Description: INTERVENTIONS:  - Identify barriers to discharge w/patient and caregiver  - Arrange for needed discharge resources and transportation as appropriate  - Identify discharge learning needs (meds, wound care, etc.)  - Arrange for interpretive services to assist at discharge as needed  - Refer to Case Management Department for coordinating discharge planning if the patient needs post-hospital services based on physician/advanced practitioner order or complex needs related to functional status, cognitive ability, or social support system  Outcome: Progressing     Problem: Knowledge Deficit  Goal: Patient/family/caregiver demonstrates understanding of disease process, treatment plan, medications, and discharge instructions  Description: Complete learning assessment and assess knowledge base.  Interventions:  - Provide teaching at level of understanding  - Provide teaching via preferred learning methods  Outcome: Progressing     Problem: COPING  Goal: Pt/Family able to verbalize concerns and demonstrate effective coping strategies  Description: INTERVENTIONS:  - Assist patient/family to identify coping skills, available support systems and cultural and spiritual values  - Provide emotional  support, including active listening and acknowledgement of concerns of patient and caregivers  - Reduce environmental stimuli, as able  - Provide patient education  - Assess for spiritual pain/suffering and initiate spiritual care, including notification of Pastoral Care or timothy based community as needed  - Assess effectiveness of coping strategies  Outcome: Progressing  Goal: Will report anxiety at manageable levels  Description: INTERVENTIONS:  - Administer medication as ordered  - Teach and encourage coping skills  - Provide emotional support  - Assess patient/family for anxiety and ability to cope  Outcome: Progressing     Problem: DEATH & DYING  Goal: Pt/Family communicate acceptance of impending death and expresses psychological comfort and peace  Description: INTERVENTIONS:  - Assess patient/family anxiety and grief process related to end of life issues  - Provide emotional, spiritual and psychosocial support  - Provide information about the patient’s health status with consideration of family and cultural values  - Communicate willingness to discuss death and facilitate grief process  with patient/family as appropriate  - Emphasize sustaining relationships within family system and community, or timothy/spiritual traditions  - Initiate Spiritual Care, Pastoral care or other ancillary consults as needed  - Refer to community support groups as appropriate  Outcome: Progressing     Problem: CHANGE IN BODY IMAGE  Goal: Pt/Family communicate acceptance of loss or change in body image and expresses psychological comfort and peace  Description: INTERVENTIONS:  - Assess patient/family anxiety and grief process related to change in body image, loss of functional status and loss of sense of self  - Assess patient/family's coping skills and provide emotional, spiritual and psychosocial support  - Provide information about the patient's health status with consideration of family and cultural values  - Communicate  willingness to discuss loss and facilitate grief process with patient/family as appropriate  - Emphasize sustaining relationships within family system and community, or timothy/spiritual traditions  - Refer to community support groups as appropriate  - Initiate Spiritual Care, Pastoral care or other ancillary consults as needed  Outcome: Progressing     Problem: DECISION MAKING  Goal: Pt/Family able to effectively weigh alternatives and participate in decision making related to treatment and care  Description: INTERVENTIONS:  - Identify decision maker  - Determine when there are differences among patient's view, family's view, and healthcare provider's view of patient condition and care goals  - Facilitate patient/family articulation of goals for care  - Help patient/family identify pros/cons of alternative solutions  - Provide information as requested by patient/family  - Respect patient/family rights related to privacy and sharing information   - Serve as a liaison between patient, family and health care team  - Initiate consults as appropriate (Ethics Team, Palliative Care, Family Care Conference, etc.)  Outcome: Progressing     Problem: CONFUSION/THOUGHT DISTURBANCE  Goal: Thought disturbances (confusion, delirium, depression, dementia or psychosis) are managed to maintain or return to baseline mental status and functional level  Description: INTERVENTIONS:  - Assess for possible contributors to  thought disturbance, including but not limited to medications, infection, impaired vision or hearing, underlying metabolic abnormalities, dehydration, respiratory compromise,  psychiatric diagnoses and notify attending PHYSICAN/AP  - Monitor and intervene to maintain adequate nutrition, hydration, elimination, sleep and activity  - Decrease environmental stimuli, including noise as appropriate.  - Provide frequent contacts to provide refocusing, direction and reassurance as needed. Approach patient calmly with eye contact  and at their level.  - [place] high risk fall precautions, aspiration precautions and other safety measures, as indicated  - If delirium suspected, notify physician/AP of change in condition and request immediate in-person evaluation  - Pursue consults as appropriate including Geriatric (campus dependent), OT for cognitive evaluation/activity planning, psychiatric, pastoral care, etc.  Outcome: Progressing     Problem: BEHAVIOR  Goal: Pt/Family maintain appropriate behavior and adhere to behavioral management agreement, if implemented  Description: INTERVENTIONS:  - Assess the family dynamic   - Encourage verbalization of thoughts and concerns in a socially appropriate manner  - Assess patient/family's coping skills and non-compliant behavior (including use of illegal substances).  - Utilize positive, consistent limit setting strategies supporting safety of patient, staff and others  - Initiate consult with Case Management, Spiritual Care or other ancillary services as appropriate  - If a patient's/visitor's behavior jeopardizes the safety of the patient, staff, or others, refer to organization procedure.   - Notify Security of behavior or suspected illegal substances which indicate the need for search of the patient and/or belongings  - Encourage participation in the decision making process about a behavioral management agreement; implement if patient meets criteria  Outcome: Progressing     Problem: Depression - IP adult  Goal: Effects of depression will be minimized  Description: INTERVENTIONS:  - Assess impact of patient's symptoms on level of functioning, self-care needs and offer support as indicated  - Assess patient/family knowledge of depression, impact on illness and need for teaching  - Provide emotional support, presence and reassurance  - Assess for possible suicidal thoughts, ideation or self-harm. If patient expresses suicidal thoughts or statements do not leave alone, notify physician/AP  immediately, initiate Suicide Precautions, and determine need for continual observation.  - Initiate consults and referrals as appropriate (a mental health professional, Spiritual Care)  - Administer medication as ordered  Outcome: Progressing     Problem: SELF HARM  Goal: Effect of psychiatric condition will be minimized and patient will be protected from self harm  Description: INTERVENTIONS:  - Assess impact of patient's symptoms on level of functioning, self-care needs and offer support as indicated  - Assess patient/family knowledge of depression, impact on illness and need for teaching  - Provide emotional support, presence and reassurance  - Assess for possible suicidal thoughts, ideation or self-harm. If patient expresses suicidal thoughts or statements do not leave alone, notify physician/AP immediately, initiate suicide precautions, and determine need for continual observation.  - initiate consults and referrals as appropriate (a mental health professional, Spiritual Care  Outcome: Progressing     Problem: ABUSE/NEGLECT  Goal: Pt/Caregiver/Family aware of resources to assist with issues of abuse and neglect  Description: INTERVENTIONS:  - If child abuse and/or neglect is suspected, notify Childline directly  - Assess for level of risk and safety  - Initiate referral to Case Management  - Notify PHYSICIAN/AP and Nursing Supervisor  - Provide appropriate education and resources to patient and/or family  - Initiate referral to age appropriate protective services, i.e. [facility] or Child Protective Services  - Offer patient/caregiver the option to Opt Out of patient information directory  - Provide emotional support, including active listening and acknowledgment of concerns  - Provide the patient with information about supportive services i.e. shelters, community resources for domestic violence  Outcome: Progressing     Problem: SUBSTANCE USE/ABUSE  Goal: Will have no detox symptoms and will  verbalize plan for changing substance-related behavior  Description: INTERVENTIONS:  - Monitor physical status and assess for symptoms of withdrawal  - Administer medication as ordered  - Provide emotional support with 1 on 1 interaction with staff  - Encourage recovery focused program/ addiction education  - Assess for verbalization of changing behaviors related to substance abuse  - Initiate consults and referrals as appropriate (Case Management, Spiritual Care, etc.)  Outcome: Progressing  Goal: By discharge, will develop insight into their chemical dependency and sustain motivation to continue in recovery  Description: INTERVENTIONS:  - Attends all daily group sessions and scheduled AA groups  - Actively practices coping skills through participation in the therapeutic community and adherence to program rules  - Reviews and completes assignments from individual treatment plan  - Assist patient development of understanding of their personal cycle of addiction and relapse triggers  Outcome: Progressing  Goal: By discharge, patient will have ongoing treatment plan addressing chemical dependency  Description: INTERVENTIONS:  - Assist patient with resources and/or appointments for ongoing recovery based living  Outcome: Progressing     Problem: SPIRITUAL CARE  Goal: Pt/Family able to move forward in process of forgiving self, others and/or higher power  Description: INTERVENTIONS:  - Assist patient with any spiritual needs/requests such as communion, confession, anointing, etc  - Explore guilt and help patient/family identify possible spiritual/cultural beliefs and values  - Explore possibilities of making amends & reconciliation with self, others, and/or a greater power  - Guide patient/family in identifying painful feelings  - Help patient explore and identify spiritual beliefs, cultural understandings or values that may help or hinder letting go of issue  - Help patient explore feelings of anger, bitterness,  resentment, anxiety   Help patient/family identify and examine the situation in which these feelings are experienced  - Help patient/family identify destructive displacement of feelings onto other individuals  - Refer patient to formal counseling and/or to timothy [facility] for further support as needed or per request  Outcome: Progressing  Goal: Patient feels balance and connection with others and/or higher power that empowers the self during times of loss, guilt and fear  Description: INTERVENTIONS:  - Create safety for patient through empathic presence and non-judgmental listening  - Encourage patient to explore his/her values, beliefs and/or spiritual images and practices  - Encourage use of breath work, imagery, meditation, relaxation, reiki to ease distress and provide healing  - Encourage use of cultural and spiritual celebrations and rituals  - Facilitate discussion that helps patient sort out spiritual concerns  - Help patient identify where meaning/hope/comfort & strength are in his/her life  - Refer patient to [facility] for assistance, as appropriate  - Respond to patient/family need for prayer/ritual/sacrament/ceremony  Outcome: Progressing     Problem: Nutrition/Hydration-ADULT  Goal: Nutrient/Hydration intake appropriate for improving, restoring or maintaining nutritional needs  Description: Monitor and assess patient's nutrition/hydration status for malnutrition. Collaborate with interdisciplinary team and initiate plan and interventions as ordered.  Monitor patient's weight and dietary intake as ordered or per policy. Utilize nutrition screening tool and intervene as necessary. Determine patient's food preferences and provide high-protein, high-caloric foods as appropriate.     INTERVENTIONS:  - Monitor oral intake, urinary output, labs, and treatment plans  - Assess nutrition and hydration status and recommend course of action  - Evaluate amount of meals eaten  - Assist patient with eating if  necessary   - Allow adequate time for meals  - Recommend/ encourage appropriate diets, oral nutritional supplements, and vitamin/mineral supplements  - Order, calculate, and assess calorie counts as needed  - Recommend, monitor, and adjust tube feedings and TPN/PPN based on assessed needs  - Assess need for intravenous fluids  - Provide specific nutrition/hydration education as appropriate  - Include patient/family/caregiver in decisions related to nutrition  Outcome: Progressing     Problem: Prexisting or High Potential for Compromised Skin Integrity  Goal: Skin integrity is maintained or improved  Description: INTERVENTIONS:  - Identify patients at risk for skin breakdown  - Assess and monitor skin integrity  - Assess and monitor nutrition and hydration status  - Monitor labs   - Assess for incontinence   - Turn and reposition patient  - Assist with mobility/ambulation  - Relieve pressure over bony prominences  - Avoid friction and shearing  - Provide appropriate hygiene as needed including keeping skin clean and dry  - Evaluate need for skin moisturizer/barrier cream  - Collaborate with interdisciplinary team   - Patient/family teaching  - Consider wound care consult   Outcome: Progressing

## 2024-03-26 NOTE — DISCHARGE INSTR - AVS FIRST PAGE
Acute Care Surgery Discharge Instructions    Please follow-up as instructed. If you do not already have a follow-up appointment, please call the office when you leave to schedule an appointment to be seen in 2-3 weeks for post-operative re-evaluation.    Activity:  - No lifting greater than 20 pounds or strenuous physical activity or exercise for 4 weeks.  - Walking and normal light activities are encouraged.  - Normal daily activities including climbing steps are okay.  - No driving until no longer using pain medications.    Diet:    - You may resume your normal diet.    Wound Care:  - May shower daily. No tub baths or swimming until cleared by your surgeon.  - Wash incision gently with soap and water and pat dry.  - Do not apply any creams or ointments unless instructed to do so by your surgeon.    Medications:    - You may resume all of your regular medications after discharge unless otherwise instructed. Please refer to your discharge medication list for further details.  - Please take the pain medications as directed.  - You are encouraged to use non-narcotic pain medications first and whenever possible. Reserve the use of narcotic pain medication for moderate to severe pain not controlled by non-narcotic medications.  - No driving while taking narcotic pain medications.  - You may become constipated, especially if taking pain medications. You may take any over the counter stool softeners or laxatives as needed. Examples: Milk of Magnesia, Colace, Senna.    Additional Instructions:  - Continue routine care of suprapubic catheter.  - If you have any questions or concerns after discharge please call the office.  - Call office or return to ER if fever greater than 101, chills, persistent nausea/vomiting, worsening/uncontrollable pain, and/or increasing redness or purulent/foul smelling drainage from incision(s).

## 2024-03-26 NOTE — INCIDENTAL FINDINGS
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  No further workup or intervention necessary for these findings at this time. Recommend short-term outpatient follow-up with primary care provider to review the finding and for further surveillance as indicated.       2) Scattered subcentimeter hypodensities too small to characterize on CT, most likely cysts, were incidentally identified on CT imaging of your liver during this hospital encounter. A malignancy (cancer) cannot be completely excluded based on trauma imaging alone.  Recommend short-term outpatient follow-up with primary care provider to review the finding and for further surveillance as indicated.      3) A nonspecific 1.8 cm hypodense lesion in the medial aspect of the splenic midpole (middle portion of spleen) incidentally identified on CT imaging during this hospital encounter. A malignancy (cancer) cannot be completely excluded based on trauma imaging alone.  Recommend short-term outpatient follow-up with primary care provider to review the finding and for further surveillance as indicated.       4) Scattered kidney cysts labs other smaller hypodensities too small to characterize on CT, most likely also represent cysts were noted incidentally on CT imaging during this hospital encounter. A malignancy (cancer) cannot be completely excluded based on trauma imaging alone.  Recommend short-term outpatient follow-up with primary care provider to review the finding and for further surveillance as indicated.       Follow up should be done within 2 week(s)    The above noted incidental findings were discussed with the patient and his son. The patient and his son demonstrated understanding of the findings and the follow-up  recommendations.    Please notify the following clinician to assist with the follow up:   Primary Care Provider

## 2024-03-28 ENCOUNTER — TELEPHONE (OUTPATIENT)
Age: 89
End: 2024-03-28

## 2024-03-28 ENCOUNTER — TELEPHONE (OUTPATIENT)
Dept: FAMILY MEDICINE CLINIC | Facility: CLINIC | Age: 89
End: 2024-03-28

## 2024-03-28 NOTE — TELEPHONE ENCOUNTER
Son calling in with patient there stating patient was seen and admitted into [facility]. When he was admitted he had a catheter placed at this visit. They were told to call our office to schedule a follow up. Patient did have gross blood in catheter bag on Wednesday and is now clear. Please review ER visit and call patients son to schedule in appropriate time frame.     CB: [phone]

## 2024-03-29 ENCOUNTER — TRANSITIONAL CARE MANAGEMENT (OUTPATIENT)
Dept: FAMILY MEDICINE CLINIC | Facility: CLINIC | Age: 89
End: 2024-03-29

## 2024-03-29 LAB
DME PARACHUTE DELIVERY DATE ACTUAL: NORMAL
DME PARACHUTE DELIVERY DATE REQUESTED: NORMAL
DME PARACHUTE ITEM DESCRIPTION: NORMAL
DME PARACHUTE ITEM DESCRIPTION: NORMAL
DME PARACHUTE ORDER STATUS: NORMAL
DME PARACHUTE SUPPLIER NAME: NORMAL
DME PARACHUTE SUPPLIER PHONE: NORMAL

## 2024-03-29 NOTE — TELEPHONE ENCOUNTER
Pt son calling to set up appointment.  Informed him the nurse is waiting for review from the provider and we will call back once records are reviewed .    PLease call son to set up letty at [phone]

## 2024-03-29 NOTE — TELEPHONE ENCOUNTER
Attempt to call pt to go over tcm questions- no answer and could not leave voicemail.    Joceline Thacker CMA

## 2024-03-30 ENCOUNTER — NURSE TRIAGE (OUTPATIENT)
Dept: OTHER | Facility: OTHER | Age: 89
End: 2024-03-30

## 2024-03-30 ENCOUNTER — APPOINTMENT (EMERGENCY)
Dept: RADIOLOGY | Facility: HOSPITAL | Age: 89
End: 2024-03-30
Payer: COMMERCIAL

## 2024-03-30 ENCOUNTER — HOSPITAL ENCOUNTER (EMERGENCY)
Facility: HOSPITAL | Age: 89
Discharge: HOME | End: 2024-03-30
Attending: EMERGENCY MEDICINE
Payer: COMMERCIAL

## 2024-03-30 VITALS
RESPIRATION RATE: 16 BRPM | HEART RATE: 90 BPM | DIASTOLIC BLOOD PRESSURE: 79 MMHG | SYSTOLIC BLOOD PRESSURE: 170 MMHG | WEIGHT: 130 LBS | OXYGEN SATURATION: 98 % | BODY MASS INDEX: 22.2 KG/M2 | HEIGHT: 64 IN | TEMPERATURE: 98.1 F

## 2024-03-30 DIAGNOSIS — R31.9 HEMATURIA: Primary | ICD-10-CM

## 2024-03-30 DIAGNOSIS — Z93.59 SUPRAPUBIC CATHETER (HCC): ICD-10-CM

## 2024-03-30 LAB
ALBUMIN SERPL BCP-MCNC: 3.4 G/DL (ref 3.5–5)
ALP SERPL-CCNC: 100 U/L (ref 34–104)
ALT SERPL W P-5'-P-CCNC: 24 U/L (ref 7–52)
ANION GAP SERPL CALCULATED.3IONS-SCNC: 8 MMOL/L (ref 4–13)
APTT PPP: 42 SECONDS (ref 23–37)
AST SERPL W P-5'-P-CCNC: 25 U/L (ref 13–39)
BACTERIA UR QL AUTO: ABNORMAL /HPF
BASOPHILS # BLD AUTO: 0.03 THOUSANDS/ÂΜL (ref 0–0.1)
BASOPHILS NFR BLD AUTO: 0 % (ref 0–1)
BILIRUB SERPL-MCNC: 0.49 MG/DL (ref 0.2–1)
BILIRUB UR QL STRIP: NEGATIVE
BUN SERPL-MCNC: 8 MG/DL (ref 5–25)
CALCIUM ALBUM COR SERPL-MCNC: 9.5 MG/DL (ref 8.3–10.1)
CALCIUM SERPL-MCNC: 9 MG/DL (ref 8.4–10.2)
CHLORIDE SERPL-SCNC: 105 MMOL/L (ref 96–108)
CLARITY UR: ABNORMAL
CO2 SERPL-SCNC: 27 MMOL/L (ref 21–32)
COLOR UR: ABNORMAL
CREAT SERPL-MCNC: 0.86 MG/DL (ref 0.6–1.3)
EOSINOPHIL # BLD AUTO: 0.13 THOUSAND/ÂΜL (ref 0–0.61)
EOSINOPHIL NFR BLD AUTO: 1 % (ref 0–6)
ERYTHROCYTE [DISTWIDTH] IN BLOOD BY AUTOMATED COUNT: 16.8 % (ref 11.6–15.1)
GFR SERPL CREATININE-BSD FRML MDRD: 77 ML/MIN/1.73SQ M
GLUCOSE SERPL-MCNC: 84 MG/DL (ref 65–140)
GLUCOSE UR STRIP-MCNC: NEGATIVE MG/DL
HCT VFR BLD AUTO: 32.7 % (ref 36.5–49.3)
HGB BLD-MCNC: 10 G/DL (ref 12–17)
HGB UR QL STRIP.AUTO: ABNORMAL
IMM GRANULOCYTES # BLD AUTO: 0.06 THOUSAND/UL (ref 0–0.2)
IMM GRANULOCYTES NFR BLD AUTO: 1 % (ref 0–2)
INR PPP: 1.72 (ref 0.84–1.19)
KETONES UR STRIP-MCNC: NEGATIVE MG/DL
LEUKOCYTE ESTERASE UR QL STRIP: ABNORMAL
LYMPHOCYTES # BLD AUTO: 1.19 THOUSANDS/ÂΜL (ref 0.6–4.47)
LYMPHOCYTES NFR BLD AUTO: 10 % (ref 14–44)
MCH RBC QN AUTO: 27.5 PG (ref 26.8–34.3)
MCHC RBC AUTO-ENTMCNC: 30.6 G/DL (ref 31.4–37.4)
MCV RBC AUTO: 90 FL (ref 82–98)
MONOCYTES # BLD AUTO: 0.93 THOUSAND/ÂΜL (ref 0.17–1.22)
MONOCYTES NFR BLD AUTO: 8 % (ref 4–12)
MUCOUS THREADS UR QL AUTO: ABNORMAL
NEUTROPHILS # BLD AUTO: 9.11 THOUSANDS/ÂΜL (ref 1.85–7.62)
NEUTS SEG NFR BLD AUTO: 80 % (ref 43–75)
NITRITE UR QL STRIP: NEGATIVE
NON-SQ EPI CELLS URNS QL MICRO: ABNORMAL /HPF
NRBC BLD AUTO-RTO: 0 /100 WBCS
PH UR STRIP.AUTO: 7 [PH]
PLATELET # BLD AUTO: 456 THOUSANDS/UL (ref 149–390)
PMV BLD AUTO: 9.8 FL (ref 8.9–12.7)
POTASSIUM SERPL-SCNC: 4.1 MMOL/L (ref 3.5–5.3)
PROT SERPL-MCNC: 6.5 G/DL (ref 6.4–8.4)
PROT UR STRIP-MCNC: ABNORMAL MG/DL
PROTHROMBIN TIME: 20.3 SECONDS (ref 11.6–14.5)
RBC # BLD AUTO: 3.63 MILLION/UL (ref 3.88–5.62)
RBC #/AREA URNS AUTO: ABNORMAL /HPF
SODIUM SERPL-SCNC: 140 MMOL/L (ref 135–147)
SP GR UR STRIP.AUTO: 1.01 (ref 1–1.03)
UROBILINOGEN UR QL STRIP.AUTO: 0.2 E.U./DL
WBC # BLD AUTO: 11.45 THOUSAND/UL (ref 4.31–10.16)
WBC #/AREA URNS AUTO: ABNORMAL /HPF

## 2024-03-30 PROCEDURE — 85730 THROMBOPLASTIN TIME PARTIAL: CPT | Performed by: EMERGENCY MEDICINE

## 2024-03-30 PROCEDURE — 99284 EMERGENCY DEPT VISIT MOD MDM: CPT

## 2024-03-30 PROCEDURE — 80053 COMPREHEN METABOLIC PANEL: CPT | Performed by: EMERGENCY MEDICINE

## 2024-03-30 PROCEDURE — 74176 CT ABD & PELVIS W/O CONTRAST: CPT

## 2024-03-30 PROCEDURE — 85610 PROTHROMBIN TIME: CPT | Performed by: EMERGENCY MEDICINE

## 2024-03-30 PROCEDURE — 96360 HYDRATION IV INFUSION INIT: CPT

## 2024-03-30 PROCEDURE — 87086 URINE CULTURE/COLONY COUNT: CPT | Performed by: EMERGENCY MEDICINE

## 2024-03-30 PROCEDURE — 36415 COLL VENOUS BLD VENIPUNCTURE: CPT | Performed by: EMERGENCY MEDICINE

## 2024-03-30 PROCEDURE — 99285 EMERGENCY DEPT VISIT HI MDM: CPT | Performed by: EMERGENCY MEDICINE

## 2024-03-30 PROCEDURE — 85025 COMPLETE CBC W/AUTO DIFF WBC: CPT | Performed by: EMERGENCY MEDICINE

## 2024-03-30 PROCEDURE — 81001 URINALYSIS AUTO W/SCOPE: CPT | Performed by: EMERGENCY MEDICINE

## 2024-03-30 RX ADMIN — SODIUM CHLORIDE 500 ML: 0.9 INJECTION, SOLUTION INTRAVENOUS at 17:08

## 2024-03-30 NOTE — TELEPHONE ENCOUNTER
"Reason for Disposition  • Patient sounds very sick or weak to the triager    Answer Assessment - Initial Assessment Questions  1. SYMPTOMS: \"What symptoms are you concerned about?\"      Blood in urine   2. ONSET:  \"When did the symptoms start?\"      today  3. FEVER: \"Is there a fever?\" If Yes, ask: \"What is the temperature, how was it measured, and when did it start?\"      No fever or chills   4. ABDOMINAL PAIN: \"Is there any abdominal pain?\" (e.g., Scale 1-10; or mild, moderate, severe)      no  5. URINE COLOR: \"What color is the urine?\"  \"Is there blood present in the urine?\" (e.g., clear, yellow, cloudy, tea-colored, blood streaks, bright red)      Was darker yesterday; dark blood with streaks   6. ONSET: \"When was the catheter inserted?\"      today  7. OTHER SYMPTOMS: \"Do you have any other symptoms?\" (e.g., back pain, bad urine odor)       No pain  Does take eliquis    Protocols used: Urinary Catheter Symptoms and Questions-ADULT-    "

## 2024-03-30 NOTE — TELEPHONE ENCOUNTER
"Regarding: Patient has a Catheter, Blood in Urine  ----- Message from Akilah Vazquez sent at 3/30/2024  2:16 PM EDT -----  \" My dad has a Catheter, a few days ago he had blood in urine, the Blood cleared up, but today there is Blood in the Urine again.\"  ( Please call [phone] if no answer at above number.)    "

## 2024-03-30 NOTE — DISCHARGE INSTRUCTIONS
Follow-up with urology for further care. Contact info provided below if needed.  Keep all upcoming appointments.  - Discuss your anti-hypertensives at your upcoming primary care appointment.  Continue all home medications as prescribed.   Return to the ED with new or worsening symptoms.

## 2024-03-30 NOTE — ED PROVIDER NOTES
History  Chief Complaint   Patient presents with    Blood in Urine     Pt had suprapubic catheter placed 3/13/2024 and was placed on eliquis due to clotting. Pt started having sporadic blood in catheter urine 3/26/2024 and was told to come to ER     Pt is an 89yo M who presents for hematuria.  Much of history provided by son.  Son states that patient had surgery on March 13 and was only recently discharged from the [facility].  Patient had a suprapubic catheter placed at that time.  Patient began with hematuria several days ago but that subsequently cleared.  Patient began again with hematuria yesterday and worsened today.  Patient called the surgery office and they recommended he come to the ED for further evaluation.  Patient states that his Kerns has been draining without difficulty.  Patient denies any abdominal or suprapubic discomfort.  Patient does report that he has been having a small amount of urine via the urethra.  Patient has not had any fevers or chills.  Patient is currently on Eliquis which increased concern due to the blood in his urine.  Patient is scheduled to see family medicine on April 3 and general surgery on April 8.        Prior to Admission Medications   Prescriptions Last Dose Informant Patient Reported? Taking?   apixaban (Eliquis) 5 mg 3/30/2024  No Yes   Sig: Take 2 tablets (10 mg total) by mouth 2 (two) times a day for 7 days, THEN 1 tablet (5 mg total) 2 (two) times a day for 23 days.   ergocalciferol (VITAMIN D2) 50,000 units   No No   Sig: Take 1 capsule (50,000 Units total) by mouth once a week for 4 doses Do not start before March 17, 2023.      Facility-Administered Medications: None       Past Medical History:   Diagnosis Date    Hypertension        Past Surgical History:   Procedure Laterality Date    IR CHEST TUBE PLACEMENT  3/17/2024    LAPAROTOMY N/A 3/13/2024    Procedure: LAPAROTOMY EXPLORATORY; SMALL BOWEL RESECTION; LYSIS OF ADHESIONS; SUPRPAPUBIC TUBE INSERTION;   Surgeon: Marvin Azul MD;  Location: [facility];  Service: General    LAPAROTOMY N/A 3/14/2024    Procedure: EXPLORATORY LAPAROTOMY, WASHOUT. SMALL BOWEL ANASTOMOSIS, CONTROL OF LIVER BLEED, CLOSURE OF ABDOMINAL WOUND;  Surgeon: Beltran Lorenz DO;  Location:  MAIN OR;  Service: General       No family history on file.  I have reviewed and agree with the history as documented.    E-Cigarette/Vaping    E-Cigarette Use Never User      E-Cigarette/Vaping Substances    Nicotine No     THC No     CBD No     Flavoring No     Other No     Unknown No      Social History     Tobacco Use    Smoking status: Never     Passive exposure: Never    Smokeless tobacco: Never   Vaping Use    Vaping status: Never Used   Substance Use Topics    Alcohol use: Never    Drug use: Never       Review of Systems   Genitourinary:  Positive for hematuria.   All other systems reviewed and are negative.      Physical Exam  Physical Exam  Vitals reviewed.   Constitutional:       General: He is not in acute distress.     Appearance: He is well-developed. He is not toxic-appearing or diaphoretic.   HENT:      Head: Normocephalic and atraumatic.      Right Ear: External ear normal.      Left Ear: External ear normal.      Nose: Nose normal.      Mouth/Throat:      Pharynx: Oropharynx is clear.   Eyes:      Extraocular Movements: Extraocular movements intact.      Pupils: Pupils are equal, round, and reactive to light.   Cardiovascular:      Rate and Rhythm: Normal rate and regular rhythm.      Heart sounds: Normal heart sounds.   Pulmonary:      Effort: Pulmonary effort is normal. No respiratory distress.      Breath sounds: Normal breath sounds.   Abdominal:      General: Bowel sounds are normal. There is no distension.      Palpations: Abdomen is soft.      Tenderness: There is no abdominal tenderness.      Comments: Surgical site with staples in place midline  Suprapubic catheter in place   Musculoskeletal:         General: Normal  range of motion.      Cervical back: Normal range of motion and neck supple.      Right lower leg: No edema.      Left lower leg: No edema.   Skin:     General: Skin is warm and dry.      Capillary Refill: Capillary refill takes less than 2 seconds.      Coloration: Skin is not pale.      Findings: No erythema or rash.   Neurological:      General: No focal deficit present.      Mental Status: He is alert and oriented to person, place, and time.   Psychiatric:         Speech: Speech normal.         Behavior: Behavior is cooperative.         Vital Signs  ED Triage Vitals [03/30/24 1511]   Temperature Pulse Respirations Blood Pressure SpO2   98.1 °F (36.7 °C) 88 16 170/79 99 %      Temp Source Heart Rate Source Patient Position - Orthostatic VS BP Location FiO2 (%)   Oral Monitor Lying Right arm --      Pain Score       No Pain           Vitals:    03/30/24 1511   BP: 170/79   Pulse: 88   Patient Position - Orthostatic VS: Lying         Visual Acuity      ED Medications  Medications   sodium chloride 0.9 % bolus 500 mL (0 mL Intravenous Stopped 3/30/24 1808)       Diagnostic Studies  Results Reviewed       Procedure Component Value Units Date/Time    Urine Microscopic [[id number]]  (Abnormal) Collected: 03/30/24 1556    Lab Status: Final result Specimen: Urine, Indwelling Kerns Catheter Updated: 03/30/24 1701     RBC, UA Innumerable /hpf      WBC, UA 1-2 /hpf      Epithelial Cells None Seen /hpf      Bacteria, UA Occasional /hpf      MUCUS THREADS Occasional    UA (URINE) with reflex to Scope [[id number]]  (Abnormal) Collected: 03/30/24 1556    Lab Status: Final result Specimen: Urine, Indwelling Kerns Catheter Updated: 03/30/24 1648     Color, UA Orange     Clarity, UA Cloudy     Specific Gravity, UA 1.015     pH, UA 7.0     Leukocytes, UA Trace     Nitrite, UA Negative     Protein,  (2+) mg/dl      Glucose, UA Negative mg/dl      Ketones, UA Negative mg/dl      Urobilinogen, UA 0.2 E.U./dl      Bilirubin, UA  Negative     Occult Blood, UA Large    Comprehensive metabolic panel [[id number]]  (Abnormal) Collected: 03/30/24 1613    Lab Status: Final result Specimen: Blood from Arm, Right Updated: 03/30/24 1642     Sodium 140 mmol/L      Potassium 4.1 mmol/L      Chloride 105 mmol/L      CO2 27 mmol/L      ANION GAP 8 mmol/L      BUN 8 mg/dL      Creatinine 0.86 mg/dL      Glucose 84 mg/dL      Calcium 9.0 mg/dL      Corrected Calcium 9.5 mg/dL      AST 25 U/L      ALT 24 U/L      Alkaline Phosphatase 100 U/L      Total Protein 6.5 g/dL      Albumin 3.4 g/dL      Total Bilirubin 0.49 mg/dL      eGFR 77 ml/min/1.73sq m     Narrative:      National Kid[employer] Disease Foundation guidelines for Chronic Kidney Disease (CKD):     Stage 1 with normal or high GFR (GFR > 90 mL/min/1.73 square meters)    Stage 2 Mild CKD (GFR = 60-89 mL/min/1.73 square meters)    Stage 3A Moderate CKD (GFR = 45-59 mL/min/1.73 square meters)    Stage 3B Moderate CKD (GFR = 30-44 mL/min/1.73 square meters)    Stage 4 Severe CKD (GFR = 15-29 mL/min/1.73 square meters)    Stage 5 End Stage CKD (GFR <15 mL/min/1.73 square meters)  Note: GFR calculation is accurate only with a steady state creatinine    Protime-INR [[id number]]  (Abnormal) Collected: 03/30/24 1613    Lab Status: Final result Specimen: Blood from Arm, Right Updated: 03/30/24 1634     Protime 20.3 seconds      INR 1.72    APTT [[id number]]  (Abnormal) Collected: 03/30/24 1613    Lab Status: Final result Specimen: Blood from Arm, Right Updated: 03/30/24 1634     PTT 42 seconds     CBC and differential [[id number]]  (Abnormal) Collected: 03/30/24 1613    Lab Status: Final result Specimen: Blood from Arm, Right Updated: 03/30/24 1619     WBC 11.45 Thousand/uL      RBC 3.63 Million/uL      Hemoglobin 10.0 g/dL      Hematocrit 32.7 %      MCV 90 fL      MCH 27.5 pg      MCHC 30.6 g/dL      RDW 16.8 %      MPV 9.8 fL      Platelets 456 Thousands/uL      nRBC 0 /100 WBCs      Neutrophils Relative 80 %       Immature Grans % 1 %      Lymphocytes Relative 10 %      Monocytes Relative 8 %      Eosinophils Relative 1 %      Basophils Relative 0 %      Neutrophils Absolute 9.11 Thousands/µL      Absolute Immature Grans 0.06 Thousand/uL      Absolute Lymphocytes 1.19 Thousands/µL      Absolute Monocytes 0.93 Thousand/µL      Eosinophils Absolute 0.13 Thousand/µL      Basophils Absolute 0.03 Thousands/µL     Urine culture [[id number]] Collected: 03/30/24 1556    Lab Status: In process Specimen: Urine, Indwelling Kerns Catheter Updated: 03/30/24 1601                   CT abdomen pelvis wo contrast   Final Result by Mian Berrios MD (03/30 1945)   1. Suprapubic bladder catheter. Collapsed bladder limiting evaluation, though no significant clot burden visualized.   2. Moderate abdominopelvic ascites.   3. Right basal atelectasis with small effusion.               Workstation performed: [id number]                    Procedures  Procedures         ED Course  ED Course as of 03/30/24 2002   Sat Mar 30, 2024   1555 Per nursing, pt is a difficult stick. Blood work delayed due to difficulty obtaining access. Nursing to try IV under US guidance.    1625 WBC(!): 11.45  Elevated above upper limit of normal but improved from most recent prior.    1625 Hemoglobin(!): 10.0  Anemia improved from most recent prior.    1635 POCT INR(!): 1.72  On thinners   1635 PTT(!): 42  On thinners   1642 Comprehensive metabolic panel(!)  Reviewed and without actionable derangement.    1643 Creatinine: 0.86  Increased from most recent baseline but WNL and not meeting criteria for LISA. Will give small fluid bolus regardless.    1643 Comprehensive metabolic panel(!)  Reviewed and without actionable derangement.    1650 Leukocytes, UA(!): Trace   1650 Nitrite, UA: Negative   1650 Blood, UA(!): Large  Consistent with gross hematuria. Awaiting micro.   1701 WBC, UA: 1-2   1701 Bacteria, UA: Occasional  No convincing for infection. Culture in process.    1701 RBC  Urine(!): Innumerable   1721 Awaiting CT.    1941 CT being read.    1957 CT abdomen pelvis wo contrast  Suprapubic bladder catheter. Collapsed bladder limiting evaluation, though no significant clot burden visualized.   1957 Pt and family made aware of all results and plan for DC. All agreeable.                                SBIRT 20yo+      Flowsheet Row Most Recent Value   Initial Alcohol Screen: US AUDIT-C     1. How often do you have a drink containing alcohol? 0 Filed at: 03/30/2024 1512   2. How many drinks containing alcohol do you have on a typical day you are drinking?  0 Filed at: 03/30/2024 1512   Audit-C Score 0 Filed at: 03/30/2024 1512   KATHI: How many times in the past year have you...    Used an illegal drug or used a prescription medication for non-medical reasons? Never Filed at: 03/30/2024 1512                      Medical Decision Making  Pt is a 87yo M who presents with hematuria.     Differential diagnosis to include but not limited to hemorrhagic cystitis, urinary retention, anemia, kidney dysfunction.  Will plan for labs, UA, and CT A/P. See ED course for results and details.    Plan to discharge pt with f/u to PCP, urology, and surgery. Discussed returning the ED with new or worsening of symptoms.  Discussed continuing home medications as prescribed.  Pt and family expressed understanding of discharge instructions, return precautions, and medication instructions and is stable for discharge at this time. All questions were answered and pt was discharged without incident.       Amount and/or Complexity of Data Reviewed  Labs: ordered. Decision-making details documented in ED Course.  Radiology: ordered. Decision-making details documented in ED Course.             Disposition  Final diagnoses:   Hematuria   Suprapubic catheter (HCC)     Time reflects when diagnosis was documented in both MDM as applicable and the Disposition within this note       Time User Action Codes Description Comment     3/30/2024  6:46 PM Shazia Oh [R31.9] Hematuria     3/30/2024  6:46 PM Shazia Oh [Z93.59] Suprapubic catheter (HCC)           ED Disposition       ED Disposition   Discharge    Condition   Stable    Date/Time   Sat Mar 30, 2024 2001    Comment   Jf Valera discharge to home/self care.                   Follow-up Information       Follow up With Specialties Details Why Contact Info    Jc Hutchinson MD Internal Medicine Call  As needed [address] [place] [address] [phone]      Kp Davis MD Urology Call on 4/1/2024  [address] [place] [address]  [phone]              Patient's Medications   Discharge Prescriptions    No medications on file       No discharge procedures on file.    PDMP Review         Value Time User    PDMP Reviewed  Yes 3/26/2024  2:44 PM Cale Henderson PA-C            ED Provider  Electronically Signed by             Shazia Oh MD  03/30/24 2002

## 2024-03-31 LAB — BACTERIA UR CULT: NORMAL

## 2024-04-01 NOTE — TELEPHONE ENCOUNTER
Pt son Zack called and requesting call back to schedule office visit due to pt had SPT cath placed     Pt call back-[phone]

## 2024-04-01 NOTE — TELEPHONE ENCOUNTER
Spoke to pt's son.  Pt is scheduled for New Pt appt for First SPT change on 4/23/24 in [facility].

## 2024-04-02 ENCOUNTER — TELEPHONE (OUTPATIENT)
Dept: FAMILY MEDICINE CLINIC | Facility: CLINIC | Age: 89
End: 2024-04-02

## 2024-04-02 NOTE — TELEPHONE ENCOUNTER
Was able to get in touch with the patients brother in law. They will be EST care here tomorrow. GERHARD Rosa, DOMINICK

## 2024-04-03 ENCOUNTER — OFFICE VISIT (OUTPATIENT)
Dept: FAMILY MEDICINE CLINIC | Facility: CLINIC | Age: 89
End: 2024-04-03

## 2024-04-03 VITALS
HEART RATE: 86 BPM | HEIGHT: 62 IN | BODY MASS INDEX: 20.43 KG/M2 | RESPIRATION RATE: 18 BRPM | SYSTOLIC BLOOD PRESSURE: 136 MMHG | TEMPERATURE: 97.8 F | WEIGHT: 111 LBS | DIASTOLIC BLOOD PRESSURE: 80 MMHG

## 2024-04-03 DIAGNOSIS — Z76.89 ENCOUNTER TO ESTABLISH CARE: Primary | ICD-10-CM

## 2024-04-03 DIAGNOSIS — Z51.5 PALLIATIVE CARE BY SPECIALIST: ICD-10-CM

## 2024-04-03 DIAGNOSIS — Z71.89 GOALS OF CARE, COUNSELING/DISCUSSION: ICD-10-CM

## 2024-04-03 DIAGNOSIS — I81 PORTAL VEIN THROMBOSIS: ICD-10-CM

## 2024-04-03 DIAGNOSIS — J94.2 HEMOPNEUMOTHORAX ON RIGHT: ICD-10-CM

## 2024-04-03 DIAGNOSIS — K55.9 MESENTERIC ISCHEMIA (HCC): ICD-10-CM

## 2024-04-03 DIAGNOSIS — E46 PROTEIN CALORIE MALNUTRITION (HCC): ICD-10-CM

## 2024-04-03 DIAGNOSIS — I10 HYPERTENSION, UNSPECIFIED TYPE: ICD-10-CM

## 2024-04-03 PROCEDURE — 99204 OFFICE O/P NEW MOD 45 MIN: CPT | Performed by: FAMILY MEDICINE

## 2024-04-03 RX ORDER — ERGOCALCIFEROL 1.25 MG/1
50000 CAPSULE ORAL WEEKLY
Qty: 4 CAPSULE | Refills: 0 | Status: SHIPPED | OUTPATIENT
Start: 2024-04-03 | End: 2024-04-25

## 2024-04-03 NOTE — ASSESSMENT & PLAN NOTE
2/2 portal vein thrombosis. Reviewed records from recent hospitalization. He will continue Eliquis for thrombosis. He has follow up scheduled with vascular surgery, general surgery.

## 2024-04-03 NOTE — ASSESSMENT & PLAN NOTE
Reviewed again in office today- family would still like for him to be full code and have whatever treatments are necessary.

## 2024-04-03 NOTE — ASSESSMENT & PLAN NOTE
Reviewed records from recent hospitalization. He will continue Eliquis for thrombosis. He has follow up scheduled with vascular surgery, general surgery.

## 2024-04-08 ENCOUNTER — OFFICE VISIT (OUTPATIENT)
Dept: SURGERY | Facility: CLINIC | Age: 89
End: 2024-04-08

## 2024-04-08 VITALS — HEIGHT: 62 IN | WEIGHT: 110.2 LBS | BODY MASS INDEX: 20.28 KG/M2 | TEMPERATURE: 97.9 F

## 2024-04-08 DIAGNOSIS — I81 PORTAL VEIN THROMBOSIS: ICD-10-CM

## 2024-04-08 DIAGNOSIS — K55.9 MESENTERIC ISCHEMIA (HCC): Primary | ICD-10-CM

## 2024-04-08 PROCEDURE — 99024 POSTOP FOLLOW-UP VISIT: CPT | Performed by: SURGERY

## 2024-04-08 NOTE — PROGRESS NOTES
Office Visit - General Surgery  Jf Valera MRN: [id number]  Encounter: [id number]  Assessment/Plan    Problem List Items Addressed This Visit       Mesenteric ischemia (HCC) - Primary     - 88yoM presenting for post op visit after discharge on 3/26, was recently hospitalized for mesenteric ischemia due to portal vein thrombosis, underwent an exploratory laparotomy, small bowel resection, suprapubic tube insertion on 3/13/24 and subsequently exploratory laparotomy, small bowel-small bowel anastomosis and abdominal closure on 3/14/24  - started on eliquis for anticoagulation   - did have ED visit on 3/30 for hematuria w repeat CT abdomen/pelvis done with no acute abnormalities, unremarkable urine studies  - established care w Dr. Hutchinson on 4/3 for primary care and ongoing care  - has scheduled urology appt to discuss suprapubic catheter management  - doing very well since discharge, no abdominal pain, eating well and tolerating diet without n/v, some inconsistency and changing caliber w his bowel movements presumably post operative, no bloody BM, some spontaneous voiding as well as from catheter          Portal vein thrombosis     I removed his abdominal incision staples during the office visit today, patient tolerated this without issues.     Subjective    Jf Valera is a 88 y.o. male presenting for post operative visit, doing very well and tolerating diet without issues, feels well, does report some occasional inconsistencies with his bowel movements in caliber and quantity since operations which I assured him and son should work itself out as the weeks go on. Otherwise feels and looks well.    They do inquire about suprapubic catheter and management, scheduled for urology follow up later this week on 4/12, hopefully after this visit they will have their questions answered and further plans in place.      Pt  has a past medical history of Hypertension.    Pt  has a past surgical history that includes LAPAROTOMY  (N/A, 3/13/2024); LAPAROTOMY (N/A, 3/14/2024); and IR chest tube placement (3/17/2024).    family history is not on file.    Jf Valera reports that he has never smoked. He has never been exposed to tobacco smoke. He has never used smokeless tobacco. He reports that he does not drink alcohol and does not use drugs.      Current Outpatient Medications on File Prior to Visit   Medication Sig Dispense Refill    apixaban (Eliquis) 5 mg Take 2 tablets (10 mg total) by mouth 2 (two) times a day for 7 days, THEN 1 tablet (5 mg total) 2 (two) times a day for 23 days. 194 tablet 0    ergocalciferol (VITAMIN D2) 50,000 units Take 1 capsule (50,000 Units total) by mouth once a week for 4 doses 4 capsule 0     No current facility-administered medications on file prior to visit.     No Known Allergies    Review of Systems   Constitutional:  Negative for appetite change, chills and fever.   Respiratory:  Negative for shortness of breath.    Cardiovascular:  Negative for chest pain.   Gastrointestinal:  Negative for abdominal distention, abdominal pain, constipation, diarrhea, nausea and vomiting.   Genitourinary:  Positive for hematuria. Negative for difficulty urinating.   Musculoskeletal:  Negative for back pain and neck pain.   Skin:  Negative for color change and wound.   Neurological:  Negative for dizziness, light-headedness and headaches.   Psychiatric/Behavioral:  Negative for agitation and confusion. The patient is not nervous/anxious.    All other systems reviewed and are negative.      Objective    Vitals:    04/08/24 1033   Temp: 97.9 °F (36.6 °C)       Physical Exam  Vitals reviewed.   Constitutional:       General: He is not in acute distress.     Appearance: Normal appearance. He is not ill-appearing or toxic-appearing.   HENT:      Head: Normocephalic and atraumatic.      Nose: Nose normal.      Mouth/Throat:      Mouth: Mucous membranes are moist.   Eyes:      Extraocular Movements: Extraocular movements  intact.   Cardiovascular:      Rate and Rhythm: Normal rate and regular rhythm.      Pulses: Normal pulses.   Pulmonary:      Effort: Pulmonary effort is normal. No respiratory distress.   Abdominal:      General: There is no distension.      Palpations: Abdomen is soft.      Tenderness: There is no abdominal tenderness.      Comments: Well healing midline surgical scar   Musculoskeletal:         General: Normal range of motion.      Cervical back: Normal range of motion.   Skin:     General: Skin is warm.      Capillary Refill: Capillary refill takes less than 2 seconds.      Coloration: Skin is not jaundiced or pale.   Neurological:      Mental Status: He is alert and oriented to person, place, and time.   Psychiatric:         Mood and Affect: Mood normal.

## 2024-04-08 NOTE — ASSESSMENT & PLAN NOTE
- 88yoM presenting for post op visit after discharge on 3/26, was recently hospitalized for mesenteric ischemia due to portal vein thrombosis, underwent an exploratory laparotomy, small bowel resection, suprapubic tube insertion on 3/13/24 and subsequently exploratory laparotomy, small bowel-small bowel anastomosis and abdominal closure on 3/14/24  - started on eliquis for anticoagulation   - did have ED visit on 3/30 for hematuria w repeat CT abdomen/pelvis done with no acute abnormalities, unremarkable urine studies  - established care w Dr. Hutchinson on 4/3 for primary care and ongoing care  - has scheduled urology appt to discuss suprapubic catheter management  - doing very well since discharge, no abdominal pain, eating well and tolerating diet without n/v, some inconsistency and changing caliber w his bowel movements presumably post operative, no bloody BM, some spontaneous voiding as well as from catheter

## 2024-04-16 ENCOUNTER — HOSPITAL ENCOUNTER (INPATIENT)
Facility: HOSPITAL | Age: 89
LOS: 2 days | Discharge: HOME | DRG: 053 | End: 2024-04-18
Attending: EMERGENCY MEDICINE | Admitting: INTERNAL MEDICINE
Payer: COMMERCIAL

## 2024-04-16 ENCOUNTER — APPOINTMENT (EMERGENCY)
Dept: RADIOLOGY | Facility: HOSPITAL | Age: 89
DRG: 053 | End: 2024-04-16
Payer: COMMERCIAL

## 2024-04-16 DIAGNOSIS — I65.1 BASILAR ARTERY STENOSIS: ICD-10-CM

## 2024-04-16 DIAGNOSIS — R56.9 SEIZURE-LIKE ACTIVITY (HCC): ICD-10-CM

## 2024-04-16 DIAGNOSIS — R56.9 SEIZURE (HCC): Primary | ICD-10-CM

## 2024-04-16 PROBLEM — D64.9 ANEMIA: Status: ACTIVE | Noted: 2024-04-16

## 2024-04-16 PROBLEM — D72.829 LEUKOCYTOSIS: Status: ACTIVE | Noted: 2024-04-16

## 2024-04-16 LAB
2HR DELTA HS TROPONIN: 1 NG/L
4HR DELTA HS TROPONIN: 2 NG/L
ALBUMIN SERPL BCP-MCNC: 3.2 G/DL (ref 3.5–5)
ALP SERPL-CCNC: 59 U/L (ref 34–104)
ALT SERPL W P-5'-P-CCNC: 11 U/L (ref 7–52)
ANION GAP SERPL CALCULATED.3IONS-SCNC: 9 MMOL/L (ref 4–13)
AST SERPL W P-5'-P-CCNC: 20 U/L (ref 13–39)
BACTERIA UR QL AUTO: ABNORMAL /HPF
BASOPHILS # BLD AUTO: 0.02 THOUSANDS/ÂΜL (ref 0–0.1)
BASOPHILS NFR BLD AUTO: 0 % (ref 0–1)
BILIRUB SERPL-MCNC: 0.42 MG/DL (ref 0.2–1)
BILIRUB UR QL STRIP: NEGATIVE
BUN SERPL-MCNC: 12 MG/DL (ref 5–25)
CALCIUM ALBUM COR SERPL-MCNC: 8.9 MG/DL (ref 8.3–10.1)
CALCIUM SERPL-MCNC: 8.3 MG/DL (ref 8.4–10.2)
CARDIAC TROPONIN I PNL SERPL HS: 4 NG/L
CARDIAC TROPONIN I PNL SERPL HS: 5 NG/L
CARDIAC TROPONIN I PNL SERPL HS: 6 NG/L
CHLORIDE SERPL-SCNC: 107 MMOL/L (ref 96–108)
CLARITY UR: CLEAR
CO2 SERPL-SCNC: 20 MMOL/L (ref 21–32)
COLOR UR: ABNORMAL
CREAT SERPL-MCNC: 0.87 MG/DL (ref 0.6–1.3)
EOSINOPHIL # BLD AUTO: 0 THOUSAND/ÂΜL (ref 0–0.61)
EOSINOPHIL NFR BLD AUTO: 0 % (ref 0–6)
ERYTHROCYTE [DISTWIDTH] IN BLOOD BY AUTOMATED COUNT: 16.3 % (ref 11.6–15.1)
GFR SERPL CREATININE-BSD FRML MDRD: 77 ML/MIN/1.73SQ M
GLUCOSE SERPL-MCNC: 141 MG/DL (ref 65–140)
GLUCOSE SERPL-MCNC: 156 MG/DL (ref 65–140)
GLUCOSE UR STRIP-MCNC: NEGATIVE MG/DL
HCT VFR BLD AUTO: 32.5 % (ref 36.5–49.3)
HGB BLD-MCNC: 9.8 G/DL (ref 12–17)
HGB UR QL STRIP.AUTO: ABNORMAL
IMM GRANULOCYTES # BLD AUTO: 0.03 THOUSAND/UL (ref 0–0.2)
IMM GRANULOCYTES NFR BLD AUTO: 0 % (ref 0–2)
KETONES UR STRIP-MCNC: NEGATIVE MG/DL
LACTATE SERPL-SCNC: 2 MMOL/L (ref 0.5–2)
LEUKOCYTE ESTERASE UR QL STRIP: ABNORMAL
LYMPHOCYTES # BLD AUTO: 0.82 THOUSANDS/ÂΜL (ref 0.6–4.47)
LYMPHOCYTES NFR BLD AUTO: 7 % (ref 14–44)
MAGNESIUM SERPL-MCNC: 2 MG/DL (ref 1.9–2.7)
MCH RBC QN AUTO: 27.5 PG (ref 26.8–34.3)
MCHC RBC AUTO-ENTMCNC: 30.2 G/DL (ref 31.4–37.4)
MCV RBC AUTO: 91 FL (ref 82–98)
MONOCYTES # BLD AUTO: 0.43 THOUSAND/ÂΜL (ref 0.17–1.22)
MONOCYTES NFR BLD AUTO: 4 % (ref 4–12)
MUCOUS THREADS UR QL AUTO: ABNORMAL
NEUTROPHILS # BLD AUTO: 9.83 THOUSANDS/ÂΜL (ref 1.85–7.62)
NEUTS SEG NFR BLD AUTO: 89 % (ref 43–75)
NITRITE UR QL STRIP: NEGATIVE
NON-SQ EPI CELLS URNS QL MICRO: ABNORMAL /HPF
NRBC BLD AUTO-RTO: 0 /100 WBCS
PH UR STRIP.AUTO: 6.5 [PH]
PLATELET # BLD AUTO: 375 THOUSANDS/UL (ref 149–390)
PMV BLD AUTO: 9.9 FL (ref 8.9–12.7)
POTASSIUM SERPL-SCNC: 3.9 MMOL/L (ref 3.5–5.3)
PROT SERPL-MCNC: 6 G/DL (ref 6.4–8.4)
PROT UR STRIP-MCNC: NEGATIVE MG/DL
RBC # BLD AUTO: 3.57 MILLION/UL (ref 3.88–5.62)
RBC #/AREA URNS AUTO: ABNORMAL /HPF
SODIUM SERPL-SCNC: 136 MMOL/L (ref 135–147)
SP GR UR STRIP.AUTO: 1.01 (ref 1–1.03)
TSH SERPL DL<=0.05 MIU/L-ACNC: 1.15 UIU/ML (ref 0.45–4.5)
UROBILINOGEN UR QL STRIP.AUTO: 0.2 E.U./DL
WBC # BLD AUTO: 11.13 THOUSAND/UL (ref 4.31–10.16)
WBC #/AREA URNS AUTO: ABNORMAL /HPF

## 2024-04-16 PROCEDURE — 93005 ELECTROCARDIOGRAM TRACING: CPT

## 2024-04-16 PROCEDURE — 99285 EMERGENCY DEPT VISIT HI MDM: CPT

## 2024-04-16 PROCEDURE — 82948 REAGENT STRIP/BLOOD GLUCOSE: CPT

## 2024-04-16 PROCEDURE — 70450 CT HEAD/BRAIN W/O DYE: CPT

## 2024-04-16 PROCEDURE — 83605 ASSAY OF LACTIC ACID: CPT | Performed by: EMERGENCY MEDICINE

## 2024-04-16 PROCEDURE — 81001 URINALYSIS AUTO W/SCOPE: CPT | Performed by: EMERGENCY MEDICINE

## 2024-04-16 PROCEDURE — 84443 ASSAY THYROID STIM HORMONE: CPT | Performed by: INTERNAL MEDICINE

## 2024-04-16 PROCEDURE — 36415 COLL VENOUS BLD VENIPUNCTURE: CPT | Performed by: EMERGENCY MEDICINE

## 2024-04-16 PROCEDURE — 84484 ASSAY OF TROPONIN QUANT: CPT | Performed by: EMERGENCY MEDICINE

## 2024-04-16 PROCEDURE — 84146 ASSAY OF PROLACTIN: CPT | Performed by: EMERGENCY MEDICINE

## 2024-04-16 PROCEDURE — 80053 COMPREHEN METABOLIC PANEL: CPT | Performed by: EMERGENCY MEDICINE

## 2024-04-16 PROCEDURE — 87086 URINE CULTURE/COLONY COUNT: CPT | Performed by: EMERGENCY MEDICINE

## 2024-04-16 PROCEDURE — 99223 1ST HOSP IP/OBS HIGH 75: CPT | Performed by: INTERNAL MEDICINE

## 2024-04-16 PROCEDURE — 99285 EMERGENCY DEPT VISIT HI MDM: CPT | Performed by: EMERGENCY MEDICINE

## 2024-04-16 PROCEDURE — 83735 ASSAY OF MAGNESIUM: CPT | Performed by: EMERGENCY MEDICINE

## 2024-04-16 PROCEDURE — 85025 COMPLETE CBC W/AUTO DIFF WBC: CPT | Performed by: EMERGENCY MEDICINE

## 2024-04-16 RX ORDER — TAMSULOSIN HYDROCHLORIDE 0.4 MG/1
0.4 CAPSULE ORAL
COMMUNITY

## 2024-04-16 RX ORDER — LEVETIRACETAM 500 MG/5ML
750 INJECTION, SOLUTION, CONCENTRATE INTRAVENOUS EVERY 12 HOURS SCHEDULED
Status: DISCONTINUED | OUTPATIENT
Start: 2024-04-16 | End: 2024-04-18

## 2024-04-16 RX ORDER — LORAZEPAM 2 MG/ML
2 INJECTION INTRAMUSCULAR EVERY 6 HOURS PRN
Status: DISCONTINUED | OUTPATIENT
Start: 2024-04-16 | End: 2024-04-18 | Stop reason: HOSPADM

## 2024-04-16 RX ORDER — ACETAMINOPHEN 325 MG/1
650 TABLET ORAL EVERY 6 HOURS PRN
Status: DISCONTINUED | OUTPATIENT
Start: 2024-04-16 | End: 2024-04-18 | Stop reason: HOSPADM

## 2024-04-16 RX ADMIN — APIXABAN 5 MG: 5 TABLET, FILM COATED ORAL at 22:43

## 2024-04-16 NOTE — ED PROVIDER NOTES
History  Chief Complaint   Patient presents with    Seizure - New Onset     Per family pt was found in his bed stiff and sliding off chair, another episode was pt sitting and talking and had an episode of staring and stiff with eyes rolled back lasting about 2 mins.     88-year-old male with a history of mesenteric ischemia recently status post surgery on Eliquis presents after family noticed 2 different episodes of seizure-like activity where 1 episode he was staring off into space and was not responsive for a few seconds and the second episode he felt very stiff and slid off the chair.  Both episodes patient is not aware no urinary incontinence no tongue biting no postictal confusion noted.  No history of seizures noted no head trauma no other complaints      History provided by:  Patient   used: No        Prior to Admission Medications   Prescriptions Last Dose Informant Patient Reported? Taking?   apixaban (Eliquis) 5 mg 4/16/2024 at 0900  No Yes   Sig: Take 2 tablets (10 mg total) by mouth 2 (two) times a day for 7 days, THEN 1 tablet (5 mg total) 2 (two) times a day for 23 days.   ergocalciferol (VITAMIN D2) 50,000 units 4/15/2024 at 1900  No Yes   Sig: Take 1 capsule (50,000 Units total) by mouth once a week for 4 doses   tamsulosin (FLOMAX) 0.4 mg 4/15/2024 at 1900  Yes Yes   Sig: Take 0.4 mg by mouth daily with dinner      Facility-Administered Medications: None       Past Medical History:   Diagnosis Date    Hypertension        Past Surgical History:   Procedure Laterality Date    IR CHEST TUBE PLACEMENT  3/17/2024    LAPAROTOMY N/A 3/13/2024    Procedure: LAPAROTOMY EXPLORATORY; SMALL BOWEL RESECTION; LYSIS OF ADHESIONS; SUPRPAPUBIC TUBE INSERTION;  Surgeon: Marvin Azul MD;  Location: [facility];  Service: General    LAPAROTOMY N/A 3/14/2024    Procedure: EXPLORATORY LAPAROTOMY, WASHOUT. SMALL BOWEL ANASTOMOSIS, CONTROL OF LIVER BLEED, CLOSURE OF ABDOMINAL WOUND;  Surgeon: Beltran  Omar Lorenz DO;  Location: [facility];  Service: General       History reviewed. No pertinent family history.  I have reviewed and agree with the history as documented.    E-Cigarette/Vaping    E-Cigarette Use Never User      E-Cigarette/Vaping Substances    Nicotine No     THC No     CBD No     Flavoring No     Other No     Unknown No      Social History     Tobacco Use    Smoking status: Never     Passive exposure: Never    Smokeless tobacco: Never   Vaping Use    Vaping status: Never Used   Substance Use Topics    Alcohol use: Never    Drug use: Never       Review of Systems   Constitutional: Negative.    HENT: Negative.     Eyes: Negative.    Respiratory: Negative.     Cardiovascular: Negative.    Gastrointestinal: Negative.    Endocrine: Negative.    Genitourinary: Negative.    Musculoskeletal: Negative.    Skin: Negative.    Allergic/Immunologic: Negative.    Neurological:  Positive for seizures and syncope.   Hematological: Negative.    Psychiatric/Behavioral: Negative.     All other systems reviewed and are negative.      Physical Exam  Physical Exam  Vitals and nursing note reviewed.   Constitutional:       Appearance: Normal appearance.   HENT:      Head: Normocephalic and atraumatic.      Nose: Nose normal.      Mouth/Throat:      Mouth: Mucous membranes are moist.   Eyes:      Extraocular Movements: Extraocular movements intact.      Pupils: Pupils are equal, round, and reactive to light.   Cardiovascular:      Rate and Rhythm: Normal rate and regular rhythm.   Pulmonary:      Effort: Pulmonary effort is normal.      Breath sounds: Normal breath sounds.   Abdominal:      General: Abdomen is flat. Bowel sounds are normal.      Palpations: Abdomen is soft.   Musculoskeletal:         General: Normal range of motion.      Cervical back: Normal range of motion and neck supple.   Skin:     General: Skin is warm.      Capillary Refill: Capillary refill takes less than 2 seconds.   Neurological:       General: No focal deficit present.      Mental Status: He is alert and oriented to person, place, and time. Mental status is at baseline.      Cranial Nerves: No cranial nerve deficit.      Sensory: No sensory deficit.      Motor: No weakness.      Coordination: Coordination normal.      Gait: Gait normal.      Deep Tendon Reflexes: Reflexes normal.   Psychiatric:         Mood and Affect: Mood normal.         Thought Content: Thought content normal.         Vital Signs  ED Triage Vitals   Temperature Pulse Respirations Blood Pressure SpO2   04/16/24 1556 04/16/24 1556 04/16/24 1556 04/16/24 1556 04/16/24 1556   98.2 °F (36.8 °C) (!) 116 20 129/67 99 %      Temp src Heart Rate Source Patient Position - Orthostatic VS BP Location FiO2 (%)   -- -- -- 04/16/24 1556 --      Left arm       Pain Score       04/16/24 2146       No Pain           Vitals:    04/16/24 1838 04/16/24 2307 04/16/24 2309 04/16/24 2312   BP: 124/70 125/71 132/73 134/74   Pulse:  93 101 (!) 116         Visual Acuity      ED Medications  Medications   LORazepam (ATIVAN) injection 2 mg (has no administration in time range)   trimethobenzamide (TIGAN) IM injection 200 mg (has no administration in time range)   acetaminophen (TYLENOL) tablet 650 mg (has no administration in time range)   apixaban (ELIQUIS) tablet 5 mg (5 mg Oral Given 4/16/24 2243)   levETIRAcetam (KEPPRA) injection 750 mg (has no administration in time range)       Diagnostic Studies  Results Reviewed       Procedure Component Value Units Date/Time    Prolactin [[id number]]  (Abnormal) Collected: 04/16/24 1747    Lab Status: Final result Specimen: Blood from Arm, Left Updated: 04/17/24 0020     Prolactin 42.25 ng/mL     TSH, 3rd generation with Free T4 reflex [[id number]]  (Normal) Collected: 04/16/24 2228    Lab Status: Final result Specimen: Blood from Hand, Right Updated: 04/16/24 2307     TSH 3RD GENERATON 1.152 uIU/mL     Urine culture [[id number]] Collected: 04/16/24 2139    Lab  Status: In process Specimen: Urine, Clean Catch Updated: 04/16/24 2306    HS Troponin I 4hr [[id number]]  (Normal) Collected: 04/16/24 2228    Lab Status: Final result Specimen: Blood from Hand, Right Updated: 04/16/24 2258     hs TnI 4hr 6 ng/L      Delta 4hr hsTnI 2 ng/L     UA (URINE) with reflex to Scope [[id number]]  (Abnormal) Collected: 04/16/24 2139    Lab Status: Final result Specimen: Urine, Clean Catch Updated: 04/16/24 2144     Color, UA Light Yellow     Clarity, UA Clear     Specific Gravity, UA 1.010     pH, UA 6.5     Leukocytes, UA Trace     Nitrite, UA Negative     Protein, UA Negative mg/dl      Glucose, UA Negative mg/dl      Ketones, UA Negative mg/dl      Urobilinogen, UA 0.2 E.U./dl      Bilirubin, UA Negative     Occult Blood, UA Moderate    HS Troponin I 2hr [[id number]]  (Normal) Collected: 04/16/24 1805    Lab Status: Final result Specimen: Blood from Arm, Right Updated: 04/16/24 1836     hs TnI 2hr 5 ng/L      Delta 2hr hsTnI 1 ng/L     Lactic acid, plasma (w/reflex if result > 2.0) [[id number]]  (Normal) Collected: 04/16/24 1704    Lab Status: Final result Specimen: Blood from Arm, Left Updated: 04/16/24 1740     LACTIC ACID 2.0 mmol/L     Narrative:      Result may be elevated if tourniquet was used during collection.    HS Troponin 0hr (reflex protocol) [[id number]]  (Normal) Collected: 04/16/24 1604    Lab Status: Final result Specimen: Blood from Arm, Left Updated: 04/16/24 1641     hs TnI 0hr 4 ng/L     Comprehensive metabolic panel [[id number]]  (Abnormal) Collected: 04/16/24 1604    Lab Status: Final result Specimen: Blood from Arm, Left Updated: 04/16/24 1632     Sodium 136 mmol/L      Potassium 3.9 mmol/L      Chloride 107 mmol/L      CO2 20 mmol/L      ANION GAP 9 mmol/L      BUN 12 mg/dL      Creatinine 0.87 mg/dL      Glucose 156 mg/dL      Calcium 8.3 mg/dL      Corrected Calcium 8.9 mg/dL      AST 20 U/L      ALT 11 U/L      Alkaline Phosphatase 59 U/L      Total Protein 6.0 g/dL       Albumin 3.2 g/dL      Total Bilirubin 0.42 mg/dL      eGFR 77 ml/min/1.73sq m     Narrative:      [employer] guidelines for Chronic Kidney Disease (CKD):     Stage 1 with normal or high GFR (GFR > 90 mL/min/1.73 square meters)    Stage 2 Mild CKD (GFR = 60-89 mL/min/1.73 square meters)    Stage 3A Moderate CKD (GFR = 45-59 mL/min/1.73 square meters)    Stage 3B Moderate CKD (GFR = 30-44 mL/min/1.73 square meters)    Stage 4 Severe CKD (GFR = 15-29 mL/min/1.73 square meters)    Stage 5 End Stage CKD (GFR <15 mL/min/1.73 square meters)  Note: GFR calculation is accurate only with a steady state creatinine    Magnesium [[id number]]  (Normal) Collected: 04/16/24 1604    Lab Status: Final result Specimen: Blood from Arm, Left Updated: 04/16/24 1632     Magnesium 2.0 mg/dL     CBC and differential [[id number]]  (Abnormal) Collected: 04/16/24 1604    Lab Status: Final result Specimen: Blood from Arm, Left Updated: 04/16/24 1611     WBC 11.13 Thousand/uL      RBC 3.57 Million/uL      Hemoglobin 9.8 g/dL      Hematocrit 32.5 %      MCV 91 fL      MCH 27.5 pg      MCHC 30.2 g/dL      RDW 16.3 %      MPV 9.9 fL      Platelets 375 Thousands/uL      nRBC 0 /100 WBCs      Segmented % 89 %      Immature Grans % 0 %      Lymphocytes % 7 %      Monocytes % 4 %      Eosinophils Relative 0 %      Basophils Relative 0 %      Absolute Neutrophils 9.83 Thousands/µL      Absolute Immature Grans 0.03 Thousand/uL      Absolute Lymphocytes 0.82 Thousands/µL      Absolute Monocytes 0.43 Thousand/µL      Eosinophils Absolute 0.00 Thousand/µL      Basophils Absolute 0.02 Thousands/µL     Fingerstick Glucose (POCT) [[id number]]  (Abnormal) Collected: 04/16/24 1554    Lab Status: Final result Specimen: Blood Updated: 04/16/24 1555     POC Glucose 141 mg/dl                    CT head without contrast   Final Result by Garth Farias MD (04/16 1632)      No acute intracranial abnormality.                  Workstation  performed: [id number]         MRI inpatient order    (Results Pending)              Procedures  ECG 12 Lead Documentation Only    Date/Time: 4/16/2024 4:14 PM    Performed by: Swati García DO  Authorized by: Swati García DO    ECG reviewed by me, the ED Provider: yes    Patient location:  ED  Previous ECG:     Previous ECG:  Unavailable    Comparison to cardiac monitor: Yes    Interpretation:     Interpretation: non-specific    Rate:     ECG rate assessment: normal    Rhythm:     Rhythm: sinus rhythm    Ectopy:     Ectopy: none    QRS:     QRS axis:  Normal  Conduction:     Conduction: normal    ST segments:     ST segments:  Non-specific  T waves:     T waves: non-specific             ED Course                                             Medical Decision Making  Spoke to the neurologist on-call likely seizure versus syncope but needs evaluation admit to the hospital for MRI of the brain with and without contrast he suggested an echocardiogram giving him a dose of IV Keppra here to continue and an EEG with a neurology consult admitted to the hospitalist for further evaluation and management    Problems Addressed:  Seizure (HCC): acute illness or injury    Amount and/or Complexity of Data Reviewed  External Data Reviewed: notes.  Labs: ordered. Decision-making details documented in ED Course.  Radiology: ordered. Decision-making details documented in ED Course.  ECG/medicine tests: ordered and independent interpretation performed. Decision-making details documented in ED Course.    Risk  Decision regarding hospitalization.             Disposition  Final diagnoses:   Seizure (HCC)     Time reflects when diagnosis was documented in both MDM as applicable and the Disposition within this note       Time User Action Codes Description Comment    4/16/2024  5:21 PM Swati García Add [R56.9] Seizure (HCC)     4/16/2024  5:34 PM Michelle Reina Add [R56.9] Seizure-like activity (HCC)           ED Disposition       ED  Disposition   Admit    Condition   Stable    Date/Time   Tue Apr 16, 2024  5:21 PM    Comment                 Follow-up Information    None         Current Discharge Medication List        CONTINUE these medications which have NOT CHANGED    Details   apixaban (Eliquis) 5 mg Take 2 tablets (10 mg total) by mouth 2 (two) times a day for 7 days, THEN 1 tablet (5 mg total) 2 (two) times a day for 23 days.  Qty: 194 tablet, Refills: 0    Comments: Eliquis Starter Pack  Associated Diagnoses: Portal vein thrombosis      ergocalciferol (VITAMIN D2) 50,000 units Take 1 capsule (50,000 Units total) by mouth once a week for 4 doses  Qty: 4 capsule, Refills: 0    Associated Diagnoses: Protein calorie malnutrition (HCC)      tamsulosin (FLOMAX) 0.4 mg Take 0.4 mg by mouth daily with dinner             No discharge procedures on file.    PDMP Review         Value Time User    PDMP Reviewed  Yes 3/26/2024  2:44 PM Cale Henderson PA-C            ED Provider  Electronically Signed by             Swati García DO  04/17/24 0117

## 2024-04-17 ENCOUNTER — APPOINTMENT (INPATIENT)
Dept: RADIOLOGY | Facility: HOSPITAL | Age: 89
DRG: 053 | End: 2024-04-17
Payer: COMMERCIAL

## 2024-04-17 DIAGNOSIS — I65.1 BASILAR ARTERY STENOSIS: Primary | ICD-10-CM

## 2024-04-17 PROBLEM — D72.829 LEUKOCYTOSIS: Status: RESOLVED | Noted: 2024-04-16 | Resolved: 2024-04-17

## 2024-04-17 LAB
ANION GAP SERPL CALCULATED.3IONS-SCNC: 5 MMOL/L (ref 4–13)
BUN SERPL-MCNC: 11 MG/DL (ref 5–25)
CALCIUM SERPL-MCNC: 8.3 MG/DL (ref 8.4–10.2)
CHLORIDE SERPL-SCNC: 108 MMOL/L (ref 96–108)
CO2 SERPL-SCNC: 26 MMOL/L (ref 21–32)
CREAT SERPL-MCNC: 0.77 MG/DL (ref 0.6–1.3)
ERYTHROCYTE [DISTWIDTH] IN BLOOD BY AUTOMATED COUNT: 16.5 % (ref 11.6–15.1)
GFR SERPL CREATININE-BSD FRML MDRD: 81 ML/MIN/1.73SQ M
GLUCOSE SERPL-MCNC: 77 MG/DL (ref 65–140)
HCT VFR BLD AUTO: 31.2 % (ref 36.5–49.3)
HGB BLD-MCNC: 9.2 G/DL (ref 12–17)
MCH RBC QN AUTO: 27.3 PG (ref 26.8–34.3)
MCHC RBC AUTO-ENTMCNC: 29.5 G/DL (ref 31.4–37.4)
MCV RBC AUTO: 93 FL (ref 82–98)
PLATELET # BLD AUTO: 347 THOUSANDS/UL (ref 149–390)
PMV BLD AUTO: 10.4 FL (ref 8.9–12.7)
POTASSIUM SERPL-SCNC: 3.8 MMOL/L (ref 3.5–5.3)
PROLACTIN SERPL-MCNC: 42.25 NG/ML
RBC # BLD AUTO: 3.37 MILLION/UL (ref 3.88–5.62)
SODIUM SERPL-SCNC: 139 MMOL/L (ref 135–147)
WBC # BLD AUTO: 9.52 THOUSAND/UL (ref 4.31–10.16)

## 2024-04-17 PROCEDURE — A9585 GADOBUTROL INJECTION: HCPCS | Performed by: INTERNAL MEDICINE

## 2024-04-17 PROCEDURE — 70553 MRI BRAIN STEM W/O & W/DYE: CPT

## 2024-04-17 PROCEDURE — 85027 COMPLETE CBC AUTOMATED: CPT | Performed by: INTERNAL MEDICINE

## 2024-04-17 PROCEDURE — 70496 CT ANGIOGRAPHY HEAD: CPT

## 2024-04-17 PROCEDURE — 70498 CT ANGIOGRAPHY NECK: CPT

## 2024-04-17 PROCEDURE — 99255 IP/OBS CONSLTJ NEW/EST HI 80: CPT | Performed by: PSYCHIATRY & NEUROLOGY

## 2024-04-17 PROCEDURE — 80048 BASIC METABOLIC PNL TOTAL CA: CPT | Performed by: INTERNAL MEDICINE

## 2024-04-17 RX ORDER — ASPIRIN 81 MG/1
81 TABLET, CHEWABLE ORAL DAILY
Status: DISCONTINUED | OUTPATIENT
Start: 2024-04-18 | End: 2024-04-18 | Stop reason: HOSPADM

## 2024-04-17 RX ORDER — GADOBUTROL 604.72 MG/ML
5 INJECTION INTRAVENOUS
Status: COMPLETED | OUTPATIENT
Start: 2024-04-17 | End: 2024-04-17

## 2024-04-17 RX ORDER — TAMSULOSIN HYDROCHLORIDE 0.4 MG/1
0.4 CAPSULE ORAL
Status: DISCONTINUED | OUTPATIENT
Start: 2024-04-17 | End: 2024-04-18 | Stop reason: HOSPADM

## 2024-04-17 RX ADMIN — IOHEXOL 85 ML: 350 INJECTION, SOLUTION INTRAVENOUS at 21:33

## 2024-04-17 RX ADMIN — LEVETIRACETAM 750 MG: 100 INJECTION, SOLUTION INTRAVENOUS at 01:23

## 2024-04-17 RX ADMIN — LEVETIRACETAM 750 MG: 100 INJECTION, SOLUTION INTRAVENOUS at 22:06

## 2024-04-17 RX ADMIN — APIXABAN 5 MG: 5 TABLET, FILM COATED ORAL at 17:49

## 2024-04-17 RX ADMIN — GADOBUTROL 5 ML: 604.72 INJECTION INTRAVENOUS at 09:46

## 2024-04-17 RX ADMIN — LEVETIRACETAM 750 MG: 100 INJECTION, SOLUTION INTRAVENOUS at 10:24

## 2024-04-17 RX ADMIN — APIXABAN 5 MG: 5 TABLET, FILM COATED ORAL at 08:25

## 2024-04-17 RX ADMIN — TAMSULOSIN HYDROCHLORIDE 0.4 MG: 0.4 CAPSULE ORAL at 18:26

## 2024-04-17 NOTE — CONSULTS
[facility]   Neurology Initial Consult    Jf Valera is a 88 y.o. male  2 South 204/2 South 204 A      Information obtained from:   Chief Complaint   Patient presents with    Seizure - New Onset     Per family pt was found in his bed stiff and sliding off chair, another episode was pt sitting and talking and had an episode of staring and stiff with eyes rolled back lasting about 2 mins.         Assessment/Plan:    1.  Suspect complex partial seizure  2.  Hypertension  3.  Recent mesenteric ischemia post surgical bowel resection  4.  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  Patient had 2 episodes of witnessed activity.  Initial was staring into space with his eyes rolled back lasting approximately 2 minutes where he was not responsive from the second episode he became very rigid.  Patient's family brought him to the ER for further evaluation.  Patient's lactic acid was normal however had a slight bump in his WBC count.  He was taken for CAT scan of the head which was normal.  No seizure-like activity in the ER at that time.  Patient did have prolactin level drawn, was 42.25 which can be elevated shortly after onset of some forms of seizure activity.  Patient has no personal nor family history of seizures, he has no history of stroke or other abnormalities of the brain.  Unclear if patient's body is under significant stress leading to the onset of complex partial seizure.  However will get MRI for further evaluation of tissue  and structural etiology.  EEG is also pending.  MRI was completed, was negative for any acute abnormalities.  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  Will look for EEG to be completed, currently this is not scheduled.  He remains on Keppra 750 mg twice a day, patient's family was made aware this will likely continue for long-term due to his multiple events.  Will     Jf Valera will need follow up in 8 weeks with general attending. He will not require outpatient neurological testing.      HPI:  Jf Valera is a an 89yo male with PMH of HTN and recent history of mesenteric ischemia related to thrombosis resulting in exploratory laparotomy with small bowel resection and AUREA currently on Eliquis for anticoagulation who was brought to the hospital after having witnessed 2 episodes of seizure-like activity.  Initial episode patient was at baseline, suddenly found staring off into space with his eyes rolled back.  This activity lasted approximately 2 minutes before he resumed his normal activity.  Second, patient became very stiff, started sliding off of his chair also lasting for a brief period of time.  Patient has no significant history for seizure activity, he was brought to the ER for further evaluation.  On arrival to the ER patient's blood pressure was 129/67 with a heart rate of 116.  He had an elevated WBC count at 11.13 with a lactic acid of 2.0.  He was sent for CT of the head which was normal and negative for any acute findings.  Patient also had a prolactin level drawn, was 42.25, this elevation could possibly have been caused by certain forms of seizure activity such as complex partial seizures.  Patient was loaded with Keppra, he was admitted for further evaluation.   He is scheduled for MRI with without contrast seizure protocol as well as a bedside EEG.  MRI has been completed, radiology read is pending.  Independent review of the MRI shows no significant evidence of acute ischemia, structural abnormalities and/or significant degree of chronic micro ischemia.  Patient continues on Keppra 750 mg twice daily at this point in time.      Met with patient and his family at bedside.  Patient recently completed his MRI and felt fatigued.  Had discussion with regards to patient's medical history regarding seizures.  According to patient's son patient did have prior events.  First event was approximately 14 years ago, although he was a lot younger at that time he remembers his father claiming having an unusual smell, then had several minutes of unresponsiveness.  He went to the ER for further evaluation after this.  Family reported another episode approximately 1 year ago, with similar to his episodes prior to this admission.  Stated he was staring with his eyes straight ahead, slightly rolled upward.  He does not have any specific gaze preference that they recall.  Reviewed neurology notations from that hospitalization, she reported at that time patient had sudden onset of left leg pain and weakness, had associated slurring of his speech and felt as if he was going to pass out.  His symptoms lasted 10 to 15 minutes and was suspected to have been related to TIA possibly related to hypertension.  Patient had an MRI of the brain done at that time which was negative for any acute intracranial hemorrhage, infarction, mass, mass effect or midline shift.  There were no white matter changes in the cerebral hemisphere.  Was a normal study.  It does not appear as though patient was worked up for seizure-like activity.  Discussed with the patient's family any possible stressors that he may have been going through duringthe events approximately 14 years ago and then again the events of 1 year ago.   They cannot recall any specific stressful or triggering type events.  Patient is currently undergoing multiple medical issues over the past month and his body has been stressed due to his thrombus and surgical interventions.  He has had decreased oral intake and appetite which has been slowly improving over time.  Suspect patient likely had complex partial seizure activity, MRI of the brain  With without contrast seizure protocol was completed and showed chronic small infarct in the right cerebellum which is from prior studies.  No postcontrast abnormalities or enhancements noted.  Symmetric hippocampal formations noted.  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  At this point in time it is doubtful that this is causative of patient's seizure-like events however will provide order for outpatient vascular surgery evaluation and treatment recommendations.        Past Medical History:   Diagnosis Date    Hypertension        Past Surgical History:   Procedure Laterality Date    IR CHEST TUBE PLACEMENT  3/17/2024    LAPAROTOMY N/A 3/13/2024    Procedure: LAPAROTOMY EXPLORATORY; SMALL BOWEL RESECTION; LYSIS OF ADHESIONS; SUPRPAPUBIC TUBE INSERTION;  Surgeon: Marvin Azul MD;  Location: [facility];  Service: General    LAPAROTOMY N/A 3/14/2024    Procedure: EXPLORATORY LAPAROTOMY, WASHOUT. SMALL BOWEL ANASTOMOSIS, CONTROL OF LIVER BLEED, CLOSURE OF ABDOMINAL WOUND;  Surgeon: Beltran Lorenz DO;  Location:  MAIN OR;  Service: General       No Known Allergies      Current Facility-Administered Medications:     acetaminophen (TYLENOL) tablet 650 mg, 650 mg, Oral, Q6H PRN, Michelle Reina MD    apixaban (ELIQUIS) tablet 5 mg, 5 mg, Oral, BID, Michelle Reina MD, 5 mg at 04/17/24 0825    levETIRAcetam (KEPPRA) injection 750 mg, 750 mg, Intravenous, Q12H RONALD, Michelle Reina  MD, 750 mg at 04/17/24 0123    LORazepam (ATIVAN) injection 2 mg, 2 mg, Intravenous, Q6H PRN, Michelle Reina MD    trimethobenzamide (TIGAN) IM injection 200 mg, 200 mg, Intramuscular, Q6H PRN, Michelle Reina MD    Social History     Socioeconomic History    Marital status: /Civil Union     Spouse name: Not on file    Number of children: Not on file    Years of education: Not on file    Highest education level: Not on file   Occupational History    Not on file   Tobacco Use    Smoking status: Never     Passive exposure: Never    Smokeless tobacco: Never   Vaping Use    Vaping status: Never Used   Substance and Sexual Activity    Alcohol use: Never    Drug use: Never    Sexual activity: Not on file   Other Topics Concern    Not on file   Social History Narrative    Not on file     Social Determinants of Health     Financial Resource Strain: Not on file   Food Insecurity: No Food Insecurity (3/19/2024)    Hunger Vital Sign     Worried About Running Out of Food in the Last Year: Never true     Ran Out of Food in the Last Year: Never true   Transportation Needs: No Transportation Needs (3/19/2024)    PRAPARE - Transportation     Lack of Transportation (Medical): No     Lack of Transportation (Non-Medical): No   Physical Activity: Not on file   Stress: Not on file   Social Connections: Not on file   Intimate Partner Violence: Not on file   Housing Stability: Low Risk  (3/19/2024)    Housing Stability Vital Sign     Unable to Pay for Housing in the Last Year: No     Number of Places Lived in the Last Year: 1     Unstable Housing in the Last Year: No       History reviewed. No pertinent family history.      Review of systems:  Please see HPI for positive symptoms.   Constitutional: No fever, no chills, no weight change.   + Fatigue  Ocular: No diplopia, no blurred vision, spots/zigzag lines  HEENT:  No sore throat, headache or congestion.   COR:  No chest pain. No palpitations.   Lungs:  no sob  GI:  no  nausea, no  "vomiting, no diarrhea, no constipation, no anorexia.   :  No dysuria, frequency, or urgency. No hematuria.    Musculoskeletal:  No joint pain or swelling   Skin:  No rash or itching.   Psychiatric:  no anxiety, no depression.   Endocrine:  No polyuria or polydipsia.    Physical examination:  /79   Pulse 89   Temp (!) 97.4 °F (36.3 °C)   Resp 18   Ht 5' 3\" (1.6 m)   Wt 50.9 kg (112 lb 3.2 oz)   SpO2 98%   BMI 19.88 kg/m²     GENERAL APPEARANCE:  The patient is alert, oriented.    HEENT:  Head is normocephalic.  Pupils are equal and reactive.    NECK:  Supple without lymphadenopathy.   HEART:  Regular rate and rhythm.  LUNGS:  clear to auscultation. No crackles or wheezes are heard.  ABDOMEN:  Soft, nontender, nondistended with good bowel sounds heard.    EXTREMITIES:  Without cyanosis, clubbing or edema.     Mental status:  The patient is alert, attentive, and oriented x 2, patient limited on time orientation.   Speech is clear and fluent, good repetition, comprehension, and naming.   Patient's comprehension seems to be limited possibly due to language interpretation.  Patient responds to commands however needs to have repetitive commands and or demonstration to assist  Cranial nerves:  CN II: Visual fields are full to confrontation.   Fundoscopic exam is normal with sharp discs and no vascular changes.  Pupils are 3 mm and briskly reactive to light.   CN III, IV, VI: At primary gaze, there is no eye deviation.   CN V: Facial sensation is intact in all 3 divisions bilaterally.   Corneal responses are intact.  CN VII: Face is symmetric with normal eye closure and smile.  CN VIII: Hearing is normal to rubbing fingers  CN IX, X: Palate elevates symmetrically. Phonation is normal.  CN XI: Head turning and shoulder shrug are intact  CN XII: Tongue is midline with normal movements and no atrophy.  Motor:  There is no pronator drift of out-stretched arms.    Muscle bulk and tone are normal.   Muscle exam  Arm " Right Left Leg Right Left   Deltoid 5/5 5/5 Iliopsoas 5/5 5/5   Biceps 5/5 5/5 Quads 5/5 5/5   Triceps 5/5 5/5 Hamstrings 5/5 5/5   Wrist Extension 5/5 5/5 Ankle Dorsi Flexion 5/5 5/5   Wrist Flexion 5/5 5/5 Ankle Plantar Flexion 5/5 5/5        Reflexes    RJ BJ TJ KJ AJ Plantars Hager's   Right 2+ 2+  2+ 2+ Downgoing Not present   Left 2+ 2+  2+ 2+ Downgoing Not present      Sensory:  Light touch, Temperature, position sense, and vibration sense are intact in fingers and toes.  Coordination:  Rapid alternating movements and fine finger movements are intact.   There is no dysmetria on finger-to-nose and heel-knee-shin.   There are no abnormal or extraneous movements.   Romberg   Gait/Stance:  Deferred due to fatigue deferred at this time    Lab Results   Component Value Date    WBC 9.52 04/17/2024    HGB 9.2 (L) 04/17/2024    HCT 31.2 (L) 04/17/2024    MCV 93 04/17/2024     04/17/2024     Lab Results   Component Value Date    HGBA1C 5.5 03/11/2023     Lab Results   Component Value Date    ALT 11 04/16/2024    AST 20 04/16/2024    ALKPHOS 59 04/16/2024     Lab Results   Component Value Date    GLUCOSE 158 (H) 03/13/2024    CALCIUM 8.3 (L) 04/17/2024    K 3.8 04/17/2024    CO2 26 04/17/2024     04/17/2024    BUN 11 04/17/2024    CREATININE 0.77 04/17/2024   Prolactin level 42.25 on admission      Review of reports and notes reveal:  Independent Interpretation of images or specimens:  CT head without contrast    Result Date: 4/16/2024  No acute intracranial abnormality. Workstation performed: [id number]     MRI of the brain dated 4/17/2024 shows chronic small infarct in the right cerebellum, this is unchanged from prior studies.  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  Abnormality in anterior periventricular region, normal for his age.  Symmetric hippocampal formations.  No abnormal enhancement on postcontrast imaging.  Patient has severe stenosis in the mid to distal basilar  artery likely due to atherosclerotic disease noted on postcontrast sequences.  This appears to have worsened since his CTA of the head and neck done in 2023.  This does not appear to be symptomatic at this time however outpatient neurovascular consultation can be made.  Deferred due to fatigue        Thank you for this consult.    Total time of encounter: 70 Minutes  More than 50% of time was spent in counseling and coordination of care of patient.  Discussed with the patient, medical team and neurology MD.

## 2024-04-17 NOTE — ASSESSMENT & PLAN NOTE
Hemoglobin fluctuating in the 9-10 range since surgery last month (previously 12-14)  Stable - continue to monitor

## 2024-04-17 NOTE — ASSESSMENT & PLAN NOTE
Presents out of concern for seizure-like activity by family found staring into a blank space with intermittent stiffness and shaking, suspicious for possible convulsions  CT of head in the ED negative for acute intracranial etiology  Monitor neurochecks  Electrolytes and blood sugar intact  TSH WNL  Prolactin level elevated 42.25  Initiated on IV Keppra 750mg BID  PRN IV Ativan on board for breakthrough episodes  MRI brain negative for any acute abnormalities. 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   EEG ordered  Seizure precautions  Consult to Neurology, recommendations are appreciated

## 2024-04-17 NOTE — ASSESSMENT & PLAN NOTE
Presents out of concern for seizure-like activity by family found staring into a blank space with intermittent stiffness and shaking, suspicious for possible convulsions  No prior history of seizures  CT of head in the ED negative for acute intracranial etiology  Case discussed with on-call neurology by ED provider -> await MRI brain and EEG w/ recommendation to initiate twice daily IV Keppra  Monitor neurochecks  Electrolytes and blood sugar intact  PRN IV Ativan on board for breakthrough episodes  Seizure precautions

## 2024-04-17 NOTE — ASSESSMENT & PLAN NOTE
Itzel Nelson Fall Risk Assessment:     Last Known Fall: Within the last month  Mobility: Use of assistive device/requires assist of two people  Medications: Cardiovascular or central nervous system meds  Mental Status/LOC/Awareness: Awake, alert, and oriented to date, place, and person  Toileting Needs: Use of catheters or diversion devices  Volume/Electrolyte Status: Use of IV fluids/tube feeds  Communication/Sensory: Visual (Glasses)/hearing deficit  Behavior: Appropriate behavior  Itzel Nelson Fall Risk Total: 14  Fall Risk Level: MODERATE RISK    Universal Fall Precautions:  call light/belongings in reach, bed in low position and locked, wheelchairs and assistive devices out of sight, siderails up x 2, use non-slip footwear, adequate lighting, clutter free and spill free environment, educate on level of risk, educate to call for assistance    Fall Risk Level Interventions:     TRIAL (TELE 8, NEURO, MED KRYSTAL 5) High Fall Risk Interventions  Place yellow fall risk ID band on patient: verified  Provide patient/family education based on risk assessment: verified  Educate patient/family to call staff for assistance when getting out of bed: verified  Place fall precaution signage outside patient door: verified  Place patient in room close to nursing station: currently not available/charge notified  Utilize bed/chair fall alarm: verified  Notify charge of high risk for huddle: verified    Patient Specific Interventions:     Medication: review medications with patient and family and assess for medications that can be discontinued or dosage decreased  Mental Status/LOC/Awareness: reorient patient, reinforce falls education, check on patient hourly, utilize bed/chair fall alarm, reinforce the use of call light and provide activity  Toileting: provide frquent toileting, monitor intake and output/use of appropriate interventions and do not leave patient unattended in bathroom/refer to toileting  Likely reactive due to acute medical issue(s)  Monitor WBC count   scripting  Volume/Electrolyte Status: ensure patient remains hydrated, advance diet as tolerated and administer medications as ordered for nausea and vomiting  Communication/Sensory: update plan of care on whiteboard, ensure proper positioning when transferrng/ambulating and ensure patient has glasses/contacts and hearing aids/dentures  Behavioral: encourage patient to voice feelings, engage patient in daily activities and administer medication as ordered  Mobility: schedule physical activity throughout the day, dangle prior to standing, utilize bed/chair fall alarm, ensure bed is locked and in lowest position and provide appropriate assistive device

## 2024-04-17 NOTE — PHYSICIAN ADVISOR
Current patient class: Inpatient  The patient is currently on Hospital Day: 2 at [facility]      The patient was admitted to the hospital at  5:21 PM on 4/16/24 for the following diagnosis:  Seizure (HCC) [R56.9]  Seizure-like activity (HCC) [R56.9]       There was documentation in the medical record of an expected length of stay of at least 2 midnights. The patient was therefore expected to satisfy the 2 midnight benchmark and given the 2 midnight presumption was appropriate for INPATIENT ADMISSION. Given this expectation of a satisfying stay, CMS instructs us that the patient is most often appropriate for inpatient admission under part A provided medical necessity is documented in the chart.    After review of the relevant documentation, labs, vital signs and test results, the patient is appropriate for INPATIENT ADMISSION.     Admission to the hospital as an inpatient is a complex decision making process which requires the practitioner to consider the patient’s presenting complaint, history and physical examination and all relevant testing. With this in mind, in this case, the patient was deemed appropriate for INPATIENT ADMISSION. After review of the documentation and testing available at the time of the admission, I concur with this clinical determination of medical necessity.      Rationale is as follows:    The patient has been admitted under inpatient class since yesterday.  He does not meet specific criteria but continued hospitalization is warranted and he is expected to cross at least a second midnight.  The concern is for suspected complex partial seizure.  History is complicated by recent portal vein thrombosis requiring anticoagulation as well as history of mesenteric ischemia requiring bowel resection.  Recommendation by Neurology is to start Keppra and obtain MRI brain and EEG.    The MRI shows severe stenosis in mid to distal basilar artery which appears to have worsened  "since 2023.    For now he continues to receive the Keppra IV every 12 hours.  Today's progress note indicates that he will be hospitalized for at least 24 to 48 hours from today.  It is appropriate to continue inpatient class for this patient.    The patient’s vitals on arrival were   ED Triage Vitals   Temperature Pulse Respirations Blood Pressure SpO2   04/16/24 1556 04/16/24 1556 04/16/24 1556 04/16/24 1556 04/16/24 1556   98.2 °F (36.8 °C) (!) 116 20 129/67 99 %      Temp src Heart Rate Source Patient Position - Orthostatic VS BP Location FiO2 (%)   -- -- 04/16/24 2307 04/16/24 1556 --     Lying - Orthostatic VS Left arm       Pain Score       04/16/24 2146       No Pain           Past Medical History:   Diagnosis Date    Hypertension      Past Surgical History:   Procedure Laterality Date    IR CHEST TUBE PLACEMENT  3/17/2024    LAPAROTOMY N/A 3/13/2024    Procedure: LAPAROTOMY EXPLORATORY; SMALL BOWEL RESECTION; LYSIS OF ADHESIONS; SUPRPAPUBIC TUBE INSERTION;  Surgeon: Marvin Azul MD;  Location: [facility];  Service: General    LAPAROTOMY N/A 3/14/2024    Procedure: EXPLORATORY LAPAROTOMY, WASHOUT. SMALL BOWEL ANASTOMOSIS, CONTROL OF LIVER BLEED, CLOSURE OF ABDOMINAL WOUND;  Surgeon: Beltran Lorenz DO;  Location:  MAIN OR;  Service: General           Consults have been placed to:   IP CONSULT TO NEUROLOGY    Vitals:    04/16/24 2312 04/17/24 0300 04/17/24 0305 04/17/24 0826   BP: 134/74 111/65  133/79   BP Location: Left arm      Pulse: (!) 116 89     Resp: 18 18     Temp:  (!) 97.4 °F (36.3 °C)     SpO2: 100% 98%     Weight:   50.9 kg (112 lb 3.2 oz)    Height:   5' 3\" (1.6 m)        Most recent labs:    Recent Labs     04/16/24  1604 04/17/24  0635   WBC 11.13* 9.52   HGB 9.8* 9.2*   HCT 32.5* 31.2*    347   K 3.9 3.8   CALCIUM 8.3* 8.3*   BUN 12 11   CREATININE 0.87 0.77   AST 20  --    ALT 11  --    ALKPHOS 59  --        Scheduled Meds:  Current Facility-Administered Medications "   Medication Dose Route Frequency Provider Last Rate    acetaminophen  650 mg Oral Q6H PRN Michelle Reina MD      apixaban  5 mg Oral BID Michelle Reina MD      levETIRAcetam  750 mg Intravenous Q12H [facility] Michelle Reina MD      LORazepam  2 mg Intravenous Q6H PRN Michelle Reina MD      trimethobenzamide  200 mg Intramuscular Q6H PRN Michelle Reina MD       Continuous Infusions:   PRN Meds:.  acetaminophen    LORazepam    trimethobenzamide    Surgical procedures (if appropriate):

## 2024-04-17 NOTE — ASSESSMENT & PLAN NOTE
With associated mesenteric ischemia status post recent hospitalization at the [facility] last month resulting in an exploratory laparotomy with small bowel resection and lysis of adhesions  On Eliquis for anticoagulation  PRN pain control  Nutritional optimization with Ensure (per family request)

## 2024-04-17 NOTE — ASSESSMENT & PLAN NOTE
BMI of 20.16 in the setting of recent surgery with increased age/deconditioning, and decreased appetite/oral intake and evidence of muscle wasting/atrophy and exam  Continue Ensure nutritional supplementation with meals

## 2024-04-17 NOTE — UTILIZATION REVIEW
Initial Clinical Review    Admission: Date/Time/Statement:   Admission Orders (From admission, onward)       Ordered        04/16/24 1720  INPATIENT ADMISSION  Once                          Orders Placed This Encounter   Procedures    INPATIENT ADMISSION     Standing Status:   Standing     Number of Occurrences:   1     Order Specific Question:   Level of Care     Answer:   Med Surg [16]     Order Specific Question:   Estimated length of stay     Answer:   More than 2 Midnights     Order Specific Question:   Certification     Answer:   I certify that inpatient services are medically necessary for this patient for a duration of greater than two midnights. See H&P and MD Progress Notes for additional information about the patient's course of treatment.     ED Arrival Information       Expected   -    Arrival   4/16/2024 15:41    Acuity   Urgent              Means of arrival   Ambulance    Escorted by   [facility]    Admission type   Emergency              Arrival complaint   SYNCOPE             Chief Complaint   Patient presents with    Seizure - New Onset     Per family pt was found in his bed stiff and sliding off chair, another episode was pt sitting and talking and had an episode of staring and stiff with eyes rolled back lasting about 2 mins.       Initial Presentation:   88 yom to ER from home via EMS after family noticed 2 different episodes of seizure-like activity where 1 episode he was staring off into space and was not responsive for a few seconds and the second episode he felt very stiff and slid off the chair. Hx HTN. Presents tachycardic. Admission CT head neg. WBC 11.13, Hgb 9.8, CO2 20, Ca 8.3, tprot 6.0, alb 3.2, procalcitonin 42.25, u/a+blood, leuk.  Admitted to inpatient status for seizure-like activity. Started on IV Keppra, neuro consulted. MRI ordered.    Date: 4/17/24   Day 2:   No further seizures noted. Remains on IV Keppra. Neuro consulted, EEG planned.     Per  neuro:  1.  Suspect complex partial seizure  2.  Hypertension  3.  Recent mesenteric ischemia post surgical bowel resection  4.  Recent portal vein thrombosis on Eliquis  -Seizure precaution  -Start Keppra 750 mg twice daily  -MRI of the brain with without contrast, seizure protocol  -Bedside EEG  -Continue workup for underlying metabolic etiology    ED Triage Vitals   Temperature Pulse Respirations Blood Pressure SpO2   04/16/24 1556 04/16/24 1556 04/16/24 1556 04/16/24 1556 04/16/24 1556   98.2 °F (36.8 °C) (!) 116 20 129/67 99 %      Temp src Heart Rate Source Patient Position - Orthostatic VS BP Location FiO2 (%)   -- -- 04/16/24 2307 04/16/24 1556 --     Lying - Orthostatic VS Left arm       Pain Score       04/16/24 2146       No Pain          Wt Readings from Last 1 Encounters:   04/17/24 50.9 kg (112 lb 3.2 oz)     Additional Vital Signs:   Date/Time Temp Pulse Resp BP MAP (mmHg) SpO2 O2 Device Patient Position - Orthostatic VS   04/17/24 08:26:51 -- -- -- 133/79 97 -- -- --   04/17/24 03:00:06 97.4 °F (36.3 °C) Abnormal  89 18 111/65 80 98 % -- --   04/16/24 23:12:06 -- 116 Abnormal  18 134/74 94 100 % -- Standing - Orthostatic VS   04/16/24 23:09:44 -- 101 -- 132/73 93 98 % -- Sitting - Orthostatic VS   04/16/24 23:07:03 -- 93 -- 125/71 89 98 % -- Lying - Orthostatic VS   04/16/24 18:38:42 98.8 °F (37.1 °C) -- 19 124/70 88 -- -- --   04/16/24 1556 98.2 °F (36.8 °C) 116 Abnormal  20 129/67 -- 99 % None (Room air) --     Pertinent Labs/Diagnostic Test Results:   MRI brain seizure wo and w contrast   Final Result  (04/17 1056)      No acute intracranial abnormality. No focal seizure focus identified.      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 Recommend consultation with the [facility], a division of [facility] at [phone].       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      CT head without contrast   Final Result  (04/16 1632)      No acute intracranial abnormality.     4/16 EKG=  Interpretation:     Interpretation: non-specific    Rate:     ECG rate assessment: normal    Rhythm:     Rhythm: sinus rhythm    Ectopy:     Ectopy: none    QRS:     QRS axis:  Normal   Conduction:     Conduction: normal    ST segments:     ST segments:  Non-specific   T waves:     T waves: non-specific     Results from last 7 days   Lab Units 04/17/24  0635 04/16/24  1604   WBC Thousand/uL 9.52 11.13*   HEMOGLOBIN g/dL 9.2* 9.8*   HEMATOCRIT % 31.2* 32.5*   PLATELETS Thousands/uL 347 375   TOTAL NEUT ABS Thousands/µL  --  9.83*     Results from last 7 days   Lab Units 04/17/24  0635 04/16/24  1604   SODIUM mmol/L 139 136   POTASSIUM mmol/L 3.8 3.9   CHLORIDE mmol/L 108 107   CO2 mmol/L 26 20*   ANION GAP mmol/L 5 9   BUN mg/dL 11 12   CREATININE mg/dL 0.77 0.87   EGFR ml/min/1.73sq m 81 77   CALCIUM mg/dL 8.3* 8.3*   MAGNESIUM mg/dL  --  2.0     Results from last 7 days   Lab Units 04/16/24  1604   AST U/L 20   ALT U/L 11   ALK PHOS U/L 59   TOTAL PROTEIN g/dL 6.0*   ALBUMIN g/dL 3.2*   TOTAL BILIRUBIN mg/dL 0.42     Results from last 7 days   Lab Units 04/16/24  1554   POC GLUCOSE mg/dl 141*     Results from last 7 days   Lab Units 04/17/24  0635 04/16/24  1604   GLUCOSE RANDOM mg/dL 77 156*     Beta- Hydroxybutyrate   Date Value Ref Range Status   03/16/2024 5.26 (H) 0.02 - 0.27 mmol/L Final      Results from last 7 days   Lab Units 04/16/24 2228 04/16/24  1805 04/16/24  1604   HS TNI 0HR ng/L  --   --  4   HS TNI 2HR ng/L  --  5  --    HSTNI D2 ng/L  --  1  --    HS TNI 4HR ng/L 6  --   --    HSTNI D4 ng/L 2  --   --      Results from last 7 days   Lab Units 04/16/24  2228   TSH 3RD GENERATON uIU/mL 1.152     Results from last 7 days   Lab Units 04/16/24  1704   LACTIC ACID  mmol/L 2.0     Results from last 7 days   Lab Units 04/16/24  1747   PROLACTIN ng/mL 42.25*     Results from last 7 days   Lab Units 04/16/24  2139   CLARITY UA  Clear   COLOR UA  Light Yellow   SPEC GRAV UA  1.010   PH UA  6.5   GLUCOSE UA mg/dl Negative   KETONES UA mg/dl Negative   BLOOD UA  Moderate*   PROTEIN UA mg/dl Negative   NITRITE UA  Negative   BILIRUBIN UA  Negative   UROBILINOGEN UA E.U./dl 0.2   LEUKOCYTES UA  Trace*   WBC UA /hpf 2-4   RBC UA /hpf 20-30*   BACTERIA UA /hpf Occasional   EPITHELIAL CELLS WET PREP /hpf Occasional   MUCUS THREADS  Occasional*       Past Medical History:   Diagnosis Date    Hypertension      Present on Admission:   Moderate protein-calorie malnutrition (HCC)   Portal vein thrombosis      Admitting Diagnosis: Seizure (HCC) [R56.9]  Seizure-like activity (HCC) [R56.9]  Age/Sex: 88 y.o. male  Admission Orders:  Consult neuro  Seizure precautions  Telemetry  Orthostatic BP    Scheduled Medications:  apixaban, 5 mg, Oral, BID  levETIRAcetam, 750 mg, Intravenous, Q12H RONALD    PRN Meds:  acetaminophen, 650 mg, Oral, Q6H PRN  Gadobutrol, 5 mL, Intravenous, Once in imaging  LORazepam, 2 mg, Intravenous, Q6H PRN  trimethobenzamide, 200 mg, Intramuscular, Q6H PRN      Network Utilization Review Department  ATTENTION: Please call with any questions or concerns to [phone] and carefully listen to the prompts so that you are directed to the right person. All voicemails are confidential.   For Discharge needs, contact Care Management DC Support Team at [phone] opt. [phone]  Send all requests for admission clinical reviews, approved or denied determinations and any other requests to dedicated fax number below belonging to the campus where the patient is receiving treatment. List of dedicated fax numbers for the Facilities:  FACILITY NAME UR FAX NUMBER   ADMISSION DENIALS (Administrative/Medical Necessity) [phone]   DISCHARGE SUPPORT TEAM (NETWORK) [phone]   PARENT CHILD  [facility] (Maternity/NICU/Pediatrics) [phone]   General acute hospital [phone]   [facility] [phone]   [facility] [phone]   [facility] [phone]   Critical access hospital [phone]   [facility] [phone]   [facility] [phone]   [facility] [phone]   [facility] [phone]   [facility] [phone]   [facility] [phone]   [facility] [phone]

## 2024-04-17 NOTE — ASSESSMENT & PLAN NOTE
With associated mesenteric ischemia status post recent hospitalization at the [facility] last month resulting in an exploratory laparotomy with small bowel resection and lysis of adhesions  On Eliquis for anticoagulation  PRN pain control

## 2024-04-17 NOTE — H&P
[facility]  H&P  Name: Jf Valera 88 y.o. male I MRN: [id number]  Unit/Bed#: [address] [place] [address] A  Date of Admission: 4/16/2024   Date of Service: 4/16/2024 I Hospital Day: 0      Assessment & Plan:    * Seizure-like activity (HCC)  Assessment & Plan  Presents out of concern for seizure-like activity by family found staring into a blank space with intermittent stiffness and shaking, suspicious for possible convulsions  No prior history of seizures  CT of head in the ED negative for acute intracranial etiology  Case discussed with on-call neurology by ED provider -> await MRI brain and EEG w/ recommendation to initiate twice daily IV Keppra  Monitor neurochecks  Electrolytes and blood sugar intact  PRN IV Ativan on board for breakthrough episodes  Seizure precautions    Portal vein thrombosis  Assessment & Plan  With associated mesenteric ischemia status post recent hospitalization at the [facility] last month resulting in an exploratory laparotomy with small bowel resection and lysis of adhesions  On Eliquis for anticoagulation  PRN pain control  Nutritional optimization with Ensure (per family request)    Moderate protein-calorie malnutrition (HCC)  Assessment & Plan  BMI of 20.16 in the setting of recent surgery with increased age/deconditioning, and decreased appetite/oral intake and evidence of muscle wasting/atrophy and exam  Continue Ensure nutritional supplementation with meals    Leukocytosis  Assessment & Plan  Likely reactive due to acute medical issue(s)  Monitor WBC count    Anemia  Assessment & Plan  Hemoglobin fluctuating in the 9-10 range since surgery last month (previously 12-14)  Stable - continue to monitor      DVT Prophylaxis:  Eliquis    Code Status:  Full code    Discussion with: Patient, along with family members, at bedside today    Anticipated Length of Stay:  Patient will be admitted on an Inpatient basis with an anticipated length of stay of greater than 2  midnights.   Justification for Hospital Stay: Seizure-like activity requiring neurology evaluation, MRI brain, and EEG.    Total Time for Visit, including Counseling / Coordination of Care: 75 minutes. More than 50% of total time spent on counseling and coordination of care, on one or more of the following: performing physical exam; counseling and coordination of care; obtaining or reviewing history; documenting in the medical record; reviewing/ordering tests, medications or procedures; communicating with other healthcare professionals and discussing with patient's family/caregivers.      Chief Complaint:  Blank staring and body stiffness with shaking      History of Present Illness:    Jf Valera is a 88 y.o. male who presents with from home with family, after being found staring into blank space without monitor verbal cues with intermittent body shaking and generalized symptoms.  No prior history of seizures noted.  Of note, he was recently hospitalized at the [facility] last month with abdominal pain with discovery of mesenteric ischemia due to portal vein thrombosis status post exploratory laparotomy with small bowel resection and lysis of existing adhesions.  He has been in his usual state of health after being discharged from that hospital course.  Per family his appetite is somewhat poor, however, he is compliant with food given and supplemental Ensure.  No recent falls or head trauma.  In the ED, CT imaging of the head, was essentially unremarkable for acute intracranial etiology.  At the time my encounter, he is resting fairly comfortably in bed, denying any blurry vision, headaches, dizziness, or nausea/vomiting at this time.  Overall, he remains in pleasant and hopeful spirits.      Review of Systems:    Review of Systems - A thorough 12 point review systems was conducted.  Pertinent positives and negatives are mentioned in the history of present illness.      Past Medical and Surgical History:      Past Medical History:   Diagnosis Date    Hypertension        Past Surgical History:   Procedure Laterality Date    IR CHEST TUBE PLACEMENT  3/17/2024    LAPAROTOMY N/A 3/13/2024    Procedure: LAPAROTOMY EXPLORATORY; SMALL BOWEL RESECTION; LYSIS OF ADHESIONS; SUPRPAPUBIC TUBE INSERTION;  Surgeon: Marvin Azul MD;  Location: [facility] MAIN OR;  Service: General    LAPAROTOMY N/A 3/14/2024    Procedure: EXPLORATORY LAPAROTOMY, WASHOUT. SMALL BOWEL ANASTOMOSIS, CONTROL OF LIVER BLEED, CLOSURE OF ABDOMINAL WOUND;  Surgeon: Beltran Lorenz DO;  Location:  MAIN OR;  Service: General         Medications & Allergies:    Prior to Admission medications    Medication Sig Start Date End Date Taking? Authorizing Provider   apixaban (Eliquis) 5 mg Take 2 tablets (10 mg total) by mouth 2 (two) times a day for 7 days, THEN 1 tablet (5 mg total) 2 (two) times a day for 23 days. 4/3/24 5/3/24  Justa Hutchinson MD   ergocalciferol (VITAMIN D2) 50,000 units Take 1 capsule (50,000 Units total) by mouth once a week for 4 doses 4/3/24 4/25/24  Justa Hutchinson MD         Allergies: No Known Allergies      Social History:    Substance Use History:   Social History     Substance and Sexual Activity   Alcohol Use Never     Social History     Tobacco Use   Smoking Status Never    Passive exposure: Never   Smokeless Tobacco Never     Social History     Substance and Sexual Activity   Drug Use Never         Family History:    Noncontributory      Physical Exam:     Vitals:   Blood Pressure: 124/70 (04/16/24 1838)  Pulse: (!) 116 (04/16/24 1556)  Temperature: 98.8 °F (37.1 °C) (04/16/24 1838)  Respirations: 19 (04/16/24 1838)  SpO2: 99 % (04/16/24 1556)      GENERAL Frail/cachectic   HEAD   Normocephalic - atraumatic - temporal wasting present   EYES Nonicteric   MOUTH   Mucosa moist   NECK   Supple - full range of motion   CARDIAC Mildly tachycardic   PULMONARY    Nonlabored respirations at rest   ABDOMEN   Soft - nontender/nondistended -  healed incision sites   MUSCULOSKELETAL   Motor strength/range of motion mildly deconditioned   NEUROLOGIC   Alert/oriented at baseline currently without obvious tongue bites - no current urinary/bowel incontinence   SKIN   Chronic wrinkles/blemishes    PSYCHIATRIC   Mood/affect pleasant         Additional Data:     Labs & Recent Cultures:    Results from last 7 days   Lab Units 04/16/24  1604   WBC Thousand/uL 11.13*   HEMOGLOBIN g/dL 9.8*   HEMATOCRIT % 32.5*   PLATELETS Thousands/uL 375   SEGS PCT % 89*   LYMPHO PCT % 7*   MONO PCT % 4   EOS PCT % 0     Results from last 7 days   Lab Units 04/16/24  1604   SODIUM mmol/L 136   POTASSIUM mmol/L 3.9   CHLORIDE mmol/L 107   CO2 mmol/L 20*   BUN mg/dL 12   CREATININE mg/dL 0.87   ANION GAP mmol/L 9   CALCIUM mg/dL 8.3*   ALBUMIN g/dL 3.2*   TOTAL BILIRUBIN mg/dL 0.42   ALK PHOS U/L 59   ALT U/L 11   AST U/L 20   GLUCOSE RANDOM mg/dL 156*         Results from last 7 days   Lab Units 04/16/24  1554   POC GLUCOSE mg/dl 141*         Results from last 7 days   Lab Units 04/16/24  1704   LACTIC ACID mmol/L 2.0                 Lines/Drains:  Invasive Devices       Drain  Duration             Suprapubic Catheter 18 Fr. 34 days                      Imaging:     CT head without contrast    Result Date: 4/16/2024  Narrative: CT BRAIN - WITHOUT CONTRAST INDICATION:   Seizure. COMPARISON: Head/neck CTA and head CT dated 3/10/2023. MRI dated 3/11/2023. TECHNIQUE:  CT examination of the brain was performed.  Multiplanar 2D reformatted images were created from the source data. Radiation dose length product (DLP) for this visit:  868 mGy-cm .  This examination, like all CT scans performed in the [facility], was performed utilizing techniques to minimize radiation dose exposure, including the use of iterative reconstruction and automated exposure control. IMAGE QUALITY:  Diagnostic. FINDINGS: PARENCHYMA:  No intracranial mass, mass effect or midline shift. No CT signs  of acute infarction.  No acute parenchymal hemorrhage. VENTRICLES AND EXTRA-AXIAL SPACES:  Normal for the patient's age. VISUALIZED ORBITS: Bilateral cataract surgeries.  Orbits are, otherwise, unremarkable. PARANASAL SINUSES: Normal visualized paranasal sinuses. CALVARIUM AND EXTRACRANIAL SOFT TISSUES:  Normal.     Impression: No acute intracranial abnormality. Workstation performed: [id number]             LILLY MARKHAM MD   Hospitalist - [facility] Internal Medicine          ** Please Note: This note is constructed using a voice recognition dictation system.  An occasional wrong word/phrase or “sound-a-like” substitution may have been picked up by dictation device due to the inherent limitations of voice recognition software.  Read the chart carefully and recognize, using reasonable context, where substitutions may have occurred.**

## 2024-04-17 NOTE — PROGRESS NOTES
[facility]  Progress Note  Name: Jf Valera I  MRN: [id number]  Unit/Bed#: 2 [facility] 204 A  Date of Admission: 4/16/2024   Date of Service: 4/17/2024 I Hospital Day: 1    Assessment/Plan   * Seizure-like activity (HCC)  Assessment & Plan  Presents out of concern for seizure-like activity by family found staring into a blank space with intermittent stiffness and shaking, suspicious for possible convulsions  CT of head in the ED negative for acute intracranial etiology  Monitor neurochecks  Electrolytes and blood sugar intact  TSH WNL  Prolactin level elevated 42.25  Initiated on IV Keppra 750mg BID  PRN IV Ativan on board for breakthrough episodes  MRI brain negative for any acute abnormalities. 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   EEG ordered  Seizure precautions  Consult to Neurology, recommendations are appreciated    Anemia  Assessment & Plan  Hemoglobin fluctuating in the 9-10 range since surgery last month (previously 12-14)  Stable - continue to monitor    Moderate protein-calorie malnutrition (HCC)  Assessment & Plan  BMI of 20.16 in the setting of recent surgery with increased age/deconditioning, and decreased appetite/oral intake and evidence of muscle wasting/atrophy and exam  Continue Ensure nutritional supplementation with meals    Portal vein thrombosis  Assessment & Plan  With associated mesenteric ischemia status post recent hospitalization at the [facility] last month resulting in an exploratory laparotomy with small bowel resection and lysis of adhesions  On Eliquis for anticoagulation  PRN pain control    Leukocytosis-resolved as of 4/17/2024  Assessment & Plan  Likely reactive due to acute medical issue(s)  WBC down to 9.52 today  Resolved             VTE Pharmacologic Prophylaxis:   High Risk (Score >/= 5)  - Pharmacological DVT Prophylaxis Ordered: apixaban (Eliquis). Sequential Compression Devices Ordered.    Mobility:   Basic Mobility Inpatient Raw Score: 16  JH-HLM Goal: 5: Stand one or more mins  JH-HLM Achieved: 6: Walk 10 steps or more  JH-HLM Goal achieved. Continue to encourage appropriate mobility.    Patient Centered Rounds: I performed bedside rounds with nursing staff today.   Discussions with Specialists or Other Care Team Provider: Nursing, Neurology    Education and Discussions with Family / Patient: Updated  (son) via phone.    Total Time Spent on Date of Encounter in care of patient: 45 mins. This time was spent on one or more of the following: performing physical exam; counseling and coordination of care; obtaining or reviewing history; documenting in the medical record; reviewing/ordering tests, medications or procedures; communicating with other healthcare professionals and discussing with patient's family/caregivers.    Current Length of Stay: 1 day(s)  Current Patient Status: Inpatient   Certification Statement: The patient will continue to require additional inpatient hospital stay due to seizure like activity   Discharge Plan: Anticipate discharge in 24-48 hrs to home.    Code Status: Level 1 - Full Code    Subjective:   Patient upright eating breakfast this morning. Denies any complaints at this time.     Objective:     Vitals:   Temp (24hrs), Av.1 °F (36.7 °C), Min:97.4 °F (36.3 °C), Max:98.8 °F (37.1 °C)    Temp:  [97.4 °F (36.3 °C)-98.8 °F (37.1 °C)] 97.4 °F (36.3 °C)  HR:  [] 89  Resp:  [18-20] 18  BP: (111-134)/(65-79) 133/79  SpO2:  [98 %-100 %] 98 %  Body mass index is 19.88 kg/m².     Input and Output Summary (last 24 hours):     Intake/Output Summary (Last 24 hours) at 2024 1440  Last data filed at 2024 0826  Gross per 24 hour   Intake 300 ml   Output 125 ml   Net 175 ml       Physical Exam:   Physical Exam  Vitals and nursing note reviewed.    Constitutional:       General: He is not in acute distress.     Appearance: He is well-developed.   HENT:      Head: Normocephalic and atraumatic.   Eyes:      Conjunctiva/sclera: Conjunctivae normal.   Cardiovascular:      Rate and Rhythm: Normal rate and regular rhythm.      Heart sounds: No murmur heard.  Pulmonary:      Effort: Pulmonary effort is normal. No respiratory distress.      Breath sounds: Normal breath sounds.   Abdominal:      Palpations: Abdomen is soft.      Tenderness: There is no abdominal tenderness.   Musculoskeletal:         General: No swelling.      Cervical back: Neck supple.   Skin:     General: Skin is warm and dry.      Capillary Refill: Capillary refill takes less than 2 seconds.   Neurological:      Mental Status: He is alert.   Psychiatric:         Mood and Affect: Mood normal.          Additional Data:     Labs:  Results from last 7 days   Lab Units 04/17/24  0635 04/16/24  1604   WBC Thousand/uL 9.52 11.13*   HEMOGLOBIN g/dL 9.2* 9.8*   HEMATOCRIT % 31.2* 32.5*   PLATELETS Thousands/uL 347 375   SEGS PCT %  --  89*   LYMPHO PCT %  --  7*   MONO PCT %  --  4   EOS PCT %  --  0     Results from last 7 days   Lab Units 04/17/24  0635 04/16/24  1604   SODIUM mmol/L 139 136   POTASSIUM mmol/L 3.8 3.9   CHLORIDE mmol/L 108 107   CO2 mmol/L 26 20*   BUN mg/dL 11 12   CREATININE mg/dL 0.77 0.87   ANION GAP mmol/L 5 9   CALCIUM mg/dL 8.3* 8.3*   ALBUMIN g/dL  --  3.2*   TOTAL BILIRUBIN mg/dL  --  0.42   ALK PHOS U/L  --  59   ALT U/L  --  11   AST U/L  --  20   GLUCOSE RANDOM mg/dL 77 156*         Results from last 7 days   Lab Units 04/16/24  1554   POC GLUCOSE mg/dl 141*         Results from last 7 days   Lab Units 04/16/24  1704   LACTIC ACID mmol/L 2.0       Lines/Drains:  Invasive Devices       Peripheral Intravenous Line  Duration             Peripheral IV 04/16/24 Right Antecubital 1 day              Drain  Duration             Suprapubic Catheter 18 Fr. 35 days                       Telemetry:  Telemetry Orders (From admission, onward)               24 Hour Telemetry Monitoring  (ED Bridging Orders Panel)  Continuous x 24 Hours (Telem)        Question:  Reason for 24 Hour Telemetry  Answer:  Metabolic/electrolyte disturbance with high probability of dysrhythmia. K level <3 or >6 OR KCL infusion >10mEq/hr                     Telemetry Reviewed: Normal Sinus Rhythm  Indication for Continued Telemetry Use: Metabolic/electrolyte disturbance with high probability of dysrhythmia             Imaging: Reviewed radiology reports from this admission including: MRI brain    Recent Cultures (last 7 days):         Last 24 Hours Medication List:   Current Facility-Administered Medications   Medication Dose Route Frequency Provider Last Rate    acetaminophen  650 mg Oral Q6H PRN Michelle Reina MD      apixaban  5 mg Oral BID Michelle Reina MD      levETIRAcetam  750 mg Intravenous Q12H [facility] Michelle Reina MD      LORazepam  2 mg Intravenous Q6H PRN Michelle Reina MD      trimethobenzamide  200 mg Intramuscular Q6H PRN Michelle Reina MD          Today, Patient Was Seen By: Roberta Rolon PA-C    **Please Note: This note may have been constructed using a voice recognition system.**

## 2024-04-18 ENCOUNTER — TRANSITIONAL CARE MANAGEMENT (OUTPATIENT)
Dept: FAMILY MEDICINE CLINIC | Facility: CLINIC | Age: 89
End: 2024-04-18

## 2024-04-18 VITALS
WEIGHT: 112.2 LBS | BODY MASS INDEX: 19.88 KG/M2 | DIASTOLIC BLOOD PRESSURE: 74 MMHG | OXYGEN SATURATION: 100 % | TEMPERATURE: 97.8 F | SYSTOLIC BLOOD PRESSURE: 124 MMHG | HEIGHT: 63 IN | RESPIRATION RATE: 18 BRPM | HEART RATE: 94 BPM

## 2024-04-18 LAB
ANION GAP SERPL CALCULATED.3IONS-SCNC: 8 MMOL/L (ref 4–13)
BUN SERPL-MCNC: 13 MG/DL (ref 5–25)
CALCIUM SERPL-MCNC: 8.3 MG/DL (ref 8.4–10.2)
CHLORIDE SERPL-SCNC: 106 MMOL/L (ref 96–108)
CO2 SERPL-SCNC: 25 MMOL/L (ref 21–32)
CREAT SERPL-MCNC: 0.78 MG/DL (ref 0.6–1.3)
ERYTHROCYTE [DISTWIDTH] IN BLOOD BY AUTOMATED COUNT: 16.2 % (ref 11.6–15.1)
GFR SERPL CREATININE-BSD FRML MDRD: 81 ML/MIN/1.73SQ M
GLUCOSE SERPL-MCNC: 90 MG/DL (ref 65–140)
HCT VFR BLD AUTO: 29.8 % (ref 36.5–49.3)
HGB BLD-MCNC: 9 G/DL (ref 12–17)
MCH RBC QN AUTO: 26.9 PG (ref 26.8–34.3)
MCHC RBC AUTO-ENTMCNC: 30.2 G/DL (ref 31.4–37.4)
MCV RBC AUTO: 89 FL (ref 82–98)
PLATELET # BLD AUTO: 356 THOUSANDS/UL (ref 149–390)
PMV BLD AUTO: 9.9 FL (ref 8.9–12.7)
POTASSIUM SERPL-SCNC: 3.5 MMOL/L (ref 3.5–5.3)
RBC # BLD AUTO: 3.34 MILLION/UL (ref 3.88–5.62)
SODIUM SERPL-SCNC: 139 MMOL/L (ref 135–147)
WBC # BLD AUTO: 8.96 THOUSAND/UL (ref 4.31–10.16)

## 2024-04-18 PROCEDURE — 80048 BASIC METABOLIC PNL TOTAL CA: CPT | Performed by: INTERNAL MEDICINE

## 2024-04-18 PROCEDURE — 85027 COMPLETE CBC AUTOMATED: CPT | Performed by: INTERNAL MEDICINE

## 2024-04-18 RX ORDER — TAMSULOSIN HYDROCHLORIDE 0.4 MG/1
CAPSULE ORAL
COMMUNITY
Start: 2024-04-12

## 2024-04-18 RX ORDER — ASPIRIN 81 MG/1
81 TABLET, CHEWABLE ORAL DAILY
Qty: 30 TABLET | Refills: 0 | Status: SHIPPED | OUTPATIENT
Start: 2024-04-19 | End: 2024-05-19

## 2024-04-18 RX ORDER — LEVETIRACETAM 500 MG/1
750 TABLET ORAL EVERY 12 HOURS SCHEDULED
Status: DISCONTINUED | OUTPATIENT
Start: 2024-04-18 | End: 2024-04-18 | Stop reason: HOSPADM

## 2024-04-18 RX ORDER — LEVETIRACETAM 750 MG/1
750 TABLET ORAL EVERY 12 HOURS SCHEDULED
Qty: 60 TABLET | Refills: 0 | Status: SHIPPED | OUTPATIENT
Start: 2024-04-18 | End: 2024-05-18

## 2024-04-18 RX ADMIN — APIXABAN 5 MG: 5 TABLET, FILM COATED ORAL at 09:05

## 2024-04-18 RX ADMIN — LEVETIRACETAM 750 MG: 100 INJECTION, SOLUTION INTRAVENOUS at 09:21

## 2024-04-18 RX ADMIN — ASPIRIN 81 MG CHEWABLE TABLET 81 MG: 81 TABLET CHEWABLE at 09:05

## 2024-04-18 NOTE — ASSESSMENT & PLAN NOTE
Presents out of concern for seizure-like activity by family found staring into a blank space with intermittent stiffness and shaking, suspicious for possible convulsions  CT of head in the ED negative for acute intracranial etiology  Monitor neurochecks  Electrolytes and blood sugar intact  TSH WNL  Prolactin level elevated 42.25  PRN IV Ativan on board for breakthrough episodes  MRI brain negative for any acute abnormalities. 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   Seizure precautions  Consult to Neurology, recommendations are appreciated  Outpatient EEG ordered  Keppra 750mg BID   Added ASA 81mg daily for basilar stenosis  Follow up with Neurology in office

## 2024-04-18 NOTE — ASSESSMENT & PLAN NOTE
With associated mesenteric ischemia status post recent hospitalization at the [facility] last month resulting in an exploratory laparotomy with small bowel resection and lysis of adhesions  On Eliquis for anticoagulation  PRN pain control

## 2024-04-18 NOTE — NURSING NOTE
Patient discharged to home. Discharge instructions reviewed with patient and family. Patient and family verbalized understanding. VSS/afebrile. Pt stable at time of discharge. IV discontinued from arm, pressure dressing in place.

## 2024-04-18 NOTE — PROGRESS NOTES
Assessment/Plan:    Mesenteric ischemia (HCC)  88-year-old gentleman presents for hospital follow-up.  He was admitted in March with mesenteric venous thrombosis he underwent small bowel resection with reanastomosis as well as super suprapubic catheter placement.  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  He was managed with anticoagulation he did not undergo any vascular intervention during that admission.  Since that time he is done very well he denies any abdominal pain he is tolerating a regular diet he denies any lower extremity claudication or rest pain or lower extremity wounds he is a non-smoker no prior embolic events or hypercoagulable disorders.  He is compliant with his Eliquis and is currently taking aspirin as well.  On exam he has palpable femoral pulses bilaterally and absent distal pulses bilaterally his abdomen is soft I discussed the findings of his imaging with him and his son who accompanied him I recommended lifelong anticoagulation as well as aspirin he was not previously taking a statin so I did prescribe him 20 mg daily of Lipitor.  He will need to get refills of his statin from his PCP in the future I recommended surveillance of his aortic plaque as well as his SFA plaque with aortoiliac and lower extremity duplex in 6 months and we will follow-up with him in the office in 1 year.       Diagnoses and all orders for this visit:    Portal vein thrombosis  -     VAS ARTERIAL DUPLEX- LOWER LIMB BILATERAL; Future  -     VAS abdominal aorta/iliac duplex; Future  -     atorvastatin (LIPITOR) 20 mg tablet; Take 1 tablet (20 mg total) by mouth daily    Mesenteric ischemia (HCC)  -     Ambulatory Referral to Vascular Surgery          Subjective:      Patient ID: Jf Valera is a 88 y.o.  "male.    New patient, referred for mesenteric ischemia and presents today for evaluation. Recent portal vein thrombus s/p exploratory laparotomy, small bowel resection, lysis of adhesions on 3/13/24 (general surgery). CT abdomen/pelvis done 3/13. Taking Eliquis.     HPI    The following portions of the patient's history were reviewed and updated as appropriate: allergies, current medications, past family history, past medical history, past social history, past surgical history, and problem list.    Review of Systems   Constitutional: Negative.    HENT: Negative.     Eyes: Negative.    Respiratory: Negative.     Cardiovascular: Negative.    Gastrointestinal: Negative.    Endocrine: Negative.    Genitourinary: Negative.    Musculoskeletal: Negative.    Skin: Negative.    Allergic/Immunologic: Negative.    Neurological: Negative.    Hematological: Negative.    Psychiatric/Behavioral: Negative.         I have reviewed the ROS above and made changes as needed.      Objective:      /78 (BP Location: Right arm, Patient Position: Sitting)   Pulse 76   Ht 5' 3\" (1.6 m)   Wt 51.3 kg (113 lb)   BMI 20.02 kg/m²          Physical Exam      General  Exam: alert, awake, oriented, no distress, consistent with stated age    Integumentary  Exam: warm, dry, no gross lesions, no bruises and normal color    Head and Neck  Exam: supple, no bruits, trachea midline, no JVD, no mass or palpable nodes    Eye  Exam: extraoccular movements intact, no scleral icterus, sclera clear, pupils equal round and reactive to light    ENMT  Exam: oral mucosa pink and moist    Chest and Lung  Exam: chest normal without deformity, bilaterally expansive, clear to auscultation    Cardiovascular  Exam: regular rate, regular rhythm, no murmurs, no rubs or gallops    Adbomen  Exam: soft, non-tender, non-distended, no pulsatile abdominal masses, no abdominal bruit  Suprapubic cath in place  Ab soft, midline incision healed    Peripheral Vascular  Exam: " no clubbing of the digits of the upper extremity, no cyanosis, no edema, both feet are warm, radial pulses 2+ bilaterally, skin well perfused, without and no varicosities.    No widened popliteal pulse noted bilaterally    Upper Extremity:  Palpation: Radial pulse- Bilateral 2+    Lower Extremity:  Palpation: Femoral pulse- Bilateral 2+         Absent distal pulses   No LE wounds   Feet warm  Neurologic  Exam:alert, non-focal, oriented x 3, cranial nerves II-XII grossly intact

## 2024-04-18 NOTE — PLAN OF CARE
Problem: PAIN - ADULT  Goal: Verbalizes/displays adequate comfort level or baseline comfort level  Description: Interventions:  - Encourage patient to monitor pain and request assistance  - Assess pain using appropriate pain scale  - Administer analgesics based on type and severity of pain and evaluate response  - Implement non-pharmacological measures as appropriate and evaluate response  - Consider cultural and social influences on pain and pain management  - Notify physician/advanced practitioner if interventions unsuccessful or patient reports new pain  Outcome: Progressing     Problem: INFECTION - ADULT  Goal: Absence or prevention of progression during hospitalization  Description: INTERVENTIONS:  - Assess and monitor for signs and symptoms of infection  - Monitor lab/diagnostic results  - Monitor all insertion sites, i.e. indwelling lines, tubes, and drains  - Monitor endotracheal if appropriate and nasal secretions for changes in amount and color  - [place] appropriate cooling/warming therapies per order  - Administer medications as ordered  - Instruct and encourage patient and family to use good hand hygiene technique  - Identify and instruct in appropriate isolation precautions for identified infection/condition  Outcome: Progressing     Problem: SAFETY ADULT  Goal: Patient will remain free of falls  Description: INTERVENTIONS:  - Educate patient/family on patient safety including physical limitations  - Instruct patient to call for assistance with activity   - Consult OT/PT to assist with strengthening/mobility   - Keep Call bell within reach  - Keep bed low and locked with side rails adjusted as appropriate  - Keep care items and personal belongings within reach  - Initiate and maintain comfort rounds  - Make Fall Risk Sign visible to staff  - Offer Toileting every 2 Hours, in advance of need  - Initiate/Maintain bed alarm  - Obtain necessary fall risk management equipment: yellow socks  - Apply  yellow socks and bracelet for high fall risk patients  - Consider moving patient to room near nurses station  Outcome: Progressing  Goal: Maintain or return to baseline ADL function  Description: INTERVENTIONS:  -  Assess patient's ability to carry out ADLs; assess patient's baseline for ADL function and identify physical deficits which impact ability to perform ADLs (bathing, care of mouth/teeth, toileting, grooming, dressing, etc.)  - Assess/evaluate cause of self-care deficits   - Assess range of motion  - Assess patient's mobility; develop plan if impaired  - Assess patient's need for assistive devices and provide as appropriate  - Encourage maximum independence but intervene and supervise when necessary  - Involve family in performance of ADLs  - Assess for home care needs following discharge   - Consider OT consult to assist with ADL evaluation and planning for discharge  - Provide patient education as appropriate  Outcome: Progressing  Goal: Maintains/Returns to pre admission functional level  Description: INTERVENTIONS:  - Perform AM-PAC 6 Click Basic Mobility/ Daily Activity assessment daily.  - Set and communicate daily mobility goal to care team and patient/family/caregiver.   - Collaborate with rehabilitation services on mobility goals if consulted  - Perform Range of Motion 3 times a day.  - Reposition patient every 2 hours.  - Dangle patient 3 times a day  - Stand patient 3 times a day  - Ambulate patient 3 times a day  - Out of bed to chair 3 times a day   - Out of bed for meals 3 times a day  - Out of bed for toileting  - Record patient progress and toleration of activity level   Outcome: Progressing     Problem: NEUROSENSORY - ADULT  Goal: Achieves stable or improved neurological status  Description: INTERVENTIONS  - Monitor and report changes in neurological status  - Monitor vital signs such as temperature, blood pressure, glucose, and any other labs ordered   - Initiate measures to prevent  increased intracranial pressure  - Monitor for seizure activity and implement precautions if appropriate      Outcome: Progressing  Goal: Remains free of injury related to seizures activity  Description: INTERVENTIONS  - Maintain airway, patient safety  and administer oxygen as ordered  - Monitor patient for seizure activity, document and report duration and description of seizure to physician/advanced practitioner  - If seizure occurs,  ensure patient safety during seizure  - Reorient patient post seizure  - Seizure pads on all 4 side rails  - Instruct patient/family to notify RN of any seizure activity including if an aura is experienced  - Instruct patient/family to call for assistance with activity based on nursing assessment  - Administer anti-seizure medications if ordered    Outcome: Progressing  Goal: Achieves maximal functionality and self care  Description: INTERVENTIONS  - Monitor swallowing and airway patency with patient fatigue and changes in neurological status  - Encourage and assist patient to increase activity and self care.   - Encourage visually impaired, hearing impaired and aphasic patients to use assistive/communication devices  Outcome: Progressing     Problem: NEUROSENSORY - ADULT  Goal: Achieves stable or improved neurological status  Description: INTERVENTIONS  - Monitor and report changes in neurological status  - Monitor vital signs such as temperature, blood pressure, glucose, and any other labs ordered   - Initiate measures to prevent increased intracranial pressure  - Monitor for seizure activity and implement precautions if appropriate      Outcome: Progressing  Goal: Remains free of injury related to seizures activity  Description: INTERVENTIONS  - Maintain airway, patient safety  and administer oxygen as ordered  - Monitor patient for seizure activity, document and report duration and description of seizure to physician/advanced practitioner  - If seizure occurs,  ensure patient  safety during seizure  - Reorient patient post seizure  - Seizure pads on all 4 side rails  - Instruct patient/family to notify RN of any seizure activity including if an aura is experienced  - Instruct patient/family to call for assistance with activity based on nursing assessment  - Administer anti-seizure medications if ordered    Outcome: Progressing  Goal: Achieves maximal functionality and self care  Description: INTERVENTIONS  - Monitor swallowing and airway patency with patient fatigue and changes in neurological status  - Encourage and assist patient to increase activity and self care.   - Encourage visually impaired, hearing impaired and aphasic patients to use assistive/communication devices  Outcome: Progressing

## 2024-04-18 NOTE — DISCHARGE INSTR - AVS FIRST PAGE
Follow ups:  -PCP  -Neurology (referral at discharge)    New prescriptions:  -Aspirin 81mg daily  -Keppra 750mg twice daily

## 2024-04-19 ENCOUNTER — TELEPHONE (OUTPATIENT)
Age: 89
End: 2024-04-19

## 2024-04-19 ENCOUNTER — CONSULT (OUTPATIENT)
Dept: VASCULAR SURGERY | Facility: CLINIC | Age: 89
End: 2024-04-19

## 2024-04-19 VITALS
WEIGHT: 113 LBS | BODY MASS INDEX: 20.02 KG/M2 | SYSTOLIC BLOOD PRESSURE: 128 MMHG | HEIGHT: 63 IN | HEART RATE: 76 BPM | DIASTOLIC BLOOD PRESSURE: 78 MMHG

## 2024-04-19 DIAGNOSIS — I81 PORTAL VEIN THROMBOSIS: Primary | ICD-10-CM

## 2024-04-19 DIAGNOSIS — K55.9 MESENTERIC ISCHEMIA (HCC): ICD-10-CM

## 2024-04-19 LAB — BACTERIA UR CULT: NORMAL

## 2024-04-19 PROCEDURE — 99215 OFFICE O/P EST HI 40 MIN: CPT | Performed by: SURGERY

## 2024-04-19 RX ORDER — ATORVASTATIN CALCIUM 20 MG/1
20 TABLET, FILM COATED ORAL DAILY
Qty: 30 TABLET | Refills: 0 | Status: SHIPPED | OUTPATIENT
Start: 2024-04-19

## 2024-04-19 NOTE — TELEPHONE ENCOUNTER
Pts son Zack called to see if there is any specific way the pt should be taking his six medications. Can he take all the morning meds at the same time? All the night time meds at the same time? Please advise.

## 2024-04-19 NOTE — TELEPHONE ENCOUNTER
Yes that is fine if he takes his daytime meds together and nighttime meds together- there is no particular way he has to take these medications.

## 2024-04-19 NOTE — ASSESSMENT & PLAN NOTE
88-year-old gentleman presents for hospital follow-up.  He was admitted in March with mesenteric venous thrombosis he underwent small bowel resection with reanastomosis as well as super suprapubic catheter placement.  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  He was managed with anticoagulation he did not undergo any vascular intervention during that admission.  Since that time he is done very well he denies any abdominal pain he is tolerating a regular diet he denies any lower extremity claudication or rest pain or lower extremity wounds he is a non-smoker no prior embolic events or hypercoagulable disorders.  He is compliant with his Eliquis and is currently taking aspirin as well.  On exam he has palpable femoral pulses bilaterally and absent distal pulses bilaterally his abdomen is soft I discussed the findings of his imaging with him and his son who accompanied him I recommended lifelong anticoagulation as well as aspirin he was not previously taking a statin so I did prescribe him 20 mg daily of Lipitor.  He will need to get refills of his statin from his PCP in the future I recommended surveillance of his aortic plaque as well as his SFA plaque with aortoiliac and lower extremity duplex in 6 months and we will follow-up with him in the office in 1 year.

## 2024-04-21 LAB
ATRIAL RATE: 98 BPM
P AXIS: 58 DEGREES
PR INTERVAL: 178 MS
QRS AXIS: -69 DEGREES
QRSD INTERVAL: 100 MS
QT INTERVAL: 352 MS
QTC INTERVAL: 449 MS
T WAVE AXIS: 66 DEGREES
VENTRICULAR RATE: 98 BPM

## 2024-04-21 PROCEDURE — 93010 ELECTROCARDIOGRAM REPORT: CPT | Performed by: INTERNAL MEDICINE

## 2024-04-22 ENCOUNTER — TELEPHONE (OUTPATIENT)
Age: 89
End: 2024-04-22

## 2024-04-22 NOTE — TELEPHONE ENCOUNTER
"Pts son called in to  reschedule his fathers TCM appointment. While on the phone he had a question in regards to his fathers Eliquis medication. He states he picked up the refill from the pharmacy and if gave the \"starter pack\" instructions, stating to take 2 pills 2 times a day for 7 days. The pt has already done this. He has been taking 1 pill 2 times a day (morning and night) for the past 3 weeks. Pts son wants to confirm dosage and frequency with Dr. Hutchinson.    Please advise, thank you  "

## 2024-04-23 NOTE — TELEPHONE ENCOUNTER
Patients son Zack calling back as he stated someone called and did not leave message.  Confirmed that this was an automated confirmation call for the upcoming TCM appointment

## 2024-04-25 NOTE — TELEPHONE ENCOUNTER
Son called and stated patient is having surgery on 5/9 to remove the suprapubic catheter. Patients son wanted  to know how many days should the patient stop taking the blood thinners prior to surgery. Patient is scheduled for a TCM appt 5/2

## 2024-04-29 ENCOUNTER — TELEPHONE (OUTPATIENT)
Dept: NEUROLOGY | Facility: CLINIC | Age: 89
End: 2024-04-29

## 2024-04-29 ENCOUNTER — TELEPHONE (OUTPATIENT)
Dept: VASCULAR SURGERY | Facility: CLINIC | Age: 89
End: 2024-04-29

## 2024-04-29 NOTE — TELEPHONE ENCOUNTER
Reviewed. Discussed with . NO approval for hold of Eliquis and BEBE at this time. Recommending waiting until late June to perform suprapubic catheter procedure.

## 2024-04-29 NOTE — TELEPHONE ENCOUNTER
Pt son called to schedule HFU appt.     Pt appt was put on a waitlist for a sooner date.    Jf Valera will need follow up in 8 weeks with general attending. He will not require outpatient neurological testing.     HFU/ ERICKA HUANG / SEIZURE-LIKE ACTIVITY -DC 04/18/2024 home

## 2024-04-29 NOTE — TELEPHONE ENCOUNTER
Son Zack called, patient is a patient of Dr. Villegas and had mesenteric ischemia and portal vein thrombus on 4/19/24.  He is on Eliquis and BASA. Per son, he has a suprapubic catheter in and he saw the urologist, Dr. Davis in [place], last week.  Per son, Dr. Davis wanted to do a procedure on 5/9/24 to remove the suprapubic catheter and insert another one and wanted patient to stop eliquis and BASA for 3 days.  If patient can not hold at this time, he will just change catheter and schedule other procedure for a later date.   Sent to vascular triage to advise.  Please note:  Dr. Davis is not in [employer], so I was unable to download records.

## 2024-05-02 ENCOUNTER — OFFICE VISIT (OUTPATIENT)
Dept: FAMILY MEDICINE CLINIC | Facility: CLINIC | Age: 89
End: 2024-05-02

## 2024-05-02 VITALS
RESPIRATION RATE: 18 BRPM | HEART RATE: 88 BPM | DIASTOLIC BLOOD PRESSURE: 82 MMHG | WEIGHT: 113 LBS | SYSTOLIC BLOOD PRESSURE: 130 MMHG | BODY MASS INDEX: 20.02 KG/M2 | TEMPERATURE: 98.7 F

## 2024-05-02 DIAGNOSIS — I10 HYPERTENSION, UNSPECIFIED TYPE: ICD-10-CM

## 2024-05-02 DIAGNOSIS — I81 PORTAL VEIN THROMBOSIS: ICD-10-CM

## 2024-05-02 DIAGNOSIS — R56.9 SEIZURE-LIKE ACTIVITY (HCC): Primary | ICD-10-CM

## 2024-05-02 DIAGNOSIS — K55.9 MESENTERIC ISCHEMIA (HCC): ICD-10-CM

## 2024-05-02 DIAGNOSIS — H91.90 DECREASED HEARING, UNSPECIFIED LATERALITY: ICD-10-CM

## 2024-05-02 PROCEDURE — 99495 TRANSJ CARE MGMT MOD F2F 14D: CPT | Performed by: FAMILY MEDICINE

## 2024-05-02 NOTE — PROGRESS NOTES
Assessment & Plan     1. Seizure-like activity (HCC)  Assessment & Plan:  Continue Keppra. I reviewed recent hospital records from admission.   He has EEG scheduled for 5/14/24.   Appointment with neurology scheduled for Sept, however he is on wait list for any cancellations.       2. Portal vein thrombosis  Assessment & Plan:  Continue management per vascular surgery- on eliquis, aspirin, statin.       3. Mesenteric ischemia (HCC)  Assessment & Plan:  Continue management per vascular surgery- on eliquis, aspirin, statin.       4. Hypertension, unspecified type  Assessment & Plan:  Controlled.       5. Decreased hearing, unspecified laterality  -     Ambulatory referral to Audiology; Future         Subjective     Transitional Care Management Review:   Jf Valera is a 88 y.o. male here for TCM follow up.     During the TCM phone call patient stated:  TCM Call       Date and time call was made  4/18/2024  2:39 PM    Hospital care reviewed  Records reviewed    Patient was hospitial[facility] at  [facility]    Date of Admission  04/16/24    Date of discharge  04/18/24    Diagnosis  Seizure-like activity    Disposition  Home    Were the patients medications reviewed and updated  Yes    Current Symptoms  None          TCM Call       Post hospital issues  None    Should patient be enrolled in anticoag monitoring?  No    Scheduled for follow up?  Yes    Patients specialists  Neurologist    Neurologist name  [facility]    Did you obtain your prescribed medications  Yes    Do you need help managing your prescriptions or medications  Yes    Why type of assitance do you need  Family assist    Is transportation to your appointment needed  Yes    Specify why  He does not drive    I have advised the patient to call PCP with any new or worsening symptoms  John Daley    Living Arrangements  Children; Spouse or Significiant other    Support System  Family    The type of support provided  Physical; Emotional    Do you  have social support  Yes, as much as I need    Are you recieving any outpatient services  No    Are you recieving home care services  No    Interperter language line needed  No    Counseling  Family; Patient    Counseling topics  Activities of daily living; Importance of RX compliance; patient and family education; instructions for management; Risk factor reduction    Comments  I spoke with Jf's son Zack who states that he is doing fine. No c/o at this time. He knows to take him to the ER if any chest pain, dypsnea, weakness, dizziness, etc John Daley          HPI  Jf is here today for hospital discharge follow up after recently being admitted at [facility] for seizure like activity. CT head and MRI brain with no acute abnormalities - only chronic right cerebellar infarct. He was evaluated by neurology and was started on Keppra. He will f/u with neurology outpatient. EEG was ordered and is scheduled as well as outpatient neurology appointment. 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   He is here today with his son. No further seizure episodes since discharge. Tolerating Keppra well. No weakness, slurred speech, headaches. He does have decreased hearing- progressively worsening since recent hospitalizations. Would like evaluation for possible hearing aids.   Has been compliant with his medications.   Follows vascular surgery and urology as well for his other medical conditions.     Review of Systems   Constitutional: Negative.    HENT:  Positive for hearing loss (decreased hearing).    Eyes: Negative.    Respiratory: Negative.     Cardiovascular: Negative.    Gastrointestinal: Negative.    Endocrine: Negative.    Genitourinary: Negative.    Musculoskeletal: Negative.    Neurological: Negative.    Hematological: Negative.    Psychiatric/Behavioral: Negative.         Objective     /82   Pulse 88   Temp  98.7 °F (37.1 °C)   Resp 18   Wt 51.3 kg (113 lb)   BMI 20.02 kg/m²      Physical Exam  Vitals and nursing note reviewed.   Constitutional:       General: He is not in acute distress.     Appearance: He is well-developed.   HENT:      Head: Normocephalic and atraumatic.      Right Ear: Tympanic membrane, ear canal and external ear normal.      Left Ear: Tympanic membrane, ear canal and external ear normal.      Nose: Nose normal.      Mouth/Throat:      Mouth: Mucous membranes are moist.      Pharynx: Oropharynx is clear. No oropharyngeal exudate or posterior oropharyngeal erythema.   Eyes:      Extraocular Movements: Extraocular movements intact.      Conjunctiva/sclera: Conjunctivae normal.      Pupils: Pupils are equal, round, and reactive to light.   Cardiovascular:      Rate and Rhythm: Normal rate and regular rhythm.      Heart sounds: No murmur heard.  Pulmonary:      Effort: Pulmonary effort is normal. No respiratory distress.      Breath sounds: Normal breath sounds.   Abdominal:      Palpations: Abdomen is soft.      Tenderness: There is no abdominal tenderness.   Musculoskeletal:         General: No swelling.      Cervical back: Neck supple.   Skin:     General: Skin is warm and dry.      Capillary Refill: Capillary refill takes less than 2 seconds.   Neurological:      General: No focal deficit present.      Mental Status: He is alert and oriented to person, place, and time. Mental status is at baseline.      Motor: No weakness.      Gait: Gait normal.   Psychiatric:         Mood and Affect: Mood normal.       Medications have been reviewed by provider in current encounter    Justa Hutchinson MD

## 2024-05-02 NOTE — ASSESSMENT & PLAN NOTE
Continue Keppra. I reviewed recent hospital records from admission.   He has EEG scheduled for 5/14/24.   Appointment with neurology scheduled for Sept, however he is on wait list for any cancellations.

## 2024-05-10 DIAGNOSIS — K55.9 MESENTERIC ISCHEMIA (HCC): Primary | ICD-10-CM

## 2024-05-13 DIAGNOSIS — R56.9 SEIZURE (HCC): ICD-10-CM

## 2024-05-13 DIAGNOSIS — I81 PORTAL VEIN THROMBOSIS: ICD-10-CM

## 2024-05-13 DIAGNOSIS — I65.1 BASILAR ARTERY STENOSIS: ICD-10-CM

## 2024-05-14 ENCOUNTER — HOSPITAL ENCOUNTER (OUTPATIENT)
Dept: NEUROLOGY | Facility: HOSPITAL | Age: 89
Discharge: HOME | End: 2024-05-14
Payer: COMMERCIAL

## 2024-05-14 ENCOUNTER — TELEPHONE (OUTPATIENT)
Age: 89
End: 2024-05-14

## 2024-05-14 DIAGNOSIS — R56.9 SEIZURE (HCC): ICD-10-CM

## 2024-05-14 PROCEDURE — 95813 EEG EXTND MNTR 61-119 MIN: CPT

## 2024-05-14 PROCEDURE — 95813 EEG EXTND MNTR 61-119 MIN: CPT | Performed by: PSYCHIATRY & NEUROLOGY

## 2024-05-14 RX ORDER — LEVETIRACETAM 750 MG/1
750 TABLET ORAL EVERY 12 HOURS SCHEDULED
Qty: 60 TABLET | Refills: 5 | Status: SHIPPED | OUTPATIENT
Start: 2024-05-14 | End: 2024-11-10

## 2024-05-14 RX ORDER — ASPIRIN 81 MG/1
81 TABLET, CHEWABLE ORAL DAILY
Qty: 30 TABLET | Refills: 5 | Status: SHIPPED | OUTPATIENT
Start: 2024-05-14 | End: 2024-11-10

## 2024-05-14 NOTE — TELEPHONE ENCOUNTER
Reason for call:   [x] Refill   [] Prior Auth  [] Other:     Office:   [x] PCP/Provider -   [] Specialty/Provider -     Medication:   Aspirin 81mg- chew 1 tablet daily  Keppra 750mg- take 1 tablet by mouth every 12 hours      Pharmacy: [facility] [place]    Does the patient have enough for 3 days?   [x] Yes   [] No - Send as HP to POD

## 2024-05-14 NOTE — TELEPHONE ENCOUNTER
Reason for call:   [x] Refill   [] Prior Auth  [] Other:     Office:   [] PCP/Provider -   [x] Specialty/Provider - Vasc surgery    Medication:   Atorvastatin 20mg- take 1 tablet by mouth daily      Pharmacy: [facility] [place]    Does the patient have enough for 3 days?   [x] Yes   [] No - Send as HP to POD

## 2024-05-14 NOTE — TELEPHONE ENCOUNTER
Patients Son called the RX Refill Line. Message is being forwarded to the office.     Patients son would like to know if his father will be taking levETIRAcetam (KEPPRA) 750 mg tablet indefinitely or if he will be able to be taken off of the medication at some point.      Please contact patients son at [phone]

## 2024-05-15 RX ORDER — ATORVASTATIN CALCIUM 20 MG/1
20 TABLET, FILM COATED ORAL DAILY
Qty: 30 TABLET | Refills: 0 | Status: SHIPPED | OUTPATIENT
Start: 2024-05-15

## 2024-05-15 NOTE — TELEPHONE ENCOUNTER
This is up to the neurologist. I recommend he stay on it until his appointment with neurology in September or until they call him and tell him otherwise.

## 2024-05-17 ENCOUNTER — TELEPHONE (OUTPATIENT)
Age: 89
End: 2024-05-17

## 2024-05-17 ENCOUNTER — HOSPITAL ENCOUNTER (OUTPATIENT)
Dept: NON INVASIVE DIAGNOSTICS | Facility: HOSPITAL | Age: 89
Discharge: HOME | End: 2024-05-17
Attending: RADIOLOGY
Payer: COMMERCIAL

## 2024-05-17 VITALS — SYSTOLIC BLOOD PRESSURE: 127 MMHG | OXYGEN SATURATION: 100 % | HEART RATE: 99 BPM | DIASTOLIC BLOOD PRESSURE: 61 MMHG

## 2024-05-17 DIAGNOSIS — K55.9 MESENTERIC ISCHEMIA (HCC): ICD-10-CM

## 2024-05-17 PROCEDURE — 75984 XRAY CONTROL CATHETER CHANGE: CPT | Performed by: RADIOLOGY

## 2024-05-17 PROCEDURE — 49450 REPLACE G/C TUBE PERC: CPT

## 2024-05-17 PROCEDURE — C1769 GUIDE WIRE: HCPCS

## 2024-05-17 PROCEDURE — 51705 CHANGE OF BLADDER TUBE: CPT | Performed by: RADIOLOGY

## 2024-05-17 RX ORDER — LIDOCAINE HYDROCHLORIDE 20 MG/ML
JELLY TOPICAL AS NEEDED
Status: COMPLETED | OUTPATIENT
Start: 2024-05-17 | End: 2024-05-17

## 2024-05-17 RX ADMIN — LIDOCAINE HYDROCHLORIDE 1 APPLICATION: 20 JELLY TOPICAL at 09:29

## 2024-05-17 RX ADMIN — IOHEXOL 20 ML: 350 INJECTION, SOLUTION INTRAVENOUS at 09:37

## 2024-05-17 NOTE — SEDATION DOCUMENTATION
Procedure completed by Dr Brar. Pt tolerated without issues, VSS. Education provided to pt and family prior to procedure, questions answered as offered. Dry dressing to site, capped per pt request.

## 2024-05-17 NOTE — DISCHARGE INSTRUCTIONS
Suprapubic Cystostomy     WHAT YOU NEED TO KNOW:   Suprapubic cystostomy is surgery to create a stoma (opening) through your abdomen into your bladder. This opening is where a catheter is inserted to drain urine. You may need a cystostomy if your urine flow is blocked.    DISCHARGE INSTRUCTIONS:   Resume your normal diet. Small sips of flat soda will help with mild nausea.    Follow up with your healthcare provider as directed: You may need to return to have your suprapubic catheter changed or removed. Write down your questions so you remember to ask them during your visits.     Empty your urine drainage bag: Empty your urine drainage bag when it is ½ to ? full, or every 8 hours. If you have a smaller leg bag, empty it every 3 to 4 hours. Do the following when you empty your urine drainage bag:  Hold the urine bag over a toilet or large container.    Remove the drain spout from its sleeve at the bottom of the urine bag. Do not touch the tip of the drain spout. Open the slide valve on the spout.   Let the urine flow out of the urine bag into the toilet or container. Do not let the drainage tube touch anything.   Clean the end of the drain spout with alcohol when the bag is empty. Ask which cleaning solution is best to use.    Close the slide valve and put the drain spout into its sleeve at the bottom of the urine bag. Write down how much urine was in your bag if you were asked to keep a record.    Prevent an infection:   Clean the stoma and skin around it daily. Wash your hands before and after cystostomy care. Put on a new pair of clean medical gloves.   Change your urine bag or clean reusable bags. Ask how often you need to change or clean your urine drainage bag. You may need to change your reusable bag at least once a week.   Keep the bag below your waist. This will prevent urine from flowing back into your bladder and causing an infection or other problems. Also, keep the tube free of kinks so the urine will  drain properly. Do not pull on the catheter. This can cause pain and bleeding and may cause the catheter to come out.    Contact Interventional Radiology at [phone] (PUMA PATIENTS: Contact Interventional Radiology at [phone]) (SAL PATIENTS: Contact Interventional Radiology at [phone]) if any of the following occur:  You have a fever or chills.  Persistent nausea or vomiting  You have severe pain.  You have a burning pain in your stoma.   Your stoma is red, swollen, or draining pus.   You have blood in your urine.   You have questions or concerns about your condition or care.  Seek care immediately or call 911 if:   No urine is draining into the urine bag.

## 2024-05-17 NOTE — TELEPHONE ENCOUNTER
New Patient    What is the reason for the patient’s appointment?:pt's son called to schedule an appt for SPT check/ change    Zack can be reached at [phone]    What office location does the patient prefer?: [place]     Does patient have Imaging/Lab Results:    Have patient records been requested?:  If No, are the records showing in Epic:       INSURANCE:   Do we accept the patient's insurance or is the patient Self-Pay?:    Insurance Provider: [place] medicaid   Plan Type/Number:   Member ID#:       HISTORY:   Has the patient had any previous Urologist(s)?: yes    Was the patient seen in the ED?: no    Has the patient had any outside testing done?: yes    Does the patient have a personal history of cancer?: No

## 2024-05-17 NOTE — BRIEF OP NOTE (RAD/CATH)
INTERVENTIONAL RADIOLOGY PROCEDURE NOTE    Date: 5/17/2024    Procedure:   Procedure Summary       Date: 05/17/24 Room / Location: [facility] Cardiac Cath Lab    Anesthesia Start:  Anesthesia Stop:     Procedure: IR SUPRAPUBIC CATHETER CHECK/CHANGE/REINSERTION/UPSIZE Diagnosis:       Mesenteric ischemia (HCC)      (Requested by family unclear duration of last change)    Scheduled Providers:  Responsible Provider:     Anesthesia Type: Not recorded ASA Status: Not recorded            Preoperative diagnosis:   1. Mesenteric ischemia (HCC)         Postoperative diagnosis: Same.    Surgeon: Messi Brar MD     Assistant: None. No qualified resident was available.    Blood loss: None    Specimens: None     Findings: Exchange of SPT, 18 F tube placed.    All care and future exchanges should be schedule with urology.    Complications: None immediate.    Anesthesia: none

## 2024-05-20 NOTE — TELEPHONE ENCOUNTER
Call placed to patient's son and spoke with him. Son would like to see Dr. Banks for 2nd opinion and to discuss SP tube and plan moving forward.   Offered appointment for tomorrow with Dr. Banks and son confirmed this appointment.   He will contact the office if unable to keep this appointment.

## 2024-05-20 NOTE — PROGRESS NOTES
Jf Valera is a(n) 88 y.o. male. , :  1935    Subjective     Assessment:  The primary encounter diagnosis was Encounter for care or replacement of suprapubic tube (HCC). 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  88-year-old gentleman here for second opinion regarding suprapubic tube. Patient of Dr. Kp Davis.  Patient had bowel ischemia and surgery by Dr. Reed with emergent suprapubic placement in 2024.  Some gross hematuria 2024.  Kerns catheter difficult to place.  Interventional radiology suprapubic tube change 18 Spanish 2024.  Son states he had surgery back in [place] maybe 10 years ago.  Dr. Davis scoped him in the office and found a strictured area in the bulbar region.  I do not have those records.  Patient had significant bleeding afterwards requiring the aspirin and Eliquis to be held for a period of time.  The son is very hesitant to repeat a cystoscopy for that reason.    Plan: Son to check voided volumes and residual volumes.  If residuals exceed 8 ounces, patient should go to chronic leg bag drainage.  He was given a leg bag today by the nurse.  He will hold off on cystoscopy to evaluate the urethral narrowing.  He is likely going to be on the blood thinners for his mesenteric thrombosis for life.  Risky to do surgery.  Might be able to get the suprapubic out if residuals are low.  But we will put him on for a 4-week visit to change the tube to a new 18 Spanish here in the office.     Radiology  3/30/2024 CT abdomen pelvis without contrast   Suprapubic bladder catheter present.  Moderate ascites.     History  BPH - TRUS 2013 60 gm    Prior Visits  None    2024 OV [facility]  88-year-old gentleman here for second opinion regarding suprapubic tube. Patient of Dr. Kp Davis.  Patient had bowel ischemia and surgery by Dr. Reed with emergent suprapubic placement in 2024.  Some  "gross hematuria March 30, 2024.  Kerns catheter difficult to place.  Interventional radiology suprapubic tube change 18 Romanian 5/17/2024.  Son states he had surgery back in [place] maybe 10 years ago.  Dr. Davis scoped him in the office and found a strictured area in the bulbar region.  I do not have those records.  Patient had significant bleeding afterwards requiring the aspirin and Eliquis to be held for a period of time.  The son is very hesitant to repeat a cystoscopy for that reason.    Plan: Son to check voided volumes and residual volumes.  If residuals exceed 8 ounces, patient should go to chronic leg bag drainage.  He was given a leg bag today by the nurse.  He will hold off on cystoscopy to evaluate the urethral narrowing.  He is likely going to be on the blood thinners for his mesenteric thrombosis for life.  Risky to do surgery.  Might be able to get the suprapubic out if residuals are low.  But we will put him on for a 4-week visit to change the tube to a new 18 Romanian here in the office.    Addendum 5/23/2024 1203.  Spoke with son.  He uncapped the tube to check for residual.  His father voided 150 mL on Tuesday and had very little residual.  Since then the tube has been leaking off and on from where it is capped.  He would like his parents to come and make sure that the tube is being capped appropriately.  Advised to come in today.    Addendum 5/23/2024 3198.  Patient came with family members.  Very likely has a plug on the suprapubic that is not occlusive.  He leaks from it.  Provided a new plug that should work better.  Explained to the family that he should continue to check his residuals after voiding and then we should be able to determine if it is safe to remove the suprapubic tube.  Changed to the gauze also.  This is the same gauze that he was wearing 2 days ago.  Explained that that should be changed daily also.    Review of Systems    No results found for: \"PSA\"  No results found for: " "\"TESTOSTERONE\"  No components found for: \"CR\"  No results found for: \"HBA1C\"    Objective     /60 (BP Location: Left arm, Patient Position: Sitting, Cuff Size: Adult)   Pulse 88   Ht 5' 3\" (1.6 m)   Wt 53.1 kg (117 lb)   SpO2 96%   BMI 20.73 kg/m²     Physical Exam      Adriano Darren, [facility]  "

## 2024-05-21 ENCOUNTER — OFFICE VISIT (OUTPATIENT)
Dept: UROLOGY | Facility: CLINIC | Age: 89
End: 2024-05-21
Payer: COMMERCIAL

## 2024-05-21 ENCOUNTER — TELEPHONE (OUTPATIENT)
Dept: UROLOGY | Facility: CLINIC | Age: 89
End: 2024-05-21

## 2024-05-21 VITALS
BODY MASS INDEX: 20.73 KG/M2 | WEIGHT: 117 LBS | HEART RATE: 88 BPM | DIASTOLIC BLOOD PRESSURE: 60 MMHG | OXYGEN SATURATION: 96 % | HEIGHT: 63 IN | SYSTOLIC BLOOD PRESSURE: 110 MMHG

## 2024-05-21 DIAGNOSIS — Z43.5 ENCOUNTER FOR CARE OR REPLACEMENT OF SUPRAPUBIC TUBE (HCC): Primary | ICD-10-CM

## 2024-05-21 DIAGNOSIS — N35.912 STRICTURE OF BULBOUS URETHRA IN MALE, UNSPECIFIED STRICTURE TYPE: ICD-10-CM

## 2024-05-21 PROCEDURE — 99203 OFFICE O/P NEW LOW 30 MIN: CPT | Performed by: UROLOGY

## 2024-05-23 PROBLEM — R33.9 URINE RETENTION: Status: ACTIVE | Noted: 2024-05-23

## 2024-05-23 PROBLEM — Z43.5 ENCOUNTER FOR CARE OR REPLACEMENT OF SUPRAPUBIC TUBE (HCC): Status: ACTIVE | Noted: 2024-05-23

## 2024-05-29 ENCOUNTER — TELEPHONE (OUTPATIENT)
Age: 89
End: 2024-05-29

## 2024-05-29 NOTE — TELEPHONE ENCOUNTER
Son melina calling to report that patient has not had much of an appetite. Patient son is asking if there is anything that Dr. Hutchinson recommends for appetite stimulant.  Verónica Sandoval

## 2024-05-29 NOTE — TELEPHONE ENCOUNTER
There are prescription medications that can be used as appetite stimulants, but it would be better discussed during an office visit. This can be virtual if it is difficult for Jf to come into the office. I just have to review options and side effects, etc.

## 2024-05-29 NOTE — TELEPHONE ENCOUNTER
Couldn't make appt this week w Dr Hutchinson.  Made appt w Cindy for next week   Leora Echeverria MA

## 2024-05-30 ENCOUNTER — TELEPHONE (OUTPATIENT)
Age: 89
End: 2024-05-30

## 2024-05-30 DIAGNOSIS — I81 PORTAL VEIN THROMBOSIS: Primary | ICD-10-CM

## 2024-05-30 NOTE — TELEPHONE ENCOUNTER
Patient son called for a refill of apixaban (Eliquis) 5 mg . He stated his father is now taking 1 tablet 2x daily. Can someone please update the script and send it to [facility] [address] [place] [address] - [address]

## 2024-06-04 ENCOUNTER — OFFICE VISIT (OUTPATIENT)
Dept: FAMILY MEDICINE CLINIC | Facility: CLINIC | Age: 89
End: 2024-06-04
Payer: COMMERCIAL

## 2024-06-04 VITALS
HEART RATE: 96 BPM | TEMPERATURE: 97.8 F | WEIGHT: 115.6 LBS | SYSTOLIC BLOOD PRESSURE: 112 MMHG | DIASTOLIC BLOOD PRESSURE: 58 MMHG | BODY MASS INDEX: 20.48 KG/M2 | RESPIRATION RATE: 17 BRPM | HEIGHT: 63 IN

## 2024-06-04 DIAGNOSIS — I65.1 BASILAR ARTERY STENOSIS: ICD-10-CM

## 2024-06-04 DIAGNOSIS — R63.0 DECREASED APPETITE: Primary | ICD-10-CM

## 2024-06-04 DIAGNOSIS — R56.9 SEIZURE-LIKE ACTIVITY (HCC): ICD-10-CM

## 2024-06-04 DIAGNOSIS — I81 PORTAL VEIN THROMBOSIS: ICD-10-CM

## 2024-06-04 PROCEDURE — 99214 OFFICE O/P EST MOD 30 MIN: CPT | Performed by: NURSE PRACTITIONER

## 2024-06-04 NOTE — PROGRESS NOTES
"Assessment/Plan:    1. Decreased appetite  Comments:  advised on small frequent meals and advised to walk daily and will follow with gastro as needed  2. Portal vein thrombosis  Assessment & Plan:  On eliquis and lipitor  3. Seizure-like activity (HCC)  Assessment & Plan:  On Keppra  4. Basilar artery stenosis  Assessment & Plan:  On aspirin and statin          Patient Instructions:  Supportive care discussed and advised.  Advised to RTO for any worsening and no improvement.   Follow up for no improvement and worsening of conditions.  Patient advised and educated when to see immediate medical care.    Return if symptoms worsen or fail to improve.      Future Appointments   Date Time Provider Department Center   6/27/2024  9:15 AM Adriano Banks MD URO [facility]   7/18/2024  1:00 PM Niya FAUSTIN OP Audiol [address]   9/17/2024 10:45 AM Jefferson Truong MD NEURO [facility]   10/21/2024  8:00 AM [place] [facility]   10/22/2024  1:00 PM [place] [facility] 1 [facility]           Subjective:      Patient ID: Jf Valera is a 88 y.o. male.    Chief Complaint   Patient presents with   • no appetite     Feels full very quickly but is eating very little Jovana Brandon LPN       Wt Readings from Last 3 Encounters:   06/04/24 52.4 kg (115 lb 9.6 oz)   05/21/24 53.1 kg (117 lb)   05/02/24 51.3 kg (113 lb)          Vitals:  /58   Pulse 96   Temp 97.8 °F (36.6 °C)   Resp 17   Ht 5' 3\" (1.6 m)   Wt 52.4 kg (115 lb 9.6 oz)   BMI 20.48 kg/m²     HPI  Patient here with family with partner and son.  Patient had exp laparotomy in march 2024 and stated that sine then his appetite is low and did not loose any major weight loss. Denies any nausea, vomiting, abdominal pain and change in bowel movements. Discussed about following with gastro and family refused and does not want any invasive approach.  Discussed SSRI and Remeron but possible interaction with keppra and family declined at " this time. Patient and family denies any depression concerns  Complaint with medications and tolerating it well          The following portions of the patient's history were reviewed and updated as appropriate: allergies, current medications, past family history, past medical history, past social history, past surgical history and problem list.      Review of Systems   Constitutional:  Positive for appetite change. Negative for chills, fever and unexpected weight change.   Respiratory: Negative.     Cardiovascular: Negative.    Gastrointestinal:  Negative for abdominal pain, anal bleeding, blood in stool, constipation, diarrhea, nausea, rectal pain and vomiting.        As noted in HPI       Skin: Negative.          Objective:    Social History     Tobacco Use   Smoking Status Never   • Passive exposure: Never   Smokeless Tobacco Never       Allergies: No Known Allergies      Current Outpatient Medications   Medication Sig Dispense Refill   • apixaban (ELIQUIS) 5 mg Take 1 tablet (5 mg total) by mouth 2 (two) times a day 180 tablet 0   • aspirin 81 mg chewable tablet Chew 1 tablet (81 mg total) daily 30 tablet 5   • atorvastatin (LIPITOR) 20 mg tablet Take 1 tablet (20 mg total) by mouth daily 30 tablet 0   • levETIRAcetam (KEPPRA) 750 mg tablet Take 1 tablet (750 mg total) by mouth every 12 (twelve) hours 60 tablet 5   • tamsulosin (FLOMAX) 0.4 mg Take 0.4 mg by mouth daily with dinner       No current facility-administered medications for this visit.          Physical Exam  Constitutional:       Appearance: Normal appearance. He is well-developed.   Cardiovascular:      Rate and Rhythm: Normal rate and regular rhythm.      Heart sounds: Normal heart sounds.   Pulmonary:      Effort: Pulmonary effort is normal.      Breath sounds: Normal breath sounds.   Abdominal:      General: Bowel sounds are normal. There is no distension.      Palpations: Abdomen is soft.      Tenderness: There is no abdominal tenderness. There  is no rebound.   Skin:     General: Skin is warm and dry.   Neurological:      Mental Status: He is alert and oriented to person, place, and time.   Psychiatric:         Behavior: Behavior normal.         Thought Content: Thought content normal.         Judgment: Judgment normal.                     IVAN Danielson

## 2024-06-07 ENCOUNTER — TELEPHONE (OUTPATIENT)
Dept: UROLOGY | Facility: CLINIC | Age: 89
End: 2024-06-07

## 2024-06-07 DIAGNOSIS — I81 PORTAL VEIN THROMBOSIS: ICD-10-CM

## 2024-06-07 NOTE — TELEPHONE ENCOUNTER
Spoke to pt's son per comm consent form and advised:    IVAN Molina13 minutes ago (2:21 PM)     Patient with chronic SPT.  No longer requires Flomax.  Prescription does not need to be refilled at this time.

## 2024-06-07 NOTE — TELEPHONE ENCOUNTER
Telephone call from patient's son in regards to his father's Flomax. He wants to know if he should still be on this medication.     If he does still need to be on this medication then he needs a refill sent in.    Reason for call:   [x] Refill   [] Prior Auth  [] Other:     Office:   [] PCP/Provider -   [x] Specialty/Provider - Urology     Medication: Flomax     Dose/Frequency: 0.4 mg taken by mouth daily with dinner     Quantity: 90    Pharmacy: [place] [id number] [place] [address] [phone]     Does the patient have enough for 3 days?   [] Yes   [x] No - Send as HP to POD

## 2024-06-07 NOTE — TELEPHONE ENCOUNTER
Patient with chronic SPT.  No longer requires Flomax.  Prescription does not need to be refilled at this time.

## 2024-06-08 RX ORDER — ATORVASTATIN CALCIUM 20 MG/1
20 TABLET, FILM COATED ORAL DAILY
Qty: 30 TABLET | Refills: 5 | Status: SHIPPED | OUTPATIENT
Start: 2024-06-08

## 2024-06-08 NOTE — TELEPHONE ENCOUNTER
Rerouting to PCP pod. Dr. Villegas's note states patient will need to get refills from Dr. Hutchinson.

## 2024-06-13 ENCOUNTER — TELEPHONE (OUTPATIENT)
Dept: UROLOGY | Facility: AMBULATORY SURGERY CENTER | Age: 89
End: 2024-06-13

## 2024-06-13 NOTE — TELEPHONE ENCOUNTER
Called and spoke to patients son to relay message about their new insurance. Son doesn't understand that we have to get the SPT tubing back. What is his dad to do without it. He is calling insurance back to verify.

## 2024-06-13 NOTE — TELEPHONE ENCOUNTER
Hello,    Patient is schedule to be seen on 6/27, and the patient has [employer] Better [employer] (Non-Par).  The patient needs to be seen to collect the property of [facility] ([facility] ), and then after this visit the patient needs to go and find a facility that participates with the insurance.

## 2024-06-18 NOTE — TELEPHONE ENCOUNTER
Pt son calling back in regards to [employer] better health [place] insurance. Pt son is requesting a call back in regards to insurance clarification. Please review to see if we take his insurance due to he has [place] Medicaid and is scheduled at [facility].     Pt call back- [phone] Zack (son)

## 2024-06-27 DIAGNOSIS — I81 PORTAL VEIN THROMBOSIS: ICD-10-CM

## 2024-06-27 DIAGNOSIS — I65.1 BASILAR ARTERY STENOSIS: ICD-10-CM

## 2024-06-27 RX ORDER — ASPIRIN 81 MG/1
81 TABLET, CHEWABLE ORAL DAILY
Qty: 30 TABLET | Refills: 5 | Status: SHIPPED | OUTPATIENT
Start: 2024-06-27 | End: 2024-12-24

## 2024-07-16 ENCOUNTER — TELEPHONE (OUTPATIENT)
Age: 89
End: 2024-07-16

## 2024-07-16 ENCOUNTER — PATIENT MESSAGE (OUTPATIENT)
Dept: VASCULAR SURGERY | Facility: CLINIC | Age: 89
End: 2024-07-16

## 2024-07-16 DIAGNOSIS — R14.0 ABDOMINAL DISTENSION: Primary | ICD-10-CM

## 2024-07-16 NOTE — TELEPHONE ENCOUNTER
I spoke to Zack to get more information, since the patient has been seen for this in the past. He stated that the patient eats very little and then states that he feels full. They are concerned because they noticed that once he says that, his stomach starts to look more distended than it should since he is not eating a lot. Would you like the patient to come into the office to be reevaluated?     Jovana Brandon LPN

## 2024-07-16 NOTE — TELEPHONE ENCOUNTER
Patient son calling to report that patient has not been eating . He states that patient says he feels full easily. He report that patient abdomen is distended they are asking if Dr. Puente could order a ct or U/S of the abdomen. I told melina that they might want to see patient in office and he stated that it would be fine and to call back if appt is necessary . I asked if patient was SOB or in distress he stated no that patient is fine its just the distended abdomen and just the fullness feeling. Did not report any pain.  Verónica Sandoval

## 2024-07-17 ENCOUNTER — TELEPHONE (OUTPATIENT)
Age: 89
End: 2024-07-17

## 2024-07-17 NOTE — TELEPHONE ENCOUNTER
Please see patient mychart message from yesterday. Patient's son calling to follow up on yesterday's communication RE: clearance for a urology surgery.

## 2024-07-17 NOTE — TELEPHONE ENCOUNTER
-RE:  Holding Elqiuis and aspirin for bladder surgery    -Dr. 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     Clinical/nursing: Please contact urology regarding our recommendations and communicate to patient/son.

## 2024-07-18 NOTE — TELEPHONE ENCOUNTER
Called and s/w Zack, relayed recommendations provided by Dr. Villegas. Zack verbalized understanding.

## 2024-07-18 NOTE — TELEPHONE ENCOUNTER
Ethan Villegas, IVAN Silva48 minutes ago (1:00 PM)       I think it will be low risk to be held for a short period for the urologic procedure.  I would advise he resume it lifelong once urology is ok with AC.     IVAN Regalado DO1 hour ago (12:07 PM)       Good afternoon,    Pt son (Zack) would like clarification if the anticoagulant will be safe to stop for the surgery for 5 days or indefinitely after his surgery?    Thank you

## 2024-07-18 NOTE — TELEPHONE ENCOUNTER
Called and s/w Zack and he would like clarification if the anticoagulant will be safe to stop for the surgery in mid-Sept and safe to continue after?    Called and s/w Merari at Dr. Burnett's office, she stated that he is in surgery and she could not take recommendations. Was advised to fax information to [phone].  Fax sent.

## 2024-07-19 ENCOUNTER — TELEPHONE (OUTPATIENT)
Age: 89
End: 2024-07-19

## 2024-07-19 NOTE — TELEPHONE ENCOUNTER
I spoke to Zack and scheduled the patient for 7/26/24. Zack is going to call us after the patient has the US completed, so we can call the reading room and let them know he has an appointment coming up to discuss results. I did inform him that it is not a guarantee that the results will be back by then, he understands. Sending as an FYI.     Jovana Brandon LPN

## 2024-07-19 NOTE — TELEPHONE ENCOUNTER
Pt saw ENT yesterday for significant hearing loss.    They found he has fluid in ears and suggested 2 options for treatment but wanted him to discuss this with his PCP.  1) treat with prednisone  2) tympanostomy-tube placement  Son also said he has his US abdmn scheduled for 07/23/24 that you will probably want to discuss results with pt as well.  He is ok with scheduling appt in person, video or consult over the phone and he is ok with waiting until these results are in so you can discuss everything together.    Please let son know how to proceed.

## 2024-07-23 ENCOUNTER — HOSPITAL ENCOUNTER (OUTPATIENT)
Dept: RADIOLOGY | Facility: HOSPITAL | Age: 89
Discharge: HOME | End: 2024-07-23
Attending: FAMILY MEDICINE
Payer: COMMERCIAL

## 2024-07-23 ENCOUNTER — TELEPHONE (OUTPATIENT)
Age: 89
End: 2024-07-23

## 2024-07-23 DIAGNOSIS — R14.0 ABDOMINAL DISTENSION: ICD-10-CM

## 2024-07-23 PROCEDURE — 76700 US EXAM ABDOM COMPLETE: CPT

## 2024-07-23 NOTE — TELEPHONE ENCOUNTER
Zack (son) called states patient is having a US done today (7/23/2024) wanted to know if should have a CT scan as well. Advised to have US completed first and see if CT is recommended by the results.

## 2024-07-26 ENCOUNTER — OFFICE VISIT (OUTPATIENT)
Dept: FAMILY MEDICINE CLINIC | Facility: CLINIC | Age: 89
End: 2024-07-26
Payer: COMMERCIAL

## 2024-07-26 VITALS
RESPIRATION RATE: 18 BRPM | BODY MASS INDEX: 22.86 KG/M2 | HEIGHT: 63 IN | WEIGHT: 129 LBS | TEMPERATURE: 97 F | DIASTOLIC BLOOD PRESSURE: 60 MMHG | HEART RATE: 82 BPM | SYSTOLIC BLOOD PRESSURE: 110 MMHG

## 2024-07-26 DIAGNOSIS — K74.60 CIRRHOSIS OF LIVER WITH ASCITES, UNSPECIFIED HEPATIC CIRRHOSIS TYPE  (HCC): Primary | ICD-10-CM

## 2024-07-26 DIAGNOSIS — I81 PORTAL VEIN THROMBOSIS: ICD-10-CM

## 2024-07-26 DIAGNOSIS — H65.93 MIDDLE EAR EFFUSION, BILATERAL: ICD-10-CM

## 2024-07-26 DIAGNOSIS — R18.8 CIRRHOSIS OF LIVER WITH ASCITES, UNSPECIFIED HEPATIC CIRRHOSIS TYPE  (HCC): Primary | ICD-10-CM

## 2024-07-26 PROCEDURE — 99214 OFFICE O/P EST MOD 30 MIN: CPT | Performed by: FAMILY MEDICINE

## 2024-07-26 NOTE — ASSESSMENT & PLAN NOTE
Following ENT for this who suggested prednisone. If this does not work, will need t-tubes placed. Jf's son will check with vascular surgery to make sure there is no contraindication to either of these.

## 2024-07-26 NOTE — ASSESSMENT & PLAN NOTE
He has significant abdominal distention as well as pitting edema in the legs. Given cirrhosis with ascites seen on abdominal ultrasound on 7/23/24, will order IR paracentesis and refer to GI for further evaluation and follow up.

## 2024-07-26 NOTE — PROGRESS NOTES
Ambulatory Visit  Name: Jf Valera      : 1935      MRN: [id number]  Encounter Provider: Justa Hutchinson MD  Encounter Date: 2024   Encounter department: [facility]    Assessment & Plan   1. Cirrhosis of liver with ascites, unspecified hepatic cirrhosis type  (HCC)  Assessment & Plan:   He has significant abdominal distention as well as pitting edema in the legs. Given cirrhosis with ascites seen on abdominal ultrasound on 24, will order IR paracentesis and refer to GI for further evaluation and follow up.   Orders:  -     Ambulatory referral to Gastroenterology; Future  -     IR AMB Paracentesis; Future; Expected date: 2024  -     Body fluid white cell count with differential; Standing  -     Body fluid culture and Gram stain; Standing  -     Total Protein, body fluid; Standing  -     Non-gynecologic cytology; Standing  -     Glucose, body fluid; Standing  -     Albumin, fluid; Standing  -     LD (LDH), Body Fluid; Standing  -     Nursing Communication Do not use for medications. Order will not be received by Pharmacy or dispensed to patient.; Standing  2. Portal vein thrombosis  Assessment & Plan:  Continue management per vascular surgery. Currently on Eliquis and Aspirin.   3. Middle ear effusion, bilateral  Assessment & Plan:  Following ENT for this who suggested prednisone. If this does not work, will need t-tubes placed. Jf's son will check with vascular surgery to make sure there is no contraindication to either of these.       History of Present Illness     HPI  Jf is a 87 yo male with a history of portal vein thrombosis on eliquis and aspirin, anemia, seizure like activity on keppra who is here today accompanied by his wife and son to discuss abdominal distention and leg swelling. He has been retaining a significant amount of fluid in abdomen and legs over the past few weeks. Gradually worsening. He does not complain of any abdominal or leg pain, but feels a fullness  "at all times. Family states he has been eating less than usual and has no appetite. Ultrasound abdomen done 7/23/24 showed cirrhosis with moderate volume upper abdominal ascites.   He also follows ENT for bilateral middle ear effusion. ENT would like to start Jf on prednisone and possibly do a t-tube placement if prednisone does not help.   Review of Systems   Constitutional:  Positive for activity change and appetite change.   HENT: Negative.     Eyes: Negative.    Respiratory: Negative.     Cardiovascular: Negative.    Gastrointestinal:  Positive for abdominal distention.   Endocrine: Negative.    Genitourinary: Negative.    Musculoskeletal: Negative.    Neurological: Negative.    Hematological: Negative.    Psychiatric/Behavioral: Negative.         Objective     /60   Pulse 82   Temp (!) 97 °F (36.1 °C)   Resp 18   Ht 5' 3\" (1.6 m)   Wt 58.5 kg (129 lb)   BMI 22.85 kg/m²     Physical Exam  Vitals and nursing note reviewed.   Constitutional:       General: He is not in acute distress.     Appearance: Normal appearance. He is well-developed.   HENT:      Head: Normocephalic and atraumatic.   Eyes:      Conjunctiva/sclera: Conjunctivae normal.   Cardiovascular:      Rate and Rhythm: Normal rate and regular rhythm.      Pulses: Normal pulses.      Heart sounds: Normal heart sounds. No murmur heard.  Pulmonary:      Effort: Pulmonary effort is normal. No respiratory distress.      Breath sounds: Normal breath sounds.   Abdominal:      General: There is distension.      Palpations: There is no mass.      Tenderness: There is no abdominal tenderness.      Hernia: No hernia is present.   Musculoskeletal:         General: No swelling.      Cervical back: Neck supple.      Right lower leg: Edema (2+ pitting) present.      Left lower leg: Edema (2+ pitting) present.   Skin:     General: Skin is warm and dry.      Capillary Refill: Capillary refill takes less than 2 seconds.   Neurological:      Mental Status: " He is alert.   Psychiatric:         Mood and Affect: Mood normal.       Administrative Statements

## 2024-07-29 ENCOUNTER — HOSPITAL ENCOUNTER (OUTPATIENT)
Dept: NON INVASIVE DIAGNOSTICS | Facility: HOSPITAL | Age: 89
Discharge: HOME | End: 2024-07-29
Attending: FAMILY MEDICINE
Payer: COMMERCIAL

## 2024-07-29 VITALS
DIASTOLIC BLOOD PRESSURE: 56 MMHG | RESPIRATION RATE: 17 BRPM | SYSTOLIC BLOOD PRESSURE: 112 MMHG | OXYGEN SATURATION: 97 % | HEART RATE: 86 BPM

## 2024-07-29 DIAGNOSIS — K74.60 CIRRHOSIS OF LIVER WITH ASCITES, UNSPECIFIED HEPATIC CIRRHOSIS TYPE  (HCC): ICD-10-CM

## 2024-07-29 DIAGNOSIS — R18.8 CIRRHOSIS OF LIVER WITH ASCITES, UNSPECIFIED HEPATIC CIRRHOSIS TYPE  (HCC): ICD-10-CM

## 2024-07-29 LAB
ALBUMIN FLD-MCNC: <1.5 G/DL
APPEARANCE FLD: CLEAR
COLOR FLD: NORMAL
GLUCOSE FLD-MCNC: 93 MG/DL
HISTIOCYTES NFR FLD: 12 %
LDH FLD L TO P-CCNC: 33 U/L
LYMPHOCYTES NFR BLD AUTO: 46 %
MONOCYTES NFR BLD AUTO: 30 %
NEUTS SEG NFR BLD AUTO: 12 %
PROT FLD-MCNC: <3 G/DL
SITE: NORMAL
TOTAL CELLS COUNTED SPEC: 100
TOTAL PROTEIN FLUID: 1.8 G/DL
WBC # FLD MANUAL: 106 /UL

## 2024-07-29 PROCEDURE — 87070 CULTURE OTHR SPECIMN AEROBIC: CPT | Performed by: FAMILY MEDICINE

## 2024-07-29 PROCEDURE — 88112 CYTOPATH CELL ENHANCE TECH: CPT | Performed by: PATHOLOGY

## 2024-07-29 PROCEDURE — 83615 LACTATE (LD) (LDH) ENZYME: CPT | Performed by: FAMILY MEDICINE

## 2024-07-29 PROCEDURE — 87205 SMEAR GRAM STAIN: CPT | Performed by: FAMILY MEDICINE

## 2024-07-29 PROCEDURE — 82945 GLUCOSE OTHER FLUID: CPT | Performed by: FAMILY MEDICINE

## 2024-07-29 PROCEDURE — 82042 OTHER SOURCE ALBUMIN QUAN EA: CPT | Performed by: FAMILY MEDICINE

## 2024-07-29 PROCEDURE — 49083 ABD PARACENTESIS W/IMAGING: CPT | Performed by: STUDENT IN AN ORGANIZED HEALTH CARE EDUCATION/TRAINING PROGRAM

## 2024-07-29 PROCEDURE — 89051 BODY FLUID CELL COUNT: CPT | Performed by: FAMILY MEDICINE

## 2024-07-29 PROCEDURE — 49083 ABD PARACENTESIS W/IMAGING: CPT

## 2024-07-29 PROCEDURE — 84157 ASSAY OF PROTEIN OTHER: CPT | Performed by: FAMILY MEDICINE

## 2024-07-29 PROCEDURE — 88305 TISSUE EXAM BY PATHOLOGIST: CPT | Performed by: PATHOLOGY

## 2024-07-29 RX ORDER — LIDOCAINE WITH 8.4% SOD BICARB 0.9%(10ML)
SYRINGE (ML) INJECTION AS NEEDED
Status: COMPLETED | OUTPATIENT
Start: 2024-07-29 | End: 2024-07-29

## 2024-07-29 RX ADMIN — Medication 10 ML: at 09:16

## 2024-07-29 NOTE — DISCHARGE INSTRUCTIONS
Abdominal Paracentesis     WHAT YOU NEED TO KNOW:   Abdominal paracentesis is a procedure to remove abnormal fluid buildup in your abdomen. Fluid builds up because of liver problems, such as swelling and scarring. Heart failure, kidney disease, a mass, or problems with your pancreas may also cause fluid buildup.     DISCHARGE INSTRUCTIONS:     Follow up with your healthcare provider as directed: Write down your questions so you remember to ask them during your visits.     Wound care: Remove dressing after 24 hours. Leave glue in place.    Return to your normal activities    Contact Interventional Radiology at [phone] ([facility] PATIENTS: Contact Interventional Radiology at [phone]) ([facility] PATIENTS: Contact Interventional Radiology at [phone]) if:  You have a fever and your wound is red and swollen.   You have yellow, green, or bad-smelling discharge coming from your wound.   You have pain or swelling in your abdomen.   You have an upset stomach or you vomit.   You have sudden, sharp pain in your abdomen.   You urinate very little or not at all.   You feel confused and more tired than usual.   Your arm or leg feels warm, tender, and painful. It may look swollen and red.   You suddenly feel lightheaded and have trouble breathing.

## 2024-07-29 NOTE — BRIEF OP NOTE (RAD/CATH)
INTERVENTIONAL RADIOLOGY PROCEDURE NOTE    Date: 7/29/2024    Procedure:   Procedure Summary       Date: 07/29/24 Room / Location: [facility] Cardiac Cath Lab    Anesthesia Start:  Anesthesia Stop:     Procedure: IR PARACENTESIS Diagnosis:       Cirrhosis of liver with ascites, unspecified hepatic cirrhosis type  (HCC)      (ascites)    Scheduled Providers:  Responsible Provider:     Anesthesia Type: Not recorded ASA Status: Not recorded            Preoperative diagnosis:   1. Cirrhosis of liver with ascites, unspecified hepatic cirrhosis type  (HCC)         Postoperative diagnosis: Same.    Surgeon: Giovanny Herrera MD     Assistant: None. No qualified resident was available.    Blood loss: Minimal    Specimens: Ascites     Findings:   Large ascites.    Successful US-guided paracentesis from a RLQ approach, yielding transparent yellow fluid.  A sample was sent for laboratory analysis.    Complications: None immediate.    Anesthesia: local

## 2024-07-30 PROCEDURE — 88112 CYTOPATH CELL ENHANCE TECH: CPT | Performed by: PATHOLOGY

## 2024-07-30 PROCEDURE — 88305 TISSUE EXAM BY PATHOLOGIST: CPT | Performed by: PATHOLOGY

## 2024-08-01 LAB
BACTERIA SPEC BFLD CULT: NO GROWTH
GRAM STN SPEC: NORMAL

## 2024-08-09 ENCOUNTER — PATIENT MESSAGE (OUTPATIENT)
Dept: FAMILY MEDICINE CLINIC | Facility: CLINIC | Age: 89
End: 2024-08-09

## 2024-08-09 DIAGNOSIS — R60.0 LOWER LEG EDEMA: Primary | ICD-10-CM

## 2024-08-09 RX ORDER — FUROSEMIDE 20 MG/1
20 TABLET ORAL DAILY
Qty: 30 TABLET | Refills: 0 | Status: SHIPPED | OUTPATIENT
Start: 2024-08-09

## 2024-08-14 ENCOUNTER — TELEPHONE (OUTPATIENT)
Age: 89
End: 2024-08-14

## 2024-08-14 DIAGNOSIS — K74.60 CIRRHOSIS OF LIVER WITH ASCITES, UNSPECIFIED HEPATIC CIRRHOSIS TYPE  (HCC): Primary | ICD-10-CM

## 2024-08-14 DIAGNOSIS — R18.8 CIRRHOSIS OF LIVER WITH ASCITES, UNSPECIFIED HEPATIC CIRRHOSIS TYPE  (HCC): Primary | ICD-10-CM

## 2024-08-14 NOTE — TELEPHONE ENCOUNTER
Patient's son called to notify that Gastroenterology requested that Dr. Hutchinson place a standing order for Paracentesis due to the patient's ascites so that he is able to have it done as needed

## 2024-08-16 ENCOUNTER — HOSPITAL ENCOUNTER (OUTPATIENT)
Dept: NON INVASIVE DIAGNOSTICS | Facility: HOSPITAL | Age: 89
Discharge: HOME | End: 2024-08-16
Attending: FAMILY MEDICINE
Payer: COMMERCIAL

## 2024-08-16 VITALS
OXYGEN SATURATION: 98 % | HEART RATE: 78 BPM | RESPIRATION RATE: 18 BRPM | DIASTOLIC BLOOD PRESSURE: 59 MMHG | SYSTOLIC BLOOD PRESSURE: 131 MMHG

## 2024-08-16 DIAGNOSIS — R18.8 CIRRHOSIS OF LIVER WITH ASCITES, UNSPECIFIED HEPATIC CIRRHOSIS TYPE  (HCC): ICD-10-CM

## 2024-08-16 DIAGNOSIS — K74.60 CIRRHOSIS OF LIVER WITH ASCITES, UNSPECIFIED HEPATIC CIRRHOSIS TYPE  (HCC): ICD-10-CM

## 2024-08-16 PROCEDURE — 49083 ABD PARACENTESIS W/IMAGING: CPT

## 2024-08-16 PROCEDURE — 49083 ABD PARACENTESIS W/IMAGING: CPT | Performed by: INTERNAL MEDICINE

## 2024-08-16 RX ORDER — LIDOCAINE WITH 8.4% SOD BICARB 0.9%(10ML)
SYRINGE (ML) INJECTION AS NEEDED
Status: COMPLETED | OUTPATIENT
Start: 2024-08-16 | End: 2024-08-16

## 2024-08-16 RX ADMIN — Medication 10 ML: at 11:38

## 2024-08-16 NOTE — BRIEF OP NOTE (RAD/CATH)
INTERVENTIONAL RADIOLOGY PROCEDURE NOTE    Date: 8/16/2024    Procedure:   Procedure Summary       Date: 08/16/24 Room / Location: [facility] Cardiac Cath Lab    Anesthesia Start:  Anesthesia Stop:     Procedure: IR PARACENTESIS Diagnosis:       Cirrhosis of liver with ascites, unspecified hepatic cirrhosis type  (HCC)      (ascites)    Scheduled Providers:  Responsible Provider:     Anesthesia Type: Not recorded ASA Status: Not recorded            Preoperative diagnosis:   1. Cirrhosis of liver with ascites, unspecified hepatic cirrhosis type  (HCC)         Postoperative diagnosis: Same.    Surgeon: Willy Storey MD     Assistant: None. No qualified resident was available.    Blood loss: None    Specimens: None     Findings: Ultrasound-guided paracentesis performed. Please see the radiology report for details.    Complications: None immediate.    Anesthesia: local

## 2024-08-16 NOTE — DISCHARGE INSTRUCTIONS
Abdominal Paracentesis     Interventional Radiology Department Number: [phone].    WHAT YOU NEED TO KNOW:   Abdominal paracentesis is a procedure to remove abnormal fluid buildup in your abdomen. Fluid builds up because of liver problems, such as swelling and scarring. Heart failure, kidney disease, a mass, or problems with your pancreas may also cause fluid buildup.    Before your procedure:   Vital signs:  Caregivers will check your blood pressure, heart rate, breathing rate, and temperature. They will also ask about your pain. These vital signs give caregivers information about your current health.     Caregivers may insert an intravenous tube (IV) into your vein. A vein in the arm is usually chosen. Through the IV tube, you may be given liquids and medicine.     Pre-op care:  You may be asked to urinate in order to empty your bladder. You are taken to the procedure room and moved to a table or bed. You will need to lie on your back or on your side.    Local anesthesia:  This medicine makes you more comfortable during your procedure. Local anesthesia is a shot of medicine put into your skin. Local anesthesia is used to numb the procedure area and dull your pain. You may still feel pressure or pushing during the procedure after you get this medicine.  During your procedure:   Your healthcare provider taps on and feels your abdomen to decide where to insert the needle. Your healthcare provider may also use an ultrasound to help decide where to insert the needle. An ultrasound uses sound waves to show pictures of the inside of your abdomen on a TV-like screen. Your healthcare provider cleans your skin and covers the area around the procedure site with a clean sheet. A needle will be inserted into your abdominal cavity. A syringe will be attached to the needle to remove a small amount of ascites fluid. The needle will be removed once your healthcare provider has removed enough fluid for testing.    To remove a  larger amount of fluid, a needle is inserted into your abdominal cavity. A catheter (small, thin tube) is attached to the needle and the needle is removed. The catheter tubing will be attached to a suction device (gentle vacuum) to help remove the fluid. The fluid will drain into a container attached to the tubing. . When your healthcare provider has pulled enough fluid from your abdomen, he will remove the catheter. Your wound (procedure site) will be covered with a bandage. The ascites fluid may be sent to a lab for tests.  After your procedure Do not get out of bed until your healthcare provider says it is okay. When healthcare providers see that you are not having any problems, you may be able to go home. If you are staying in the hospital, you may be taken back to your room.  Blood tests:  You may need blood taken to give caregivers information about how your body is working. The blood may be taken from your hand, arm, or IV.     Intravenous fluids and volume expanders:  You may need fluids or volume expanders given through your IV after your procedure. These fluids include albumin or saline. Albumin is a protein found in your blood. The IV fluids help prevent a drop in your blood pressure    If you do not have an abdominal paracentesis, your symptoms may get worse. You may feel short of breath, have abdominal and chest pain, and have trouble moving around. You may not learn why fluid is building up in your abdomen. You may not get proper treatment. You may have bleeding inside your stomach or bowels. Your kidneys may stop working, and your liver problems may get worse. The fluid inside your abdomen may get infected and cause abdominal pain and tiredness. An infection may become life-threatening and you may die. Talk with your healthcare provider if you have questions or concerns about your procedure, condition, or care.  Bandage may be removed the next day and you may shower the next day.  If you have any  fever or abdominal pain please notify your MD.  You may call the Radiology nurse at [[phone] until 4pm.After 4pm you may call your ordering MD.Abdominal Paracentesis     Interventional Radiology Department Number: [phone].    WHAT YOU NEED TO KNOW:   Abdominal paracentesis is a procedure to remove abnormal fluid buildup in your abdomen. Fluid builds up because of liver problems, such as swelling and scarring. Heart failure, kidney disease, a mass, or problems with your pancreas may also cause fluid buildup.    Before your procedure:   Vital signs:  Caregivers will check your blood pressure, heart rate, breathing rate, and temperature. They will also ask about your pain. These vital signs give caregivers information about your current health.     Caregivers may insert an intravenous tube (IV) into your vein. A vein in the arm is usually chosen. Through the IV tube, you may be given liquids and medicine.     Pre-op care:  You may be asked to urinate in order to empty your bladder. You are taken to the procedure room and moved to a table or bed. You will need to lie on your back or on your side.    Local anesthesia:  This medicine makes you more comfortable during your procedure. Local anesthesia is a shot of medicine put into your skin. Local anesthesia is used to numb the procedure area and dull your pain. You may still feel pressure or pushing during the procedure after you get this medicine.  During your procedure:   Your healthcare provider taps on and feels your abdomen to decide where to insert the needle. Your healthcare provider may also use an ultrasound to help decide where to insert the needle. An ultrasound uses sound waves to show pictures of the inside of your abdomen on a TV-like screen. Your healthcare provider cleans your skin and covers the area around the procedure site with a clean sheet. A needle will be inserted into your abdominal cavity. A syringe will be attached to the needle to  remove a small amount of ascites fluid. The needle will be removed once your healthcare provider has removed enough fluid for testing.    To remove a larger amount of fluid, a needle is inserted into your abdominal cavity. A catheter (small, thin tube) is attached to the needle and the needle is removed. The catheter tubing will be attached to a suction device (gentle vacuum) to help remove the fluid. The fluid will drain into a container attached to the tubing. . When your healthcare provider has pulled enough fluid from your abdomen, he will remove the catheter. Your wound (procedure site) will be covered with a bandage. The ascites fluid may be sent to a lab for tests.  After your procedure Do not get out of bed until your healthcare provider says it is okay. When healthcare providers see that you are not having any problems, you may be able to go home. If you are staying in the hospital, you may be taken back to your room.  Blood tests:  You may need blood taken to give caregivers information about how your body is working. The blood may be taken from your hand, arm, or IV.     Intravenous fluids and volume expanders:  You may need fluids or volume expanders given through your IV after your procedure. These fluids include albumin or saline. Albumin is a protein found in your blood. The IV fluids help prevent a drop in your blood pressure    If you do not have an abdominal paracentesis, your symptoms may get worse. You may feel short of breath, have abdominal and chest pain, and have trouble moving around. You may not learn why fluid is building up in your abdomen. You may not get proper treatment. You may have bleeding inside your stomach or bowels. Your kidneys may stop working, and your liver problems may get worse. The fluid inside your abdomen may get infected and cause abdominal pain and tiredness. An infection may become life-threatening and you may die. Talk with your healthcare provider if you have  questions or concerns about your procedure, condition, or care.  Bandage may be removed the next day and you may shower the next day.  If you have any fever or abdominal pain please notify your MD.  You may call the Radiology nurse at [phone] until 4pm.After 4pm you may call your ordering MD.

## 2024-08-16 NOTE — SEDATION DOCUMENTATION
Pt arrived to holding area for paracentesis in no apparent distress. Tolerated well. 3000ml clear yellow fluid drained from right abd. Bandage applied to site. Pt and family educated and discharged to home

## 2024-08-19 ENCOUNTER — TELEPHONE (OUTPATIENT)
Age: 89
End: 2024-08-19

## 2024-08-19 ENCOUNTER — CONSULT (OUTPATIENT)
Dept: GASTROENTEROLOGY | Facility: CLINIC | Age: 89
End: 2024-08-19
Payer: COMMERCIAL

## 2024-08-19 VITALS
HEART RATE: 94 BPM | HEIGHT: 63 IN | WEIGHT: 118 LBS | BODY MASS INDEX: 20.91 KG/M2 | DIASTOLIC BLOOD PRESSURE: 65 MMHG | SYSTOLIC BLOOD PRESSURE: 123 MMHG

## 2024-08-19 DIAGNOSIS — I81 PORTAL VEIN THROMBOSIS: ICD-10-CM

## 2024-08-19 DIAGNOSIS — R18.8 CIRRHOSIS OF LIVER WITH ASCITES, UNSPECIFIED HEPATIC CIRRHOSIS TYPE  (HCC): Primary | ICD-10-CM

## 2024-08-19 DIAGNOSIS — D64.9 ANEMIA, UNSPECIFIED TYPE: ICD-10-CM

## 2024-08-19 DIAGNOSIS — K74.60 CIRRHOSIS OF LIVER WITH ASCITES, UNSPECIFIED HEPATIC CIRRHOSIS TYPE  (HCC): Primary | ICD-10-CM

## 2024-08-19 DIAGNOSIS — K59.00 CONSTIPATION, UNSPECIFIED CONSTIPATION TYPE: ICD-10-CM

## 2024-08-19 PROCEDURE — 99204 OFFICE O/P NEW MOD 45 MIN: CPT | Performed by: INTERNAL MEDICINE

## 2024-08-19 RX ORDER — SPIRONOLACTONE 50 MG/1
50 TABLET, FILM COATED ORAL DAILY
Qty: 30 TABLET | Refills: 3 | Status: SHIPPED | OUTPATIENT
Start: 2024-08-19

## 2024-08-19 NOTE — PROGRESS NOTES
[facility] - Outpatient Consultation  Jf Valera 88 y.o. male MRN: [id number]  Encounter: [id number]        ASSESSMENT AND PLAN:       Diagnoses and all orders for this visit:    Cirrhosis of liver with ascites, unspecified hepatic cirrhosis type  (HCC)  New diagnosis of likely cirrhosis based on ultrasound imaging, portal vein thrombosis and new onset ascites.  No clear evidence of hepatic nodularity or ascites on CT at the time of presentation, however.  Liver does not appear to have been visualized at the time of his laparotomy.  No other biochemical or physical exam findings to suggest cirrhosis.  Unable to calculate serum ascites albumin gradient or MELD based on available lab testing.  No evidence of SBP.  No obvious etiology.  Will check for underlying hepatitis B    We discussed this diagnosis in detail including risks of complications.  He has had fairly rapidly recurrent ascites despite Lasix 20 mg daily, so we will add spironolactone 50 mg and check labs as below.  Reports his blood pressure has been running somewhat soft systolically in the  range, though not associated with any symptoms.  Advised that this is not unexpected and cirrhosis, and if he remains asymptomatic, should not need to supplement with increased sodium intake.  This may actually be worsening his ascites.  Recommended following daily weights.      Recommend high-protein diet.  Avoid raw seafood.  Follow-up with primary care for routine vaccinations.    Reports some constipation but no evidence of encephalopathy.  Recommend treatment with daily MiraLAX to maintain daily bowel movements.  Will hold off on lactulose at this time as it may not be well-tolerated.    Discussed risk of bleeding including variceal hemorrhage.  Given soft blood pressure will likely not tolerate nonselective beta-blocker at this time.  May consider screening for varices with EGD.        -     spironolactone (ALDACTONE) 50 mg  tablet; Take 1 tablet (50 mg total) by mouth daily  -     CBC; Future  -     Comprehensive metabolic panel; Future  -     Protime-INR; Future  -     AFP tumor marker; Future    Portal vein thrombosis  Evidence of cavernous transformation on repeat imaging.  Currently on Eliquis.  Following with vascular surgery.  This is likely secondary to underlying cirrhosis, with resulting portal hypertension and ascites    Constipation, unspecified constipation type  MiraLAX      ______________________________________________________________________    HPI: Patient originally from [place] presents with new diagnosis of cirrhosis.  He presented initially in March 2024 with abdominal pain and was found to have colonic ischemia secondary to portal vein thrombosis.  He underwent resection of 150 cm of frankly ischemic distal small bowel and was started on anticoagulation.  Imaging at that time revealed a normal-appearing liver and spleen with labs showing normal LFTs and platelet count.  No prior history of cirrhosis.  No family history of liver disease, no significant alcohol intake, no history of obesity or known metabolic derangement.  Several months prior to his hospitalization he noted development of lower extremity edema.  Since his hospitalization he has developed onset of ascites and has undergone large-volume paracentesis with removal of approximately 3 L x 2, most recently 3 days ago.  No evidence of SBP.  Unable to calculate SAAG as there are no results of blood work since April.  He was started on Lasix 20 mg daily.  Presents with his son reports that his systolic blood pressure has been running in the upper 90s to low 100s and he has been taking sodium to counteract this.  He has had no bruising or bleeding, jaundice or rash, fever or chills, abdominal pain, confusion.  He does report intermittent constipation      REVIEW OF SYSTEMS:    ROS     Historical Information   Past Medical History:   Diagnosis Date    Anemia   "   Basilar artery stenosis     Decreased hearing     Encounter for care or replacement of suprapubic tube (HCC)     Hemopneumothorax 03/17/2024    chest tube inserted-right    HTN (hypertension) 09/23/2013    Hypertension     Mesenteric ischemia (HCC)     Portal vein thrombosis     Seizure-like activity (HCC) 04/16/2024     Past Surgical History:   Procedure Laterality Date    IR CHEST TUBE PLACEMENT  3/17/2024    IR PARACENTESIS  7/29/2024    IR PARACENTESIS  8/16/2024    IR SUPRAPUBIC CATHETER CHECK/CHANGE/REINSERTION/UPSIZE  5/17/2024    LAPAROTOMY N/A 3/13/2024    Procedure: LAPAROTOMY EXPLORATORY; SMALL BOWEL RESECTION; LYSIS OF ADHESIONS; SUPRPAPUBIC TUBE INSERTION;  Surgeon: Marvin Azul MD;  Location: [facility];  Service: General    LAPAROTOMY N/A 3/14/2024    Procedure: EXPLORATORY LAPAROTOMY, WASHOUT. SMALL BOWEL ANASTOMOSIS, CONTROL OF LIVER BLEED, CLOSURE OF ABDOMINAL WOUND;  Surgeon: Beltran Lorenz DO;  Location: [facility];  Service: General     Social History   Social History     Substance and Sexual Activity   Alcohol Use Never     Social History     Substance and Sexual Activity   Drug Use Never     Social History     Tobacco Use   Smoking Status Never    Passive exposure: Never   Smokeless Tobacco Never     History reviewed. No pertinent family history.    Meds/Allergies       Current Outpatient Medications:     apixaban (ELIQUIS) 5 mg    aspirin 81 mg chewable tablet    atorvastatin (LIPITOR) 20 mg tablet    furosemide (LASIX) 20 mg tablet    levETIRAcetam (KEPPRA) 750 mg tablet    spironolactone (ALDACTONE) 50 mg tablet    No Known Allergies        Objective     Blood pressure 123/65, pulse 94, height 5' 3\" (1.6 m), weight 53.5 kg (118 lb). Body mass index is 20.9 kg/m².        PHYSICAL EXAM:      Physical Exam  Vitals and nursing note reviewed.   Constitutional:       General: He is not in acute distress.  HENT:      Head: Normocephalic and atraumatic.      Mouth/Throat:      " Mouth: Mucous membranes are moist.   Eyes:      General: No scleral icterus.     Pupils: Pupils are equal, round, and reactive to light.   Cardiovascular:      Rate and Rhythm: Normal rate.   Pulmonary:      Effort: Pulmonary effort is normal. No respiratory distress.   Abdominal:      General: There is no distension.      Palpations: Abdomen is soft.      Tenderness: There is no abdominal tenderness. There is no guarding or rebound.   Musculoskeletal:         General: Normal range of motion.      Cervical back: Normal range of motion and neck supple.      Right lower leg: Edema present.      Left lower leg: Edema present.   Skin:     General: Skin is warm and dry.   Neurological:      General: No focal deficit present.      Mental Status: He is alert and oriented to person, place, and time.   Psychiatric:         Mood and Affect: Mood normal.         Behavior: Behavior normal.              Lab Results:   No visits with results within 1 Day(s) from this visit.   Latest known visit with results is:   Hospital Outpatient Visit on 07/29/2024   Component Date Value    Site 07/29/2024 Paracentesis     Color, Fluid 07/29/2024 Straw     Clarity, Fluid 07/29/2024 Clear     WBC, Fluid 07/29/2024 106     Body Fluid Culture, Ster* 07/29/2024 No growth     Gram Stain Result 07/29/2024 No Polys or Bacteria seen     Protein, Fluid 07/29/2024 <3.0     Case Report 07/29/2024                      Value:Non-gynecologic Cytology                          Case: [id number]                                  Authorizing Provider:  Justa Hutchinson MD           Collected:           07/29/2024 0912              Ordering Location:     Critical access hospital Received:            07/29/2024 1051                                     Cardiac Cath Lab                                                             Pathologist:           Natasha Conde MD                                                         Specimens:   A) - Paracentesis                                                                                    B) - Paracentesis, CELL BLOCK                                                              Final Diagnosis 07/29/2024                      Value:A.-B.  Paracentesis (Thin-prep and cell block preparations):    No carcinoma identified   Mesothelial cells, histiocytes, lymphocytes     Satisfactory for evaluation.    Interpretation performed at [facility], [address] [place] [id number]        Gross Description 07/29/2024                      Value:A. 100 mL of yellow, clear fluid, received in CytoLyt   B. Scant cell button, white    Due to the (size and/or consistency) of the specimen, it is questionable whether cell block will survive histological processing.       Additional Information 07/29/2024                      Value:[employer]'s FDA approved ,  and ThinPrep Imaging Duo System are utilized with strict adherence to the 's instruction manual to prepare gynecologic and non-gynecologic cytology specimens for the production of ThinPrep slides as well as for gynecologic ThinPrep imaging. These processes have been validated by our laboratory and/or by the .    These tests were developed and their performance characteristics determined by [facility] or [facility]. They may not be cleared or approved by the U.S. Food and Drug Administration. The FDA has determined that such clearance or approval is not necessary. These tests are used for clinical purposes. They should not be regarded as investigational or for research. This laboratory has been approved by CLIA 88, designated as a high-complexity laboratory and is qualified to perform these tests.        Glucose, Fluid 07/29/2024 93     Albumin, Fluid 07/29/2024 <1.5     LD, Fluid 07/29/2024 33     Total Counted 07/29/2024 100     Neutrophils % (Fluid) 07/29/2024 12     Lymphs % (Fluid) 07/29/2024 46     Histiocyte %  (Fluid) 07/29/2024 12     Monocytes % (Fluid) 07/29/2024 30     TOTPROTEINFL 07/29/2024 1.8          Radiology Results:   IR AMB Paracentesis    Result Date: 8/16/2024  Narrative: PROCEDURE: Therapeutic paracentesis STAFF: Willy Storey M.D. NUMBER OF IMAGES: Multiple. COMPLICATIONS: None. MEDICATIONS: 1% lidocaine subcutaneous. INDICATION: Symptomatic ascites. PROCEDURE: Using ultrasound guidance, the right paracolic gutter was punctured and a catheter was placed into the peritoneal cavity. A total of 3000 milliliters of fluid was removed. The catheter was then removed. FINDINGS: Moderate ascites. Straw colored ascites was removed.     Impression: Therapeutic paracentesis. Workstation performed: [id number]     IR AMB Paracentesis    Result Date: 7/29/2024  Narrative: PROCEDURE: Ultrasound-guided paracentesis Procedural Personnel Attending physician(s): Giovanny Herrera MD Pre-procedure diagnosis: Ascites Post-procedure diagnosis: Same PROCEDURE SUMMARY: Ultrasound-guided paracentesis PROCEDURE DETAILS: Pre-procedure Consent: Informed consent for the procedure including risks, benefits and alternatives was obtained and time-out was performed prior to the procedure. Preparation: The site was prepared and draped using maximal sterile barrier technique including cutaneous antisepsis. Anesthesia/sedation Level of anesthesia/sedation: No sedation Technique and Findings: Immediate pre-procedure ultrasound demonstrated large ascites.  A safe percutaneous access site was identified in the right lower quadrant. Local anesthetic was administered.  Under real-time ultrasound guidance, a 5 Fr [employer] centesis needle and catheter were advanced percutaneously into the ascites.  A permanent image was saved.  The needle was removed and the catheter attached to suction.  A total of 3100 mL transparent yellow fluid was removed. A sample of fluid was sent for laboratory analysis. The catheter was removed and a sterile dressing was  applied. Contrast None. Radiation Dose None. Additional Details Specimens removed: Ascites Estimated blood loss (mL): Less than 10 Complications: No immediate complications.     Impression: Ultrasound-guided paracentesis, yielding a total of 3100 mL of transparent yellow fluid, a sample of which was sent for laboratory analysis. Workstation performed: [id number]     US abdomen complete    Result Date: 7/23/2024  Narrative: ABDOMEN ULTRASOUND, COMPLETE INDICATION: R14.0: Abdominal distension (gaseous). COMPARISON: CT abdomen/pelvis 3/30/2024. TECHNIQUE: Real-time ultrasound of the abdomen was performed with a curvilinear transducer with both volumetric sweeps and still imaging techniques. FINDINGS: PANCREAS: Visualized portions of the pancreas are within normal limits. AORTA AND IVC: Visualized portions are normal for patient age. LIVER: Size: Within normal range. The liver measures 14.4 cm in the midclavicular line. Contour: Surface contour is nodular. Parenchyma: There is diffuse coarsened heterogeneous echotexture suggesting underlying cirrhotic changes. One or more benign-appearing hepatic cyst is noted.  No evidence of suspicious hepatic mass. Findings suspicious for chronic portal vein occlusion with cavernous transformation BILIARY: The gallbladder is normal in caliber. Mild wall thickening without pericholecystic fluid. No stones or sludge identified. No sonographic Berry sign. No intrahepatic biliary dilatation. CBD measures 5.0 mm. No choledocholithiasis. KIDNEY: Right kidney measures 8.5 x 3.8 x 4.3 cm, potentially foreshortened due to limited views.. Volume 72.5 mL Few cysts measuring up to 1.6 cm. Left kidney measures 10.2 x 5.7 x 4.6 cm. Volume 139.1 mL Few cysts measuring up to 0.4 cm. SPLEEN: Measures 10.2 x 10.2 x 6.1 cm. Volume 328.9 mL Within normal limits. ASCITES: Moderate volume upper abdominal ascites. Right pleural effusion.     Impression: Findings suspicious for cirrhosis with moderate  volume upper abdominal ascites. Findings suspicious for chronic portal vein occlusion and cavernous transformation. If confirmation is clinically relevant, CT abdomen with contrast can be obtained for further evaluation. Gallbladder wall thickening, nonspecific in the setting of liver disease. Workstation performed: [id number]

## 2024-08-19 NOTE — TELEPHONE ENCOUNTER
Patients GI provider:  Dr. Edwards    Number to return call: [phone]    Reason for call: Garth from [facility] called with the following abnormal results: Hgb 4.1, Hematocrit 17.3, MCHC 23.7, RBC 2.34    Scheduled procedure/appointment date if applicable: Apt/procedure 08/19/24

## 2024-08-20 ENCOUNTER — APPOINTMENT (EMERGENCY)
Dept: RADIOLOGY | Facility: HOSPITAL | Age: 89
DRG: 241 | End: 2024-08-20
Payer: COMMERCIAL

## 2024-08-20 ENCOUNTER — HOSPITAL ENCOUNTER (INPATIENT)
Facility: HOSPITAL | Age: 89
LOS: 5 days | Discharge: HOME | DRG: 241 | End: 2024-08-25
Attending: EMERGENCY MEDICINE
Payer: COMMERCIAL

## 2024-08-20 DIAGNOSIS — R93.5 ABNORMAL CT OF THE ABDOMEN: ICD-10-CM

## 2024-08-20 DIAGNOSIS — D64.9 ANEMIA: Primary | ICD-10-CM

## 2024-08-20 DIAGNOSIS — I81 PORTAL VEIN THROMBOSIS: ICD-10-CM

## 2024-08-20 DIAGNOSIS — R79.89 ELEVATED LACTIC ACID LEVEL: ICD-10-CM

## 2024-08-20 DIAGNOSIS — K76.9 LIVER LESION: ICD-10-CM

## 2024-08-20 DIAGNOSIS — K92.2 UPPER GI BLEED: ICD-10-CM

## 2024-08-20 PROBLEM — E87.20 LACTIC ACIDOSIS: Status: ACTIVE | Noted: 2024-08-20

## 2024-08-20 LAB
ABO GROUP BLD: NORMAL
ALBUMIN SERPL BCG-MCNC: 3.7 G/DL (ref 3.5–5)
ALP SERPL-CCNC: 46 U/L (ref 34–104)
ALT SERPL W P-5'-P-CCNC: 9 U/L (ref 7–52)
AMMONIA PLAS-SCNC: 35 UMOL/L (ref 18–72)
ANION GAP SERPL CALCULATED.3IONS-SCNC: 7 MMOL/L (ref 4–13)
APTT PPP: 31 SECONDS (ref 23–34)
AST SERPL W P-5'-P-CCNC: 12 U/L (ref 13–39)
BASOPHILS # BLD AUTO: 0.03 THOUSANDS/ÂΜL (ref 0–0.1)
BASOPHILS NFR BLD AUTO: 0 % (ref 0–1)
BILIRUB SERPL-MCNC: 0.94 MG/DL (ref 0.2–1)
BLD GP AB SCN SERPL QL: NEGATIVE
BUN SERPL-MCNC: 14 MG/DL (ref 5–25)
CALCIUM SERPL-MCNC: 8.6 MG/DL (ref 8.4–10.2)
CHLORIDE SERPL-SCNC: 103 MMOL/L (ref 96–108)
CO2 SERPL-SCNC: 27 MMOL/L (ref 21–32)
CREAT SERPL-MCNC: 1 MG/DL (ref 0.6–1.3)
EOSINOPHIL # BLD AUTO: 0.06 THOUSAND/ÂΜL (ref 0–0.61)
EOSINOPHIL NFR BLD AUTO: 1 % (ref 0–6)
ERYTHROCYTE [DISTWIDTH] IN BLOOD BY AUTOMATED COUNT: 18.8 % (ref 11.6–15.1)
FERRITIN SERPL-MCNC: 4 NG/ML (ref 24–336)
FOLATE SERPL-MCNC: 17.4 NG/ML
GFR SERPL CREATININE-BSD FRML MDRD: 67 ML/MIN/1.73SQ M
GLUCOSE SERPL-MCNC: 103 MG/DL (ref 65–140)
HCT VFR BLD AUTO: 18.5 % (ref 36.5–49.3)
HGB BLD-MCNC: 4.2 G/DL (ref 12–17)
IMM GRANULOCYTES # BLD AUTO: 0.04 THOUSAND/UL (ref 0–0.2)
IMM GRANULOCYTES NFR BLD AUTO: 1 % (ref 0–2)
INR PPP: 1.43 (ref 0.85–1.19)
IRON SATN MFR SERPL: 5 % (ref 15–50)
IRON SERPL-MCNC: 17 UG/DL (ref 50–212)
LACTATE SERPL-SCNC: 2.4 MMOL/L (ref 0.5–2)
LACTATE SERPL-SCNC: 3.2 MMOL/L (ref 0.5–2)
LYMPHOCYTES # BLD AUTO: 0.97 THOUSANDS/ÂΜL (ref 0.6–4.47)
LYMPHOCYTES NFR BLD AUTO: 12 % (ref 14–44)
MCH RBC QN AUTO: 17.6 PG (ref 26.8–34.3)
MCHC RBC AUTO-ENTMCNC: 22.8 G/DL (ref 31.4–37.4)
MCV RBC AUTO: 77 FL (ref 82–98)
MONOCYTES # BLD AUTO: 0.43 THOUSAND/ÂΜL (ref 0.17–1.22)
MONOCYTES NFR BLD AUTO: 5 % (ref 4–12)
NEUTROPHILS # BLD AUTO: 6.63 THOUSANDS/ÂΜL (ref 1.85–7.62)
NEUTS SEG NFR BLD AUTO: 81 % (ref 43–75)
NRBC BLD AUTO-RTO: 0 /100 WBCS
PLATELET # BLD AUTO: 251 THOUSANDS/UL (ref 149–390)
PMV BLD AUTO: 11.1 FL (ref 8.9–12.7)
POTASSIUM SERPL-SCNC: 3.6 MMOL/L (ref 3.5–5.3)
PROT SERPL-MCNC: 5.8 G/DL (ref 6.4–8.4)
PROTHROMBIN TIME: 18 SECONDS (ref 12.3–15)
RBC # BLD AUTO: 2.39 MILLION/UL (ref 3.88–5.62)
RH BLD: POSITIVE
SODIUM SERPL-SCNC: 137 MMOL/L (ref 135–147)
SPECIMEN EXPIRATION DATE: NORMAL
TIBC SERPL-MCNC: 356 UG/DL (ref 250–450)
UIBC SERPL-MCNC: 339 UG/DL (ref 155–355)
VIT B12 SERPL-MCNC: 595 PG/ML (ref 180–914)
WBC # BLD AUTO: 8.16 THOUSAND/UL (ref 4.31–10.16)

## 2024-08-20 PROCEDURE — 86850 RBC ANTIBODY SCREEN: CPT | Performed by: EMERGENCY MEDICINE

## 2024-08-20 PROCEDURE — 85730 THROMBOPLASTIN TIME PARTIAL: CPT | Performed by: EMERGENCY MEDICINE

## 2024-08-20 PROCEDURE — 99223 1ST HOSP IP/OBS HIGH 75: CPT | Performed by: INTERNAL MEDICINE

## 2024-08-20 PROCEDURE — 82607 VITAMIN B-12: CPT | Performed by: INTERNAL MEDICINE

## 2024-08-20 PROCEDURE — 96374 THER/PROPH/DIAG INJ IV PUSH: CPT

## 2024-08-20 PROCEDURE — 87340 HEPATITIS B SURFACE AG IA: CPT | Performed by: INTERNAL MEDICINE

## 2024-08-20 PROCEDURE — 82140 ASSAY OF AMMONIA: CPT | Performed by: INTERNAL MEDICINE

## 2024-08-20 PROCEDURE — 96361 HYDRATE IV INFUSION ADD-ON: CPT

## 2024-08-20 PROCEDURE — 86704 HEP B CORE ANTIBODY TOTAL: CPT | Performed by: INTERNAL MEDICINE

## 2024-08-20 PROCEDURE — 80053 COMPREHEN METABOLIC PANEL: CPT | Performed by: EMERGENCY MEDICINE

## 2024-08-20 PROCEDURE — 83550 IRON BINDING TEST: CPT | Performed by: EMERGENCY MEDICINE

## 2024-08-20 PROCEDURE — 83540 ASSAY OF IRON: CPT | Performed by: EMERGENCY MEDICINE

## 2024-08-20 PROCEDURE — 86705 HEP B CORE ANTIBODY IGM: CPT | Performed by: INTERNAL MEDICINE

## 2024-08-20 PROCEDURE — 74174 CTA ABD&PLVS W/CONTRAST: CPT

## 2024-08-20 PROCEDURE — P9016 RBC LEUKOCYTES REDUCED: HCPCS

## 2024-08-20 PROCEDURE — 36430 TRANSFUSION BLD/BLD COMPNT: CPT

## 2024-08-20 PROCEDURE — 36415 COLL VENOUS BLD VENIPUNCTURE: CPT | Performed by: EMERGENCY MEDICINE

## 2024-08-20 PROCEDURE — 83605 ASSAY OF LACTIC ACID: CPT | Performed by: EMERGENCY MEDICINE

## 2024-08-20 PROCEDURE — 30233N1 TRANSFUSION OF NONAUTOLOGOUS RED BLOOD CELLS INTO PERIPHERAL VEIN, PERCUTANEOUS APPROACH: ICD-10-PCS | Performed by: INTERNAL MEDICINE

## 2024-08-20 PROCEDURE — 82746 ASSAY OF FOLIC ACID SERUM: CPT | Performed by: INTERNAL MEDICINE

## 2024-08-20 PROCEDURE — 86900 BLOOD TYPING SEROLOGIC ABO: CPT | Performed by: EMERGENCY MEDICINE

## 2024-08-20 PROCEDURE — 86901 BLOOD TYPING SEROLOGIC RH(D): CPT | Performed by: EMERGENCY MEDICINE

## 2024-08-20 PROCEDURE — 86923 COMPATIBILITY TEST ELECTRIC: CPT

## 2024-08-20 PROCEDURE — 86803 HEPATITIS C AB TEST: CPT | Performed by: INTERNAL MEDICINE

## 2024-08-20 PROCEDURE — 85025 COMPLETE CBC W/AUTO DIFF WBC: CPT | Performed by: EMERGENCY MEDICINE

## 2024-08-20 PROCEDURE — 99291 CRITICAL CARE FIRST HOUR: CPT

## 2024-08-20 PROCEDURE — 85610 PROTHROMBIN TIME: CPT | Performed by: EMERGENCY MEDICINE

## 2024-08-20 PROCEDURE — 99291 CRITICAL CARE FIRST HOUR: CPT | Performed by: EMERGENCY MEDICINE

## 2024-08-20 PROCEDURE — 82728 ASSAY OF FERRITIN: CPT | Performed by: EMERGENCY MEDICINE

## 2024-08-20 RX ORDER — LEVETIRACETAM 500 MG/1
750 TABLET ORAL EVERY 12 HOURS SCHEDULED
Status: DISCONTINUED | OUTPATIENT
Start: 2024-08-20 | End: 2024-08-25 | Stop reason: HOSPADM

## 2024-08-20 RX ORDER — PANTOPRAZOLE SODIUM 40 MG/10ML
40 INJECTION, POWDER, LYOPHILIZED, FOR SOLUTION INTRAVENOUS EVERY 12 HOURS SCHEDULED
Status: DISCONTINUED | OUTPATIENT
Start: 2024-08-20 | End: 2024-08-23

## 2024-08-20 RX ORDER — FUROSEMIDE 20 MG
20 TABLET ORAL DAILY
Status: DISCONTINUED | OUTPATIENT
Start: 2024-08-21 | End: 2024-08-21

## 2024-08-20 RX ORDER — SPIRONOLACTONE 25 MG/1
50 TABLET ORAL DAILY
Status: DISCONTINUED | OUTPATIENT
Start: 2024-08-21 | End: 2024-08-21

## 2024-08-20 RX ORDER — PANTOPRAZOLE SODIUM 40 MG/10ML
40 INJECTION, POWDER, LYOPHILIZED, FOR SOLUTION INTRAVENOUS ONCE
Status: COMPLETED | OUTPATIENT
Start: 2024-08-20 | End: 2024-08-20

## 2024-08-20 RX ORDER — ATORVASTATIN CALCIUM 20 MG/1
20 TABLET, FILM COATED ORAL
Status: DISCONTINUED | OUTPATIENT
Start: 2024-08-21 | End: 2024-08-25 | Stop reason: HOSPADM

## 2024-08-20 RX ORDER — CEFTRIAXONE 1 G/50ML
1000 INJECTION, SOLUTION INTRAVENOUS EVERY 24 HOURS
Status: DISCONTINUED | OUTPATIENT
Start: 2024-08-20 | End: 2024-08-25

## 2024-08-20 RX ADMIN — PANTOPRAZOLE SODIUM 40 MG: 40 INJECTION, POWDER, FOR SOLUTION INTRAVENOUS at 15:25

## 2024-08-20 RX ADMIN — IOHEXOL 100 ML: 350 INJECTION, SOLUTION INTRAVENOUS at 16:23

## 2024-08-20 RX ADMIN — PANTOPRAZOLE SODIUM 40 MG: 40 INJECTION, POWDER, FOR SOLUTION INTRAVENOUS at 22:33

## 2024-08-20 RX ADMIN — LEVETIRACETAM 750 MG: 500 TABLET, FILM COATED ORAL at 20:47

## 2024-08-20 RX ADMIN — CEFTRIAXONE 1000 MG: 1 INJECTION, SOLUTION INTRAVENOUS at 20:47

## 2024-08-20 RX ADMIN — SODIUM CHLORIDE 1000 ML: 0.9 INJECTION, SOLUTION INTRAVENOUS at 15:49

## 2024-08-20 NOTE — ASSESSMENT & PLAN NOTE
-LA 2.4>3.2.  -Possibly Type A in setting of anemia. Caution further fluids given liver cirrhosis and volume overload.   -Trend until level LA <2

## 2024-08-20 NOTE — ED NOTES
CT Scan called to have it done now since we are going to transfuse the patient with PRBC.     Abigail Agudelo RN  08/20/24 [id number]

## 2024-08-20 NOTE — ASSESSMENT & PLAN NOTE
-Hgb 4.2, previously 9 in April of this year  -Hemodynamically stable  -Patient does not attest to any melena, hematochezia or hematemesis  -Anemia likely chronic, possibly due to iron/vitamine deficiency. May also be slow oozing from GI tract  -Do not suspect bleeding from esophageal varices at this time. Will not start octreotide infusion  -Continue high dose protonix, hold eliquis & aspirin  -iron/vitamin studies ordered  -NPO, GI consulted for endoscopy

## 2024-08-20 NOTE — ED NOTES
Ultra sound guided being used  by Zara SESAY to gain another IV access to be use for CT scan and blood transfusion.     Abigail Agudelo RN  08/20/24 [id number]

## 2024-08-20 NOTE — ASSESSMENT & PLAN NOTE
-Patient on eliquis and aspirin at home  -Will hold medications given anemia  -Continue outpatient follow up with vascular surgery as mentioned in previous notes

## 2024-08-20 NOTE — ED PROVIDER NOTES
History  Chief Complaint   Patient presents with    Abnormal Lab     Hgb is very low     Pt is an 87yo M who presents for anemia.  Much of history provided by son.  Son reports the patient was recently referred to a GI doctor secondary to concern for cirrhosis.  Patient was sent for outpatient labs and they got a call today that on the outpatient labs he was found to have a hemoglobin of 4.2.  Son states that he does have history of anemia but has never been this low previously.  Patient did also recently undergo a paracentesis approximately 4 days ago.  Son reports that his abdomen is more distended than it was immediately after the paracentesis.  Patient denies any pain or complaints.  Patient denies any dark or bloody stools.  Patient denies any nausea, vomiting, or hematemesis.  Patient is currently on Eliquis.  Son reports that patient has never required blood transfusions previously aside from when he was in the hospital in March.  Patient was in the hospital in March due to portal vein thrombosis and that is when he was started on the Eliquis.        Prior to Admission Medications   Prescriptions Last Dose Informant Patient Reported? Taking?   apixaban (ELIQUIS) 5 mg  Self No No   Sig: Take 1 tablet (5 mg total) by mouth 2 (two) times a day   aspirin 81 mg chewable tablet  Self No No   Sig: Chew 1 tablet (81 mg total) daily   atorvastatin (LIPITOR) 20 mg tablet  Self No No   Sig: Take 1 tablet (20 mg total) by mouth daily   furosemide (LASIX) 20 mg tablet  Self No No   Sig: Take 1 tablet (20 mg total) by mouth daily   levETIRAcetam (KEPPRA) 750 mg tablet  Self, Family Member No No   Sig: Take 1 tablet (750 mg total) by mouth every 12 (twelve) hours   spironolactone (ALDACTONE) 50 mg tablet   No No   Sig: Take 1 tablet (50 mg total) by mouth daily      Facility-Administered Medications: None       Past Medical History:   Diagnosis Date    Anemia     Basilar artery stenosis     Decreased hearing     Encounter  for care or replacement of suprapubic tube (HCC)     Hemopneumothorax 03/17/2024    chest tube inserted-right    HTN (hypertension) 09/23/2013    Hypertension     Mesenteric ischemia (HCC)     Portal vein thrombosis     Seizure-like activity (HCC) 04/16/2024       Past Surgical History:   Procedure Laterality Date    IR CHEST TUBE PLACEMENT  3/17/2024    IR PARACENTESIS  7/29/2024    IR PARACENTESIS  8/16/2024    IR SUPRAPUBIC CATHETER CHECK/CHANGE/REINSERTION/UPSIZE  5/17/2024    LAPAROTOMY N/A 3/13/2024    Procedure: LAPAROTOMY EXPLORATORY; SMALL BOWEL RESECTION; LYSIS OF ADHESIONS; SUPRPAPUBIC TUBE INSERTION;  Surgeon: Marvin Azul MD;  Location: [facility];  Service: General    LAPAROTOMY N/A 3/14/2024    Procedure: EXPLORATORY LAPAROTOMY, WASHOUT. SMALL BOWEL ANASTOMOSIS, CONTROL OF LIVER BLEED, CLOSURE OF ABDOMINAL WOUND;  Surgeon: Beltran Lorenz DO;  Location:  MAIN OR;  Service: General       No family history on file.  I have reviewed and agree with the history as documented.    E-Cigarette/Vaping    E-Cigarette Use Never User      E-Cigarette/Vaping Substances    Nicotine No     THC No     CBD No     Flavoring No     Other No     Unknown No      Social History     Tobacco Use    Smoking status: Never     Passive exposure: Never    Smokeless tobacco: Never   Vaping Use    Vaping status: Never Used   Substance Use Topics    Alcohol use: Never    Drug use: Never     Physical Exam  Physical Exam  Vitals reviewed.   Constitutional:       Appearance: He is well-developed. He is not toxic-appearing or diaphoretic.   HENT:      Head: Normocephalic and atraumatic.      Right Ear: External ear normal.      Left Ear: External ear normal.      Nose: Nose normal.      Mouth/Throat:      Pharynx: Oropharynx is clear.   Eyes:      Extraocular Movements: Extraocular movements intact.      Pupils: Pupils are equal, round, and reactive to light.   Cardiovascular:      Rate and Rhythm: Normal rate and regular  rhythm.      Heart sounds: Normal heart sounds.   Pulmonary:      Effort: Pulmonary effort is normal. No respiratory distress.      Breath sounds: Normal breath sounds.   Abdominal:      General: Bowel sounds are normal. There is distension.      Palpations: Abdomen is soft. There is no mass.      Tenderness: There is no abdominal tenderness. There is no guarding or rebound.   Musculoskeletal:         General: Normal range of motion.      Cervical back: Normal range of motion and neck supple.   Skin:     General: Skin is warm and dry.      Capillary Refill: Capillary refill takes less than 2 seconds.      Coloration: Skin is not pale.      Findings: No erythema or rash.   Neurological:      General: No focal deficit present.      Mental Status: He is alert and oriented to person, place, and time.   Psychiatric:         Speech: Speech normal.         Behavior: Behavior is cooperative.         Vital Signs  ED Triage Vitals   Temperature Pulse Respirations Blood Pressure SpO2   08/20/24 1353 08/20/24 1353 08/20/24 1353 08/20/24 1353 08/20/24 1353   (!) 96 °F (35.6 °C) 95 20 (!) 107/48 100 %      Temp Source Heart Rate Source Patient Position - Orthostatic VS BP Location FiO2 (%)   08/20/24 1652 08/20/24 1652 08/20/24 1652 08/20/24 1652 --   Temporal Monitor Lying Left arm       Pain Score       08/20/24 1353       No Pain           Vitals:    08/20/24 1715 08/20/24 1720 08/20/24 1730 08/20/24 1745   BP: 123/58 134/61 125/61 130/60   Pulse: 75 75 80    Patient Position - Orthostatic VS:             Visual Acuity      ED Medications  Medications   pantoprazole (PROTONIX) injection 40 mg (40 mg Intravenous Given 8/20/24 1525)   sodium chloride 0.9 % bolus 1,000 mL (0 mL Intravenous Stopped 8/20/24 1839)   iohexol (OMNIPAQUE) 350 MG/ML injection (SINGLE-DOSE) 100 mL (100 mL Intravenous Given 8/20/24 1623)       Diagnostic Studies  Results Reviewed       Procedure Component Value Units Date/Time    Lactic acid 2 Hours  [[id number]]  (Abnormal) Collected: 08/20/24 1736    Lab Status: Final result Specimen: Blood from Arm, Left Updated: 08/20/24 1758     LACTIC ACID 3.2 mmol/L     Narrative:      Result may be elevated if tourniquet was used during collection.    Comprehensive metabolic panel [[id number]]  (Abnormal) Collected: 08/20/24 1458    Lab Status: Final result Specimen: Blood from Arm, Right Updated: 08/20/24 1534     Sodium 137 mmol/L      Potassium 3.6 mmol/L      Chloride 103 mmol/L      CO2 27 mmol/L      ANION GAP 7 mmol/L      BUN 14 mg/dL      Creatinine 1.00 mg/dL      Glucose 103 mg/dL      Calcium 8.6 mg/dL      AST 12 U/L      ALT 9 U/L      Alkaline Phosphatase 46 U/L      Total Protein 5.8 g/dL      Albumin 3.7 g/dL      Total Bilirubin 0.94 mg/dL      eGFR 67 ml/min/1.73sq m     Narrative:      [employer] guidelines for Chronic Kidney Disease (CKD):     Stage 1 with normal or high GFR (GFR > 90 mL/min/1.73 square meters)    Stage 2 Mild CKD (GFR = 60-89 mL/min/1.73 square meters)    Stage 3A Moderate CKD (GFR = 45-59 mL/min/1.73 square meters)    Stage 3B Moderate CKD (GFR = 30-44 mL/min/1.73 square meters)    Stage 4 Severe CKD (GFR = 15-29 mL/min/1.73 square meters)    Stage 5 End Stage CKD (GFR <15 mL/min/1.73 square meters)  Note: GFR calculation is accurate only with a steady state creatinine    Lactic acid, plasma (w/reflex if result > 2.0) [[id number]]  (Abnormal) Collected: 08/20/24 1458    Lab Status: Final result Specimen: Blood from Arm, Right Updated: 08/20/24 1532     LACTIC ACID 2.4 mmol/L     Narrative:      Result may be elevated if tourniquet was used during collection.    Protime-INR [[id number]]  (Abnormal) Collected: 08/20/24 1458    Lab Status: Final result Specimen: Blood from Arm, Right Updated: 08/20/24 1528     Protime 18.0 seconds      INR 1.43    Narrative:      INR Therapeutic Range    Indication                                             INR Range      Atrial  Fibrillation                                               2.0-3.0  Hypercoagulable State                                    2.0.2.3  Left Ventricular Asist Device                            2.0-3.0  Mechanical Heart Valve                                  -    Aortic(with afib, MI, embolism, HF, LA enlargement,    and/or coagulopathy)                                     2.0-3.0 (2.5-3.5)     Mitral                                                             2.5-3.5  Prosthetic/Bioprosthetic Heart Valve               2.0-3.0  Venous thromboembolism (VTE: VT, PE        2.0-3.0    APTT [[id number]]  (Normal) Collected: 08/20/24 1458    Lab Status: Final result Specimen: Blood from Arm, Right Updated: 08/20/24 1528     PTT 31 seconds     CBC and differential [[id number]]  (Abnormal) Collected: 08/20/24 1458    Lab Status: Final result Specimen: Blood from Arm, Right Updated: 08/20/24 1525     WBC 8.16 Thousand/uL      RBC 2.39 Million/uL      Hemoglobin 4.2 g/dL      Hematocrit 18.5 %      MCV 77 fL      MCH 17.6 pg      MCHC 22.8 g/dL      RDW 18.8 %      MPV 11.1 fL      Platelets 251 Thousands/uL      nRBC 0 /100 WBCs      Segmented % 81 %      Immature Grans % 1 %      Lymphocytes % 12 %      Monocytes % 5 %      Eosinophils Relative 1 %      Basophils Relative 0 %      Absolute Neutrophils 6.63 Thousands/µL      Absolute Immature Grans 0.04 Thousand/uL      Absolute Lymphocytes 0.97 Thousands/µL      Absolute Monocytes 0.43 Thousand/µL      Eosinophils Absolute 0.06 Thousand/µL      Basophils Absolute 0.03 Thousands/µL     Narrative:      This is an appended report.  These results have been appended to a previously verified report.    TIBC Panel (incl. Iron, TIBC, % Iron Saturation) [[id number]] Collected: 08/20/24 1458    Lab Status: In process Specimen: Blood from Arm, Right Updated: 08/20/24 1518    Ferritin [[id number]] Collected: 08/20/24 1458    Lab Status: In process Specimen: Blood from Arm, Right Updated:  08/20/24 1505                   CTA abdomen pelvis w wo contrast   Final Result by Katharine Carrillo MD (08/20 1811)      1) No abdominal or pelvic hematoma.      2) Patent abdominal aorta and its major branches. Resolution of infrarenal abdominal aortic thrombi seen on the March 2024 CT. 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      3) Cirrhotic liver morphology with evidence of portal hypertension manifested by large abdominal and pelvic ascites, portal hypertensive colopathy, gastroesophageal varices and internal hemorrhoids.      4) 5 mm focus of arterial hyperenhancement in segment 4A of the liver, which blends imperceptibly on subsequent venous phase. This is compatible with the LI-RADS 3 (intermediate probability for HCC) lesion.      5) 1.7 cm exophytic lesion in segment 7 of the liver medially, matching liver parenchyma and attenuation, of unclear etiology. Attention on follow-up.      6) Circumferential wall thickening of the urinary bladder out of proportion for degree of underdistention with suprapubic catheter in place. This may be related to chronic bladder outlet obstruction and/or cystitis.      7) Prostatomegaly (volume of 77 mL) with a 1.7 cm hyperdense, exophytic nodule in the midgland on the left, as described above. Although this may represent an exophytic BPH nodule extending into the peripheral zone and beyond, underlying neoplasm is not    excluded.      8) Multiple additional findings as above.      The study was marked in EPIC for immediate notification.               Workstation performed: [id number]                    Procedures  CriticalCare Time    Date/Time: 8/20/2024 3:00 PM    Performed by: Shazia Oh MD  Authorized by: Shazia Oh MD    Critical care provider statement:     Critical care time (minutes):  37    Critical care time was exclusive of:  Separately billable procedures and treating other patients and teaching time     Critical care was necessary to treat or prevent imminent or life-threatening deterioration of the following conditions:  Circulatory failure and metabolic crisis    Critical care was time spent personally by me on the following activities:  Obtaining history from patient or surrogate, development of treatment plan with patient or surrogate, evaluation of patient's response to treatment, examination of patient, ordering and performing treatments and interventions, ordering and review of laboratory studies, ordering and review of radiographic studies, re-evaluation of patient's condition and review of old charts           ED Course  ED Course as of 08/20/24 1849   Tue Aug 20, 2024   1420 Hemoccult faintly positive.    1524 Hemoglobin(!!): 4.2  Will plan for transfusion.    1524 MCV(!): 77  Microcytic anemia.    1530 PTT: 31   1530 POCT INR(!): 1.43  On thinners.    1536 Comprehensive metabolic panel(!)  Reviewed and without actionable derangement.    1536 LACTIC ACID(!): 2.4  Elevated. Will plan for CTA based on hx. Will also give fluids and trend.    1804 LACTIC ACID(!): 3.2  Increasing.    1804 CT being read.    1815 CTA abdomen pelvis w wo contrast  No noted intraabdominal bleeding; No acute thrombosis; Large volume ascites   1829 SLIM contacted for admission.                                  SBIRT 22yo+      Flowsheet Row Most Recent Value   Initial Alcohol Screen: US AUDIT-C     1. How often do you have a drink containing alcohol? 0 Filed at: 08/20/2024 1357   2. How many drinks containing alcohol do you have on a typical day you are drinking?  0 Filed at: 08/20/2024 135   3a. Male UNDER 65: How often do you have five or more drinks on one occasion? 0 Filed at: 08/20/2024 1351   3b. FEMALE Any Age, or MALE 65+: How often do you have 4 or more drinks on one occassion? 0 Filed at: 08/20/2024 1355   Audit-C Score 0 Filed at: 08/20/2024 1350                      Medical Decision Making  Pt is a 87yo M who presents  with anemia.     Will plan for labs and imaging.  Patient will likely require admission.  See ED course for results and details.    Plan to admit pt to [facility]. Pt discussed with admitting team and admission orders placed. Pt admitted without incident.       Amount and/or Complexity of Data Reviewed  Labs: ordered. Decision-making details documented in ED Course.  Radiology: ordered. Decision-making details documented in ED Course.    Risk  Prescription drug management.  Decision regarding hospitalization.                 Disposition  Final diagnoses:   Anemia   Abnormal CT of the abdomen   Elevated lactic acid level     Time reflects when diagnosis was documented in both MDM as applicable and the Disposition within this note       Time User Action Codes Description Comment    8/20/2024  4:18 PM Shazia Oh [D64.9] Anemia     8/20/2024  6:33 PM Shazia Oh Add [R93.5] Abnormal CT of the abdomen     8/20/2024  6:33 PM Shazia Oh Add [R79.89] Elevated lactic acid level           ED Disposition       ED Disposition   Admit    Condition   Stable    Date/Time   Tue Aug 20, 2024 1618    Comment                  Follow-up Information    None         Patient's Medications   Discharge Prescriptions    No medications on file       No discharge procedures on file.    PDMP Review         Value Time User    PDMP Reviewed  Yes 3/26/2024  2:44 PM Cale Henderson PA-C            ED Provider  Electronically Signed by             Shazia Oh MD  08/20/24 2809

## 2024-08-20 NOTE — ASSESSMENT & PLAN NOTE
-Recent diagnosis of liver cirrhosis with etiology unknown. Patient denies alcohol/drug use  -Will send chronic hepatitis panel. Further antibody workup/special testing as per GI  -No prior EGD performed; patient will benefit from EGD to assess for presence of esophageal varices  -Evidence of large volume ascites with history of recurrent ascites requiring paracentesis (last drained 4 days prior)  -Previous ascitic fluid studies appear transudative without evidence of SBP  -Continue home medications lasix, spironolactone; may consider increasing doses  -Continue ceftriaxone for SBP prophylaxis  -Patient will likely require paracentesis; consider IR consult after anemia improves

## 2024-08-20 NOTE — ASSESSMENT & PLAN NOTE
-Patient with suprapubic catheter in place, functioning well  -No evidence of obstruction or renal compromise  -Continue to monitor I/O

## 2024-08-20 NOTE — ASSESSMENT & PLAN NOTE
-NPO for now with oral diet as tolerated once cleared by GI  -BMI 21.08. increased age/deconditioning, and decreased appetite/oral intake and evidence of muscle wasting/atrophy

## 2024-08-21 LAB
ABO GROUP BLD BPU: NORMAL
ALBUMIN SERPL BCG-MCNC: 3.1 G/DL (ref 3.5–5)
ALP SERPL-CCNC: 41 U/L (ref 34–104)
ALT SERPL W P-5'-P-CCNC: 8 U/L (ref 7–52)
ANION GAP SERPL CALCULATED.3IONS-SCNC: 6 MMOL/L (ref 4–13)
AST SERPL W P-5'-P-CCNC: 9 U/L (ref 13–39)
BASOPHILS # BLD AUTO: 0.04 THOUSANDS/ÂΜL (ref 0–0.1)
BASOPHILS NFR BLD AUTO: 0 % (ref 0–1)
BILIRUB SERPL-MCNC: 2.59 MG/DL (ref 0.2–1)
BPU ID: NORMAL
BUN SERPL-MCNC: 14 MG/DL (ref 5–25)
CALCIUM ALBUM COR SERPL-MCNC: 8.7 MG/DL (ref 8.3–10.1)
CALCIUM SERPL-MCNC: 8 MG/DL (ref 8.4–10.2)
CHLORIDE SERPL-SCNC: 106 MMOL/L (ref 96–108)
CO2 SERPL-SCNC: 26 MMOL/L (ref 21–32)
CREAT SERPL-MCNC: 0.87 MG/DL (ref 0.6–1.3)
CROSSMATCH: NORMAL
EOSINOPHIL # BLD AUTO: 0.03 THOUSAND/ÂΜL (ref 0–0.61)
EOSINOPHIL NFR BLD AUTO: 0 % (ref 0–6)
ERYTHROCYTE [DISTWIDTH] IN BLOOD BY AUTOMATED COUNT: 17.1 % (ref 11.6–15.1)
GFR SERPL CREATININE-BSD FRML MDRD: 77 ML/MIN/1.73SQ M
GLUCOSE SERPL-MCNC: 77 MG/DL (ref 65–140)
HBV CORE IGM SER QL: NORMAL
HBV SURFACE AG SER QL: NORMAL
HCT VFR BLD AUTO: 28.9 % (ref 36.5–49.3)
HCT VFR BLD AUTO: 34.9 % (ref 36.5–49.3)
HGB BLD-MCNC: 8.3 G/DL (ref 12–17)
HGB BLD-MCNC: 9.8 G/DL (ref 12–17)
IMM GRANULOCYTES # BLD AUTO: 0.09 THOUSAND/UL (ref 0–0.2)
IMM GRANULOCYTES NFR BLD AUTO: 1 % (ref 0–2)
LYMPHOCYTES # BLD AUTO: 0.88 THOUSANDS/ÂΜL (ref 0.6–4.47)
LYMPHOCYTES NFR BLD AUTO: 6 % (ref 14–44)
MAGNESIUM SERPL-MCNC: 2.2 MG/DL (ref 1.9–2.7)
MCH RBC QN AUTO: 23 PG (ref 26.8–34.3)
MCHC RBC AUTO-ENTMCNC: 28.4 G/DL (ref 31.4–37.4)
MCV RBC AUTO: 81 FL (ref 82–98)
MONOCYTES # BLD AUTO: 0.69 THOUSAND/ÂΜL (ref 0.17–1.22)
MONOCYTES NFR BLD AUTO: 5 % (ref 4–12)
NEUTROPHILS # BLD AUTO: 12.09 THOUSANDS/ÂΜL (ref 1.85–7.62)
NEUTS SEG NFR BLD AUTO: 88 % (ref 43–75)
NRBC BLD AUTO-RTO: 0 /100 WBCS
PHOSPHATE SERPL-MCNC: 2.9 MG/DL (ref 2.3–4.1)
PLATELET # BLD AUTO: 191 THOUSANDS/UL (ref 149–390)
PMV BLD AUTO: 10.2 FL (ref 8.9–12.7)
POTASSIUM SERPL-SCNC: 3.7 MMOL/L (ref 3.5–5.3)
PROT SERPL-MCNC: 5 G/DL (ref 6.4–8.4)
RBC # BLD AUTO: 3.56 MILLION/UL (ref 3.88–5.62)
SODIUM SERPL-SCNC: 138 MMOL/L (ref 135–147)
UNIT DISPENSE STATUS: NORMAL
UNIT PRODUCT CODE: NORMAL
UNIT PRODUCT VOLUME: 350 ML
UNIT RH: NORMAL
WBC # BLD AUTO: 13.82 THOUSAND/UL (ref 4.31–10.16)

## 2024-08-21 PROCEDURE — 83735 ASSAY OF MAGNESIUM: CPT | Performed by: INTERNAL MEDICINE

## 2024-08-21 PROCEDURE — 86704 HEP B CORE ANTIBODY TOTAL: CPT | Performed by: FAMILY MEDICINE

## 2024-08-21 PROCEDURE — 85018 HEMOGLOBIN: CPT | Performed by: FAMILY MEDICINE

## 2024-08-21 PROCEDURE — 85025 COMPLETE CBC W/AUTO DIFF WBC: CPT | Performed by: INTERNAL MEDICINE

## 2024-08-21 PROCEDURE — 99222 1ST HOSP IP/OBS MODERATE 55: CPT | Performed by: INTERNAL MEDICINE

## 2024-08-21 PROCEDURE — 0W9G3ZZ DRAINAGE OF PERITONEAL CAVITY, PERCUTANEOUS APPROACH: ICD-10-PCS | Performed by: INTERNAL MEDICINE

## 2024-08-21 PROCEDURE — 84100 ASSAY OF PHOSPHORUS: CPT | Performed by: INTERNAL MEDICINE

## 2024-08-21 PROCEDURE — 86803 HEPATITIS C AB TEST: CPT | Performed by: FAMILY MEDICINE

## 2024-08-21 PROCEDURE — 80053 COMPREHEN METABOLIC PANEL: CPT | Performed by: INTERNAL MEDICINE

## 2024-08-21 PROCEDURE — 99232 SBSQ HOSP IP/OBS MODERATE 35: CPT | Performed by: FAMILY MEDICINE

## 2024-08-21 PROCEDURE — 85014 HEMATOCRIT: CPT | Performed by: FAMILY MEDICINE

## 2024-08-21 RX ORDER — FUROSEMIDE 20 MG
20 TABLET ORAL DAILY
Status: DISCONTINUED | OUTPATIENT
Start: 2024-08-22 | End: 2024-08-25 | Stop reason: HOSPADM

## 2024-08-21 RX ORDER — SPIRONOLACTONE 25 MG/1
50 TABLET ORAL DAILY
Status: DISCONTINUED | OUTPATIENT
Start: 2024-08-22 | End: 2024-08-25 | Stop reason: HOSPADM

## 2024-08-21 RX ADMIN — PANTOPRAZOLE SODIUM 40 MG: 40 INJECTION, POWDER, FOR SOLUTION INTRAVENOUS at 08:46

## 2024-08-21 RX ADMIN — ATORVASTATIN CALCIUM 20 MG: 20 TABLET, FILM COATED ORAL at 17:36

## 2024-08-21 RX ADMIN — CEFTRIAXONE 1000 MG: 1 INJECTION, SOLUTION INTRAVENOUS at 20:38

## 2024-08-21 RX ADMIN — LEVETIRACETAM 750 MG: 500 TABLET, FILM COATED ORAL at 08:46

## 2024-08-21 RX ADMIN — PANTOPRAZOLE SODIUM 40 MG: 40 INJECTION, POWDER, FOR SOLUTION INTRAVENOUS at 20:38

## 2024-08-21 RX ADMIN — LEVETIRACETAM 750 MG: 500 TABLET, FILM COATED ORAL at 20:38

## 2024-08-21 NOTE — CONSULTS
Consultation -  [facility]  Jf Valera 88 y.o. male MRN: [id number]  Unit/Bed#: [address] [place] [address] A Encounter: [id number]            Reason for Consult / Principal Problem:     anemia      ASSESSMENT AND PLAN:    Cirrhosis of liver with ascites    Anemia  -Recent diagnosis of liver cirrhosis with etiology unknown. Patient denies alcohol/drug use  -can send serologic workup  -Evidence of large volume ascites with history of recurrent ascites requiring paracentesis- IR consult ordered, SAAG cannot be calculated at that time as there was no serum albumin  -Continue lasix, aldactone, can consider increasing although BP can tend to run low at times  -CT done showing no abdominal or pelvic hematoma. Patent abdominal aorta and its major branches. Resolution of infrarenal abdominal aortic thrombi seen on the March 2024 CT. 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   Cirrhotic liver morphology with evidence of portal hypertension manifested by large abdominal and pelvic ascites, portal hypertensive colopathy, gastroesophageal varices and internal hemorrhoids 5 mm focus of arterial hyperenhancement in segment 4A of the liver, which blends imperceptibly on subsequent venous phase. This is compatible with the LI-RADS 3 (intermediate probability for HCC) lesion.1.7 cm exophytic lesion in segment 7 of the liver medially, matching liver parenchyma and attenuation, of unclear etiology. Attention on follow-up.Circumferential wall thickening of the urinary bladder out of proportion for degree of underdistention with suprapubic catheter in place. This may be related to chronic bladder outlet obstruction and/or cystitis. Prostatomegaly with a 1.7 cm hyperdense, exophytic nodule in the midgland on the left, as described above. Although this may represent an exophytic BPH nodule extending into the peripheral zone and beyond, underlying neoplasm is not excluded  -Check AFP  -MELD  is around 14, continue to monitor MELD labs   -Pt s/p 3 units PRBCs, Hgb 8.3 today, EGD should be performed tomorrow, Eliquis held, BUN WNL  -Follow serial H&H  -Continue Protonix IV twice daily  -Would recommend MRI abd with contrast in 3 months for LI-RADS 3 lesion as well exophytic lesion, spoke with patient and family at bedside regarding this  -Can have low sodium diet today and then n.p.o. after midnight for EGD      Thank you for the consultation.  Case will be discussed with Dr. Garcia.         ______________________________________________________________________    HPI:    88 y.o. male with a PMH of liver cirrhosis with ascites (unknown etiology), portal vein thrombosis on eliquis, chronic anemia, urinary retention s/p suprapubic catheter placement, seizure disorder presenting to the ED due to anemia. Patient recently diagnosed with cirrhosis and just seen in office and bloodwork done. Patient has had recurrent ascites requiring paracentesis, last performed 4 days prior. At home, patient has felt fatigued and weak however denied any hematemesis, hematochezia or melena. Patient has had poor oral intake during this time. He has also been on eliquis after being diagnosed with portal vein thrombosis in March of this year.  Eliquis has obviously been held in setting of significant anemia.  Son gives most of the history and he had a brown stool yesterday.  ER did report it was heme positive.  Patient reports that he has had a poor appetite because he has been feeling full and belly does feel distended so he is unable to eat well.  Does have some constipation and was supposed to start on MiraLAX.  No reports of prior EGD or colonoscopy.    REVIEW OF SYSTEMS:    CONSTITUTIONAL: Denies any fever, chills, rigors, and weight loss.  HEENT: No earache or tinnitus. Denies hearing loss or visual disturbances.  CARDIOVASCULAR: No chest pain or palpitations.   RESPIRATORY: Denies any cough, hemoptysis, shortness of breath or  dyspnea on exertion.  GASTROINTESTINAL: As noted in the History of Present Illness.   GENITOURINARY: No problems with urination. Denies any hematuria or dysuria.  NEUROLOGIC: No dizziness or vertigo, denies headaches.   MUSCULOSKELETAL: Denies any muscle or joint pain.   SKIN: Denies skin rashes or itching.   ENDOCRINE: Denies excessive thirst. Denies intolerance to heat or cold.  PSYCHOSOCIAL: Denies depression or anxiety. Denies any recent memory loss.       Historical Information   Past Medical History:   Diagnosis Date    Anemia     Basilar artery stenosis     Decreased hearing     Encounter for care or replacement of suprapubic tube (HCC)     Hemopneumothorax 03/17/2024    chest tube inserted-right    HTN (hypertension) 09/23/2013    Hypertension     Mesenteric ischemia (HCC)     Portal vein thrombosis     Seizure-like activity (HCC) 04/16/2024     Past Surgical History:   Procedure Laterality Date    IR CHEST TUBE PLACEMENT  3/17/2024    IR PARACENTESIS  7/29/2024    IR PARACENTESIS  8/16/2024    IR SUPRAPUBIC CATHETER CHECK/CHANGE/REINSERTION/UPSIZE  5/17/2024    LAPAROTOMY N/A 3/13/2024    Procedure: LAPAROTOMY EXPLORATORY; SMALL BOWEL RESECTION; LYSIS OF ADHESIONS; SUPRPAPUBIC TUBE INSERTION;  Surgeon: Marvin Azul MD;  Location: [facility];  Service: General    LAPAROTOMY N/A 3/14/2024    Procedure: EXPLORATORY LAPAROTOMY, WASHOUT. SMALL BOWEL ANASTOMOSIS, CONTROL OF LIVER BLEED, CLOSURE OF ABDOMINAL WOUND;  Surgeon: Beltran Lorenz DO;  Location:  MAIN OR;  Service: General     Social History   Social History     Substance and Sexual Activity   Alcohol Use Never     Social History     Substance and Sexual Activity   Drug Use Never     Social History     Tobacco Use   Smoking Status Never    Passive exposure: Never   Smokeless Tobacco Never     History reviewed. No pertinent family history.    Meds/Allergies       Medications Prior to Admission:     apixaban (ELIQUIS) 5 mg    aspirin 81 mg  "chewable tablet    atorvastatin (LIPITOR) 20 mg tablet    furosemide (LASIX) 20 mg tablet    levETIRAcetam (KEPPRA) 750 mg tablet    spironolactone (ALDACTONE) 50 mg tablet  Current Facility-Administered Medications   Medication Dose Route Frequency    atorvastatin (LIPITOR) tablet 20 mg  20 mg Oral Daily With Dinner    cefTRIAXone (ROCEPHIN) IVPB (premix in dextrose) 1,000 mg 50 mL  1,000 mg Intravenous Q24H    furosemide (LASIX) tablet 20 mg  20 mg Oral Daily    levETIRAcetam (KEPPRA) tablet 750 mg  750 mg Oral Q12H RONALD    pantoprazole (PROTONIX) injection 40 mg  40 mg Intravenous Q12H RONALD    spironolactone (ALDACTONE) tablet 50 mg  50 mg Oral Daily       No Known Allergies        Objective     Blood pressure 95/51, pulse 64, temperature (!) 97.3 °F (36.3 °C), temperature source Oral, resp. rate 17, height 5' 3\" (1.6 m), weight 54 kg (119 lb), SpO2 97%. Body mass index is 21.08 kg/m².      Intake/Output Summary (Last 24 hours) at 8/21/2024 1008  Last data filed at 8/21/2024 0845  Gross per 24 hour   Intake 1720.83 ml   Output 175 ml   Net 1545.83 ml         PHYSICAL EXAM:      General Appearance:   Alert, cooperative, no distress   HEENT:   Normocephalic, atraumatic, anicteric.     Neck:  Supple, symmetrical, trachea midline   Lungs:   Clear to auscultation bilaterally; no rales, rhonchi or wheezing; respirations unlabored    Heart::   Regular rate and rhythm; no murmur, rub, or gallop.   Abdomen:   Soft, non-tender, positive distention, not firm, positive bowel sounds, no rebound rigidity or guarding   Genitalia:   Deferred    Rectal:   Deferred    Extremities:  No cyanosis, clubbing or edema    Pulses:  2+ and symmetric all extremities    Skin:  No jaundice, rashes, or lesions    Lymph nodes:  No palpable cervical lymphadenopathy        Lab Results:   Admission on 08/20/2024   Component Date Value    WBC 08/20/2024 8.16     RBC 08/20/2024 2.39 (L)     Hemoglobin 08/20/2024 4.2 (LL)     Hematocrit 08/20/2024 18.5 " (L)     MCV 08/20/2024 77 (L)     MCH 08/20/2024 17.6 (L)     MCHC 08/20/2024 22.8 (L)     RDW 08/20/2024 18.8 (H)     MPV 08/20/2024 11.1     Platelets 08/20/2024 251     nRBC 08/20/2024 0     Segmented % 08/20/2024 81 (H)     Immature Grans % 08/20/2024 1     Lymphocytes % 08/20/2024 12 (L)     Monocytes % 08/20/2024 5     Eosinophils Relative 08/20/2024 1     Basophils Relative 08/20/2024 0     Absolute Neutrophils 08/20/2024 6.63     Absolute Immature Grans 08/20/2024 0.04     Absolute Lymphocytes 08/20/2024 0.97     Absolute Monocytes 08/20/2024 0.43     Eosinophils Absolute 08/20/2024 0.06     Basophils Absolute 08/20/2024 0.03     Sodium 08/20/2024 137     Potassium 08/20/2024 3.6     Chloride 08/20/2024 103     CO2 08/20/2024 27     ANION GAP 08/20/2024 7     BUN 08/20/2024 14     Creatinine 08/20/2024 1.00     Glucose 08/20/2024 103     Calcium 08/20/2024 8.6     AST 08/20/2024 12 (L)     ALT 08/20/2024 9     Alkaline Phosphatase 08/20/2024 46     Total Protein 08/20/2024 5.8 (L)     Albumin 08/20/2024 3.7     Total Bilirubin 08/20/2024 0.94     eGFR 08/20/2024 67     Protime 08/20/2024 18.0 (H)     INR 08/20/2024 1.43 (H)     PTT 08/20/2024 31     ABO Grouping 08/20/2024 A     Rh Factor 08/20/2024 Positive     Antibody Screen 08/20/2024 Negative     Specimen Expiration Date 08/20/2024 [id number]     LACTIC ACID 08/20/2024 2.4 (H)     Iron Saturation 08/20/2024 5 (L)     TIBC 08/20/2024 356     Iron 08/20/2024 17 (L)     UIBC 08/20/2024 339     Ferritin 08/20/2024 4 (L)     Unit Product Code 08/21/2024 [id number]     Unit Number 08/21/2024 [id number]     Unit ABO 08/21/2024 A     Unit RH 08/21/2024 POS     Crossmatch 08/21/2024 Compatible     Unit Dispense Status 08/21/2024 Presumed Trans     Unit Product Volume 08/21/2024 350     Unit Product Code 08/21/2024 E7351J65     Unit Number 08/21/2024 [id number]     Unit ABO 08/21/2024 A     Unit RH 08/21/2024 POS     Crossmatch 08/21/2024 Compatible      Unit Dispense Status 08/21/2024 Presumed Trans     Unit Product Volume 08/21/2024 350     Unit Product Code 08/21/2024 [id number]     Unit Number 08/21/2024 [id number]     Unit ABO 08/21/2024 A     Unit RH 08/21/2024 NEG     Crossmatch 08/21/2024 Compatible     Unit Dispense Status 08/21/2024 Presumed Trans     Unit Product Volume 08/21/2024 350     LACTIC ACID 08/20/2024 3.2 (H)     Ammonia 08/20/2024 35     Vitamin B-12 08/20/2024 595     Folate 08/20/2024 17.4     Sodium 08/21/2024 138     Potassium 08/21/2024 3.7     Chloride 08/21/2024 106     CO2 08/21/2024 26     ANION GAP 08/21/2024 6     BUN 08/21/2024 14     Creatinine 08/21/2024 0.87     Glucose 08/21/2024 77     Calcium 08/21/2024 8.0 (L)     Corrected Calcium 08/21/2024 8.7     AST 08/21/2024 9 (L)     ALT 08/21/2024 8     Alkaline Phosphatase 08/21/2024 41     Total Protein 08/21/2024 5.0 (L)     Albumin 08/21/2024 3.1 (L)     Total Bilirubin 08/21/2024 2.59 (H)     eGFR 08/21/2024 77     Magnesium 08/21/2024 2.2     Phosphorus 08/21/2024 2.9     WBC 08/21/2024 13.82 (H)     RBC 08/21/2024 3.56 (L)     Hemoglobin 08/21/2024 8.3 (L)     Hematocrit 08/21/2024 28.9 (L)     MCV 08/21/2024 81 (L)     MCH 08/21/2024 23.0 (L)     MCHC 08/21/2024 28.4 (L)     RDW 08/21/2024 17.1 (H)     MPV 08/21/2024 10.2     Platelets 08/21/2024 191     nRBC 08/21/2024 0     Segmented % 08/21/2024 88 (H)     Immature Grans % 08/21/2024 1     Lymphocytes % 08/21/2024 6 (L)     Monocytes % 08/21/2024 5     Eosinophils Relative 08/21/2024 0     Basophils Relative 08/21/2024 0     Absolute Neutrophils 08/21/2024 12.09 (H)     Absolute Immature Grans 08/21/2024 0.09     Absolute Lymphocytes 08/21/2024 0.88     Absolute Monocytes 08/21/2024 0.69     Eosinophils Absolute 08/21/2024 0.03     Basophils Absolute 08/21/2024 0.04        Imaging Studies: CTA abdomen pelvis w wo contrast    Result Date: 8/20/2024  Narrative: CT ANGIOGRAM OF THE ABDOMEN AND PELVIS WITH IV CONTRAST  INDICATION: Recent paracentesis; Hx of vascular occlusion. As per review of electronic medical record, patient with multiple comorbidities including cirrhosis, portal vein thrombosis on Eliquis, and mesenteric ischemia resulting in small bowel resection in March 2024. Patient status post ultrasound-guided paracentesis on 8/16/2024, with significant drop in hemoglobin. COMPARISON: CT of the abdomen and pelvis from 3/30/2024 and 3/13/2024, and abdominal ultrasound from 7/23/2024. TECHNIQUE: CT examination of the abdomen and pelvis was performed after the administration of intravenous contrast during the arterial and venous phases. Precontrast images were inadvertently not obtained. 3D reformatted images and volume rendering were performed on an independent workstation.  Sagittal and coronal 2D reformatted images were created from the source data and submitted for interpretation. Radiation dose length product (DLP) for this visit:  1734 mGy-cm . This examination, like all CT scans performed in the [facility], was performed utilizing techniques to minimize radiation dose exposure, including the use of iterative reconstruction and automated exposure control. IV Contrast: 100 mL of iohexol (OMNIPAQUE) Enteric Contrast: Enteric contrast was not administered. FINDINGS: VESSELS: Normal caliber abdominal aorta. No aortic dissection. Scattered atherosclerotic calcifications in the infrarenal abdominal aorta, and the bilateral common, internal and external iliac arteries with resultant multifocal stenoses. The thrombi seen  in the infrarenal abdominal aorta on the 3/13/2024 CT have resolved. There is no celiac axis, with the left gastric, common hepatic, and splenic arteries arising directly from the abdominal aorta. These vessels are patent. The superior mesenteric artery, inferior mesenteric artery and bilateral renal arteries are overall patent. The bilateral common iliac arteries are overall patent and  normal in caliber. The bilateral internal and external iliac arteries are also overall patent. There is chronic occlusion of the portal veins with cavernous transformation. The superior mesenteric vein is not visualized due to its chronic thrombosis. There is formation of collateral vessels in the abdomen. The hepatic veins, bilateral renal veins, bilateral common iliac, internal iliac and external iliac veins are patent. The IVC is patent. ABDOMEN LOWER CHEST: There our tubular opacities with associated bronchiolectasis in the lower lobes, right greater than left, likely areas of scarring. Atelectatic changes seen at the right lung base anteriorly. No pleural effusions. The partially imaged heart is enlarged. Small hiatal hernia. LIVER/BILIARY TREE: The liver demonstrates a nodular contour suggestive of cirrhosis. There is heterogeneous attenuation of the liver parenchyma on the arterial phase. This is likely perfusional, related to the diffuse portal vein thrombosis as the parenchyma appears homogeneous on the venous phase. There is a 5 mm focus of arterial hyperenhancement in segment 4A of the liver (series 3 image 23), which appears to blend into perceptibly on the venous phase. Based on the Liver-Imaging-Reporting and Data System lexicon version 2018/version 2024, this is a LI-RADS 3 (intermediate probability for HCC) lesion. There is an exophytic 1.7 cm nodule right from segment 7 medially. It appears to match the liver parenchyma in attenuation. Subcentimeter hypodense lesions are seen in the liver, too small to accurately characterize with CT technique, however statistically likely cysts, similar to March 2024 No biliary ductal dilation. GALLBLADDER: No calcified gallstones. No pericholecystic inflammatory change. SPLEEN: The spleen is at the upper limits of normal in size. There is a 2.4 x 1.4 cm lesion measuring higher than simple fluid in density in the spleen medially. It appears hypodense on the  arterial phase, and appears more imperceptible on the venous phase, possibly representing a hemangioma. PANCREAS: Unremarkable. Normal caliber main pancreatic duct. ADRENAL GLANDS: Unremarkable. KIDNEYS/URETERS: Symmetric renal enhancement. No intrarenal or ureteral calculi. No hydronephrosis. Simple cyst in the right kidney. There are also several subcentimeter hypodense lesions in the kidneys, too small to accurately characterize with CT technique, however statistically most likely a cyst. STOMACH AND BOWEL: Evaluation of the gastrointestinal tract is somewhat limited by underdistention and lack of oral contrast. Evaluation for bowel inflammation further limited by the presence of ascites. There are gastroesophageal varices present. Internal hemorrhoids are also present. A small bowel anastomosis is seen in the left lower quadrant. No bowel obstruction. There is circumferential wall thickening of the distal ascending colon and the hepatic flexure, likely related to portal hypertensive colopathy. There are multiple foci of air projecting over the region of the colonic wall, however most, if not all of the air appears to be contiguous with the lumen. No convincing pneumatosis intestinalis given underdistention and aforementioned wall thickening with evaluation further limited by the presence of surrounding ascites. No loss of bowel wall enhancement. There is no extravasation of IV contrast into the lumen of the gastrointestinal tract to suggest a large volume gastrointestinal hemorrhage. APPENDIX: Normal appendix. ABDOMINOPELVIC CAVITY: No pneumoperitoneum. Large abdominal and pelvic ascites. No organized fluid collections. No hyperdense fluid to suggest hematoma. Several hypodense foci, some of which appear to be fat density seen in the right side of the pelvis anteriorly, possibly related to recent paracentesis. LYMPH NODES: No abdominal or pelvic lymphadenopathy. PELVIS REPRODUCTIVE ORGANS: The prostate gland is  enlarged, measuring 6.0 x 5.0 x 4.9 cm (volume of 77 mL). There is a hyperdense nodule measuring at least 1.7 cm, which appears exophytic from the left side of the prostate gland at the level of the mid gland. Although this may represent an exophytic BPH nodule extending into the peripheral zone and beyond, underlying neoplasm is not excluded. URINARY BLADDER: A suprapubic catheter is seen within the urinary bladder. The urinary bladder demonstrates circumferential wall thickening out of proportion for degree of underdistention. ABDOMINAL WALL/INGUINAL REGIONS: Small, right-sided lumbar hernia containing fat and fluid. There is also a small fat-containing supraumbilical ventral abdominal wall hernia in the midline. There is anasarca. MUSCULOSKELETAL: No focal aggressive osseous lesions. Several subcentimeter densely sclerotic lesions are seen in the left iliac bone and the L5 vertebral body., statistically likely representing bone islands. Degenerative changes of the spine. Generalized osteopenia. No hyperdense subcutaneous or intramuscular fluid collections to suggest hematoma.     Impression: 1) No abdominal or pelvic hematoma. 2) Patent abdominal aorta and its major branches. Resolution of infrarenal abdominal aortic thrombi seen on the March 2024 CT. 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 3) Cirrhotic liver morphology with evidence of portal hypertension manifested by large abdominal and pelvic ascites, portal hypertensive colopathy, gastroesophageal varices and internal hemorrhoids. 4) 5 mm focus of arterial hyperenhancement in segment 4A of the liver, which blends imperceptibly on subsequent venous phase. This is compatible with the LI-RADS 3 (intermediate probability for HCC) lesion. 5) 1.7 cm exophytic lesion in segment 7 of the liver medially, matching liver parenchyma and attenuation, of unclear etiology. Attention on follow-up. 6) Circumferential wall thickening  of the urinary bladder out of proportion for degree of underdistention with suprapubic catheter in place. This may be related to chronic bladder outlet obstruction and/or cystitis. 7) Prostatomegaly (volume of 77 mL) with a 1.7 cm hyperdense, exophytic nodule in the midgland on the left, as described above. Although this may represent an exophytic BPH nodule extending into the peripheral zone and beyond, underlying neoplasm is not excluded. 8) Multiple additional findings as above. The study was marked in EPIC for immediate notification. Workstation performed: [id number]     IR AMB Paracentesis    Result Date: 8/16/2024  Narrative: PROCEDURE: Therapeutic paracentesis STAFF: Willy Storey M.D. NUMBER OF IMAGES: Multiple. COMPLICATIONS: None. MEDICATIONS: 1% lidocaine subcutaneous. INDICATION: Symptomatic ascites. PROCEDURE: Using ultrasound guidance, the right paracolic gutter was punctured and a catheter was placed into the peritoneal cavity. A total of 3000 milliliters of fluid was removed. The catheter was then removed. FINDINGS: Moderate ascites. Straw colored ascites was removed.     Impression: Therapeutic paracentesis. Workstation performed: [id number]     IR AMB Paracentesis    Result Date: 7/29/2024  Narrative: PROCEDURE: Ultrasound-guided paracentesis Procedural Personnel Attending physician(s): Giovanny Herrera MD Pre-procedure diagnosis: Ascites Post-procedure diagnosis: Same PROCEDURE SUMMARY: Ultrasound-guided paracentesis PROCEDURE DETAILS: Pre-procedure Consent: Informed consent for the procedure including risks, benefits and alternatives was obtained and time-out was performed prior to the procedure. Preparation: The site was prepared and draped using maximal sterile barrier technique including cutaneous antisepsis. Anesthesia/sedation Level of anesthesia/sedation: No sedation Technique and Findings: Immediate pre-procedure ultrasound demonstrated large ascites.  A safe percutaneous access site  was identified in the right lower quadrant. Local anesthetic was administered.  Under real-time ultrasound guidance, a 5 Fr [employer] needle and catheter were advanced percutaneously into the ascites.  A permanent image was saved.  The needle was removed and the catheter attached to suction.  A total of 3100 mL transparent yellow fluid was removed. A sample of fluid was sent for laboratory analysis. The catheter was removed and a sterile dressing was applied. Contrast None. Radiation Dose None. Additional Details Specimens removed: Ascites Estimated blood loss (mL): Less than 10 Complications: No immediate complications.     Impression: Ultrasound-guided paracentesis, yielding a total of 3100 mL of transparent yellow fluid, a sample of which was sent for laboratory analysis. Workstation performed: [id number]     US abdomen complete    Result Date: 7/23/2024  Narrative: ABDOMEN ULTRASOUND, COMPLETE INDICATION: R14.0: Abdominal distension (gaseous). COMPARISON: CT abdomen/pelvis 3/30/2024. TECHNIQUE: Real-time ultrasound of the abdomen was performed with a curvilinear transducer with both volumetric sweeps and still imaging techniques. FINDINGS: PANCREAS: Visualized portions of the pancreas are within normal limits. AORTA AND IVC: Visualized portions are normal for patient age. LIVER: Size: Within normal range. The liver measures 14.4 cm in the midclavicular line. Contour: Surface contour is nodular. Parenchyma: There is diffuse coarsened heterogeneous echotexture suggesting underlying cirrhotic changes. One or more benign-appearing hepatic cyst is noted.  No evidence of suspicious hepatic mass. Findings suspicious for chronic portal vein occlusion with cavernous transformation BILIARY: The gallbladder is normal in caliber. Mild wall thickening without pericholecystic fluid. No stones or sludge identified. No sonographic Berry sign. No intrahepatic biliary dilatation. CBD measures 5.0 mm. No choledocholithiasis.  KIDNEY: Right kidney measures 8.5 x 3.8 x 4.3 cm, potentially foreshortened due to limited views.. Volume 72.5 mL Few cysts measuring up to 1.6 cm. Left kidney measures 10.2 x 5.7 x 4.6 cm. Volume 139.1 mL Few cysts measuring up to 0.4 cm. SPLEEN: Measures 10.2 x 10.2 x 6.1 cm. Volume 328.9 mL Within normal limits. ASCITES: Moderate volume upper abdominal ascites. Right pleural effusion.     Impression: Findings suspicious for cirrhosis with moderate volume upper abdominal ascites. Findings suspicious for chronic portal vein occlusion and cavernous transformation. If confirmation is clinically relevant, CT abdomen with contrast can be obtained for further evaluation. Gallbladder wall thickening, nonspecific in the setting of liver disease. Workstation performed: [id number]

## 2024-08-21 NOTE — ASSESSMENT & PLAN NOTE
Patient with suprapubic catheter in place, functioning well  -No evidence of obstruction or renal compromise  -Continue to monitor I/O

## 2024-08-21 NOTE — PLAN OF CARE
Problem: PAIN - ADULT  Goal: Verbalizes/displays adequate comfort level or baseline comfort level  Description: Interventions:  - Encourage patient to monitor pain and request assistance  - Assess pain using appropriate pain scale  - Administer analgesics based on type and severity of pain and evaluate response  - Implement non-pharmacological measures as appropriate and evaluate response  - Consider cultural and social influences on pain and pain management  - Notify physician/advanced practitioner if interventions unsuccessful or patient reports new pain  Outcome: Progressing     Problem: INFECTION - ADULT  Goal: Absence or prevention of progression during hospitalization  Description: INTERVENTIONS:  - Assess and monitor for signs and symptoms of infection  - Monitor lab/diagnostic results  - Monitor all insertion sites, i.e. indwelling lines, tubes, and drains  - Monitor endotracheal if appropriate and nasal secretions for changes in amount and color  - [place] appropriate cooling/warming therapies per order  - Administer medications as ordered  - Instruct and encourage patient and family to use good hand hygiene technique  - Identify and instruct in appropriate isolation precautions for identified infection/condition  Outcome: Progressing  Goal: Absence of fever/infection during neutropenic period  Description: INTERVENTIONS:  - Monitor WBC    Outcome: Progressing     Problem: SAFETY ADULT  Goal: Patient will remain free of falls  Description: INTERVENTIONS:  - Educate patient/family on patient safety including physical limitations  - Instruct patient to call for assistance with activity   - Consult OT/PT to assist with strengthening/mobility   - Keep Call bell within reach  - Keep bed low and locked with side rails adjusted as appropriate  - Keep care items and personal belongings within reach  - Initiate and maintain comfort rounds  - Make Fall Risk Sign visible to staff  - Offer Toileting every 2 Hours,  in advance of need  - Initiate/Maintain BED alarm  - Obtain necessary fall risk management equipment: BED ALARM   - Apply yellow socks and bracelet for high fall risk patients  - Consider moving patient to room near nurses station  Outcome: Progressing  Goal: Maintain or return to baseline ADL function  Description: INTERVENTIONS:  -  Assess patient's ability to carry out ADLs; assess patient's baseline for ADL function and identify physical deficits which impact ability to perform ADLs (bathing, care of mouth/teeth, toileting, grooming, dressing, etc.)  - Assess/evaluate cause of self-care deficits   - Assess range of motion  - Assess patient's mobility; develop plan if impaired  - Assess patient's need for assistive devices and provide as appropriate  - Encourage maximum independence but intervene and supervise when necessary  - Involve family in performance of ADLs  - Assess for home care needs following discharge   - Consider OT consult to assist with ADL evaluation and planning for discharge  - Provide patient education as appropriate  Outcome: Progressing  Goal: Maintains/Returns to pre admission functional level  Description: INTERVENTIONS:  - Perform AM-PAC 6 Click Basic Mobility/ Daily Activity assessment daily.  - Set and communicate daily mobility goal to care team and patient/family/caregiver.   - Collaborate with rehabilitation services on mobility goals if consulted  - Perform Range of Motion 3 times a day.  - Reposition patient every 2 hours.  - Dangle patient 3 times a day  - Stand patient 3 times a day  - Ambulate patient 3 times a day  - Out of bed to chair 3 times a day   - Out of bed for meals 3 times a day  - Out of bed for toileting  - Record patient progress and toleration of activity level   Outcome: Progressing     Problem: DISCHARGE PLANNING  Goal: Discharge to home or other facility with appropriate resources  Description: INTERVENTIONS:  - Identify barriers to discharge w/patient and  caregiver  - Arrange for needed discharge resources and transportation as appropriate  - Identify discharge learning needs (meds, wound care, etc.)  - Arrange for interpretive services to assist at discharge as needed  - Refer to Case Management Department for coordinating discharge planning if the patient needs post-hospital services based on physician/advanced practitioner order or complex needs related to functional status, cognitive ability, or social support system  Outcome: Progressing     Problem: Knowledge Deficit  Goal: Patient/family/caregiver demonstrates understanding of disease process, treatment plan, medications, and discharge instructions  Description: Complete learning assessment and assess knowledge base.  Interventions:  - Provide teaching at level of understanding  - Provide teaching via preferred learning methods  Outcome: Progressing

## 2024-08-21 NOTE — ASSESSMENT & PLAN NOTE
Recent diagnosis of liver cirrhosis with etiology unknown. Patient denies alcohol/drug use  - chronic hepatitis panel and other lab work ordered as per GI  -Evidence of large volume ascites with history of recurrent ascites requiring paracentesis  IR consulted for paracentesis-   -Previous ascitic fluid studies appear transudative without evidence of SBP  -Continue home medications lasix, spironolactone  -Continue ceftriaxone for SBP prophylaxis

## 2024-08-21 NOTE — UTILIZATION REVIEW
Initial Clinical Review    Admission: Date/Time/Statement:   Admission Orders (From admission, onward)       Ordered        08/20/24 1834  INPATIENT ADMISSION  Once                          Orders Placed This Encounter   Procedures    INPATIENT ADMISSION     Standing Status:   Standing     Number of Occurrences:   1     Order Specific Question:   Level of Care     Answer:   Med Surg [16]     Order Specific Question:   Estimated length of stay     Answer:   More than 2 Midnights     Order Specific Question:   Certification     Answer:   I certify that inpatient services are medically necessary for this patient for a duration of greater than two midnights. See H&P and MD Progress Notes for additional information about the patient's course of treatment.     ED Arrival Information       Expected   8/20/2024 13:08    Arrival   8/20/2024 13:46    Acuity   Urgent              Means of arrival   Walk-In    Escorted by   Family Member    Service   Hospitalist    Admission type   Emergency              Arrival complaint   cirrhosis of liver with ascites             Chief Complaint   Patient presents with    Abnormal Lab     Hgb is very low       Initial Presentation:   88 yom to ER from home for anemia. Recent OP labs showing Hgb 4.2 per son. Patient did also recently undergo a paracentesis approximately 4 days ago. Son reports that his abdomen is more distended than it was immediately after the paracentesis. Hx liver cirrhosis with ascites (unknown etiology), portal vein thrombosis on eliquis, chronic anemia, urinary retention s/p suprapubic catheter placement, seizure disorder. Presents hypotensive, abd distended, BLE edema. Admission CTA: Cirrhotic liver morphology with evidence of portal hypertension manifested by large abdominal and pelvic ascites, portal hypertensive colopathy, gastroesophageal varices and internal hemorrhoids. Labs: Hgb 4.2, ast 12, tprot 5.8, PT 18, INR 1.43, lactic acid 2.4, ferritin 4, iron sat 5,  iron 17.  Admitted to inpatient status for acute on chronic anemia. Transfusions ordered.     Anticipated Length of Stay/Certification Statement:   Patient will be admitted on an inpatient basis with an anticipated length of stay of greater than 2 midnights secondary to Severe anemia     Date: 8/21/24   Day 2:   Acute on chronic anemia. Hgb 8.3 s/p transfusions. GI following. EGD planned for tomorrow, Eliquis on hold.     Per GI: anemia  Check AFP.MELD is around 14, continue to monitor MELD labs. Pt s/p 3 units PRBCs, Hgb 8.3 today, EGD should be performed tomorrow, Tamarais held, BUN WNL. Follow serial H&H. Continue Protonix IV twice daily    ED Triage Vitals [08/20/24 1353]   Temperature Pulse Respirations Blood Pressure SpO2 Pain Score   (!) 96 °F (35.6 °C) 95 20 (!) 107/48 100 % No Pain     Weight (last 2 days)       Date/Time Weight    08/21/24 00:31:11 54 (119)    08/20/24 1353 54 (119)            Vital Signs (last 3 days)       Date/Time Temp Pulse Resp BP MAP (mmHg) SpO2 O2 Device Patient Position - Orthostatic VS Pain    08/21/24 11:42:20 -- 64 16 115/62 80 99 % -- -- --    08/21/24 08:38:22 97.3 °F (36.3 °C) 64 17 95/51 66 -- -- -- --    08/21/24 07:19:42 -- 63 -- 94/51 65 97 % None (Room air) -- No Pain    08/21/24 07:16:36 -- 61 16 93/51 65 98 % -- -- --    08/21/24 02:36:52 97 °F (36.1 °C) 55 18 123/56 78 97 % -- Lying --    08/21/24 02:14:06 97.2 °F (36.2 °C) 59 18 119/58 78 -- -- Lying --    08/21/24 01:03:21 -- 55 -- 118/61 80 99 % -- -- --    08/21/24 00:31:11 97.1 °F (36.2 °C) 50 18 117/55 76 98 % -- Lying --    08/21/24 00:12:25 -- 55 -- 107/50 69 99 % -- -- --    08/21/24 0010 96.9 °F (36.1 °C) 63 18 107/50 -- -- -- -- --    08/20/24 2359 -- 56 18 -- -- -- -- -- --    08/20/24 23:57:51 96.2 °F (35.7 °C) -- -- 108/50 69 -- -- -- --    08/20/24 2340 96.7 °F (35.9 °C) 63 18 -- -- -- -- -- --    08/20/24 23:10:03 -- -- -- 109/56 74 -- -- -- --    08/20/24 23:08:49 -- 55 -- -- -- 98 % -- -- --     08/20/24 22:43:48 96 °F (35.6 °C) 57 18 107/54 72 100 % -- -- --    08/20/24 21:54:29 -- 57 -- 112/54 73 98 % -- -- --    08/20/24 2152 96.5 °F (35.8 °C) -- 18 -- -- -- -- -- --    08/20/24 21:28:52 -- -- -- 114/55 75 -- -- -- --    08/20/24 2125 96.8 °F (36 °C) -- 18 114/55 -- -- -- -- --    08/20/24 2120 96.6 °F (35.9 °C) 62 18 116/56 -- -- -- -- --    08/20/24 20:52:54 95.9 °F (35.5 °C) 64 18 119/58 78 98 % -- -- No Pain    08/20/24 2000 -- -- 18 -- -- -- -- -- --    08/20/24 1948 95.9 °F (35.5 °C) 60 16 129/60 83 98 % -- -- No Pain    08/20/24 1923 96.4 °F (35.8 °C) 63 16 141/63 -- 97 % -- -- No Pain    08/20/24 1745 96.8 °F (36 °C) -- -- 130/60 -- 98 % None (Room air) -- --    08/20/24 1730 96.9 °F (36.1 °C) 80 13 125/61 -- 97 % None (Room air) -- --    08/20/24 1720 96.7 °F (35.9 °C) 75 14 134/61 -- 96 % None (Room air) -- --    08/20/24 1715 96.6 °F (35.9 °C) 75 13 123/58 -- 99 % None (Room air) -- --    08/20/24 1705 96.9 °F (36.1 °C) 77 13 157/78 -- 96 % None (Room air) -- --    08/20/24 1700 -- -- -- 157/78 -- -- -- -- --    08/20/24 1654 -- -- -- -- -- -- None (Room air) -- --    08/20/24 1652 96.9 °F (36.1 °C) 80 15 116/55 -- 100 % None (Room air) Lying --    08/20/24 1615 -- 74 15 111/57 80 -- -- -- --    08/20/24 1353 96 °F (35.6 °C) 95 20 107/48 -- 100 % None (Room air) -- No Pain              Pertinent Labs/Diagnostic Test Results:   Radiology:  CTA abdomen pelvis w wo contrast   Final Interpretation by Katharine Carrillo MD (08/20 1811)      1) No abdominal or pelvic hematoma.      2) Patent abdominal aorta and its major branches. Resolution of infrarenal abdominal aortic thrombi seen on the March 2024 CT. 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      3) Cirrhotic liver morphology with evidence of portal hypertension manifested by large abdominal and pelvic ascites, portal hypertensive colopathy, gastroesophageal varices and internal hemorrhoids.      4) 5 mm  focus of arterial hyperenhancement in segment 4A of the liver, which blends imperceptibly on subsequent venous phase. This is compatible with the LI-RADS 3 (intermediate probability for HCC) lesion.      5) 1.7 cm exophytic lesion in segment 7 of the liver medially, matching liver parenchyma and attenuation, of unclear etiology. Attention on follow-up.      6) Circumferential wall thickening of the urinary bladder out of proportion for degree of underdistention with suprapubic catheter in place. This may be related to chronic bladder outlet obstruction and/or cystitis.      7) Prostatomegaly (volume of 77 mL) with a 1.7 cm hyperdense, exophytic nodule in the midgland on the left, as described above. Although this may represent an exophytic BPH nodule extending into the peripheral zone and beyond, underlying neoplasm is not    excluded.      8) Multiple additional findings as above.      The study was marked in EPIC for immediate notification.               Workstation performed: [id number]         IR INPATIENT Paracentesis    (Results Pending)       Results from last 7 days   Lab Units 08/21/24  0715 08/20/24  1458   WBC Thousand/uL 13.82* 8.16   HEMOGLOBIN g/dL 8.3* 4.2*   HEMATOCRIT % 28.9* 18.5*   PLATELETS Thousands/uL 191 251   TOTAL NEUT ABS Thousands/µL 12.09* 6.63     Results from last 7 days   Lab Units 08/21/24  0715 08/20/24  1458   SODIUM mmol/L 138 137   POTASSIUM mmol/L 3.7 3.6   CHLORIDE mmol/L 106 103   CO2 mmol/L 26 27   ANION GAP mmol/L 6 7   BUN mg/dL 14 14   CREATININE mg/dL 0.87 1.00   EGFR ml/min/1.73sq m 77 67   CALCIUM mg/dL 8.0* 8.6   MAGNESIUM mg/dL 2.2  --    PHOSPHORUS mg/dL 2.9  --      Results from last 7 days   Lab Units 08/21/24  0715 08/20/24  2024 08/20/24  1458   AST U/L 9*  --  12*   ALT U/L 8  --  9   ALK PHOS U/L 41  --  46   TOTAL PROTEIN g/dL 5.0*  --  5.8*   ALBUMIN g/dL 3.1*  --  3.7   TOTAL BILIRUBIN mg/dL 2.59*  --  0.94   AMMONIA umol/L  --  35  --      Results from last 7  days   Lab Units 08/21/24  0715 08/20/24  1458   GLUCOSE RANDOM mg/dL 77 103     Beta- Hydroxybutyrate   Date Value Ref Range Status   03/16/2024 5.26 (H) 0.02 - 0.27 mmol/L Final      Results from last 7 days   Lab Units 08/20/24  1458   PROTIME seconds 18.0*   INR  1.43*   PTT seconds 31     Results from last 7 days   Lab Units 08/20/24  1736 08/20/24  1458   LACTIC ACID mmol/L 3.2* 2.4*     Results from last 7 days   Lab Units 08/20/24  1458   FERRITIN ng/mL 4*   IRON SATURATION % 5*   IRON ug/dL 17*   TIBC ug/dL 356     Results from last 7 days   Lab Units 08/21/24  0615   UNIT PRODUCT CODE  [id number]  [id number]  [id number]   UNIT NUMBER  [id number]  [id number]  [id number]   UNITABO  A  A  A   UNITRH  POS  POS  NEG   CROSSMATCH  Compatible  Compatible  Compatible   UNIT DISPENSE STATUS  Presumed Trans  Presumed Trans  Presumed Trans   UNIT PRODUCT VOL ml 350  350  350     Results from last 7 days   Lab Units 08/20/24 2024   HEP B S AG  Non-reactive   HEP B C IGM  Non-reactive       ED Treatment-Medication Administration from 08/20/2024 1307 to 08/20/2024 1937         Date/Time Order Dose Route Action     08/20/2024 1525 pantoprazole (PROTONIX) injection 40 mg 40 mg Intravenous Given     08/20/2024 1549 sodium chloride 0.9 % bolus 1,000 mL 1,000 mL Intravenous New Bag     08/20/2024 1623 iohexol (OMNIPAQUE) 350 MG/ML injection (SINGLE-DOSE) 100 mL 100 mL Intravenous Given            Past Medical History:   Diagnosis Date    Anemia     Basilar artery stenosis     Decreased hearing     Encounter for care or replacement of suprapubic tube (HCC)     Hemopneumothorax 03/17/2024    chest tube inserted-right    HTN (hypertension) 09/23/2013    Hypertension     Mesenteric ischemia (HCC)     Portal vein thrombosis     Seizure-like activity (HCC) 04/16/2024     Present on Admission:   Urine retention   Seizure-like activity (HCC)   Portal vein thrombosis   Moderate protein-calorie malnutrition  (HCC)   Cirrhosis of liver with ascites  (HCC)   Acute on chronic anemia      Admitting Diagnosis: Anemia [D64.9]  Abnormal CT of the abdomen [R93.5]  Elevated lactic acid level [R79.89]  Age/Sex: 88 y.o. male  Admission Orders:  Scd/foot pumps  Transfuse 3uprbc's  Consult GI    Scheduled Medications:  atorvastatin, 20 mg, Oral, Daily With Dinner  cefTRIAXone, 1,000 mg, Intravenous, Q24H  furosemide, 20 mg, Oral, Daily  levETIRAcetam, 750 mg, Oral, Q12H RONALD  pantoprazole, 40 mg, Intravenous, Q12H RONALD  spironolactone, 50 mg, Oral, Daily    Network Utilization Review Department  ATTENTION: Please call with any questions or concerns to [phone] and carefully listen to the prompts so that you are directed to the right person. All voicemails are confidential.   For Discharge needs, contact Care Management DC Support Team at [phone] opt. [phone]  Send all requests for admission clinical reviews, approved or denied determinations and any other requests to dedicated fax number below belonging to the [place] where the patient is receiving treatment. List of dedicated fax numbers for the Facilities:  FACILITY NAME UR FAX NUMBER   ADMISSION DENIALS (Administrative/Medical Necessity) [phone]   DISCHARGE SUPPORT TEAM (NETWORK) [phone]   PARENT CHILD HEALTH (Maternity/NICU/Pediatrics) [phone]   [facility] [phone]   [facility] [phone]   [facility] [phone]   [facility] [phone]   [facility] [phone]   [facility] [phone]   [facility] [phone]   [facility] [phone]   [facility] [phone]   [facility] [phone]   [facility] [phone]   [facility]  [facility] [phone]

## 2024-08-21 NOTE — H&P
[facility]  H&P  Name: Jf Valera 88 y.o. male I MRN: [id number]  Unit/Bed#: [address] [place] [address] A  Date of Admission: 8/20/2024   Date of Service: 8/20/2024 I Hospital Day: 0      Assessment & Plan   Cirrhosis of liver with ascites  (HCC)  Assessment & Plan  -Recent diagnosis of liver cirrhosis with etiology unknown. Patient denies alcohol/drug use  -Will send chronic hepatitis panel. Further antibody workup/special testing as per GI  -No prior EGD performed; patient will benefit from EGD to assess for presence of esophageal varices  -Evidence of large volume ascites with history of recurrent ascites requiring paracentesis (last drained 4 days prior)  -Previous ascitic fluid studies appear transudative without evidence of SBP  -Continue home medications lasix, spironolactone; may consider increasing doses  -Continue ceftriaxone for SBP prophylaxis  -Patient will likely require paracentesis; consider IR consult after anemia improves    Lactic acidosis  Assessment & Plan  -LA 2.4>3.2.  -Possibly Type A in setting of anemia. Caution further fluids given liver cirrhosis and volume overload.   -Trend until level LA <2    Urine retention  Assessment & Plan  -Patient with suprapubic catheter in place, functioning well  -No evidence of obstruction or renal compromise  -Continue to monitor I/O    Seizure-like activity (HCC)  Assessment & Plan  -Continue home dose keppra    Moderate protein-calorie malnutrition (HCC)  Assessment & Plan  -NPO for now with oral diet as tolerated once cleared by GI  -BMI 21.08. increased age/deconditioning, and decreased appetite/oral intake and evidence of muscle wasting/atrophy     Portal vein thrombosis  Assessment & Plan  -Patient on eliquis and aspirin at home  -Will hold medications given anemia  -Continue outpatient follow up with vascular surgery as mentioned in previous notes    * Acute on chronic anemia  Assessment & Plan  -Hgb 4.2, previously 9 in April of this  year  -Hemodynamically stable  -Patient does not attest to any melena, hematochezia or hematemesis  -Anemia likely chronic, possibly due to iron/vitamine deficiency. May also be slow oozing from GI tract  -Do not suspect bleeding from esophageal varices at this time. Will not start octreotide infusion  -Continue high dose protonix, hold eliquis & aspirin  -iron/vitamin studies ordered  -NPO, GI consulted for endoscopy    VTE Pharmacologic Prophylaxis: VTE Score: 9 Low Risk (Score 0-2) - Encourage Ambulation.  Code Status: Full Code  Discussion with family: Updated  (wife and son) at bedside.    Anticipated Length of Stay: Patient will be admitted on an inpatient basis with an anticipated length of stay of greater than 2 midnights secondary to Severe anemia.    Total Time Spent on Date of Encounter in care of patient: 45 mins. This time was spent on one or more of the following: performing physical exam; counseling and coordination of care; obtaining or reviewing history; documenting in the medical record; reviewing/ordering tests, medications or procedures; communicating with other healthcare professionals and discussing with patient's family/caregivers.    Chief Complaint: Weakness    History of Present Illness:  Jf Valera is a 88 y.o. male with a PMH of liver cirrhosis with ascites (unknown etiology), portal vein thrombosis on eliquis, chronic anemia, urinary retention s/p suprapubic catheter placement, seizure disorder presenting to the ED due to anemia. Majority of information obtained by son at bedside as patient is hard of hearing. Patient recently diagnosed with cirrhosis and is uncertain of etiology. He has never undergone EGD or liver biopsy. Patient has had recurrent ascites requiring thoracentesis, last performed 4 days prior. Patient received a call by PCP today to seek emergency medical attention as lab work showed hgb level of 4.2. At home patient has felt fatigued and weak however  denied any hematemesis, hemoptysis, hematuria, hematochezia or melena. Patient has had poor oral intake during this time. He has also been on eliquis after being diagnosed with portal vein thrombosis in March of this year. Further denies any fevers, chills, abdominal pain, chest pain, dyspnea.    Review of Systems:  Review of Systems   Constitutional:  Positive for activity change, appetite change and fatigue.   HENT: Negative.     Eyes: Negative.    Respiratory: Negative.     Cardiovascular: Negative.    Gastrointestinal:  Positive for abdominal distention.   Endocrine: Negative.    Genitourinary: Negative.    Musculoskeletal: Negative.    Allergic/Immunologic: Negative.    Neurological: Negative.    Hematological:  Bruises/bleeds easily.   Psychiatric/Behavioral: Negative.         Past Medical and Surgical History:   Past Medical History:   Diagnosis Date    Anemia     Basilar artery stenosis     Decreased hearing     Encounter for care or replacement of suprapubic tube (HCC)     Hemopneumothorax 03/17/2024    chest tube inserted-right    HTN (hypertension) 09/23/2013    Hypertension     Mesenteric ischemia (HCC)     Portal vein thrombosis     Seizure-like activity (HCC) 04/16/2024       Past Surgical History:   Procedure Laterality Date    IR CHEST TUBE PLACEMENT  3/17/2024    IR PARACENTESIS  7/29/2024    IR PARACENTESIS  8/16/2024    IR SUPRAPUBIC CATHETER CHECK/CHANGE/REINSERTION/UPSIZE  5/17/2024    LAPAROTOMY N/A 3/13/2024    Procedure: LAPAROTOMY EXPLORATORY; SMALL BOWEL RESECTION; LYSIS OF ADHESIONS; SUPRPAPUBIC TUBE INSERTION;  Surgeon: Marvin Azul MD;  Location: [facility];  Service: General    LAPAROTOMY N/A 3/14/2024    Procedure: EXPLORATORY LAPAROTOMY, WASHOUT. SMALL BOWEL ANASTOMOSIS, CONTROL OF LIVER BLEED, CLOSURE OF ABDOMINAL WOUND;  Surgeon: Beltran Lorenz DO;  Location:  MAIN OR;  Service: General       Meds/Allergies:  Prior to Admission medications    Medication Sig Start Date  End Date Taking? Authorizing Provider   apixaban (ELIQUIS) 5 mg Take 1 tablet (5 mg total) by mouth 2 (two) times a day 6/27/24 12/24/24  Justa Hutchinson MD   aspirin 81 mg chewable tablet Chew 1 tablet (81 mg total) daily 6/27/24 12/24/24  Justa Hutchinson MD   atorvastatin (LIPITOR) 20 mg tablet Take 1 tablet (20 mg total) by mouth daily 6/8/24   Ethan Villegas DO   furosemide (LASIX) 20 mg tablet Take 1 tablet (20 mg total) by mouth daily 8/9/24   Justa Hutchinson MD   levETIRAcetam (KEPPRA) 750 mg tablet Take 1 tablet (750 mg total) by mouth every 12 (twelve) hours 5/14/24 11/10/24  Justa Hutchinson MD   spironolactone (ALDACTONE) 50 mg tablet Take 1 tablet (50 mg total) by mouth daily 8/19/24   Wesley Edwards MD     I have reviewed home medications using recent Epic encounter.    Allergies: No Known Allergies    Social History:  Marital Status: /Civil Union   Patient Pre-hospital Living Situation: Home  Patient Pre-hospital Level of Mobility: walks  Substance Use History:   Social History     Substance and Sexual Activity   Alcohol Use Never     Social History     Tobacco Use   Smoking Status Never    Passive exposure: Never   Smokeless Tobacco Never     Social History     Substance and Sexual Activity   Drug Use Never       Family History:  No family history on file.    Physical Exam:     Vitals:   Blood Pressure: 129/60 (08/20/24 1948)  Pulse: 60 (08/20/24 1948)  Temperature: (!) 96.4 °F (35.8 °C) (08/20/24 1923)  Temp Source: Temporal (08/20/24 1923)  Respirations: 16 (08/20/24 1948)  Weight - Scale: 54 kg (119 lb) (08/20/24 1353)  SpO2: 98 % (08/20/24 1948)    Physical Exam  Constitutional:       Appearance: He is ill-appearing.   HENT:      Head: Normocephalic and atraumatic.      Nose: Nose normal.      Mouth/Throat:      Mouth: Mucous membranes are moist.   Eyes:      Extraocular Movements: Extraocular movements intact.      Conjunctiva/sclera: Conjunctivae normal.      Pupils: Pupils are equal, round, and  reactive to light.   Cardiovascular:      Rate and Rhythm: Normal rate and regular rhythm.      Pulses: Normal pulses.      Heart sounds: Normal heart sounds.   Pulmonary:      Effort: Pulmonary effort is normal.      Breath sounds: Normal breath sounds.   Abdominal:      General: There is distension.      Palpations: Abdomen is soft.      Tenderness: There is no abdominal tenderness. There is no guarding or rebound.   Musculoskeletal:         General: Swelling present.      Cervical back: Normal range of motion.      Right lower leg: Edema present.      Left lower leg: Edema present.   Skin:     General: Skin is warm.      Capillary Refill: Capillary refill takes less than 2 seconds.   Neurological:      General: No focal deficit present.      Mental Status: He is alert and oriented to person, place, and time. Mental status is at baseline.          Additional Data:     Lab Results:  Results from last 7 days   Lab Units 08/20/24  1458   WBC Thousand/uL 8.16   HEMOGLOBIN g/dL 4.2*   HEMATOCRIT % 18.5*   PLATELETS Thousands/uL 251   SEGS PCT % 81*   LYMPHO PCT % 12*   MONO PCT % 5   EOS PCT % 1     Results from last 7 days   Lab Units 08/20/24  1458   SODIUM mmol/L 137   POTASSIUM mmol/L 3.6   CHLORIDE mmol/L 103   CO2 mmol/L 27   BUN mg/dL 14   CREATININE mg/dL 1.00   ANION GAP mmol/L 7   CALCIUM mg/dL 8.6   ALBUMIN g/dL 3.7   TOTAL BILIRUBIN mg/dL 0.94   ALK PHOS U/L 46   ALT U/L 9   AST U/L 12*   GLUCOSE RANDOM mg/dL 103     Results from last 7 days   Lab Units 08/20/24  1458   INR  1.43*         Lab Results   Component Value Date    HGBA1C 5.5 03/11/2023     Results from last 7 days   Lab Units 08/20/24  1736 08/20/24  1458   LACTIC ACID mmol/L 3.2* 2.4*       Lines/Drains:  Invasive Devices       Peripheral Intravenous Line  Duration             Peripheral IV 08/20/24 Distal;Right;Upper;Ventral (anterior) Arm <1 day    Peripheral IV 08/20/24 Left Hand <1 day              Drain  Duration             Suprapubic  Catheter 18 Fr. 95 days                        Imaging: Reviewed radiology reports from this admission including: abdominal/pelvic CT  CTA abdomen pelvis w wo contrast   Final Result by Katharine Carrillo MD (08/20 1811)      1) No abdominal or pelvic hematoma.      2) Patent abdominal aorta and its major branches. Resolution of infrarenal abdominal aortic thrombi seen on the March 2024 CT. 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      3) Cirrhotic liver morphology with evidence of portal hypertension manifested by large abdominal and pelvic ascites, portal hypertensive colopathy, gastroesophageal varices and internal hemorrhoids.      4) 5 mm focus of arterial hyperenhancement in segment 4A of the liver, which blends imperceptibly on subsequent venous phase. This is compatible with the LI-RADS 3 (intermediate probability for HCC) lesion.      5) 1.7 cm exophytic lesion in segment 7 of the liver medially, matching liver parenchyma and attenuation, of unclear etiology. Attention on follow-up.      6) Circumferential wall thickening of the urinary bladder out of proportion for degree of underdistention with suprapubic catheter in place. This may be related to chronic bladder outlet obstruction and/or cystitis.      7) Prostatomegaly (volume of 77 mL) with a 1.7 cm hyperdense, exophytic nodule in the midgland on the left, as described above. Although this may represent an exophytic BPH nodule extending into the peripheral zone and beyond, underlying neoplasm is not    excluded.      8) Multiple additional findings as above.      The study was marked in EPIC for immediate notification.               Workstation performed: [id number]             EKG and Other Studies Reviewed on Admission:   EKG: Personally Reviewed.    ** Please Note: This note has been constructed using a voice recognition system. **

## 2024-08-21 NOTE — ASSESSMENT & PLAN NOTE
Patient presented with abnormal outpatient lab work with hemoglobin of 4.1.  He reported generalized weakness   Hgb 4.2 on admission, previously 9 in April of this year  -Hemodynamically stable  -Patient does not attest to any melena, hematochezia or hematemesis  Status post 3 units of PRBC transfusion with improved hemoglobin.  Per GI plan on EGD tomorrow for further evaluation  -Continue protonix, hold eliquis & aspirin  -iron panel reviewed  Repeat lab work in a.m.    Results from last 7 days   Lab Units 08/21/24  1556 08/21/24  0715 08/20/24  1458   HEMOGLOBIN g/dL 9.8* 8.3* 4.2*   HEMATOCRIT % 34.9* 28.9* 18.5*   MCV fL  --  81* 77*

## 2024-08-21 NOTE — ASSESSMENT & PLAN NOTE
-LA 2.4>3.2.  -Possibly Type A in setting of anemia. Caution further fluids given liver cirrhosis and volume overload.   -Repeat lactate in a.m.

## 2024-08-21 NOTE — PLAN OF CARE
Problem: INFECTION - ADULT  Goal: Absence or prevention of progression during hospitalization  Description: INTERVENTIONS:  - Assess and monitor for signs and symptoms of infection  - Monitor lab/diagnostic results  - Monitor all insertion sites, i.e. indwelling lines, tubes, and drains  - Administer medications as ordered  - Instruct and encourage patient and family to use good hand hygiene technique  8/21/2024 0903 by Taylor Moreno RN  Outcome: Progressing  8/21/2024 0903 by Taylor Moreno RN  Outcome: Progressing  Goal: Absence of fever/infection during neutropenic period  Description: INTERVENTIONS:  - Monitor WBC    8/21/2024 0903 by Taylor Moreno RN  Outcome: Progressing      Problem: SAFETY ADULT  Goal: Patient will remain free of falls  Description: INTERVENTIONS:  - Educate patient/family on patient safety including physical limitations  - Instruct patient to call for assistance with activity   - Consult OT/PT to assist with strengthening/mobility   - Keep Call bell within reach  - Keep bed low and locked with side rails adjusted as appropriate  - Keep care items and personal belongings within reach  - Initiate and maintain comfort rounds  - Make Fall Risk Sign visible to staff  - Offer Toileting every 2 Hours, in advance of need  - Initiate/Maintain bed alarm  - Obtain necessary fall risk management equipment  - Apply yellow socks and bracelet for high fall risk patients  - Consider moving patient to room near nurses station  8/21/2024 0903 by Taylor Moreno RN  Outcome: Progressing    Goal: Maintain or return to baseline ADL function  Description: INTERVENTIONS:  -  Assess patient's ability to carry out ADLs; assess patient's baseline for ADL function and identify physical deficits which impact ability to perform ADLs (bathing, care of mouth/teeth, toileting, grooming, dressing, etc.)  - Assess/evaluate cause of self-care deficits   - Assess range of motion  - Assess patient's  mobility; develop plan if impaired  - Assess patient's need for assistive devices and provide as appropriate  - Encourage maximum independence but intervene and supervise when necessary  - Involve family in performance of ADLs  - Assess for home care needs following discharge   - Consider OT consult to assist with ADL evaluation and planning for discharge  - Provide patient education as appropriate  8/21/2024 0903 by Taylor Moreno RN  Outcome: Progressing    Goal: Maintains/Returns to pre admission functional level  Description: INTERVENTIONS:  - Perform AM-PAC 6 Click Basic Mobility/ Daily Activity assessment daily.  - Set and communicate daily mobility goal to care team and patient/family/caregiver.   - Collaborate with rehabilitation services on mobility goals if consulted  - Reposition patient every 2 hours.  - Dangle patient 3 times a day  - Stand patient 3 times a day  - Ambulate patient 3 times a day  - Out of bed to chair 3 times a day   - Out of bed for meals 3 times a day  - Out of bed for toileting  - Record patient progress and toleration of activity level   8/21/2024 0903 by Taylor Moreno RN  Outcome: Progressing      Problem: HEMATOLOGIC - ADULT  Goal: Maintains hematologic stability  Description: INTERVENTIONS  - Assess for signs and symptoms of bleeding or hemorrhage  - Monitor labs  - Administer supportive blood products/factors as ordered and appropriate  Outcome: Progressing      Problem: Nutrition/Hydration-ADULT  Goal: Nutrient/Hydration intake appropriate for improving, restoring or maintaining nutritional needs  Description: Monitor and assess patient's nutrition/hydration status for malnutrition. Collaborate with interdisciplinary team and initiate plan and interventions as ordered.  Monitor patient's weight and dietary intake as ordered or per policy. Utilize nutrition screening tool and intervene as necessary. Determine patient's food preferences and provide high-protein,  high-caloric foods as appropriate.     INTERVENTIONS:  - Monitor oral intake, urinary output, labs, and treatment plans  - Assess nutrition and hydration status and recommend course of action  - Evaluate amount of meals eaten  - Allow adequate time for meals  - Recommend/ encourage appropriate diets, oral nutritional supplements, and vitamin/mineral supplements  - Assess need for intravenous fluids  - Provide specific nutrition/hydration education as appropriate  - Include patient/family/caregiver in decisions related to nutrition  8/21/2024 0903 by Taylor Moreno RN  Outcome: Progressing

## 2024-08-21 NOTE — ASSESSMENT & PLAN NOTE
BMI 21.08. increased age/deconditioning, and decreased appetite/oral intake and evidence of muscle wasting/atrophy

## 2024-08-21 NOTE — PROGRESS NOTES
[facility]  Progress Note  Name: Jf Valera I  MRN: [id number]  Unit/Bed#: 2 [facility] 204 A  Date of Admission: 8/20/2024   Date of Service: 8/21/2024 I Hospital Day: 1    Assessment & Plan   * Acute on chronic anemia  Assessment & Plan  Patient presented with abnormal outpatient lab work with hemoglobin of 4.1.  He reported generalized weakness   Hgb 4.2 on admission, previously 9 in April of this year  -Hemodynamically stable  -Patient does not attest to any melena, hematochezia or hematemesis  Status post 3 units of PRBC transfusion with improved hemoglobin.  Per GI plan on EGD tomorrow for further evaluation  -Continue protonix, hold eliquis & aspirin  -iron panel reviewed  Repeat lab work in a.m.    Results from last 7 days   Lab Units 08/21/24  1556 08/21/24  0715 08/20/24  1458   HEMOGLOBIN g/dL 9.8* 8.3* 4.2*   HEMATOCRIT % 34.9* 28.9* 18.5*   MCV fL  --  81* 77*        Cirrhosis of liver with ascites  (HCC)  Assessment & Plan  Recent diagnosis of liver cirrhosis with etiology unknown. Patient denies alcohol/drug use  - chronic hepatitis panel and other lab work ordered as per GI  -Evidence of large volume ascites with history of recurrent ascites requiring paracentesis  IR consulted for paracentesis-   -Previous ascitic fluid studies appear transudative without evidence of SBP  -Continue home medications lasix, spironolactone  -Continue ceftriaxone for SBP prophylaxis    Lactic acidosis  Assessment & Plan  -LA 2.4>3.2.  -Possibly Type A in setting of anemia. Caution further fluids given liver cirrhosis and volume overload.   -Repeat lactate in a.m.    Urine retention  Assessment & Plan  Patient with suprapubic catheter in place, functioning well  -No evidence of obstruction or renal compromise  -Continue to monitor I/O    Seizure-like activity (HCC)  Assessment & Plan  Continue home dose keppra    Moderate protein-calorie malnutrition (HCC)  Assessment & Plan  BMI 21.08. increased  age/deconditioning, and decreased appetite/oral intake and evidence of muscle wasting/atrophy     Portal vein thrombosis  Assessment & Plan  Patient on eliquis and aspirin at home which is currently on hold  -Continue outpatient follow up with vascular surgery as mentioned in previous notes               VTE Pharmacologic Prophylaxis: VTE Score: 9 High Risk (Score >/= 5) - Pharmacological DVT Prophylaxis Contraindicated. Sequential Compression Devices Ordered.    Mobility:   Basic Mobility Inpatient Raw Score: 17  JH-HLM Goal: 5: Stand one or more mins  JH-HLM Achieved: 2: Bed activities/Dependent transfer  JH-HLM Goal NOT achieved. Continue with multidisciplinary rounding and encourage appropriate mobility to improve upon JH-HLM goals.    Patient Centered Rounds: I performed bedside rounds with nursing staff today.   Discussions with Specialists or Other Care Team Provider: Yes-GI    Education and Discussions with Family / Patient: Updated  (son) via phone.    Total Time Spent on Date of Encounter in care of patient: This time was spent on one or more of the following: performing physical exam; counseling and coordination of care; obtaining or reviewing history; documenting in the medical record; reviewing/ordering tests, medications or procedures; communicating with other healthcare professionals and discussing with patient's family/caregivers.    Current Length of Stay: 1 day(s)  Current Patient Status: Inpatient   Certification Statement: The patient will continue to require additional inpatient hospital stay due to anemia requiring EGD and monitoring of lab work  Discharge Plan: Anticipate discharge in 48-72 hrs to home.    Code Status: Level 1 - Full Code    Subjective:     Patient denies any abdominal pain, nausea, vomiting.  Wondering why his blood pressure is running low    Objective:     Vitals:   Temp (24hrs), Av.6 °F (35.9 °C), Min:95.9 °F (35.5 °C), Max:97.3 °F (36.3 °C)    Temp:   [95.9 °F (35.5 °C)-97.3 °F (36.3 °C)] 97.3 °F (36.3 °C)  HR:  [50-95] 64  Resp:  [13-20] 17  BP: ()/(48-78) 95/51  SpO2:  [96 %-100 %] 97 %  Body mass index is 21.08 kg/m².     Input and Output Summary (last 24 hours):     Intake/Output Summary (Last 24 hours) at 8/21/2024 0907  Last data filed at 8/21/2024 0845  Gross per 24 hour   Intake 1720.83 ml   Output 175 ml   Net 1545.83 ml       Physical Exam:   Physical Exam  Vitals reviewed.   Constitutional:       General: He is not in acute distress.     Appearance: He is not toxic-appearing.   HENT:      Head: Normocephalic and atraumatic.   Eyes:      General:         Right eye: No discharge.         Left eye: No discharge.   Cardiovascular:      Rate and Rhythm: Normal rate and regular rhythm.   Pulmonary:      Effort: Pulmonary effort is normal. No respiratory distress.      Breath sounds: Normal breath sounds. No wheezing or rales.   Abdominal:      General: Bowel sounds are normal. There is distension.      Palpations: Abdomen is soft.      Tenderness: There is no abdominal tenderness.      Comments: Suprapubic catheter in place   Musculoskeletal:      Right lower leg: No edema.      Left lower leg: No edema.   Neurological:      Mental Status: He is alert.          Additional Data:     Labs:  Results from last 7 days   Lab Units 08/21/24  0715   WBC Thousand/uL 13.82*   HEMOGLOBIN g/dL 8.3*   HEMATOCRIT % 28.9*   PLATELETS Thousands/uL 191   SEGS PCT % 88*   LYMPHO PCT % 6*   MONO PCT % 5   EOS PCT % 0     Results from last 7 days   Lab Units 08/21/24  0715   SODIUM mmol/L 138   POTASSIUM mmol/L 3.7   CHLORIDE mmol/L 106   CO2 mmol/L 26   BUN mg/dL 14   CREATININE mg/dL 0.87   ANION GAP mmol/L 6   CALCIUM mg/dL 8.0*   ALBUMIN g/dL 3.1*   TOTAL BILIRUBIN mg/dL 2.59*   ALK PHOS U/L 41   ALT U/L 8   AST U/L 9*   GLUCOSE RANDOM mg/dL 77     Results from last 7 days   Lab Units 08/20/24  1458   INR  1.43*             Results from last 7 days   Lab Units  08/20/24  1736 08/20/24  1458   LACTIC ACID mmol/L 3.2* 2.4*       Lines/Drains:  Invasive Devices       Peripheral Intravenous Line  Duration             Peripheral IV 08/20/24 Left Hand <1 day              Drain  Duration             Suprapubic Catheter 18 Fr. 95 days                          Imaging: Reviewed radiology reports from this admission including: abdominal/pelvic CT    Recent Cultures (last 7 days):         Last 24 Hours Medication List:   Current Facility-Administered Medications   Medication Dose Route Frequency Provider Last Rate    atorvastatin  20 mg Oral Daily With Dinner Juan Aguiar MD      cefTRIAXone  1,000 mg Intravenous Q24H Juan Aguiar MD 1,000 mg (08/20/24 2047)    furosemide  20 mg Oral Daily Juan Aguiar MD      levETIRAcetam  750 mg Oral Q12H RONALD Aguiar MD      pantoprazole  40 mg Intravenous Q12H RONALD Aguiar MD      spironolactone  50 mg Oral Daily Juan Aguiar MD          Today, Patient Was Seen By: Dinora Shaikh DO    **Please Note: This note may have been constructed using a voice recognition system.**

## 2024-08-21 NOTE — ASSESSMENT & PLAN NOTE
Patient on eliquis and aspirin at home which is currently on hold  -Continue outpatient follow up with vascular surgery as mentioned in previous notes

## 2024-08-22 ENCOUNTER — APPOINTMENT (OUTPATIENT)
Dept: PERIOP | Facility: HOSPITAL | Age: 89
DRG: 241 | End: 2024-08-22
Payer: COMMERCIAL

## 2024-08-22 ENCOUNTER — APPOINTMENT (OUTPATIENT)
Dept: NON INVASIVE DIAGNOSTICS | Facility: HOSPITAL | Age: 89
DRG: 241 | End: 2024-08-22
Attending: FAMILY MEDICINE
Payer: COMMERCIAL

## 2024-08-22 ENCOUNTER — ANESTHESIA (INPATIENT)
Dept: PERIOP | Facility: HOSPITAL | Age: 89
DRG: 241 | End: 2024-08-22
Payer: COMMERCIAL

## 2024-08-22 ENCOUNTER — ANESTHESIA EVENT (INPATIENT)
Dept: PERIOP | Facility: HOSPITAL | Age: 89
DRG: 241 | End: 2024-08-22
Payer: COMMERCIAL

## 2024-08-22 PROBLEM — E43 SEVERE PROTEIN-CALORIE MALNUTRITION (HCC): Status: ACTIVE | Noted: 2024-08-22

## 2024-08-22 LAB
AFP-TM SERPL-MCNC: 13.99 NG/ML (ref 0–9)
ALBUMIN FLD-MCNC: <1.5 G/DL
ALBUMIN SERPL BCG-MCNC: 3 G/DL (ref 3.5–5)
ALP SERPL-CCNC: 41 U/L (ref 34–104)
ALT SERPL W P-5'-P-CCNC: 7 U/L (ref 7–52)
ANION GAP SERPL CALCULATED.3IONS-SCNC: 5 MMOL/L (ref 4–13)
APPEARANCE FLD: CLEAR
AST SERPL W P-5'-P-CCNC: 10 U/L (ref 13–39)
BILIRUB SERPL-MCNC: 1.27 MG/DL (ref 0.2–1)
BUN SERPL-MCNC: 14 MG/DL (ref 5–25)
CALCIUM ALBUM COR SERPL-MCNC: 8.9 MG/DL (ref 8.3–10.1)
CALCIUM SERPL-MCNC: 8.1 MG/DL (ref 8.4–10.2)
CHLORIDE SERPL-SCNC: 107 MMOL/L (ref 96–108)
CO2 SERPL-SCNC: 26 MMOL/L (ref 21–32)
COLOR FLD: YELLOW
CREAT SERPL-MCNC: 0.96 MG/DL (ref 0.6–1.3)
ERYTHROCYTE [DISTWIDTH] IN BLOOD BY AUTOMATED COUNT: 18.6 % (ref 11.6–15.1)
GFR SERPL CREATININE-BSD FRML MDRD: 70 ML/MIN/1.73SQ M
GLUCOSE SERPL-MCNC: 74 MG/DL (ref 65–140)
HBV CORE AB SER QL: REACTIVE
HCT VFR BLD AUTO: 28.6 % (ref 36.5–49.3)
HCV AB SER QL: NORMAL
HGB BLD-MCNC: 8.2 G/DL (ref 12–17)
HISTIOCYTES NFR FLD: 21 %
INR PPP: 1.28 (ref 0.85–1.19)
LACTATE SERPL-SCNC: 1.5 MMOL/L (ref 0.5–2)
LYMPHOCYTES NFR BLD AUTO: 47 %
MCH RBC QN AUTO: 23.2 PG (ref 26.8–34.3)
MCHC RBC AUTO-ENTMCNC: 28.7 G/DL (ref 31.4–37.4)
MCV RBC AUTO: 81 FL (ref 82–98)
MONO+MESO NFR FLD MANUAL: 12 %
NEUTS SEG NFR BLD AUTO: 20 %
PLATELET # BLD AUTO: 199 THOUSANDS/UL (ref 149–390)
PMV BLD AUTO: 10.4 FL (ref 8.9–12.7)
POTASSIUM SERPL-SCNC: 3.9 MMOL/L (ref 3.5–5.3)
PROT SERPL-MCNC: 4.9 G/DL (ref 6.4–8.4)
PROTHROMBIN TIME: 16.5 SECONDS (ref 12.3–15)
RBC # BLD AUTO: 3.53 MILLION/UL (ref 3.88–5.62)
SITE: NORMAL
SODIUM SERPL-SCNC: 138 MMOL/L (ref 135–147)
TOTAL CELLS COUNTED SPEC: 100
WBC # BLD AUTO: 9.98 THOUSAND/UL (ref 4.31–10.16)
WBC # FLD MANUAL: 48 /UL

## 2024-08-22 PROCEDURE — 83605 ASSAY OF LACTIC ACID: CPT | Performed by: FAMILY MEDICINE

## 2024-08-22 PROCEDURE — 86708 HEPATITIS A ANTIBODY: CPT | Performed by: PHYSICIAN ASSISTANT

## 2024-08-22 PROCEDURE — 0DB68ZX EXCISION OF STOMACH, VIA NATURAL OR ARTIFICIAL OPENING ENDOSCOPIC, DIAGNOSTIC: ICD-10-PCS | Performed by: INTERNAL MEDICINE

## 2024-08-22 PROCEDURE — 80053 COMPREHEN METABOLIC PANEL: CPT | Performed by: PHYSICIAN ASSISTANT

## 2024-08-22 PROCEDURE — 82105 ALPHA-FETOPROTEIN SERUM: CPT | Performed by: PHYSICIAN ASSISTANT

## 2024-08-22 PROCEDURE — 85027 COMPLETE CBC AUTOMATED: CPT | Performed by: FAMILY MEDICINE

## 2024-08-22 PROCEDURE — 86038 ANTINUCLEAR ANTIBODIES: CPT | Performed by: PHYSICIAN ASSISTANT

## 2024-08-22 PROCEDURE — 49083 ABD PARACENTESIS W/IMAGING: CPT | Performed by: RADIOLOGY

## 2024-08-22 PROCEDURE — 85610 PROTHROMBIN TIME: CPT | Performed by: PHYSICIAN ASSISTANT

## 2024-08-22 PROCEDURE — 87205 SMEAR GRAM STAIN: CPT | Performed by: FAMILY MEDICINE

## 2024-08-22 PROCEDURE — 89051 BODY FLUID CELL COUNT: CPT | Performed by: FAMILY MEDICINE

## 2024-08-22 PROCEDURE — 87070 CULTURE OTHR SPECIMN AEROBIC: CPT | Performed by: FAMILY MEDICINE

## 2024-08-22 PROCEDURE — 43239 EGD BIOPSY SINGLE/MULTIPLE: CPT | Performed by: INTERNAL MEDICINE

## 2024-08-22 PROCEDURE — 82042 OTHER SOURCE ALBUMIN QUAN EA: CPT | Performed by: PHYSICIAN ASSISTANT

## 2024-08-22 PROCEDURE — 86381 MITOCHONDRIAL ANTIBODY EACH: CPT | Performed by: PHYSICIAN ASSISTANT

## 2024-08-22 PROCEDURE — 49083 ABD PARACENTESIS W/IMAGING: CPT

## 2024-08-22 PROCEDURE — 86015 ACTIN ANTIBODY EACH: CPT | Performed by: PHYSICIAN ASSISTANT

## 2024-08-22 PROCEDURE — 86706 HEP B SURFACE ANTIBODY: CPT | Performed by: PHYSICIAN ASSISTANT

## 2024-08-22 PROCEDURE — 88341 IMHCHEM/IMCYTCHM EA ADD ANTB: CPT | Performed by: PATHOLOGY

## 2024-08-22 PROCEDURE — 88305 TISSUE EXAM BY PATHOLOGIST: CPT | Performed by: PATHOLOGY

## 2024-08-22 PROCEDURE — 88342 IMHCHEM/IMCYTCHM 1ST ANTB: CPT | Performed by: PATHOLOGY

## 2024-08-22 PROCEDURE — 99232 SBSQ HOSP IP/OBS MODERATE 35: CPT

## 2024-08-22 RX ORDER — ASPIRIN 81 MG/1
81 TABLET, CHEWABLE ORAL DAILY
Status: DISCONTINUED | OUTPATIENT
Start: 2024-08-23 | End: 2024-08-25 | Stop reason: HOSPADM

## 2024-08-22 RX ORDER — FERROUS SULFATE 325(65) MG
325 TABLET ORAL
Status: DISCONTINUED | OUTPATIENT
Start: 2024-08-22 | End: 2024-08-22

## 2024-08-22 RX ORDER — SODIUM CHLORIDE, SODIUM LACTATE, POTASSIUM CHLORIDE, CALCIUM CHLORIDE 600; 310; 30; 20 MG/100ML; MG/100ML; MG/100ML; MG/100ML
INJECTION, SOLUTION INTRAVENOUS CONTINUOUS PRN
Status: DISCONTINUED | OUTPATIENT
Start: 2024-08-22 | End: 2024-08-22

## 2024-08-22 RX ORDER — PROPOFOL 10 MG/ML
INJECTION, EMULSION INTRAVENOUS AS NEEDED
Status: DISCONTINUED | OUTPATIENT
Start: 2024-08-22 | End: 2024-08-22

## 2024-08-22 RX ORDER — FERROUS SULFATE 325(65) MG
325 TABLET ORAL
Status: DISCONTINUED | OUTPATIENT
Start: 2024-08-25 | End: 2024-08-25 | Stop reason: HOSPADM

## 2024-08-22 RX ORDER — LIDOCAINE WITH 8.4% SOD BICARB 0.9%(10ML)
SYRINGE (ML) INJECTION AS NEEDED
Status: COMPLETED | OUTPATIENT
Start: 2024-08-22 | End: 2024-08-22

## 2024-08-22 RX ORDER — LIDOCAINE HYDROCHLORIDE 20 MG/ML
INJECTION, SOLUTION EPIDURAL; INFILTRATION; INTRACAUDAL; PERINEURAL AS NEEDED
Status: DISCONTINUED | OUTPATIENT
Start: 2024-08-22 | End: 2024-08-22

## 2024-08-22 RX ADMIN — ATORVASTATIN CALCIUM 20 MG: 20 TABLET, FILM COATED ORAL at 17:46

## 2024-08-22 RX ADMIN — FUROSEMIDE 20 MG: 20 TABLET ORAL at 14:12

## 2024-08-22 RX ADMIN — CEFTRIAXONE 1000 MG: 1 INJECTION, SOLUTION INTRAVENOUS at 20:11

## 2024-08-22 RX ADMIN — LIDOCAINE HYDROCHLORIDE 50 MG: 20 INJECTION, SOLUTION EPIDURAL; INFILTRATION; INTRACAUDAL; PERINEURAL at 12:35

## 2024-08-22 RX ADMIN — PROPOFOL 20 MG: 10 INJECTION, EMULSION INTRAVENOUS at 12:36

## 2024-08-22 RX ADMIN — Medication 20 ML: at 09:32

## 2024-08-22 RX ADMIN — PROPOFOL 80 MG: 10 INJECTION, EMULSION INTRAVENOUS at 12:35

## 2024-08-22 RX ADMIN — SODIUM CHLORIDE, SODIUM LACTATE, POTASSIUM CHLORIDE, AND CALCIUM CHLORIDE: .6; .31; .03; .02 INJECTION, SOLUTION INTRAVENOUS at 10:53

## 2024-08-22 RX ADMIN — IRON SUCROSE 200 MG: 20 INJECTION, SOLUTION INTRAVENOUS at 14:11

## 2024-08-22 RX ADMIN — LEVETIRACETAM 750 MG: 500 TABLET, FILM COATED ORAL at 20:10

## 2024-08-22 RX ADMIN — PROPOFOL 20 MG: 10 INJECTION, EMULSION INTRAVENOUS at 12:39

## 2024-08-22 RX ADMIN — PANTOPRAZOLE SODIUM 40 MG: 40 INJECTION, POWDER, FOR SOLUTION INTRAVENOUS at 14:11

## 2024-08-22 RX ADMIN — PANTOPRAZOLE SODIUM 40 MG: 40 INJECTION, POWDER, FOR SOLUTION INTRAVENOUS at 20:11

## 2024-08-22 RX ADMIN — LEVETIRACETAM 750 MG: 500 TABLET, FILM COATED ORAL at 14:11

## 2024-08-22 RX ADMIN — SPIRONOLACTONE 50 MG: 25 TABLET ORAL at 14:10

## 2024-08-22 NOTE — PROGRESS NOTES
[facility]  Progress Note  Name: Jf Valera I  MRN: [id number]  Unit/Bed#: 2 [place] [address] A  Date of Admission: 8/20/2024   Date of Service: 8/22/2024 I Hospital Day: 2    Assessment & Plan   * Acute on chronic anemia  Assessment & Plan  Patient presented with abnormal outpatient lab work with hemoglobin of 4.1.  He reported generalized weakness   Hgb 4.2 on admission, previously 9 in April of this year  -Hemodynamically stable  -Patient does not attest to any melena, hematochezia or hematemesis  Status post 3 units of PRBC transfusion with improved hemoglobin.  Per GI plan on EGD today for further evaluation  -Continue protonix q12h, hold eliquis & aspirin  -iron panel indicates iron deficiency anemia present, started 3 days IV Venofer then p.o. ferrous sulfate  Repeat lab work in a.m. pending    Results from last 7 days   Lab Units 08/21/24  1556 08/21/24  0715 08/20/24  1458   HEMOGLOBIN g/dL 9.8* 8.3* 4.2*   HEMATOCRIT % 34.9* 28.9* 18.5*   MCV fL  --  81* 77*          Lactic acidosis  Assessment & Plan  -LA 2.4>3.2.  -Possibly Type A in setting of anemia. Caution further fluids given liver cirrhosis and volume overload.   -Repeat lactate in a.m., patient difficult stick awaiting lab results      Lab Results   Component Value Date/Time    LACTICACID 3.2 (H) 08/20/2024 05:36 PM    LACTICACID 2.4 (H) 08/20/2024 02:58 PM        Urine retention  Assessment & Plan  Patient with suprapubic catheter in place, functioning well  -No evidence of obstruction or renal compromise  -Continue to monitor I/O    Severe protein-calorie malnutrition (HCC)  Assessment & Plan  Malnutrition Findings:   Adult Malnutrition type: Chronic illness  Adult Degree of Malnutrition: Other severe protein calorie malnutrition  Malnutrition Characteristics: Fat loss, Muscle loss                  360 Statement: Severe protein calorie malnutrion of chronic illness related to decreased po intake as evidenced by depressed  temples, hollow orbits and visible clavicles.  Treated with diet and supplements    BMI Findings:           Body mass index is 21.08 kg/m².       Cirrhosis of liver with ascites  (HCC)  Assessment & Plan  Recent diagnosis of liver cirrhosis with etiology unknown. Patient denies alcohol/drug use  - chronic hepatitis panel and other lab work ordered as per GI  -Evidence of large volume ascites with history of recurrent ascites requiring paracentesis.  Last completed 8/16 for 3 L  IR consulted for paracentesis, to be done 8/22.  White cell count and culture ordered  -Previous ascitic fluid studies appear transudative without evidence of SBP  -Continue home medications lasix, spironolactone  -Continue IV ceftriaxone for SBP prophylaxis, day 3    Seizure-like activity (HCC)  Assessment & Plan  Continue home dose keppra    Moderate protein-calorie malnutrition (HCC)  Assessment & Plan  BMI 21.08. increased age/deconditioning, and decreased appetite/oral intake and evidence of muscle wasting/atrophy  Continue nutritional supplementation of Magic cup when able to eat    Portal vein thrombosis  Assessment & Plan  Patient on eliquis and aspirin at home which is currently on hold  -Continue outpatient follow up with vascular surgery as mentioned in previous notes               VTE Pharmacologic Prophylaxis: VTE Score: 9 High Risk (Score >/= 5) - Pharmacological DVT Prophylaxis Contraindicated. Sequential Compression Devices Ordered.  Acute anemia with concern for GI bleed    Mobility:   Basic Mobility Inpatient Raw Score: 17  JH-HLM Goal: 5: Stand one or more mins  JH-HLM Achieved: 2: Bed activities/Dependent transfer  JH-HLM Goal NOT achieved. Continue with multidisciplinary rounding and encourage appropriate mobility to improve upon JH-HLM goals.    Patient Centered Rounds: I performed bedside rounds with nursing staff today.   Discussions with Specialists or Other Care Team Provider: Case management    Education and  Discussions with Family / Patient: Patient declined call to .     Total Time Spent on Date of Encounter in care of patient:  mins. This time was spent on one or more of the following: performing physical exam; counseling and coordination of care; obtaining or reviewing history; documenting in the medical record; reviewing/ordering tests, medications or procedures; communicating with other healthcare professionals and discussing with patient's family/caregivers.    Current Length of Stay: 2 day(s)  Current Patient Status: Inpatient   Certification Statement: The patient will continue to require additional inpatient hospital stay due to paracentesis, EGD, acute on chronic anemia  Discharge Plan: Anticipate discharge in 48-72 hrs to discharge location to be determined pending rehab evaluations.    Code Status: Level 1 - Full Code    Subjective:   Patient is asking to get up bed to go use the bathroom.  He is asking to be set up so he can brush his teeth.  Denies nausea or pain.  All questions and concerns addressed.    Objective:     Vitals:   Temp (24hrs), Av.7 °F (36.5 °C), Min:97.4 °F (36.3 °C), Max:97.9 °F (36.6 °C)    Temp:  [97.4 °F (36.3 °C)-97.9 °F (36.6 °C)] 97.9 °F (36.6 °C)  HR:  [64-69] 64  Resp:  [16-20] 18  BP: (115-133)/(62-66) 133/65  SpO2:  [98 %-100 %] 98 %  Body mass index is 21.08 kg/m².     Input and Output Summary (last 24 hours):   No intake or output data in the 24 hours ending 24 0922    Physical Exam:   Physical Exam  Vitals and nursing note reviewed.   Constitutional:       Appearance: He is normal weight.   HENT:      Head: Normocephalic.      Nose: Nose normal.      Mouth/Throat:      Mouth: Mucous membranes are moist.      Pharynx: Oropharynx is clear.   Eyes:      Conjunctiva/sclera: Conjunctivae normal.   Cardiovascular:      Rate and Rhythm: Normal rate and regular rhythm.      Pulses: Normal pulses.      Heart sounds: Normal heart sounds.   Pulmonary:       Effort: Pulmonary effort is normal. No respiratory distress.      Breath sounds: Normal breath sounds. No wheezing or rhonchi.   Abdominal:      General: Bowel sounds are normal. There is no distension.      Palpations: Abdomen is soft. There is fluid wave.      Tenderness: There is no abdominal tenderness. There is no guarding.   Genitourinary:     Comments: Suprapubic catheter in place, draining yellow urine  Musculoskeletal:      Right lower leg: 3+ Edema present.      Left lower leg: 3+ Edema present.   Skin:     General: Skin is warm and dry.   Neurological:      General: No focal deficit present.      Mental Status: He is alert. Mental status is at baseline.   Psychiatric:         Mood and Affect: Mood normal.         Thought Content: Thought content normal.          Additional Data:     Labs:  Results from last 7 days   Lab Units 08/21/24  1556 08/21/24  0715   WBC Thousand/uL  --  13.82*   HEMOGLOBIN g/dL 9.8* 8.3*   HEMATOCRIT % 34.9* 28.9*   PLATELETS Thousands/uL  --  191   SEGS PCT %  --  88*   LYMPHO PCT %  --  6*   MONO PCT %  --  5   EOS PCT %  --  0     Results from last 7 days   Lab Units 08/21/24  0715   SODIUM mmol/L 138   POTASSIUM mmol/L 3.7   CHLORIDE mmol/L 106   CO2 mmol/L 26   BUN mg/dL 14   CREATININE mg/dL 0.87   ANION GAP mmol/L 6   CALCIUM mg/dL 8.0*   ALBUMIN g/dL 3.1*   TOTAL BILIRUBIN mg/dL 2.59*   ALK PHOS U/L 41   ALT U/L 8   AST U/L 9*   GLUCOSE RANDOM mg/dL 77     Results from last 7 days   Lab Units 08/20/24  1458   INR  1.43*             Results from last 7 days   Lab Units 08/20/24  1736 08/20/24  1458   LACTIC ACID mmol/L 3.2* 2.4*       Lines/Drains:  Invasive Devices       Peripheral Intravenous Line  Duration             Peripheral IV 08/20/24 Left Hand 1 day              Drain  Duration             Suprapubic Catheter 18 Fr. 96 days                          Imaging: Reviewed radiology reports from this admission including: abdominal/pelvic CT    Recent Cultures (last 7  days):         Last 24 Hours Medication List:   Current Facility-Administered Medications   Medication Dose Route Frequency Provider Last Rate    atorvastatin  20 mg Oral Daily With Dinner Juan Aguiar MD      cefTRIAXone  1,000 mg Intravenous Q24H Juan Aguiar MD 1,000 mg (08/21/24 2038)    [START ON 8/25/2024] ferrous sulfate  325 mg Oral Daily With Breakfast IVAN Leija      furosemide  20 mg Oral Daily Dinora Shaikh DO      iron sucrose  200 mg Intravenous Daily IVAN Leija      levETIRAcetam  750 mg Oral Q12H RONALD Aguiar MD      pantoprazole  40 mg Intravenous Q12H RONALD Aguiar MD      spironolactone  50 mg Oral Daily Dinora Shaikh DO          Today, Patient Was Seen By: IVAN Leija    **Please Note: This note may have been constructed using a voice recognition system.**

## 2024-08-22 NOTE — ASSESSMENT & PLAN NOTE
Patient presented with abnormal outpatient lab work with hemoglobin of 4.1.  He reported generalized weakness   Hgb 4.2 on admission, previously 9 in April of this year  -Hemodynamically stable  -Patient does not attest to any melena, hematochezia or hematemesis  8/21 CT abdomen pelvis: Gastroesophageal varices present.  Status post 3 units of PRBC transfusion with improved hemoglobin.  Per GI plan on EGD today for further evaluation  -Continue protonix q12h, hold eliquis & aspirin  -iron panel indicates iron deficiency anemia present, started 3 days IV Venofer then p.o. ferrous sulfate  Repeat lab work in a.m. pending    Results from last 7 days   Lab Units 08/21/24  1556 08/21/24  0715 08/20/24  1458   HEMOGLOBIN g/dL 9.8* 8.3* 4.2*   HEMATOCRIT % 34.9* 28.9* 18.5*   MCV fL  --  81* 77*

## 2024-08-22 NOTE — ASSESSMENT & PLAN NOTE
BMI 21.08. increased age/deconditioning, and decreased appetite/oral intake and evidence of muscle wasting/atrophy  Continue nutritional supplementation of Magic cup when able to eat

## 2024-08-22 NOTE — ASSESSMENT & PLAN NOTE
-LA 2.4>3.2.  -Possibly Type A in setting of anemia. Caution further fluids given liver cirrhosis and volume overload.   -Repeat lactate in a.m., patient difficult stick awaiting lab results      Lab Results   Component Value Date/Time    LACTICACID 3.2 (H) 08/20/2024 05:36 PM    LACTICACID 2.4 (H) 08/20/2024 02:58 PM

## 2024-08-22 NOTE — SEDATION DOCUMENTATION
Procedure ended, pt tolerated without incident, vss, exofin to puncture site right abdomen.  Pt taken to GI suite for endoscopy.

## 2024-08-22 NOTE — ASSESSMENT & PLAN NOTE
Recent diagnosis of liver cirrhosis with etiology unknown. Patient denies alcohol/drug use  GI consulted and appreciate their recommendations  Chronic hepatitis panel and other lab work ordered as per GI  Need for recurrent paracenteses.  S/p paracentesis per IR for 2600 mL fluid (8/22/24).  Fluid culture: negative thus far  WBCs: 48  Previous ascitic fluid studies appear transudative without evidence of SBP  Continue home medications lasix, spironolactone  Currently on IV ceftriaxone but consider discontinuation  AFP elevated @ 13.99  Hepatitis A antibodies (+)  Hepatitis B core antibodies (+)

## 2024-08-22 NOTE — PLAN OF CARE
Problem: PAIN - ADULT  Goal: Verbalizes/displays adequate comfort level or baseline comfort level  Description: Interventions:  - Encourage patient to monitor pain and request assistance  - Assess pain using appropriate pain scale  - Administer analgesics based on type and severity of pain and evaluate response  - Implement non-pharmacological measures as appropriate and evaluate response  - Consider cultural and social influences on pain and pain management  - Notify physician/advanced practitioner if interventions unsuccessful or patient reports new pain  Outcome: Progressing     Problem: INFECTION - ADULT  Goal: Absence or prevention of progression during hospitalization  Description: INTERVENTIONS:  - Assess and monitor for signs and symptoms of infection  - Monitor lab/diagnostic results  - Monitor all insertion sites, i.e. indwelling lines, tubes, and drains  - Monitor endotracheal if appropriate and nasal secretions for changes in amount and color  - [place] appropriate cooling/warming therapies per order  - Administer medications as ordered  - Instruct and encourage patient and family to use good hand hygiene technique  - Identify and instruct in appropriate isolation precautions for identified infection/condition  Outcome: Progressing  Goal: Absence of fever/infection during neutropenic period  Description: INTERVENTIONS:  - Monitor WBC    Outcome: Progressing     Problem: SAFETY ADULT  Goal: Patient will remain free of falls  Description: INTERVENTIONS:  - Educate patient/family on patient safety including physical limitations  - Instruct patient to call for assistance with activity   - Consult OT/PT to assist with strengthening/mobility   - Keep Call bell within reach  - Keep bed low and locked with side rails adjusted as appropriate  - Keep care items and personal belongings within reach  - Initiate and maintain comfort rounds  - Make Fall Risk Sign visible to staff  - Offer Toileting every 2 Hours,  in advance of need  - Initiate/Maintain bed alarm  - Apply yellow socks and bracelet for high fall risk patients  - Consider moving patient to room near nurses station  Outcome: Progressing  Goal: Maintain or return to baseline ADL function  Description: INTERVENTIONS:  -  Assess patient's ability to carry out ADLs; assess patient's baseline for ADL function and identify physical deficits which impact ability to perform ADLs (bathing, care of mouth/teeth, toileting, grooming, dressing, etc.)  - Assess/evaluate cause of self-care deficits   - Assess range of motion  - Assess patient's mobility; develop plan if impaired  - Assess patient's need for assistive devices and provide as appropriate  - Encourage maximum independence but intervene and supervise when necessary  - Involve family in performance of ADLs  - Assess for home care needs following discharge   - Consider OT consult to assist with ADL evaluation and planning for discharge  - Provide patient education as appropriate  Outcome: Progressing  Goal: Maintains/Returns to pre admission functional level  Description: INTERVENTIONS:  - Perform AM-PAC 6 Click Basic Mobility/ Daily Activity assessment daily.  - Set and communicate daily mobility goal to care team and patient/family/caregiver.   - Collaborate with rehabilitation services on mobility goals if consulted  - Perform Range of Motion 3 times a day.  - Reposition patient every 3 hours.  - Dangle patient 3 times a day  - Stand patient 3 times a day  - Ambulate patient 3 times a day  - Out of bed to chair 3 times a day   - Out of bed for meals 3 times a day  - Out of bed for toileting  - Record patient progress and toleration of activity level   Outcome: Progressing     Problem: DISCHARGE PLANNING  Goal: Discharge to home or other facility with appropriate resources  Description: INTERVENTIONS:  - Identify barriers to discharge w/patient and caregiver  - Arrange for needed discharge resources and  transportation as appropriate  - Identify discharge learning needs (meds, wound care, etc.)  - Arrange for interpretive services to assist at discharge as needed  - Refer to Case Management Department for coordinating discharge planning if the patient needs post-hospital services based on physician/advanced practitioner order or complex needs related to functional status, cognitive ability, or social support system  Outcome: Progressing     Problem: Knowledge Deficit  Goal: Patient/family/caregiver demonstrates understanding of disease process, treatment plan, medications, and discharge instructions  Description: Complete learning assessment and assess knowledge base.  Interventions:  - Provide teaching at level of understanding  - Provide teaching via preferred learning methods  Outcome: Progressing     Problem: Nutrition/Hydration-ADULT  Goal: Nutrient/Hydration intake appropriate for improving, restoring or maintaining nutritional needs  Description: Monitor and assess patient's nutrition/hydration status for malnutrition. Collaborate with interdisciplinary team and initiate plan and interventions as ordered.  Monitor patient's weight and dietary intake as ordered or per policy. Utilize nutrition screening tool and intervene as necessary. Determine patient's food preferences and provide high-protein, high-caloric foods as appropriate.     INTERVENTIONS:  - Monitor oral intake, urinary output, labs, and treatment plans  - Assess nutrition and hydration status and recommend course of action  - Evaluate amount of meals eaten  - Assist patient with eating if necessary   - Allow adequate time for meals  - Recommend/ encourage appropriate diets, oral nutritional supplements, and vitamin/mineral supplements  - Order, calculate, and assess calorie counts as needed  - Recommend, monitor, and adjust tube feedings and TPN/PPN based on assessed needs  - Assess need for intravenous fluids  - Provide specific  nutrition/hydration education as appropriate  - Include patient/family/caregiver in decisions related to nutrition  Outcome: Progressing     Problem: HEMATOLOGIC - ADULT  Goal: Maintains hematologic stability  Description: INTERVENTIONS  - Assess for signs and symptoms of bleeding or hemorrhage  - Monitor labs  - Administer supportive blood products/factors as ordered and appropriate  Outcome: Progressing

## 2024-08-22 NOTE — DISCHARGE INSTRUCTIONS
Abdominal Paracentesis     WHAT YOU NEED TO KNOW:   Abdominal paracentesis is a procedure to remove abnormal fluid buildup in your abdomen. Fluid builds up because of liver problems, such as swelling and scarring. Heart failure, kidney disease, a mass, or problems with your pancreas may also cause fluid buildup.     DISCHARGE INSTRUCTIONS:     Follow up with your healthcare provider as directed: Write down your questions so you remember to ask them during your visits.     Wound care: Remove dressing after 24 hours. Leave glue in place.    Return to your normal activities    Contact Interventional Radiology at [phone] ([facility] PATIENTS: Contact Interventional Radiology at [phone]) ([facility] PATIENTS: Contact Interventional Radiology at [phone]) if:  You have a fever and your wound is red and swollen.   You have yellow, green, or bad-smelling discharge coming from your wound.   You have pain or swelling in your abdomen.   You have an upset stomach or you vomit.   You have sudden, sharp pain in your abdomen.   You urinate very little or not at all.   You feel confused and more tired than usual.   Your arm or leg feels warm, tender, and painful. It may look swollen and red.   You suddenly feel lightheaded and have trouble breathing.

## 2024-08-22 NOTE — ASSESSMENT & PLAN NOTE
Recent diagnosis of liver cirrhosis with etiology unknown. Patient denies alcohol/drug use  - chronic hepatitis panel and other lab work ordered as per GI  -Evidence of large volume ascites with history of recurrent ascites requiring paracentesis.  Last completed 8/16 for 3 L  IR consulted for paracentesis, to be done 8/22.  White cell count and culture ordered  -Previous ascitic fluid studies appear transudative without evidence of SBP  -Continue home medications lasix, spironolactone  -Continue IV ceftriaxone for SBP prophylaxis, day 3

## 2024-08-22 NOTE — ANESTHESIA POSTPROCEDURE EVALUATION
Post-Op Assessment Note    CV Status:  Stable  Pain Score: 0    Pain management: adequate       Mental Status:  Sleepy   Hydration Status:  Stable   PONV Controlled:  None   Airway Patency:  Patent     Post Op Vitals Reviewed: Yes    No anethesia notable event occurred.    Staff: Anesthesiologist, CRNA               BP   107/67   Temp      Pulse  70   Resp   14   SpO2   98

## 2024-08-22 NOTE — ASSESSMENT & PLAN NOTE
Malnutrition Findings:   Adult Malnutrition type: Chronic illness  Adult Degree of Malnutrition: Other severe protein calorie malnutrition  Malnutrition Characteristics: Fat loss, Muscle loss                  360 Statement: Severe protein calorie malnutrion of chronic illness related to decreased po intake as evidenced by depressed temples, hollow orbits and visible clavicles.  Treated with diet and supplements    BMI Findings:           Body mass index is 21.08 kg/m².

## 2024-08-22 NOTE — PLAN OF CARE
Problem: PAIN - ADULT  Goal: Verbalizes/displays adequate comfort level or baseline comfort level  Description: Interventions:  - Encourage patient to monitor pain and request assistance  - Assess pain using appropriate pain scale  - Administer analgesics based on type and severity of pain and evaluate response  - Implement non-pharmacological measures as appropriate and evaluate response  - Consider cultural and social influences on pain and pain management  - Notify physician/advanced practitioner if interventions unsuccessful or patient reports new pain  Outcome: Progressing     Problem: INFECTION - ADULT  Goal: Absence or prevention of progression during hospitalization  Description: INTERVENTIONS:  - Assess and monitor for signs and symptoms of infection  - Monitor lab/diagnostic results  - Monitor all insertion sites, i.e. indwelling lines, tubes, and drains  - Monitor endotracheal if appropriate and nasal secretions for changes in amount and color  - [place] appropriate cooling/warming therapies per order  - Administer medications as ordered  - Instruct and encourage patient and family to use good hand hygiene technique  - Identify and instruct in appropriate isolation precautions for identified infection/condition  Outcome: Progressing  Goal: Absence of fever/infection during neutropenic period  Description: INTERVENTIONS:  - Monitor WBC    Outcome: Progressing     Problem: SAFETY ADULT  Goal: Patient will remain free of falls  Description: INTERVENTIONS:  - Educate patient/family on patient safety including physical limitations  - Instruct patient to call for assistance with activity   - Consult OT/PT to assist with strengthening/mobility   - Keep Call bell within reach  - Keep bed low and locked with side rails adjusted as appropriate  - Keep care items and personal belongings within reach  - Initiate and maintain comfort rounds  - Make Fall Risk Sign visible to staff  - Offer Toileting every 2 Hours,  in advance of need  - Initiate/Maintain bed alarm  - Obtain necessary fall risk management equipment: a;arm  - Apply yellow socks and bracelet for high fall risk patients  - Consider moving patient to room near nurses station  Outcome: Progressing  Goal: Maintain or return to baseline ADL function  Description: INTERVENTIONS:  -  Assess patient's ability to carry out ADLs; assess patient's baseline for ADL function and identify physical deficits which impact ability to perform ADLs (bathing, care of mouth/teeth, toileting, grooming, dressing, etc.)  - Assess/evaluate cause of self-care deficits   - Assess range of motion  - Assess patient's mobility; develop plan if impaired  - Assess patient's need for assistive devices and provide as appropriate  - Encourage maximum independence but intervene and supervise when necessary  - Involve family in performance of ADLs  - Assess for home care needs following discharge   - Consider OT consult to assist with ADL evaluation and planning for discharge  - Provide patient education as appropriate  Outcome: Progressing  Goal: Maintains/Returns to pre admission functional level  Description: INTERVENTIONS:  - Perform AM-PAC 6 Click Basic Mobility/ Daily Activity assessment daily.  - Set and communicate daily mobility goal to care team and patient/family/caregiver.   - Collaborate with rehabilitation services on mobility goals if consulted  - Perform Range of Motion 3 times a day.  - Reposition patient every 2 hours.  - Dangle patient 3 times a day  - Stand patient 3 times a day  - Ambulate patient 3 times a day  - Out of bed to chair 3 times a day   - Out of bed for meals 3 times a day  - Out of bed for toileting  - Record patient progress and toleration of activity level   Outcome: Progressing     Problem: DISCHARGE PLANNING  Goal: Discharge to home or other facility with appropriate resources  Description: INTERVENTIONS:  - Identify barriers to discharge w/patient and  caregiver  - Arrange for needed discharge resources and transportation as appropriate  - Identify discharge learning needs (meds, wound care, etc.)  - Arrange for interpretive services to assist at discharge as needed  - Refer to Case Management Department for coordinating discharge planning if the patient needs post-hospital services based on physician/advanced practitioner order or complex needs related to functional status, cognitive ability, or social support system  Outcome: Progressing     Problem: Knowledge Deficit  Goal: Patient/family/caregiver demonstrates understanding of disease process, treatment plan, medications, and discharge instructions  Description: Complete learning assessment and assess knowledge base.  Interventions:  - Provide teaching at level of understanding  - Provide teaching via preferred learning methods  Outcome: Progressing     Problem: Nutrition/Hydration-ADULT  Goal: Nutrient/Hydration intake appropriate for improving, restoring or maintaining nutritional needs  Description: Monitor and assess patient's nutrition/hydration status for malnutrition. Collaborate with interdisciplinary team and initiate plan and interventions as ordered.  Monitor patient's weight and dietary intake as ordered or per policy. Utilize nutrition screening tool and intervene as necessary. Determine patient's food preferences and provide high-protein, high-caloric foods as appropriate.     INTERVENTIONS:  - Monitor oral intake, urinary output, labs, and treatment plans  - Assess nutrition and hydration status and recommend course of action  - Evaluate amount of meals eaten  - Assist patient with eating if necessary   - Allow adequate time for meals  - Recommend/ encourage appropriate diets, oral nutritional supplements, and vitamin/mineral supplements  - Order, calculate, and assess calorie counts as needed  - Recommend, monitor, and adjust tube feedings and TPN/PPN based on assessed needs  - Assess need for  intravenous fluids  - Provide specific nutrition/hydration education as appropriate  - Include patient/family/caregiver in decisions related to nutrition  Outcome: Progressing     Problem: HEMATOLOGIC - ADULT  Goal: Maintains hematologic stability  Description: INTERVENTIONS  - Assess for signs and symptoms of bleeding or hemorrhage  - Monitor labs  - Administer supportive blood products/factors as ordered and appropriate  Outcome: Progressing     Problem: Potential for Falls  Goal: Patient will remain free of falls  Description: INTERVENTIONS:  - Educate patient/family on patient safety including physical limitations  - Instruct patient to call for assistance with activity   - Consult OT/PT to assist with strengthening/mobility   - Keep Call bell within reach  - Keep bed low and locked with side rails adjusted as appropriate  - Keep care items and personal belongings within reach  - Initiate and maintain comfort rounds  - Make Fall Risk Sign visible to staff  - Offer Toileting every 2 Hours, in advance of need  - Initiate/Maintain bed alarm  - Obtain necessary fall risk management equipment: a;arm  - Apply yellow socks and bracelet for high fall risk patients  - Consider moving patient to room near nurses station  Outcome: Progressing

## 2024-08-22 NOTE — ANESTHESIA PREPROCEDURE EVALUATION
Procedure:  EGD    Relevant Problems   CARDIO   (+) Mesenteric ischemia (HCC)   (+) Portal vein thrombosis      GI/HEPATIC   (+) Cirrhosis of liver with ascites  (HCC)      HEMATOLOGY   (+) Acute on chronic anemia      NEURO/PSYCH   (+) TIA (transient ischemic attack)      PULMONARY   (+) Hemopneumothorax on right      Surgery/Wound/Pain   (+) S/P exploratory laparotomy      Other   (+) Moderate protein-calorie malnutrition (HCC)   (+) Severe protein-calorie malnutrition (HCC)   Paracentesis this morning, 2600ml removed.    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 s/p  -3/13/24 exlap, SBR, AUREA, Suprapubic tube insertion, ABthera VAC   -3/14/24 exlap, small bowel-small bowel anastomosis, abdominal closure    Hgb 4.2 on admit, s/p transfusion   Latest Reference Range & Units 08/21/24 15:56   Hemoglobin 12.0 - 17.0 g/dL 9.8 (L)   (L): Data is abnormally low  Physical Exam    Airway    Mallampati score: II  TM Distance: >3 FB  Neck ROM: full     Dental   Comment: Denies loose teeth     Cardiovascular  Cardiovascular exam normal    Pulmonary  Pulmonary exam normal     Other Findings  Portions of exam deferred due to low yield and/or unknown COVID status      Anesthesia Plan  ASA Score- 4     Anesthesia Type- IV sedation with anesthesia with ASA Monitors.         Additional Monitors:     Airway Plan:            Plan Factors-    Chart reviewed.   Existing labs reviewed. Patient summary reviewed.    Patient is not a current smoker.              Induction- intravenous.    Postoperative Plan-     Perioperative Resuscitation Plan - Level 1 - Full Code.       Informed Consent- Anesthetic plan and risks discussed with son.  I personally reviewed this patient with the CRNA. Discussed and agreed on the Anesthesia Plan with the CRNA..      Consent obtained from son Zack Valera at bedside.

## 2024-08-22 NOTE — MALNUTRITION/BMI
This medical record reflects one or more clinical indicators suggestive of malnutrition.    Malnutrition Findings:   Adult Malnutrition type: Chronic illness  Adult Degree of Malnutrition: Other severe protein calorie malnutrition  Malnutrition Characteristics: Fat loss, Muscle loss    360 Statement: Severe protein calorie malnutrion of chronic illness related to decreased po intake as evidenced by depressed temples, hollow orbits and visible clavicles.  Treated with diet and supplements    BMI Findings:    Body mass index is 21.08 kg/m².     See Nutrition note dated 8/21/24 for additional details.  Completed nutrition assessment is viewable in the nutrition documentation.

## 2024-08-23 ENCOUNTER — TELEPHONE (OUTPATIENT)
Age: 89
End: 2024-08-23

## 2024-08-23 ENCOUNTER — PREP FOR PROCEDURE (OUTPATIENT)
Dept: GASTROENTEROLOGY | Facility: CLINIC | Age: 89
End: 2024-08-23

## 2024-08-23 DIAGNOSIS — K25.9 GASTRIC ULCER, UNSPECIFIED CHRONICITY, UNSPECIFIED WHETHER GASTRIC ULCER HEMORRHAGE OR PERFORATION PRESENT: ICD-10-CM

## 2024-08-23 DIAGNOSIS — R18.8 CIRRHOSIS OF LIVER WITH ASCITES, UNSPECIFIED HEPATIC CIRRHOSIS TYPE  (HCC): Primary | ICD-10-CM

## 2024-08-23 DIAGNOSIS — K74.60 CIRRHOSIS OF LIVER WITH ASCITES, UNSPECIFIED HEPATIC CIRRHOSIS TYPE  (HCC): Primary | ICD-10-CM

## 2024-08-23 PROBLEM — E87.20 LACTIC ACIDOSIS: Status: RESOLVED | Noted: 2024-08-20 | Resolved: 2024-08-23

## 2024-08-23 LAB
ALBUMIN SERPL BCG-MCNC: 2.9 G/DL (ref 3.5–5)
ALP SERPL-CCNC: 39 U/L (ref 34–104)
ALT SERPL W P-5'-P-CCNC: 9 U/L (ref 7–52)
ANA SER QL IA: NEGATIVE
ANION GAP SERPL CALCULATED.3IONS-SCNC: 4 MMOL/L (ref 4–13)
AST SERPL W P-5'-P-CCNC: 38 U/L (ref 13–39)
BILIRUB SERPL-MCNC: 0.8 MG/DL (ref 0.2–1)
BUN SERPL-MCNC: 14 MG/DL (ref 5–25)
CALCIUM ALBUM COR SERPL-MCNC: 8.9 MG/DL (ref 8.3–10.1)
CALCIUM SERPL-MCNC: 8 MG/DL (ref 8.4–10.2)
CHLORIDE SERPL-SCNC: 106 MMOL/L (ref 96–108)
CO2 SERPL-SCNC: 27 MMOL/L (ref 21–32)
CREAT SERPL-MCNC: 1.06 MG/DL (ref 0.6–1.3)
ERYTHROCYTE [DISTWIDTH] IN BLOOD BY AUTOMATED COUNT: 19.1 % (ref 11.6–15.1)
GFR SERPL CREATININE-BSD FRML MDRD: 62 ML/MIN/1.73SQ M
GLUCOSE SERPL-MCNC: 83 MG/DL (ref 65–140)
HAV AB SER QL IA: REACTIVE
HBV SURFACE AB SER-ACNC: 197 MIU/ML
HCT VFR BLD AUTO: 27.2 % (ref 36.5–49.3)
HGB BLD-MCNC: 7.6 G/DL (ref 12–17)
MCH RBC QN AUTO: 23 PG (ref 26.8–34.3)
MCHC RBC AUTO-ENTMCNC: 27.9 G/DL (ref 31.4–37.4)
MCV RBC AUTO: 82 FL (ref 82–98)
PLATELET # BLD AUTO: 211 THOUSANDS/UL (ref 149–390)
PMV BLD AUTO: 10.9 FL (ref 8.9–12.7)
POTASSIUM SERPL-SCNC: 4.9 MMOL/L (ref 3.5–5.3)
PROT SERPL-MCNC: 5 G/DL (ref 6.4–8.4)
RBC # BLD AUTO: 3.3 MILLION/UL (ref 3.88–5.62)
SODIUM SERPL-SCNC: 137 MMOL/L (ref 135–147)
WBC # BLD AUTO: 11.31 THOUSAND/UL (ref 4.31–10.16)

## 2024-08-23 PROCEDURE — 99232 SBSQ HOSP IP/OBS MODERATE 35: CPT | Performed by: PHYSICIAN ASSISTANT

## 2024-08-23 PROCEDURE — 80053 COMPREHEN METABOLIC PANEL: CPT

## 2024-08-23 PROCEDURE — 97167 OT EVAL HIGH COMPLEX 60 MIN: CPT

## 2024-08-23 PROCEDURE — 97163 PT EVAL HIGH COMPLEX 45 MIN: CPT

## 2024-08-23 PROCEDURE — 99232 SBSQ HOSP IP/OBS MODERATE 35: CPT | Performed by: INTERNAL MEDICINE

## 2024-08-23 PROCEDURE — 85027 COMPLETE CBC AUTOMATED: CPT

## 2024-08-23 PROCEDURE — 97116 GAIT TRAINING THERAPY: CPT

## 2024-08-23 RX ORDER — PANTOPRAZOLE SODIUM 40 MG/1
40 TABLET, DELAYED RELEASE ORAL
Status: DISCONTINUED | OUTPATIENT
Start: 2024-08-24 | End: 2024-08-25 | Stop reason: HOSPADM

## 2024-08-23 RX ORDER — SODIUM CHLORIDE 9 MG/ML
20 INJECTION, SOLUTION INTRAVENOUS ONCE
OUTPATIENT
Start: 2024-08-26

## 2024-08-23 RX ADMIN — APIXABAN 2.5 MG: 2.5 TABLET, FILM COATED ORAL at 08:42

## 2024-08-23 RX ADMIN — LEVETIRACETAM 750 MG: 500 TABLET, FILM COATED ORAL at 08:42

## 2024-08-23 RX ADMIN — FUROSEMIDE 20 MG: 20 TABLET ORAL at 08:42

## 2024-08-23 RX ADMIN — APIXABAN 2.5 MG: 2.5 TABLET, FILM COATED ORAL at 17:10

## 2024-08-23 RX ADMIN — SPIRONOLACTONE 50 MG: 25 TABLET ORAL at 08:42

## 2024-08-23 RX ADMIN — PANTOPRAZOLE SODIUM 40 MG: 40 INJECTION, POWDER, FOR SOLUTION INTRAVENOUS at 08:42

## 2024-08-23 RX ADMIN — CEFTRIAXONE 1000 MG: 1 INJECTION, SOLUTION INTRAVENOUS at 20:58

## 2024-08-23 RX ADMIN — IRON SUCROSE 200 MG: 20 INJECTION, SOLUTION INTRAVENOUS at 08:54

## 2024-08-23 RX ADMIN — ASPIRIN 81 MG CHEWABLE TABLET 81 MG: 81 TABLET CHEWABLE at 08:42

## 2024-08-23 RX ADMIN — LEVETIRACETAM 750 MG: 500 TABLET, FILM COATED ORAL at 20:58

## 2024-08-23 RX ADMIN — ATORVASTATIN CALCIUM 20 MG: 20 TABLET, FILM COATED ORAL at 17:10

## 2024-08-23 NOTE — OCCUPATIONAL THERAPY NOTE
Occupational Therapy Evaluation       08/23/24 1200   OT Last Visit   OT Visit Date 08/23/24   Note Type   Note type Evaluation   Pain Assessment   Pain Assessment Tool 0-10   Pain Score No Pain   Restrictions/Precautions   Weight Bearing Precautions Per Order No   Other Precautions Chair Alarm;Bed Alarm;Fall Risk;Pain;Hard of hearing   Home Living   Type of Home House   Home Layout Two level;Bed/bath upstairs   Bathroom Shower/Tub Tub/shower unit   Bathroom Toilet Standard   Bathroom Equipment Grab bars in shower   Home Equipment Walker   Additional Comments pt reports using RW prn in the house and always using it outdoors   Prior Function   Level of [place] Needs assistance with ADLs;Independent with functional mobility;Needs assistance with IADLS   Lives With Spouse;Son   Receives Help From Family   IADLs Family/Friend/Other provides medication management;Independent with driving;Family/Friend/Other provides meals   Vocational Retired   Additional Comments Pt is questionable historian, unsure if all home set-up/PLOF info is accurate.   ADL   Eating Assistance 7  Independent   Grooming Assistance 5  Supervision/Setup   UB Bathing Assistance 5  Supervision/Setup   LB Bathing Assistance 4  Minimal Assistance   UB Dressing Assistance 5  Supervision/Setup   LB Dressing Assistance 4  Minimal Assistance   Toileting Assistance  4  Minimal Assistance   Bed Mobility   Supine to Sit 5  Supervision   Additional items Verbal cues;Increased time required   Sit to Supine 5  Supervision   Additional items Verbal cues;Increased time required   Transfers   Sit to Stand 4  Minimal assistance   Additional items Assist x 1;Verbal cues;Increased time required   Stand to Sit 4  Minimal assistance   Additional items Assist x 1;Verbal cues;Increased time required   Toilet transfer 4  Minimal assistance   Additional items Assist x 1;Verbal cues;Increased time required   Functional Mobility   Functional Mobility 4  Minimal  assistance   Additional Comments engaged in fxl mobility short household distances handheld assist with min A/contact guard   Additional items Hand hold assistance   Balance   Static Sitting Fair +   Dynamic Sitting Fair   Static Standing Fair   Dynamic Standing Fair -   Activity Tolerance   Activity Tolerance Patient tolerated treatment well   RUE Assessment   RUE Assessment WFL   LUE Assessment   LUE Assessment WFL   Cognition   Arousal/Participation Alert;Cooperative   Attention Within functional limits   Orientation Level Oriented to person;Oriented to place;Oriented to situation  (generally oriented to time; knew month and year, did not know date)   Memory Within functional limits   Following Commands Follows multistep commands with increased time or repetition   Assessment   Limitation Decreased ADL status;Decreased endurance;Decreased high-level ADLs;Decreased self-care trans  (decreased balance and mobility, decreased activity tolerance)   Prognosis Good   Assessment Patient evaluated by Occupational Therapy.  Patient admitted with Acute on chronic anemia.  The patients occupational profile, medical and therapy history includes a extensive additional review of physical, cognitive, or psychosocial history related to current functional performance.  Comorbidities affecting functional mobility and ADLS include: liver cirrhosis with ascites (unknown etiology), portal vein thrombosis on eliquis, chronic anemia, urinary retention s/p suprapubic catheter placement, seizure disorder.  Prior to admission, patient was independent with functional mobility with RW as needed, requiring assist for ADLS, and requiring assist for IADLS.  The evaluation identifies the following performance deficits: weakness, impaired balance, decreased endurance, increased fall risk, new onset of impairment of functional mobility, decreased ADLS, decreased IADLS, decreased activity tolerance, and decreased strength, that result in activity  limitations and/or participation restrictions. This evaluation requires clinical decision making of high complexity, because the patient presents with comorbidites that affect occupational performance and required significant modification of tasks or assistance with consideration of multiple treatment options.  The Barthel Index was used as a functional outcome tool presenting with a score of Barthel Index Score: 60, indicating moderate limitations of functional mobility and ADLS.  The patient's raw score on the AM-PAC Daily Activity Inpatient Short Form is 19. A raw score of greater than or equal to 19 suggests the patient may benefit from discharge to home. Please refer to the recommendation of the Occupational Therapist for safe discharge planning.  Patient will benefit from skilled Occupational Therapy services to address above deficits and facilitate a safe return to prior level of function.   Goals   Patient Goals to get better and go home   STG Time Frame   (1-7 days)   Short Term Goal  Goals established to promote Patient Goals: to get better and go home: Grooming: independent standing at sink; Bathing: supervision; Upper Body Dressing independent; Lower Body Dressing: supervision; Toileting: supervision; Patient will increase ambulatory standard toilet transfer to supervision with least restrictive assistive device to increase performance and safety with ADLS and functional mobility; Patient will increase standing tolerance to 5 minutes during ADL task to decrease assistance level and decrease fall risk; Patient will increase bed mobility to independent in preparation for ADLS and transfers; Patient will increase functional mobility to and from bathroom with least restrictive assistive device with supervision to increase performance with ADLS and to use a toilet; Patient will tolerate 5 minutes of UE ROM/strengthening to increase general activity tolerance and performance in ADLS/IADLS; Patient will improve  functional activity tolerance to 5 minutes of sustained functional tasks to increase participation in basic self-care and decrease assistance level; Patient will increase dynamic sitting balance to fair+ to improve the ability to sit at edge of bed or on a chair for ADLS;  Patient will increase dynamic standing balance to fair to improve postural stability and decrease fall risk during standing ADLS and transfers.   LTG Time Frame   (8-14 days)   Long Term Goal Bathing: independent; Lower Body Dressing: independent; Toileting: independent; Patient will increase ambulatory standard toilet transfer to independent with least restrictive assistive device to increase performance and safety with ADLS and functional mobility; Patient will increase standing tolerance to 10 minutes during ADL task to decrease assistance level and decrease fall risk; Patient will increase functional mobility to and from bathroom with least restrictive assistive device independently to increase performance with ADLS and to use a toilet; Patient will tolerate 10 minutes of UE ROM/strengthening to increase general activity tolerance and performance in ADLS/IADLS; Patient will improve functional activity tolerance to 10 minutes of sustained functional tasks to increase participation in basic self-care and decrease assistance level; Patient will increase dynamic sitting balance to good to improve the ability to sit at edge of bed or on a chair for ADLS;  Patient will increase dynamic standing balance to fair+ to improve postural stability and decrease fall risk during standing ADLS and transfers.  Pt will score >/= 23/24 on AM-PAC Daily Activity Inpatient scale to promote safe independence with ADLs and functional mobility; Pt will score >/= 75/100 on Barthel Index in order to decrease caregiver assistance needed and increase ability to perform ADLs and functional mobility.   Plan   Treatment Interventions ADL retraining;Functional transfer  training;UE strengthening/ROM;Endurance training;Patient/family training;Equipment evaluation/education;Activityengagement;Compensatory technique education   Goal Expiration Date 09/06/24   OT Frequency 3-5x/wk   Discharge Recommendation   Rehab Resource Intensity Level, OT III (Minimum Resource Intensity)   AM-PAC Daily Activity Inpatient   Lower Body Dressing 3   Bathing 3   Toileting 3   Upper Body Dressing 3   Grooming 3   Eating 4   Daily Activity Raw Score 19   Daily Activity Standardized Score (Calc for Raw Score >=11) 40.22   AM-PAC Applied Cognition Inpatient   Following a Speech/Presentation 3   Understanding Ordinary Conversation 3   Taking Medications 4   Remembering Where Things Are Placed or Put Away 4   Remembering List of 4-5 Errands 3   Taking Care of Complicated Tasks 2   Applied Cognition Raw Score 19   Applied Cognition Standardized Score 39.77   Barthel Index   Feeding 10   Bathing 0   Grooming Score 0   Dressing Score 5   Bladder Score 0   Bowels Score 10   Toilet Use Score 5   Transfers (Bed/Chair) Score 10   Mobility (Level Surface) Score 0   Stairs Score 5   Barthel Index Score 45     Niya Stephens OTR/L   [place] License # [id number]  [place] License # [id number]

## 2024-08-23 NOTE — ASSESSMENT & PLAN NOTE
Patient on eliquis and aspirin at home which is currently on hold given low hemoglobin.  Await clearance to resume from GI.  Continue outpatient follow up with vascular surgery as mentioned in previous notes.

## 2024-08-23 NOTE — PLAN OF CARE
Problem: PHYSICAL THERAPY ADULT  Goal: Performs mobility at highest level of function for planned discharge setting.  See evaluation for individualized goals.  Description: Treatment/Interventions: ADL retraining, Functional transfer training, LE strengthening/ROM, Elevations, Therapeutic exercise, Endurance training, Patient/family training, Equipment eval/education, Bed mobility, Gait training, Compensatory technique education, Spoke to case management, Family  Equipment Recommended:  (Patient has a cane and a roller walker if needed)       See flowsheet documentation for full assessment, interventions and recommendations.  Note:    Problem List: Decreased strength, Decreased endurance, Impaired balance, Decreased mobility, Decreased coordination, Decreased safety awareness  Assessment: Patient seen for Physical Therapy evaluation. Patient admitted with Acute on chronic anemia.  Comorbidities affecting patient's physical performance include: Urinary retention, cirrhosis of liver with ascites, severe protein calorie malnutrition, bilateral cataracts, TIA, status post exploratory laparotomy, mesenteric ischemia, history of prostate cancer.  Personal factors affecting patient at time of initial evaluation include: lives in two story house, ambulating with assistive device, stairs to enter home, inability to navigate community distances, inability to navigate level surfaces without external assistance, and inability to perform dynamic tasks in community. Prior to admission, patient was independent with functional mobility with cane, requiring assist for ADLS, requiring assist for IADLS, living with spouse and family in a two level home with 3 steps to enter, and ambulating household distance.  Please find objective findings from Physical Therapy assessment regarding body systems outlined above with impairments and limitations including weakness, impaired balance, decreased endurance, impaired coordination, gait  deviations, decreased activity tolerance, decreased functional mobility tolerance, decreased safety awareness, fall risk, and impulsive at times with functional mobility .  The Barthel Index was used as a functional outcome tool presenting with a score of Barthel Index Score: 45 today indicating marked limitations of functional mobility and ADLS.  Patient's clinical presentation is currently unstable/unpredictable as seen in patient's presentation of vital sign response, varying levels of cognitive performance, increased fall risk, new onset of impairment of functional mobility, decreased endurance, and new onset of weakness. Pt would benefit from continued Physical Therapy treatment to address deficits as defined above and maximize level of functional mobility. As demonstrated by objective findings, the assigned level of complexity for this evaluation is high.The patient's -[facility] Basic Mobility Inpatient Short Form Raw Score is 19. A Raw score of greater than 16 suggests the patient may benefit from discharge to home. Please also refer to the recommendation of the Physical Therapist for safe discharge planning.        Rehab Resource Intensity Level, PT: III (Minimum Resource Intensity)    See flowsheet documentation for full assessment.

## 2024-08-23 NOTE — PLAN OF CARE
Problem: PAIN - ADULT  Goal: Verbalizes/displays adequate comfort level or baseline comfort level  Description: Interventions:  - Encourage patient to monitor pain and request assistance  - Assess pain using appropriate pain scale  - Administer analgesics based on type and severity of pain and evaluate response  - Implement non-pharmacological measures as appropriate and evaluate response  - Consider cultural and social influences on pain and pain management  - Notify physician/advanced practitioner if interventions unsuccessful or patient reports new pain  Outcome: Progressing     Problem: INFECTION - ADULT  Goal: Absence or prevention of progression during hospitalization  Description: INTERVENTIONS:  - Assess and monitor for signs and symptoms of infection  - Monitor lab/diagnostic results  - Monitor all insertion sites, i.e. indwelling lines, tubes, and drains  - Monitor endotracheal if appropriate and nasal secretions for changes in amount and color  - [place] appropriate cooling/warming therapies per order  - Administer medications as ordered  - Instruct and encourage patient and family to use good hand hygiene technique  - Identify and instruct in appropriate isolation precautions for identified infection/condition  Outcome: Progressing  Goal: Absence of fever/infection during neutropenic period  Description: INTERVENTIONS:  - Monitor WBC    Outcome: Progressing     Problem: SAFETY ADULT  Goal: Patient will remain free of falls  Description: INTERVENTIONS:  - Educate patient/family on patient safety including physical limitations  - Instruct patient to call for assistance with activity   - Consult OT/PT to assist with strengthening/mobility   - Keep Call bell within reach  - Keep bed low and locked with side rails adjusted as appropriate  - Keep care items and personal belongings within reach  - Initiate and maintain comfort rounds  - Make Fall Risk Sign visible to staff  - Offer Toileting every 2 Hours,  in advance of need  - Initiate/Maintain bed alarm  - Obtain necessary fall risk management equipment: alarm  - Apply yellow socks and bracelet for high fall risk patients  - Consider moving patient to room near nurses station  Outcome: Progressing  Goal: Maintain or return to baseline ADL function  Description: INTERVENTIONS:  -  Assess patient's ability to carry out ADLs; assess patient's baseline for ADL function and identify physical deficits which impact ability to perform ADLs (bathing, care of mouth/teeth, toileting, grooming, dressing, etc.)  - Assess/evaluate cause of self-care deficits   - Assess range of motion  - Assess patient's mobility; develop plan if impaired  - Assess patient's need for assistive devices and provide as appropriate  - Encourage maximum independence but intervene and supervise when necessary  - Involve family in performance of ADLs  - Assess for home care needs following discharge   - Consider OT consult to assist with ADL evaluation and planning for discharge  - Provide patient education as appropriate  Outcome: Progressing  Goal: Maintains/Returns to pre admission functional level  Description: INTERVENTIONS:  - Perform AM-PAC 6 Click Basic Mobility/ Daily Activity assessment daily.  - Set and communicate daily mobility goal to care team and patient/family/caregiver.   - Collaborate with rehabilitation services on mobility goals if consulted  - Perform Range of Motion 3 times a day.  - Reposition patient every 2 hours.  - Dangle patient 3 times a day  - Stand patient 3 times a day  - Ambulate patient 3 times a day  - Out of bed to chair 3 times a day   - Out of bed for meals 3 times a day  - Out of bed for toileting  - Record patient progress and toleration of activity level   Outcome: Progressing     Problem: DISCHARGE PLANNING  Goal: Discharge to home or other facility with appropriate resources  Description: INTERVENTIONS:  - Identify barriers to discharge w/patient and  caregiver  - Arrange for needed discharge resources and transportation as appropriate  - Identify discharge learning needs (meds, wound care, etc.)  - Arrange for interpretive services to assist at discharge as needed  - Refer to Case Management Department for coordinating discharge planning if the patient needs post-hospital services based on physician/advanced practitioner order or complex needs related to functional status, cognitive ability, or social support system  Outcome: Progressing     Problem: Knowledge Deficit  Goal: Patient/family/caregiver demonstrates understanding of disease process, treatment plan, medications, and discharge instructions  Description: Complete learning assessment and assess knowledge base.  Interventions:  - Provide teaching at level of understanding  - Provide teaching via preferred learning methods  Outcome: Progressing     Problem: Nutrition/Hydration-ADULT  Goal: Nutrient/Hydration intake appropriate for improving, restoring or maintaining nutritional needs  Description: Monitor and assess patient's nutrition/hydration status for malnutrition. Collaborate with interdisciplinary team and initiate plan and interventions as ordered.  Monitor patient's weight and dietary intake as ordered or per policy. Utilize nutrition screening tool and intervene as necessary. Determine patient's food preferences and provide high-protein, high-caloric foods as appropriate.     INTERVENTIONS:  - Monitor oral intake, urinary output, labs, and treatment plans  - Assess nutrition and hydration status and recommend course of action  - Evaluate amount of meals eaten  - Assist patient with eating if necessary   - Allow adequate time for meals  - Recommend/ encourage appropriate diets, oral nutritional supplements, and vitamin/mineral supplements  - Order, calculate, and assess calorie counts as needed  - Recommend, monitor, and adjust tube feedings and TPN/PPN based on assessed needs  - Assess need for  intravenous fluids  - Provide specific nutrition/hydration education as appropriate  - Include patient/family/caregiver in decisions related to nutrition  Outcome: Progressing     Problem: HEMATOLOGIC - ADULT  Goal: Maintains hematologic stability  Description: INTERVENTIONS  - Assess for signs and symptoms of bleeding or hemorrhage  - Monitor labs  - Administer supportive blood products/factors as ordered and appropriate  Outcome: Progressing     Problem: Potential for Falls  Goal: Patient will remain free of falls  Description: INTERVENTIONS:  - Educate patient/family on patient safety including physical limitations  - Instruct patient to call for assistance with activity   - Consult OT/PT to assist with strengthening/mobility   - Keep Call bell within reach  - Keep bed low and locked with side rails adjusted as appropriate  - Keep care items and personal belongings within reach  - Initiate and maintain comfort rounds  - Make Fall Risk Sign visible to staff  - Offer Toileting every 2 Hours, in advance of need  - Initiate/Maintain bed alarm  - Obtain necessary fall risk management equipment: alarm  - Apply yellow socks and bracelet for high fall risk patients  - Consider moving patient to room near nurses station  Outcome: Progressing

## 2024-08-23 NOTE — ASSESSMENT & PLAN NOTE
Patient presented with abnormal outpatient lab work with hemoglobin of 4.1 along with generalized weakness   Patient denied any melena, hematochezia or hematemesis  CT abdomen pelvis (8/21/24): Gastroesophageal varices present.  Status post 3 units of PRBC transfusion with improved hemoglobin.    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  Mild portal hypertensive gastropathy in the fundus and body of the stomach.  Single, small cratered ulcer in the antrum with clean base.  Continue Protonix every 12 hours  Restart eliquis & aspirin  Iron panel indicates iron deficiency anemia present, started 3 days IV Venofer then p.o. ferrous sulfate    Results from last 7 days   Lab Units 08/23/24  0532 08/22/24  1414 08/21/24  1556 08/21/24  0715 08/20/24  1458   HEMOGLOBIN g/dL 7.6* 8.2* 9.8* 8.3* 4.2*   HEMATOCRIT % 27.2* 28.6* 34.9* 28.9* 18.5*   MCV fL 82 81*  --  81* 77*

## 2024-08-23 NOTE — PROGRESS NOTES
[facility]  Progress Note  Name: Jf Valera I  MRN: [id number]  Unit/Bed#: [address] [place] [address] A  Date of Admission: 8/20/2024   Date of Service: 8/23/2024 I Hospital Day: 3    Assessment & Plan   * Acute on chronic anemia  Assessment & Plan  Patient presented with abnormal outpatient lab work with hemoglobin of 4.1 along with generalized weakness   Patient denied any melena, hematochezia or hematemesis  CT abdomen pelvis (8/21/24): Gastroesophageal varices present.  Status post 3 units of PRBC transfusion with improved hemoglobin.    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  Mild portal hypertensive gastropathy in the fundus and body of the stomach.  Single, small cratered ulcer in the antrum with clean base.  Continue Protonix every 12 hours  Restart eliquis & aspirin  Iron panel indicates iron deficiency anemia present, started 3 days IV Venofer then p.o. ferrous sulfate    Results from last 7 days   Lab Units 08/23/24  0532 08/22/24  1414 08/21/24  1556 08/21/24  0715 08/20/24  1458   HEMOGLOBIN g/dL 7.6* 8.2* 9.8* 8.3* 4.2*   HEMATOCRIT % 27.2* 28.6* 34.9* 28.9* 18.5*   MCV fL 82 81*  --  81* 77*         Severe protein-calorie malnutrition (HCC)  Assessment & Plan  Malnutrition Findings:   Adult Malnutrition type: Chronic illness  Adult Degree of Malnutrition: Other severe protein calorie malnutrition  Malnutrition Characteristics: Fat loss, Muscle loss    360 Statement: Severe protein calorie malnutrion of chronic illness related to decreased po intake as evidenced by depressed temples, hollow orbits and visible clavicles.  Treated with diet and supplements    BMI Findings:  Body mass index is 21.08 kg/m².     Encourage adequate PO intake along with nutritional supplements.    Cirrhosis of liver with ascites  (HCC)  Assessment & Plan  Recent diagnosis of liver cirrhosis with etiology unknown. Patient denies alcohol/drug use  GI  consulted and appreciate their recommendations  Chronic hepatitis panel and other lab work ordered as per GI  Need for recurrent paracenteses.  S/p paracentesis per IR for 2600 mL fluid (8/22/24).  Fluid culture: negative thus far  WBCs: 48  Previous ascitic fluid studies appear transudative without evidence of SBP  Continue home medications lasix, spironolactone  Currently on IV ceftriaxone but consider discontinuation  AFP elevated @ 13.99  Hepatitis A antibodies (+)  Hepatitis B core antibodies (+)    Urine retention  Assessment & Plan  Patient with suprapubic catheter in place, functioning well  No evidence of obstruction or renal compromise  Continue to monitor I/O    Seizure-like activity (HCC)  Assessment & Plan  Continue home dose keppra    Portal vein thrombosis  Assessment & Plan  Patient on eliquis and aspirin at home which is currently on hold given low hemoglobin.  Await clearance to resume from GI.  Continue outpatient follow up with vascular surgery as mentioned in previous notes.    Lactic acidosis-resolved as of 8/23/2024  Assessment & Plan  LA 2.4 --> 3.2 --> 1.5  Possibly Type A in setting of anemia. Caution further fluids given liver cirrhosis and volume overload.     Lab Results   Component Value Date/Time    LACTICACID 1.5 08/22/2024 02:14 PM    LACTICACID 3.2 (H) 08/20/2024 05:36 PM    LACTICACID 2.4 (H) 08/20/2024 02:58 PM          VTE Pharmacologic Prophylaxis: VTE Score: 9 High Risk (Score >/= 5) - Pharmacological DVT Prophylaxis Contraindicated. Sequential Compression Devices Ordered.    Mobility:   Basic Mobility Inpatient Raw Score: 19  JH-HLM Goal: 6: Walk 10 steps or more  JH-HLM Achieved: 7: Walk 25 feet or more  JH-HLM Goal achieved. Continue to encourage appropriate mobility.    Patient Centered Rounds:  I provided the nurse with an update    Discussions with Specialists or Other Care Team Provider: GI    Education and Discussions with Family / Patient: Updated  (son)  at bedside.    Total Time Spent on Date of Encounter in care of patient:  mins. This time was spent on one or more of the following: performing physical exam; counseling and coordination of care; obtaining or reviewing history; documenting in the medical record; reviewing/ordering tests, medications or procedures; communicating with other healthcare professionals and discussing with patient's family/caregivers.    Current Length of Stay: 3 day(s)  Current Patient Status: Inpatient   Certification Statement: The patient will continue to require additional inpatient hospital stay due to hemoglobin monitoring  Discharge Plan: Anticipate discharge tomorrow to home.    Code Status: Level 1 - Full Code    Subjective:   Patient is sitting in the bedside chair and doing well without complaints.  Per his son, his mental status is at baseline.  He has no confusion or excessive fatigue.  He had a large BM this morning, which was normal color.      Objective:     Vitals:   Temp (24hrs), Av.7 °F (36.5 °C), Min:96.9 °F (36.1 °C), Max:98.8 °F (37.1 °C)    Temp:  [96.9 °F (36.1 °C)-98.8 °F (37.1 °C)] 98.8 °F (37.1 °C)  HR:  [69-75] 75  Resp:  [16-17] 17  BP: (110-133)/(58-66) 110/58  SpO2:  [95 %-100 %] 100 %  Body mass index is 21.08 kg/m².     Input and Output Summary (last 24 hours):     Intake/Output Summary (Last 24 hours) at 2024 1433  Last data filed at 2024 1000  Gross per 24 hour   Intake --   Output 650 ml   Net -650 ml       Physical Exam:   Physical Exam  Constitutional:       General: He is not in acute distress.     Appearance: Normal appearance. He is normal weight.   HENT:      Head: Normocephalic and atraumatic.      Mouth/Throat:      Mouth: Mucous membranes are moist.   Eyes:      General: No scleral icterus.     Extraocular Movements: Extraocular movements intact.   Cardiovascular:      Rate and Rhythm: Normal rate and regular rhythm.      Heart sounds: No murmur heard.  Pulmonary:      Effort:  Pulmonary effort is normal. No respiratory distress.      Breath sounds: Normal breath sounds. No wheezing, rhonchi or rales.   Abdominal:      General: Bowel sounds are normal. There is distension.      Tenderness: There is no abdominal tenderness.   Musculoskeletal:         General: Normal range of motion.      Cervical back: Normal range of motion.   Skin:     General: Skin is warm.      Findings: No rash.   Neurological:      General: No focal deficit present.      Mental Status: He is alert and oriented to person, place, and time.   Psychiatric:         Mood and Affect: Mood normal.         Behavior: Behavior normal.        Additional Data:     Labs:  Results from last 7 days   Lab Units 08/23/24  0532 08/21/24  1556 08/21/24  0715   WBC Thousand/uL 11.31*   < > 13.82*   HEMOGLOBIN g/dL 7.6*   < > 8.3*   HEMATOCRIT % 27.2*   < > 28.9*   PLATELETS Thousands/uL 211   < > 191   SEGS PCT %  --   --  88*   LYMPHO PCT %  --   --  6*   MONO PCT %  --   --  5   EOS PCT %  --   --  0    < > = values in this interval not displayed.     Results from last 7 days   Lab Units 08/23/24  0532   SODIUM mmol/L 137   POTASSIUM mmol/L 4.9   CHLORIDE mmol/L 106   CO2 mmol/L 27   BUN mg/dL 14   CREATININE mg/dL 1.06   ANION GAP mmol/L 4   CALCIUM mg/dL 8.0*   ALBUMIN g/dL 2.9*   TOTAL BILIRUBIN mg/dL 0.80   ALK PHOS U/L 39   ALT U/L 9   AST U/L 38   GLUCOSE RANDOM mg/dL 83     Results from last 7 days   Lab Units 08/22/24  1414   INR  1.28*             Results from last 7 days   Lab Units 08/22/24  1414 08/20/24  1736 08/20/24  1458   LACTIC ACID mmol/L 1.5 3.2* 2.4*       Lines/Drains:  Invasive Devices       Peripheral Intravenous Line  Duration             Peripheral IV 08/20/24 Left Hand 2 days    Peripheral IV 08/22/24 Left;Upper;Ventral (anterior) Arm <1 day              Drain  Duration             Suprapubic Catheter 18 Fr. 98 days                  Imaging: Reviewed radiology reports from this admission including:  abdominal/pelvic CT    Recent Cultures (last 7 days):   Results from last 7 days   Lab Units 08/22/24  1015   GRAM STAIN RESULT  No Polys or Bacteria seen   BODY FLUID CULTURE, STERILE  No growth       Last 24 Hours Medication List:   Current Facility-Administered Medications   Medication Dose Route Frequency Provider Last Rate    apixaban  2.5 mg Oral BID IVAN Leija      aspirin  81 mg Oral Daily IVAN Leija      atorvastatin  20 mg Oral Daily With Dinner Juan Aguiar MD      cefTRIAXone  1,000 mg Intravenous Q24H Juan Aguiar MD 1,000 mg (08/22/24 2011)    [START ON 8/25/2024] ferrous sulfate  325 mg Oral Daily With Breakfast IVAN Leija      furosemide  20 mg Oral Daily Dinora Shaikh DO      iron sucrose  200 mg Intravenous Daily IVAN Leija 200 mg (08/23/24 0854)    levETIRAcetam  750 mg Oral Q12H [facility] Juan Aguiar MD      [START ON 8/24/2024] pantoprazole  40 mg Oral Early Morning Juanita Glaser PA-C      spironolactone  50 mg Oral Daily Dinora Shaikh DO          Today, Patient Was Seen By: Jovana Reed PA-C    **Please Note: This note may have been constructed using a voice recognition system.**

## 2024-08-23 NOTE — ASSESSMENT & PLAN NOTE
LA 2.4 --> 3.2 --> 1.5  Possibly Type A in setting of anemia. Caution further fluids given liver cirrhosis and volume overload.     Lab Results   Component Value Date/Time    LACTICACID 1.5 08/22/2024 02:14 PM    LACTICACID 3.2 (H) 08/20/2024 05:36 PM    LACTICACID 2.4 (H) 08/20/2024 02:58 PM

## 2024-08-23 NOTE — TELEPHONE ENCOUNTER
Philip Grant:   we have no sooner appointments available for Dr. Jose in [place]. Because if his insurance, he can only be seen in [place]. Placed pt on a wait list.

## 2024-08-23 NOTE — PLAN OF CARE
Problem: PAIN - ADULT  Goal: Verbalizes/displays adequate comfort level or baseline comfort level  Description: Interventions:  - Encourage patient to monitor pain and request assistance  - Assess pain using appropriate pain scale  - Administer analgesics based on type and severity of pain and evaluate response  - Implement non-pharmacological measures as appropriate and evaluate response  - Consider cultural and social influences on pain and pain management  - Notify physician/advanced practitioner if interventions unsuccessful or patient reports new pain  Outcome: Progressing     Problem: INFECTION - ADULT  Goal: Absence or prevention of progression during hospitalization  Description: INTERVENTIONS:  - Assess and monitor for signs and symptoms of infection  - Monitor lab/diagnostic results  - Monitor all insertion sites, i.e. indwelling lines, tubes, and drains  - Monitor endotracheal if appropriate and nasal secretions for changes in amount and color  - [place] appropriate cooling/warming therapies per order  - Administer medications as ordered  - Instruct and encourage patient and family to use good hand hygiene technique  - Identify and instruct in appropriate isolation precautions for identified infection/condition  Outcome: Progressing  Goal: Absence of fever/infection during neutropenic period  Description: INTERVENTIONS:  - Monitor WBC    Outcome: Progressing     Problem: SAFETY ADULT  Goal: Patient will remain free of falls  Description: INTERVENTIONS:  - Educate patient/family on patient safety including physical limitations  - Instruct patient to call for assistance with activity   - Consult OT/PT to assist with strengthening/mobility   - Keep Call bell within reach  - Keep bed low and locked with side rails adjusted as appropriate  - Keep care items and personal belongings within reach  - Initiate and maintain comfort rounds  - Make Fall Risk Sign visible to staff  - Offer Toileting every 2 Hours,  in advance of need  - Initiate/Maintain bed alarm  - Obtain necessary fall risk management equipment: yellow sock   - Apply yellow socks and bracelet for high fall risk patients  - Consider moving patient to room near nurses station  Outcome: Progressing  Goal: Maintain or return to baseline ADL function  Description: INTERVENTIONS:  -  Assess patient's ability to carry out ADLs; assess patient's baseline for ADL function and identify physical deficits which impact ability to perform ADLs (bathing, care of mouth/teeth, toileting, grooming, dressing, etc.)  - Assess/evaluate cause of self-care deficits   - Assess range of motion  - Assess patient's mobility; develop plan if impaired  - Assess patient's need for assistive devices and provide as appropriate  - Encourage maximum independence but intervene and supervise when necessary  - Involve family in performance of ADLs  - Assess for home care needs following discharge   - Consider OT consult to assist with ADL evaluation and planning for discharge  - Provide patient education as appropriate  Outcome: Progressing  Goal: Maintains/Returns to pre admission functional level  Description: INTERVENTIONS:  - Perform AM-PAC 6 Click Basic Mobility/ Daily Activity assessment daily.  - Set and communicate daily mobility goal to care team and patient/family/caregiver.   - Collaborate with rehabilitation services on mobility goals if consulted  - Perform Range of Motion 3 times a day.  - Reposition patient every 2 hours.  - Dangle patient 3 times a day  - Stand patient 3 times a day  - Ambulate patient 3 times a day  - Out of bed to chair 3 times a day   - Out of bed for meals 3 times a day  - Out of bed for toileting  - Record patient progress and toleration of activity level   Outcome: Progressing     Problem: DISCHARGE PLANNING  Goal: Discharge to home or other facility with appropriate resources  Description: INTERVENTIONS:  - Identify barriers to discharge w/patient and  caregiver  - Arrange for needed discharge resources and transportation as appropriate  - Identify discharge learning needs (meds, wound care, etc.)  - Arrange for interpretive services to assist at discharge as needed  - Refer to Case Management Department for coordinating discharge planning if the patient needs post-hospital services based on physician/advanced practitioner order or complex needs related to functional status, cognitive ability, or social support system  Outcome: Progressing     Problem: Knowledge Deficit  Goal: Patient/family/caregiver demonstrates understanding of disease process, treatment plan, medications, and discharge instructions  Description: Complete learning assessment and assess knowledge base.  Interventions:  - Provide teaching at level of understanding  - Provide teaching via preferred learning methods  Outcome: Progressing     Problem: Nutrition/Hydration-ADULT  Goal: Nutrient/Hydration intake appropriate for improving, restoring or maintaining nutritional needs  Description: Monitor and assess patient's nutrition/hydration status for malnutrition. Collaborate with interdisciplinary team and initiate plan and interventions as ordered.  Monitor patient's weight and dietary intake as ordered or per policy. Utilize nutrition screening tool and intervene as necessary. Determine patient's food preferences and provide high-protein, high-caloric foods as appropriate.     INTERVENTIONS:  - Monitor oral intake, urinary output, labs, and treatment plans  - Assess nutrition and hydration status and recommend course of action  - Evaluate amount of meals eaten  - Assist patient with eating if necessary   - Allow adequate time for meals  - Recommend/ encourage appropriate diets, oral nutritional supplements, and vitamin/mineral supplements  - Order, calculate, and assess calorie counts as needed  - Recommend, monitor, and adjust tube feedings and TPN/PPN based on assessed needs  - Assess need for  intravenous fluids  - Provide specific nutrition/hydration education as appropriate  - Include patient/family/caregiver in decisions related to nutrition  Outcome: Progressing     Problem: HEMATOLOGIC - ADULT  Goal: Maintains hematologic stability  Description: INTERVENTIONS  - Assess for signs and symptoms of bleeding or hemorrhage  - Monitor labs  - Administer supportive blood products/factors as ordered and appropriate  Outcome: Progressing     Problem: Potential for Falls  Goal: Patient will remain free of falls  Description: INTERVENTIONS:  - Educate patient/family on patient safety including physical limitations  - Instruct patient to call for assistance with activity   - Consult OT/PT to assist with strengthening/mobility   - Keep Call bell within reach  - Keep bed low and locked with side rails adjusted as appropriate  - Keep care items and personal belongings within reach  - Initiate and maintain comfort rounds  - Make Fall Risk Sign visible to staff  - Offer Toileting every 2 Hours, in advance of need  - Initiate/Maintain bed alarm  - - Apply yellow socks and bracelet for high fall risk patients  - Consider moving patient to room near nurses station  Outcome: Progressing

## 2024-08-23 NOTE — TELEPHONE ENCOUNTER
----- Message from Juanita Glaser PA-C sent at 8/23/2024 10:28 AM EDT -----  This patient is scheduled with Dr. Jose in December but I do not know if he can come to [place] due to insurance but family was requesting sooner appointment

## 2024-08-23 NOTE — ASSESSMENT & PLAN NOTE
Malnutrition Findings:   Adult Malnutrition type: Chronic illness  Adult Degree of Malnutrition: Other severe protein calorie malnutrition  Malnutrition Characteristics: Fat loss, Muscle loss    360 Statement: Severe protein calorie malnutrion of chronic illness related to decreased po intake as evidenced by depressed temples, hollow orbits and visible clavicles.  Treated with diet and supplements    BMI Findings:  Body mass index is 21.08 kg/m².     Encourage adequate PO intake along with nutritional supplements.

## 2024-08-23 NOTE — ASSESSMENT & PLAN NOTE
Patient with suprapubic catheter in place, functioning well  No evidence of obstruction or renal compromise  Continue to monitor I/O

## 2024-08-23 NOTE — PHYSICAL THERAPY NOTE
PHYSICAL THERAPY EVALUATION/TREATMENT    NAME:  Jf Valera  AGE:   88 y.o.  Mrn:   [id number]  Length Of Stay: 3    ADMIT DX:  Anemia [D64.9]  Abnormal CT of the abdomen [R93.5]  Elevated lactic acid level [R79.89]    Past Medical History:   Diagnosis Date    Anemia     Basilar artery stenosis     Decreased hearing     Encounter for care or replacement of suprapubic tube (HCC)     Hemopneumothorax 03/17/2024    chest tube inserted-right    HTN (hypertension) 09/23/2013    Hypertension     Mesenteric ischemia (HCC)     Portal vein thrombosis     Seizure-like activity (HCC) 04/16/2024     Past Surgical History:   Procedure Laterality Date    IR CHEST TUBE PLACEMENT  3/17/2024    IR PARACENTESIS  7/29/2024    IR PARACENTESIS  8/16/2024    IR PARACENTESIS  8/22/2024    IR SUPRAPUBIC CATHETER CHECK/CHANGE/REINSERTION/UPSIZE  5/17/2024    LAPAROTOMY N/A 3/13/2024    Procedure: LAPAROTOMY EXPLORATORY; SMALL BOWEL RESECTION; LYSIS OF ADHESIONS; SUPRPAPUBIC TUBE INSERTION;  Surgeon: Marvin Azul MD;  Location: [facility];  Service: General    LAPAROTOMY N/A 3/14/2024    Procedure: EXPLORATORY LAPAROTOMY, WASHOUT. SMALL BOWEL ANASTOMOSIS, CONTROL OF LIVER BLEED, CLOSURE OF ABDOMINAL WOUND;  Surgeon: Beltran Lorenz DO;  Location:  MAIN OR;  Service: General       08/23/24 1335   PT Last Visit   PT Visit Date 08/23/24   Note Type   Note type Evaluation   Pain Assessment   Pain Assessment Tool 0-10   Pain Score No Pain   Restrictions/Precautions   Other Precautions Hard of hearing;Fall Risk;Bed Alarm;Chair Alarm;Visual impairment  (Suprapubic catheter; [place] on room air)   Home Living   Type of Home House   Home Layout Two level;Bed/bath upstairs;1/2 bath on main level;Stairs to enter with rails  (3 steps to enter)   Bathroom Shower/Tub Tub/shower unit  (Patient sponge bathes at baseline)   Bathroom Toilet Standard   Bathroom Accessibility Accessible   Home Equipment Cane  (Roller walker)   Additional  "Comments Patient is a questionable historian.  Home living information gained from patient's son and patient.   Prior Function   Level of [place] Needs assistance with ADLs;Independent with functional mobility;Needs assistance with IADLS   Lives With Spouse;Daughter  (+ Daughter's family)   Receives Help From Family   IADLs Family/Friend/Other provides meals;Family/Friend/Other provides medication management;Family/Friend/Other provides transportation   Falls in the last 6 months 0  (Denies)   Comments Per patient's son, patient using a cane inside and outside.  Occasional use of roller walker as needed   General   Additional Pertinent History Patient is admitted with weakness.   Family/Caregiver Present Yes  (Patient's son)   Cognition   Overall Cognitive Status Impaired   Arousal/Participation Cooperative   Attention Within functional limits   Orientation Level Oriented to person;Oriented to time;Oriented to situation  (Generally oriented to place, states \"hospital\")   Memory Decreased recall of precautions;Decreased recall of recent events;Decreased short term memory   Following Commands Follows multistep commands without difficulty   Comments At least 2 patient identifiers including name and date of birth   Subjective   Subjective \"Okay\" patient agreeable to PT.   RLE Assessment   RLE Assessment WFL  (Grossly 3+ to 4 -/5)   LLE Assessment   LLE Assessment WFL  (Grossly 3+ to 4 -/5)   Light Touch   RLE Light Touch Grossly intact   LLE Light Touch Grossly intact   Bed Mobility   Supine to Sit 6  Modified independent   Transfers   Sit to Stand 5  Supervision   Additional items Assist x 1;Verbal cues;Impulsive   Stand to Sit 5  Supervision   Additional items Assist x 1;Verbal cues;Impulsive   Additional Comments Patient noted with slight impulsiveness with transfers   Ambulation/Elevation   Gait pattern Short stride;Step through pattern;Decreased foot clearance;Narrow MARILYN; minimal, generalized unsteadiness "   Gait Assistance 4  Minimal assist   Additional items Assist x 1;Verbal cues;Tactile cues   Assistive Device None   Distance 60 feet with change in direction   Balance   Static Sitting Good   Static Standing Fair   Ambulatory Fair -   Endurance Deficit   Endurance Deficit No   Activity Tolerance   Activity Tolerance Patient tolerated treatment well   Assessment   Problem List Decreased strength;Decreased endurance;Impaired balance;Decreased mobility;Decreased coordination;Decreased safety awareness   Assessment Patient seen for Physical Therapy evaluation. Patient admitted with Acute on chronic anemia.  Comorbidities affecting patient's physical performance include: Urinary retention, cirrhosis of liver with ascites, severe protein calorie malnutrition, bilateral cataracts, TIA, status post exploratory laparotomy, mesenteric ischemia, history of prostate cancer.  Personal factors affecting patient at time of initial evaluation include: lives in two story house, ambulating with assistive device, stairs to enter home, inability to navigate community distances, inability to navigate level surfaces without external assistance, and inability to perform dynamic tasks in community. Prior to admission, patient was independent with functional mobility with cane, requiring assist for ADLS, requiring assist for IADLS, living with spouse and family in a two level home with 3 steps to enter, and ambulating household distance.  Please find objective findings from Physical Therapy assessment regarding body systems outlined above with impairments and limitations including weakness, impaired balance, decreased endurance, impaired coordination, gait deviations, decreased activity tolerance, decreased functional mobility tolerance, decreased safety awareness, fall risk, and impulsive at times with functional mobility .  The Barthel Index was used as a functional outcome tool presenting with a score of Barthel Index Score: 45 today  indicating marked limitations of functional mobility and ADLS.  Patient's clinical presentation is currently unstable/unpredictable as seen in patient's presentation of vital sign response, varying levels of cognitive performance, increased fall risk, new onset of impairment of functional mobility, decreased endurance, and new onset of weakness. Pt would benefit from continued Physical Therapy treatment to address deficits as defined above and maximize level of functional mobility. As demonstrated by objective findings, the assigned level of complexity for this evaluation is high.    The patient's AM-PAC Basic Mobility Inpatient Short Form Raw Score is 19. A Raw score of greater than 16 suggests the patient may benefit from discharge to home. Please also refer to the recommendation of the Physical Therapist for safe discharge planning.   Goals   Patient Goals To go home   STG Expiration Date 09/03/24   Short Term Goal #1 Patient will: Increase bilateral LE strength 1 grade to facilitate independent mobility, Perform all bed mobility tasks independently to improve pt's independence w/ repositioning for decrease risk of skin breakdown, Perform all transfers independently consistently from various height surfaces in order to improve I w/ engagement w/ real-world environments/situations, Ambulate at least 150 ft. with LRAD versus no AD independently w/o LOB to facilitate return and engagement w/ previous living environment, Navigate flight of stairs independently with unilateral handrail to either improve independence w/ entering home and/or so patient can fully access living areas in home, Increase ambulatory and static and dynamic standing balance 1 grade to decrease risk for falls, Tolerate at least 15 consecutive minutes of activity to demonstrate improved activity tolerance and endurance, and Tolerate 3 hr OOB to faciliate upright tolerance   Plan   Treatment/Interventions ADL retraining;Functional transfer  training;LE strengthening/ROM;Elevations;Therapeutic exercise;Endurance training;Patient/family training;Equipment eval/education;Bed mobility;Gait training;Compensatory technique education;Spoke to case management;Family   PT Frequency 3-5x/wk   Discharge Recommendation   Rehab Resource Intensity Level, PT III (Minimum Resource Intensity)   Equipment Recommended   (Patient has a cane and a roller walker if needed)   AM-PAC Basic Mobility Inpatient   Turning in Flat Bed Without Bedrails 4   Lying on Back to Sitting on Edge of Flat Bed Without Bedrails 3   Moving Bed to Chair 3   Standing Up From Chair Using Arms 3   Walk in Room 3   Climb 3-5 Stairs With Railing 3   Basic Mobility Inpatient Raw Score 19   Basic Mobility Standardized Score 42.48   [facility] Highest Level Of Mobility   -HLM Goal 6: Walk 10 steps or more   -HLM Achieved 7: Walk 25 feet or more   Barthel Index   Feeding 10   Bathing 0   Grooming Score 0   Dressing Score 5   Bladder Score 0   Bowels Score 10   Toilet Use Score 5   Transfers (Bed/Chair) Score 10   Mobility (Level Surface) Score 0   Stairs Score 5   Barthel Index Score 45   Additional Treatment Session   Start Time 1335   End Time 1345   Treatment Assessment Patient agreeable to additional trial of ambulation without an assistive device.  Patient transfers sit to stand with mod and ambulates without an assistive device 80 feet with change in direction with supervision.  Patient transfers stand to sit with mod I.  A: patient with fairly good tolerance to PT evaluation and treatment.  While admitted, patient will benefit from continued skilled physical therapy services to prevent loss of strength and mobility.  When medically stable for discharge patient is appropriate for Level III Minimum Resource Intensity.  Patient normally ambulates with a cane at all times at home but did well today without an assistive device.  Pending patient's progress with PT and gait ability he can be  trialed with a single-point cane as needed.   End of Consult   Patient Position at End of Consult Bed/Chair alarm activated;Bedside chair;All needs within reach   Licensure   [place] License Number  Katharine Parikh, PT [id number]   Portions of the documentation may have been created using voice recognition software. Occasional wrong word or sound alike substitutions may have occurred due to the inherent limitation of the voice recognition software. Read the chart carefully and recognize, using context, where substitutions have occurred.

## 2024-08-24 LAB
ACTIN IGG SERPL-ACNC: 3 UNITS (ref 0–19)
ALBUMIN SERPL BCG-MCNC: 2.8 G/DL (ref 3.5–5)
ALP SERPL-CCNC: 36 U/L (ref 34–104)
ALT SERPL W P-5'-P-CCNC: 7 U/L (ref 7–52)
ANION GAP SERPL CALCULATED.3IONS-SCNC: 4 MMOL/L (ref 4–13)
AST SERPL W P-5'-P-CCNC: 10 U/L (ref 13–39)
BASOPHILS # BLD AUTO: 0.06 THOUSANDS/ÂΜL (ref 0–0.1)
BASOPHILS NFR BLD AUTO: 1 % (ref 0–1)
BILIRUB SERPL-MCNC: 0.58 MG/DL (ref 0.2–1)
BUN SERPL-MCNC: 15 MG/DL (ref 5–25)
CALCIUM ALBUM COR SERPL-MCNC: 8.9 MG/DL (ref 8.3–10.1)
CALCIUM SERPL-MCNC: 7.9 MG/DL (ref 8.4–10.2)
CHLORIDE SERPL-SCNC: 106 MMOL/L (ref 96–108)
CO2 SERPL-SCNC: 28 MMOL/L (ref 21–32)
CREAT SERPL-MCNC: 0.84 MG/DL (ref 0.6–1.3)
EOSINOPHIL # BLD AUTO: 0.17 THOUSAND/ÂΜL (ref 0–0.61)
EOSINOPHIL NFR BLD AUTO: 2 % (ref 0–6)
ERYTHROCYTE [DISTWIDTH] IN BLOOD BY AUTOMATED COUNT: 20.8 % (ref 11.6–15.1)
GFR SERPL CREATININE-BSD FRML MDRD: 78 ML/MIN/1.73SQ M
GLUCOSE SERPL-MCNC: 67 MG/DL (ref 65–140)
HCT VFR BLD AUTO: 26.8 % (ref 36.5–49.3)
HGB BLD-MCNC: 7.4 G/DL (ref 12–17)
IMM GRANULOCYTES # BLD AUTO: 0.04 THOUSAND/UL (ref 0–0.2)
IMM GRANULOCYTES NFR BLD AUTO: 0 % (ref 0–2)
LYMPHOCYTES # BLD AUTO: 1.43 THOUSANDS/ÂΜL (ref 0.6–4.47)
LYMPHOCYTES NFR BLD AUTO: 15 % (ref 14–44)
MAGNESIUM SERPL-MCNC: 2.1 MG/DL (ref 1.9–2.7)
MCH RBC QN AUTO: 23 PG (ref 26.8–34.3)
MCHC RBC AUTO-ENTMCNC: 27.6 G/DL (ref 31.4–37.4)
MCV RBC AUTO: 83 FL (ref 82–98)
MITOCHONDRIA M2 IGG SER-ACNC: <20 UNITS (ref 0–20)
MONOCYTES # BLD AUTO: 0.53 THOUSAND/ÂΜL (ref 0.17–1.22)
MONOCYTES NFR BLD AUTO: 6 % (ref 4–12)
NEUTROPHILS # BLD AUTO: 7.44 THOUSANDS/ÂΜL (ref 1.85–7.62)
NEUTS SEG NFR BLD AUTO: 76 % (ref 43–75)
NRBC BLD AUTO-RTO: 0 /100 WBCS
PLATELET # BLD AUTO: 205 THOUSANDS/UL (ref 149–390)
PMV BLD AUTO: 10.9 FL (ref 8.9–12.7)
POTASSIUM SERPL-SCNC: 3.8 MMOL/L (ref 3.5–5.3)
PROT SERPL-MCNC: 4.8 G/DL (ref 6.4–8.4)
RBC # BLD AUTO: 3.22 MILLION/UL (ref 3.88–5.62)
SODIUM SERPL-SCNC: 138 MMOL/L (ref 135–147)
WBC # BLD AUTO: 9.67 THOUSAND/UL (ref 4.31–10.16)

## 2024-08-24 PROCEDURE — 85025 COMPLETE CBC W/AUTO DIFF WBC: CPT | Performed by: PHYSICIAN ASSISTANT

## 2024-08-24 PROCEDURE — 99232 SBSQ HOSP IP/OBS MODERATE 35: CPT

## 2024-08-24 PROCEDURE — 80053 COMPREHEN METABOLIC PANEL: CPT | Performed by: PHYSICIAN ASSISTANT

## 2024-08-24 PROCEDURE — 83735 ASSAY OF MAGNESIUM: CPT | Performed by: PHYSICIAN ASSISTANT

## 2024-08-24 RX ADMIN — PANTOPRAZOLE SODIUM 40 MG: 40 TABLET, DELAYED RELEASE ORAL at 05:21

## 2024-08-24 RX ADMIN — SPIRONOLACTONE 50 MG: 25 TABLET ORAL at 10:07

## 2024-08-24 RX ADMIN — LEVETIRACETAM 750 MG: 500 TABLET, FILM COATED ORAL at 10:06

## 2024-08-24 RX ADMIN — CEFTRIAXONE 1000 MG: 1 INJECTION, SOLUTION INTRAVENOUS at 22:03

## 2024-08-24 RX ADMIN — APIXABAN 2.5 MG: 2.5 TABLET, FILM COATED ORAL at 18:51

## 2024-08-24 RX ADMIN — ATORVASTATIN CALCIUM 20 MG: 20 TABLET, FILM COATED ORAL at 16:32

## 2024-08-24 RX ADMIN — ASPIRIN 81 MG CHEWABLE TABLET 81 MG: 81 TABLET CHEWABLE at 10:07

## 2024-08-24 RX ADMIN — LEVETIRACETAM 750 MG: 500 TABLET, FILM COATED ORAL at 22:03

## 2024-08-24 RX ADMIN — APIXABAN 2.5 MG: 2.5 TABLET, FILM COATED ORAL at 10:07

## 2024-08-24 RX ADMIN — IRON SUCROSE 200 MG: 20 INJECTION, SOLUTION INTRAVENOUS at 10:08

## 2024-08-24 RX ADMIN — FUROSEMIDE 20 MG: 20 TABLET ORAL at 10:07

## 2024-08-24 NOTE — ASSESSMENT & PLAN NOTE
Patient presented with abnormal outpatient lab work with hemoglobin of 4.1 along with generalized weakness   Patient denied any melena, hematochezia or hematemesis  CT abdomen pelvis (8/21/24): Gastroesophageal varices present.  Status post 3 units of PRBC transfusion with improved hemoglobin.    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  Mild portal hypertensive gastropathy in the fundus and body of the stomach.  Single, small cratered ulcer in the antrum with clean base.  Continue Protonix every 12 hours  Restarted eliquis & aspirin 8/23    Results from last 7 days   Lab Units 08/24/24  0521 08/23/24  0532 08/22/24  1414 08/21/24  1556 08/21/24  0715 08/20/24  1458   HEMOGLOBIN g/dL 7.4* 7.6* 8.2* 9.8* 8.3* 4.2*   HEMATOCRIT % 26.8* 27.2* 28.6* 34.9* 28.9* 18.5*   MCV fL 83 82 81*  --  81* 77*       Iron panel with evidence of iron deficiency anemia - s/p 3 days IV venofer. Ferrous sulfate ordered for the morning.   Hgb remained stable overnight, however still slowly down trending - will observe one more night given recent initiation of blood thinners. If hgb remains stable, can likely be discharged tomorrow    (0) independent oral

## 2024-08-24 NOTE — ASSESSMENT & PLAN NOTE
LA 2.4 --> 3.2 --> 1.5  Possibly Type A in setting of anemia. Caution further fluids given liver cirrhosis and volume overload.     Lab Results   Component Value Date/Time    LACTICACID 1.5 08/22/2024 02:14 PM

## 2024-08-24 NOTE — PLAN OF CARE
Problem: PAIN - ADULT  Goal: Verbalizes/displays adequate comfort level or baseline comfort level  Description: Interventions:  - Encourage patient to monitor pain and request assistance  - Assess pain using appropriate pain scale  - Administer analgesics based on type and severity of pain and evaluate response  - Implement non-pharmacological measures as appropriate and evaluate response  - Consider cultural and social influences on pain and pain management  - Notify physician/advanced practitioner if interventions unsuccessful or patient reports new pain  Outcome: Progressing     Problem: INFECTION - ADULT  Goal: Absence or prevention of progression during hospitalization  Description: INTERVENTIONS:  - Assess and monitor for signs and symptoms of infection  - Monitor lab/diagnostic results  - Monitor all insertion sites, i.e. indwelling lines, tubes, and drains  - Monitor endotracheal if appropriate and nasal secretions for changes in amount and color  - [place] appropriate cooling/warming therapies per order  - Administer medications as ordered  - Instruct and encourage patient and family to use good hand hygiene technique  - Identify and instruct in appropriate isolation precautions for identified infection/condition  Outcome: Progressing  Goal: Absence of fever/infection during neutropenic period  Description: INTERVENTIONS:  - Monitor WBC    Outcome: Progressing     Problem: SAFETY ADULT  Goal: Patient will remain free of falls  Description: INTERVENTIONS:  - Educate patient/family on patient safety including physical limitations  - Instruct patient to call for assistance with activity   - Consult OT/PT to assist with strengthening/mobility   - Keep Call bell within reach  - Keep bed low and locked with side rails adjusted as appropriate  - Keep care items and personal belongings within reach  - Initiate and maintain comfort rounds  - Make Fall Risk Sign visible to staff  - Offer Toileting every 2 Hours,  in advance of need  - Initiate/Maintain bed alarm  - Obtain necessary fall risk management equipment: socks  Problem: DISCHARGE PLANNING  Goal: Discharge to home or other facility with appropriate resources  Description: INTERVENTIONS:  - Identify barriers to discharge w/patient and caregiver  - Arrange for needed discharge resources and transportation as appropriate  - Identify discharge learning needs (meds, wound care, etc.)  - Arrange for interpretive services to assist at discharge as needed  - Refer to Case Management Department for coordinating discharge planning if the patient needs post-hospital services based on physician/advanced practitioner order or complex needs related to functional status, cognitive ability, or social support system  Outcome: Progressing     Problem: Knowledge Deficit  Goal: Patient/family/caregiver demonstrates understanding of disease process, treatment plan, medications, and discharge instructions  Description: Complete learning assessment and assess knowledge base.  Interventions:  - Provide teaching at level of understanding  - Provide teaching via preferred learning methods  Outcome: Progressing     Problem: Nutrition/Hydration-ADULT  Goal: Nutrient/Hydration intake appropriate for improving, restoring or maintaining nutritional needs  Description: Monitor and assess patient's nutrition/hydration status for malnutrition. Collaborate with interdisciplinary team and initiate plan and interventions as ordered.  Monitor patient's weight and dietary intake as ordered or per policy. Utilize nutrition screening tool and intervene as necessary. Determine patient's food preferences and provide high-protein, high-caloric foods as appropriate.     INTERVENTIONS:  - Monitor oral intake, urinary output, labs, and treatment plans  - Assess nutrition and hydration status and recommend course of action  - Evaluate amount of meals eaten  - Assist patient with eating if necessary   - Allow  adequate time for meals  - Recommend/ encourage appropriate diets, oral nutritional supplements, and vitamin/mineral supplements  - Order, calculate, and assess calorie counts as needed  - Recommend, monitor, and adjust tube feedings and TPN/PPN based on assessed needs  - Assess need for intravenous fluids  - Provide specific nutrition/hydration education as appropriate  - Include patient/family/caregiver in decisions related to nutrition  Outcome: Progressing     Problem: HEMATOLOGIC - ADULT  Goal: Maintains hematologic stability  Description: INTERVENTIONS  - Assess for signs and symptoms of bleeding or hemorrhage  - Monitor labs  - Administer supportive blood products/factors as ordered and appropriate  Outcome: Progressing     - Apply yellow socks and bracelet for high fall risk patients  - Consider moving patient to room near nurses station  Outcome: Progressing  Goal: Maintain or return to baseline ADL function  Description: INTERVENTIONS:  -  Assess patient's ability to carry out ADLs; assess patient's baseline for ADL function and identify physical deficits which impact ability to perform ADLs (bathing, care of mouth/teeth, toileting, grooming, dressing, etc.)  - Assess/evaluate cause of self-care deficits   - Assess range of motion  - Assess patient's mobility; develop plan if impaired  - Assess patient's need for assistive devices and provide as appropriate  - Encourage maximum independence but intervene and supervise when necessary  - Involve family in performance of ADLs  - Assess for home care needs following discharge   - Consider OT consult to assist with ADL evaluation and planning for discharge  - Provide patient education as appropriate  Outcome: Progressing  Goal: Maintains/Returns to pre admission functional level  Description: INTERVENTIONS:  - Perform AM-PAC 6 Click Basic Mobility/ Daily Activity assessment daily.  - Set and communicate daily mobility goal to care team and  patient/family/caregiver.   - Collaborate with rehabilitation services on mobility goals if consulted  - Perform Range of Motion 3 times a day.  - Reposition patient every 2 hours.  - Dangle patient 4 times a day  - Stand patient 4 times a day  - Ambulate patient 4 times a day  - Out of bed to chair 3 times a day   - Out of bed for meals 3 times a day  - Out of bed for toileting  - Record patient progress and toleration of activity level   Outcome: Progressing

## 2024-08-24 NOTE — PROGRESS NOTES
Critical access hospital  Progress Note  Name: Jf Valera I  MRN: [id number]  Unit/Bed#: [address] [place] [address] A  Date of Admission: 8/20/2024   Date of Service: 8/24/2024 I Hospital Day: 4    Assessment & Plan   * Acute on chronic anemia  Assessment & Plan  Patient presented with abnormal outpatient lab work with hemoglobin of 4.1 along with generalized weakness   Patient denied any melena, hematochezia or hematemesis  CT abdomen pelvis (8/21/24): Gastroesophageal varices present.  Status post 3 units of PRBC transfusion with improved hemoglobin.    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  Mild portal hypertensive gastropathy in the fundus and body of the stomach.  Single, small cratered ulcer in the antrum with clean base.  Continue Protonix every 12 hours  Restarted eliquis & aspirin 8/23    Results from last 7 days   Lab Units 08/24/24  0521 08/23/24  0532 08/22/24  1414 08/21/24  1556 08/21/24  0715 08/20/24  1458   HEMOGLOBIN g/dL 7.4* 7.6* 8.2* 9.8* 8.3* 4.2*   HEMATOCRIT % 26.8* 27.2* 28.6* 34.9* 28.9* 18.5*   MCV fL 83 82 81*  --  81* 77*       Iron panel with evidence of iron deficiency anemia - s/p 3 days IV venofer. Ferrous sulfate ordered for the morning.   Hgb remained stable overnight, however still slowly down trending - will observe one more night given recent initiation of blood thinners. If hgb remains stable, can likely be discharged tomorrow     Severe protein-calorie malnutrition (HCC)  Assessment & Plan  Malnutrition Findings:   Adult Malnutrition type: Chronic illness  Adult Degree of Malnutrition: Other severe protein calorie malnutrition  Malnutrition Characteristics: Fat loss, Muscle loss    360 Statement: Severe protein calorie malnutrion of chronic illness related to decreased po intake as evidenced by depressed temples, hollow orbits and visible clavicles.  Treated with diet and supplements    BMI Findings:  Body mass index is  21.08 kg/m².     Encourage adequate PO intake along with nutritional supplements.    Cirrhosis of liver with ascites  (HCC)  Assessment & Plan  Recent diagnosis of liver cirrhosis with etiology unknown. Patient denies alcohol/drug use  GI consulted and appreciate their recommendations  Chronic hepatitis panel and other lab work ordered as per GI  Need for recurrent paracenteses.  S/p paracentesis per IR for 2600 mL fluid (8/22/24).  Fluid culture: negative thus far  WBCs: 48  Previous ascitic fluid studies appear transudative without evidence of SBP  Continue home medications lasix, spironolactone  Currently on IV ceftriaxone but consider discontinuation  AFP elevated @ 13.99  Hepatitis A antibodies (+)  Hepatitis B core antibodies (+)    Urine retention  Assessment & Plan  Patient with suprapubic catheter in place, functioning well  No evidence of obstruction or renal compromise  Continue to monitor I/O    Seizure-like activity (HCC)  Assessment & Plan  Continue home dose keppra    Portal vein thrombosis  Assessment & Plan  Eliquis initially held on admission  Restarted 8/23 after clearance from GI    Lactic acidosis-resolved as of 8/23/2024  Assessment & Plan  LA 2.4 --> 3.2 --> 1.5  Possibly Type A in setting of anemia. Caution further fluids given liver cirrhosis and volume overload.     Lab Results   Component Value Date/Time    LACTICACID 1.5 08/22/2024 02:14 PM             VTE Pharmacologic Prophylaxis: VTE Score: 9 High Risk (Score >/= 5) - Pharmacological DVT Prophylaxis Ordered: apixaban (Eliquis). Sequential Compression Devices Ordered.    Mobility:   Basic Mobility Inpatient Raw Score: 19  JH-HLM Goal: 6: Walk 10 steps or more  JH-HLM Achieved: 3: Sit at edge of bed  JH-HLM Goal NOT achieved. Continue with multidisciplinary rounding and encourage appropriate mobility to improve upon JH-HLM goals.    Patient Centered Rounds: I performed bedside rounds with nursing staff today.   Discussions with  Specialists or Other Care Team Provider: None    Education and Discussions with Family / Patient: Updated  (son) via phone.    Total Time Spent on Date of Encounter in care of patient: This time was spent on one or more of the following: performing physical exam; counseling and coordination of care; obtaining or reviewing history; documenting in the medical record; reviewing/ordering tests, medications or procedures; communicating with other healthcare professionals and discussing with patient's family/caregivers.    Current Length of Stay: 4 day(s)  Current Patient Status: Inpatient   Certification Statement: The patient will continue to require additional inpatient hospital stay due to continued hgb monitoring  Discharge Plan: Anticipate discharge tomorrow to home.    Code Status: Level 1 - Full Code    Subjective:   Seen and examined resting comfortably in bed. No acute complaints. Aware of plan to monitor hgb one more day and plan for discharge tomorrow.     Objective:     Vitals:   Temp (24hrs), Av.5 °F (36.4 °C), Min:96.7 °F (35.9 °C), Max:97.8 °F (36.6 °C)    Temp:  [96.7 °F (35.9 °C)-97.8 °F (36.6 °C)] 97.8 °F (36.6 °C)  HR:  [68-87] 80  Resp:  [16-18] 16  BP: (111-126)/(58-68) 123/62  SpO2:  [98 %-100 %] 100 %  Body mass index is 21.08 kg/m².     Input and Output Summary (last 24 hours):     Intake/Output Summary (Last 24 hours) at 2024 1159  Last data filed at 2024 0333  Gross per 24 hour   Intake --   Output 1250 ml   Net -1250 ml       Physical Exam:   Physical Exam  Constitutional:       General: He is not in acute distress.     Appearance: He is ill-appearing (chronically).   Eyes:      Conjunctiva/sclera: Conjunctivae normal.   Cardiovascular:      Heart sounds: Murmur heard.   Pulmonary:      Breath sounds: Normal breath sounds.   Abdominal:      Palpations: Abdomen is soft.      Tenderness: There is no abdominal tenderness.   Skin:     General: Skin is warm and dry.    Neurological:      General: No focal deficit present.        Additional Data:     Labs:  Results from last 7 days   Lab Units 08/24/24  0521   WBC Thousand/uL 9.67   HEMOGLOBIN g/dL 7.4*   HEMATOCRIT % 26.8*   PLATELETS Thousands/uL 205   SEGS PCT % 76*   LYMPHO PCT % 15   MONO PCT % 6   EOS PCT % 2     Results from last 7 days   Lab Units 08/24/24  0521   SODIUM mmol/L 138   POTASSIUM mmol/L 3.8   CHLORIDE mmol/L 106   CO2 mmol/L 28   BUN mg/dL 15   CREATININE mg/dL 0.84   ANION GAP mmol/L 4   CALCIUM mg/dL 7.9*   ALBUMIN g/dL 2.8*   TOTAL BILIRUBIN mg/dL 0.58   ALK PHOS U/L 36   ALT U/L 7   AST U/L 10*   GLUCOSE RANDOM mg/dL 67     Results from last 7 days   Lab Units 08/22/24  1414   INR  1.28*             Results from last 7 days   Lab Units 08/22/24  1414 08/20/24  1736 08/20/24  1458   LACTIC ACID mmol/L 1.5 3.2* 2.4*       Lines/Drains:  Invasive Devices       Peripheral Intravenous Line  Duration             Peripheral IV 08/20/24 Left Hand 3 days    Peripheral IV 08/22/24 Left;Upper;Ventral (anterior) Arm 1 day              Drain  Duration             Suprapubic Catheter 18 Fr. 99 days                          Imaging: Reviewed radiology reports from this admission including: abdominal/pelvic CT    Recent Cultures (last 7 days):   Results from last 7 days   Lab Units 08/22/24  1015   GRAM STAIN RESULT  No Polys or Bacteria seen   BODY FLUID CULTURE, STERILE  No growth       Last 24 Hours Medication List:   Current Facility-Administered Medications   Medication Dose Route Frequency Provider Last Rate    apixaban  2.5 mg Oral BID IVAN Leija      aspirin  81 mg Oral Daily IVAN Leija      atorvastatin  20 mg Oral Daily With Dinner Juan Aguiar MD      cefTRIAXone  1,000 mg Intravenous Q24H Juan Aguiar MD Stopped (08/23/24 2330)    [START ON 8/25/2024] ferrous sulfate  325 mg Oral Daily With Breakfast IVAN Leija      furosemide  20 mg Oral Daily Dinora  DO Neel      levETIRAcetam  750 mg Oral Q12H [facility] Juan Aguiar MD      pantoprazole  40 mg Oral Early Morning Juanita Glaser PA-C      spironolactone  50 mg Oral Daily Dinorachuy Shaikh DO          Today, Patient Was Seen By: Giselle Bell PA-C    **Please Note: This note may have been constructed using a voice recognition system.**

## 2024-08-25 VITALS
DIASTOLIC BLOOD PRESSURE: 76 MMHG | HEIGHT: 63 IN | SYSTOLIC BLOOD PRESSURE: 123 MMHG | OXYGEN SATURATION: 100 % | RESPIRATION RATE: 17 BRPM | WEIGHT: 119 LBS | HEART RATE: 74 BPM | TEMPERATURE: 97.5 F | BODY MASS INDEX: 21.09 KG/M2

## 2024-08-25 PROBLEM — D64.9 ACUTE ON CHRONIC ANEMIA: Status: RESOLVED | Noted: 2024-04-16 | Resolved: 2024-08-25

## 2024-08-25 PROBLEM — R56.9 SEIZURE-LIKE ACTIVITY (HCC): Status: RESOLVED | Noted: 2024-04-16 | Resolved: 2024-08-25

## 2024-08-25 LAB
ANION GAP SERPL CALCULATED.3IONS-SCNC: 6 MMOL/L (ref 4–13)
BACTERIA SPEC BFLD CULT: NO GROWTH
BUN SERPL-MCNC: 14 MG/DL (ref 5–25)
CALCIUM SERPL-MCNC: 8.3 MG/DL (ref 8.4–10.2)
CHLORIDE SERPL-SCNC: 107 MMOL/L (ref 96–108)
CO2 SERPL-SCNC: 26 MMOL/L (ref 21–32)
CREAT SERPL-MCNC: 0.8 MG/DL (ref 0.6–1.3)
ERYTHROCYTE [DISTWIDTH] IN BLOOD BY AUTOMATED COUNT: 21.7 % (ref 11.6–15.1)
GFR SERPL CREATININE-BSD FRML MDRD: 80 ML/MIN/1.73SQ M
GLUCOSE SERPL-MCNC: 76 MG/DL (ref 65–140)
GRAM STN SPEC: NORMAL
HCT VFR BLD AUTO: 29.2 % (ref 36.5–49.3)
HGB BLD-MCNC: 8 G/DL (ref 12–17)
MCH RBC QN AUTO: 23.4 PG (ref 26.8–34.3)
MCHC RBC AUTO-ENTMCNC: 27.4 G/DL (ref 31.4–37.4)
MCV RBC AUTO: 85 FL (ref 82–98)
PLATELET # BLD AUTO: 204 THOUSANDS/UL (ref 149–390)
PMV BLD AUTO: 10.7 FL (ref 8.9–12.7)
POTASSIUM SERPL-SCNC: 3.9 MMOL/L (ref 3.5–5.3)
RBC # BLD AUTO: 3.42 MILLION/UL (ref 3.88–5.62)
SODIUM SERPL-SCNC: 139 MMOL/L (ref 135–147)
WBC # BLD AUTO: 10.98 THOUSAND/UL (ref 4.31–10.16)

## 2024-08-25 PROCEDURE — 80048 BASIC METABOLIC PNL TOTAL CA: CPT

## 2024-08-25 PROCEDURE — 99239 HOSP IP/OBS DSCHRG MGMT >30: CPT

## 2024-08-25 PROCEDURE — 85027 COMPLETE CBC AUTOMATED: CPT

## 2024-08-25 RX ORDER — FERROUS SULFATE 325(65) MG
325 TABLET ORAL
Qty: 30 TABLET | Refills: 0 | Status: SHIPPED | OUTPATIENT
Start: 2024-08-25 | End: 2024-09-24

## 2024-08-25 RX ORDER — PANTOPRAZOLE SODIUM 40 MG/1
40 TABLET, DELAYED RELEASE ORAL
Qty: 30 TABLET | Refills: 0 | Status: SHIPPED | OUTPATIENT
Start: 2024-08-26 | End: 2024-09-25

## 2024-08-25 RX ADMIN — FERROUS SULFATE TAB 325 MG (65 MG ELEMENTAL FE) 325 MG: 325 (65 FE) TAB at 08:40

## 2024-08-25 RX ADMIN — FUROSEMIDE 20 MG: 20 TABLET ORAL at 08:40

## 2024-08-25 RX ADMIN — APIXABAN 2.5 MG: 2.5 TABLET, FILM COATED ORAL at 08:40

## 2024-08-25 RX ADMIN — LEVETIRACETAM 750 MG: 500 TABLET, FILM COATED ORAL at 08:40

## 2024-08-25 RX ADMIN — PANTOPRAZOLE SODIUM 40 MG: 40 TABLET, DELAYED RELEASE ORAL at 06:25

## 2024-08-25 RX ADMIN — SPIRONOLACTONE 50 MG: 25 TABLET ORAL at 08:40

## 2024-08-25 RX ADMIN — ASPIRIN 81 MG CHEWABLE TABLET 81 MG: 81 TABLET CHEWABLE at 08:40

## 2024-08-25 NOTE — ASSESSMENT & PLAN NOTE
Patient with suprapubic catheter in place, functioning well  No evidence of obstruction or renal compromise  Continue to monitor I/O  Continue routine follow-up with urology outpatient

## 2024-08-25 NOTE — CASE MANAGEMENT
Case Management Assessment & Discharge Planning Note    Patient name Jf Valera  Location 2 [facility]2 [address] 204 A MRN [id number]  : 1935 Date 2024       Current Admission Date: 2024  Current Admission Diagnosis:Acute on chronic anemia   Patient Active Problem List    Diagnosis Date Noted Date Diagnosed    Severe protein-calorie malnutrition (HCC) 2024     Cirrhosis of liver with ascites  (HCC) 2024     Middle ear effusion, bilateral 2024     Basilar artery stenosis      Urine retention 2024     Encounter for care or replacement of suprapubic tube (HCC) 2024     Seizure-like activity (HCC) 2024     Acute on chronic anemia 2024     Hemopneumothorax on right 2024     Palliative care by specialist 2024     Goals of care, counseling/discussion 2024     S/P exploratory laparotomy 2024     Mesenteric ischemia (HCC) 2024     Portal vein thrombosis 2024     H/O prostate cancer 2024     TIA (transient ischemic attack) 03/10/2023     Cataracts, bilateral 2013       LOS (days): 5  Geometric Mean LOS (GMLOS) (days): 4.6  Days to GMLOS:0     OBJECTIVE:    Risk of Unplanned Readmission Score: 27.78     Current admission status: Inpatient    Preferred Pharmacy:   [facility] [address] [place], [place] - [address]  [address]  [facility] [address]  Phone: [phone] Fax: [phone]    Primary Care Provider: Justa Hutchinson MD    Primary Insurance: [employer] [facility]  Secondary Insurance:     ASSESSMENT:  Active Health Care Proxies    There are no active Health Care Proxies on file.       Readmission Root Cause  30 Day Readmission: No    Patient Information  Admitted from:: Home  Mental Status: Alert  Assessment information provided by:: Son  Primary Caregiver: Spouse  Caregiver's Name:: Minerva Valera/Shantal  Caregiver's Relationship to Patient:: Family Member (wife/son)  Caregiver's Telephone  Number:: [phone]  Support Systems: Spouse/significant other, Son, Daughter, Family members  County of Residence: [place]  What [facility] do you live in?: [place]  Home entry access options. Select all that apply.: Stairs  Number of steps to enter home.: 3  Do the steps have railings?: Yes  Type of Current Residence: 2 story home  Upon entering residence, is there a bedroom on the main floor (no further steps)?: No  A bedroom is located on the following floor levels of residence (select all that apply):: 2nd Floor  Upon entering residence, is there a bathroom on the main floor (no further steps)?: Yes  Number of steps to 2nd floor from main floor: One Flight  Living Arrangements: Lives w/ Spouse/significant other, Lives w/ Daughter    Activities of Daily Living Prior to Admission  Functional Status: Assistance  Completes ADLs independently?: No  Level of ADL dependence: Assistance  Ambulates independently?: No  Level of ambulatory dependence: Assistance  Does patient use assisted devices?: Yes  Assisted Devices (DME) used: Straight Cane, Walker  Does patient currently own DME?: Yes  What DME does the patient currently own?: Straight Cane, Walker  Does patient have a history of Outpatient Therapy (PT/OT)?: No  Does the patient have a history of Short-Term Rehab?: No  Does patient have a history of HHC?: No  Does patient currently have HHC?: No    Patient Information Continued  Income Source: Pension/FPC  Does patient have prescription coverage?: Yes  Does patient receive dialysis treatments?: No    Means of Transportation  Means of Transport to [facility]:: Family transport      Social Determinants of Health (SDOH)      Flowsheet Row Most Recent Value   Housing Stability    In the last 12 months, was there a time when you were not able to pay the mortgage or rent on time? N   In the past 12 months, how many times have you moved where you were living? 0   At any time in the past 12 months, were you homeless or  living in a shelter (including now)? N   Transportation Needs    In the past 12 months, has lack of transportation kept you from medical appointments or from getting medications? no   In the past 12 months, has lack of transportation kept you from meetings, work, or from getting things needed for daily living? No   Food Insecurity    Within the past 12 months, you worried that your food would run out before you got the money to buy more. Never true   Within the past 12 months, the food you bought just didn't last and you didn't have money to get more. Never true   Utilities    In the past 12 months has the electric, gas, oil, or water company threatened to shut off services in your home? No            DISCHARGE DETAILS:    Discharge planning discussed with:: SonZack  Freedom of Choice: Yes  Comments - Freedom of Choice: SW spoke with pt's son over phone to assess needs and discuss plans.  Per PA discharge is planned for today.  PT/OT evaluated pt at Level III (Minimum Resource Intensity) and is recommending continued therapy. SW discussed recommendations with son and son is open to services.  Referrals have been initiated however due to weekend agency acceptance will need to be confirmed tomorrow.  SW discussed this with son and son was agreeable with follow up tomorrow.  SW also talked with son about programs available through [facility] that pt may be eligible for which would provide steady A support, MLTSS.  SW offered to make referral tomorrow as well as add agency contact information to [facility] for son's reference.  Son requested referral be made.  No other discharge needs expressed by son.  JAYESH will follow up with son tomorrow once home care agency availability is determined.  CM contacted family/caregiver?: Yes  Were Treatment Team discharge recommendations reviewed with patient/caregiver?: Yes  Did patient/caregiver verbalize understanding of patient care needs?:  Yes    Contacts  Patient Contacts: Zack Valera  Relationship to Patient:: Family (son)  Contact Method: Phone  Phone Number: [phone]  Reason/Outcome: Discharge Planning    Requested Home Health Care         Is the patient interested in HHC at discharge?: Yes  Home Health Discipline requested:: Nursing, Physical Therapy, Occupational Therapy  Home Health Agency Name:: Other  HHA External Referral Reason (only applicable if external HHA name selected): Services not provided in network or near patient location  Home Health Follow-Up Provider:: PCP  Home Health Services Needed:: Evaluate Functional Status and Safety, Gait/ADL Training, Strengthening/Theraputic Exercises to Improve Function  Homebound Criteria Met:: Requires the Assistance of Another Person for Safe Ambulation or to Leave the Home, Uses an Assist Device (i.e. cane, walker, etc)  Supporting Clincal Findings:: Fatigues Easliy in Short Distances, Limited Endurance    DME Referral Provided  Referral made for DME?: No    Other Referral/Resources/Interventions Provided:  Interventions: HHC  Referral Comments: Home care referrals have been made and are pending acceptance.  Referral to [facility] will be made next business day.    Treatment Team Recommendation: Home with Home Health Care  Discharge Destination Plan:: Home with Home Health Care  Transport at Discharge : Family

## 2024-08-25 NOTE — NURSING NOTE
Patient being discharged to home at this time.  IV catheter removed without difficulty.  Suprapubic capped per PA's instructions from urology.  Discharge summary reviewed with patient and son and both verbalized understanding.  Assisted patient via wheelchair to son's vehicle at main entrance.

## 2024-08-25 NOTE — DISCHARGE SUMMARY
[facility]  Discharge- Jf Valera 1935, 88 y.o. male MRN: [id number]  Unit/Bed#: 2 [address] Encounter: [id number]  Primary Care Provider: Justa Hutchinson MD   Date and time admitted to hospital: 8/20/2024  2:04 PM    * Acute on chronic anemia-resolved as of 8/25/2024  Assessment & Plan  Patient presented with abnormal outpatient lab work with hemoglobin of 4.1 along with generalized weakness   Patient denied any melena, hematochezia or hematemesis  CT abdomen pelvis (8/21/24): Gastroesophageal varices present.  Status post 3 units of PRBC transfusion with improved hemoglobin.    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  Mild portal hypertensive gastropathy in the fundus and body of the stomach.  Single, small cratered ulcer in the antrum with clean base.  Continue Protonix every 12 hours  Restarted eliquis & aspirin 8/23    Results from last 7 days   Lab Units 08/25/24  0634 08/24/24  0521 08/23/24  0532 08/22/24  1414 08/21/24  1556 08/21/24  0715 08/20/24  1458   HEMOGLOBIN g/dL 8.0* 7.4* 7.6* 8.2* 9.8* 8.3* 4.2*   HEMATOCRIT % 29.2* 26.8* 27.2* 28.6* 34.9* 28.9* 18.5*   MCV fL 85 83 82 81*  --  81* 77*     Iron panel with evidence of iron deficiency anemia - s/p 3 days IV venofer. Ferrous sulfate ordered for the morning.   Hgb remained stable overnight, however still slowly down trending - will observe one more night given recent initiation of blood thinners. If hgb remains stable, can likely be discharged tomorrow     Severe protein-calorie malnutrition (HCC)  Assessment & Plan  Malnutrition Findings:   Adult Malnutrition type: Chronic illness  Adult Degree of Malnutrition: Other severe protein calorie malnutrition  Malnutrition Characteristics: Fat loss, Muscle loss    360 Statement: Severe protein calorie malnutrion of chronic illness related to decreased po intake as evidenced by depressed temples, hollow orbits and  "visible clavicles.  Treated with diet and supplements    BMI Findings:  Body mass index is 21.08 kg/m².     Encourage adequate PO intake along with nutritional supplements.    Cirrhosis of liver with ascites  (HCC)  Assessment & Plan  Recent diagnosis of liver cirrhosis with etiology unknown. Patient denies alcohol/drug use  GI consulted and appreciate their recommendations  Chronic hepatitis panel and other lab work ordered as per GI  Need for recurrent paracenteses.  S/p paracentesis per IR for 2600 mL fluid (8/22/24).  Fluid culture: negative thus far  WBCs: 48  Previous ascitic fluid studies appear transudative without evidence of SBP  Continue home medications lasix, spironolactone  Currently on IV ceftriaxone but consider discontinuation  AFP elevated @ 13.99  Hepatitis A antibodies (+)  Hepatitis B core antibodies (+)  MRI abdomen ordered by GI, follow-up outpatient    Urine retention  Assessment & Plan  Patient with suprapubic catheter in place, functioning well  No evidence of obstruction or renal compromise  Continue to monitor I/O  Continue routine follow-up with urology outpatient    Portal vein thrombosis  Assessment & Plan  Eliquis initially held on admission  Restarted 8/23 after clearance from GI    Lactic acidosis-resolved as of 8/23/2024  Assessment & Plan  LA 2.4 --> 3.2 --> 1.5  Possibly Type A in setting of anemia. Caution further fluids given liver cirrhosis and volume overload.     No results found for: \"LACTICACID\"       Seizure-like activity (HCC)-resolved as of 8/25/2024  Assessment & Plan  Continue home dose keppra      Medical Problems       Resolved Problems  Date Reviewed: 8/25/2024            Resolved    Seizure-like activity (HCC) 8/25/2024     Resolved by  Giselle Bell PA-C    * (Principal) Acute on chronic anemia 8/25/2024     Resolved by  Giselle Bell PA-C    Lactic acidosis 8/23/2024     Resolved by  Jovana Reed PA-C        Discharging Physician / Practitioner: " Giselle Bell PA-C  PCP: Justa Hutchinson MD  Admission Date:   Admission Orders (From admission, onward)       Ordered        08/20/24 1834  INPATIENT ADMISSION  Once                          Discharge Date: 08/25/24    Consultations During Hospital Stay:  GI    Procedures Performed:   EGD 8/22  Paracentesis 8/22    Significant Findings / Test Results:   IR INPATIENT Paracentesis 8/22/2024  Impression: Therapeutic paracentesis.     EGD 8/22/2024  Impression: Irregular Z-line Two grade II varices in the esophagus Mild portal hypertensive gastropathy in the fundus of the stomach and body of the stomach No gastric varices at fundus Single ulcer in the antrum with clean base (Iker III); performed cold forceps biopsy The duodenum appeared normal. RECOMMENDATION: Await pathology results Schedule repeat EGD, due: 11/26/2024 Gastric ulcer surveillance  Start PPI daily Can resume Eliquis and ASA Close follow up as outpatient with hepatology. I discussed with son briefly regarding goals of care. Severe anemia could be caused by gastric ulcer with slow oozing Close monitoring stool colon and consistancy.   \    CTA abdomen pelvis w wo contrast 8/20/2024  Impression: 1) No abdominal or pelvic hematoma. 2) Patent abdominal aorta and its major branches. Resolution of infrarenal abdominal aortic thrombi seen on the March 2024 CT. 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 3) Cirrhotic liver morphology with evidence of portal hypertension manifested by large abdominal and pelvic ascites, portal hypertensive colopathy, gastroesophageal varices and internal hemorrhoids. 4) 5 mm focus of arterial hyperenhancement in segment 4A of the liver, which blends imperceptibly on subsequent venous phase. This is compatible with the LI-RADS 3 (intermediate probability for HCC) lesion. 5) 1.7 cm exophytic lesion in segment 7 of the liver medially, matching liver parenchyma and attenuation, of unclear  etiology. Attention on follow-up. 6) Circumferential wall thickening of the urinary bladder out of proportion for degree of underdistention with suprapubic catheter in place. This may be related to chronic bladder outlet obstruction and/or cystitis. 7) Prostatomegaly (volume of 77 mL) with a 1.7 cm hyperdense, exophytic nodule in the midgland on the left, as described above. Although this may represent an exophytic BPH nodule extending into the peripheral zone and beyond, underlying neoplasm is not excluded. 8) Multiple additional findings as above.     Incidental Findings:   See incidental findings note    Test Results Pending at Discharge (will require follow up):   None      Outpatient Tests Requested:  CBC in 1 week     Complications:  None     Reason for Admission: Acute on chronic anemia     Hospital Course:   Jf Valera is a 88 y.o. male patient who originally presented to the hospital on 8/20/2024 due to an abnormal hemoglobin of 4.2 on labs.  Patient also endorsed symptoms of fatigue, and poor appetite.  He was evaluated by GI given his recently diagnosed cirrhosis and heme positive stool in the ED.  He underwent EGD 8/22 that showed a clean-based gastric ulcer likely the cause of upper GI bleeding.  He also underwent a therapeutic paracentesis with negative results for SBP.  Throughout hospitalization, patient was treated with 3 units of PRBCs with improvement in hemoglobin.  He was also given 3 days of IV Venofer for iron deficiency anemia.  Once cleared by GI, patient was restarted on his Eliquis and monitored for 2 days with hemoglobin remaining stable.  He is stable for discharge at this time and will continue follow-up with GI for a MRI, EGD in 3 months and ongoing test regarding his cirrhosis. Patient will returning home, and case management will reach out tomorrow regarding home health.    Please see above list of diagnoses and related plan for additional information.     Condition at Discharge:  "stable    Discharge Day Visit / Exam:   Subjective:  Seen and examined.  No acute events overnight.  Patient is feeling well. Denies chest pain, SOB, N/V/D. Eating and drinking. Denies hematuria/hematochezia  Vitals: Blood Pressure: 123/76 (08/25/24 0834)  Pulse: 74 (08/24/24 2258)  Temperature: 97.5 °F (36.4 °C) (08/25/24 0834)  Temp Source: Oral (08/24/24 2258)  Respirations: 17 (08/25/24 0834)  Height: 5' 3\" (160 cm) (08/21/24 0031)  Weight - Scale: 54 kg (119 lb) (08/21/24 0031)  SpO2: 100 % (08/24/24 2258)  Exam:   Physical Exam  Constitutional:       General: He is not in acute distress.  HENT:      Mouth/Throat:      Mouth: Mucous membranes are moist.   Eyes:      Conjunctiva/sclera: Conjunctivae normal.   Cardiovascular:      Heart sounds: Normal heart sounds.   Abdominal:      Palpations: Abdomen is soft.      Tenderness: There is no abdominal tenderness.   Skin:     General: Skin is warm and dry.   Neurological:      Mental Status: Mental status is at baseline.       Discussion with Family: Updated  (son) via phone.    Discharge instructions/Information to patient and family:   See after visit summary for information provided to patient and family.      Provisions for Follow-Up Care:  See after visit summary for information related to follow-up care and any pertinent home health orders.      Mobility at time of Discharge:   Basic Mobility Inpatient Raw Score: 19  JH-HLM Goal: 6: Walk 10 steps or more  JH-HLM Achieved: 6: Walk 10 steps or more  HLM Goal achieved. Continue to encourage appropriate mobility.     Disposition:   Home    Planned Readmission: None     Discharge Statement:  I spent 46 minutes discharging the patient. This time was spent on the day of discharge. I had direct contact with the patient on the day of discharge. Greater than 50% of the total time was spent examining patient, answering all patient questions, arranging and discussing plan of care with patient as well as " directly providing post-discharge instructions.  Additional time then spent on discharge activities.    Discharge Medications:  See after visit summary for reconciled discharge medications provided to patient and/or family.      **Please Note: This note may have been constructed using a voice recognition system**

## 2024-08-25 NOTE — DISCHARGE INSTR - AVS FIRST PAGE
Dear Jf Valera,     [facility]  It is our hope that we were always able to meet and exceed the expected standards for your care during your stay.  You were hospitalized due to acute on chronic anemia .  You were cared for on the 2nd floor under the service of Giselle Bell PA-C with the [facility] who covers for your primary care physician (PCP), Justa Hutchinson MD, while you were hospitalized.  If you have any questions or concerns related to this hospitalization, you may contact us at .  For follow up, we recommend that you follow up with your primary care physician.  Please review this entire discharge summary as additional general instructions may be provided later as well.  However, at this time we provide for you here, the most important instructions / recommendations at discharge:     Your hemoglobin has remained stable with restarting Eliquis  Your new dose of Eliquis will be 2.5 mg twice daily -this has been sent to the pharmacy for you  GI recommends MRI with IV contrast of the liver, repeat EGD in 3 months, and to continue Lasix 20 mg daily, and spironolactone 50 mg daily  You are recommended to follow-up with hematology, and hepatology outpatient.  Continue follow up with GI - they will be scheduling you for follow up. If you do not hear from them in 1 week, please call the number provided to facilitate an appointment  Please follow up with your PCP in 1-2 weeks post discharge  Continue routine follow up with urology      Sincerely,     Giselle Bell PA-C

## 2024-08-25 NOTE — ASSESSMENT & PLAN NOTE
Recent diagnosis of liver cirrhosis with etiology unknown. Patient denies alcohol/drug use  GI consulted and appreciate their recommendations  Chronic hepatitis panel and other lab work ordered as per GI  Need for recurrent paracenteses.  S/p paracentesis per IR for 2600 mL fluid (8/22/24).  Fluid culture: negative thus far  WBCs: 48  Previous ascitic fluid studies appear transudative without evidence of SBP  Continue home medications lasix, spironolactone  Currently on IV ceftriaxone but consider discontinuation  AFP elevated @ 13.99  Hepatitis A antibodies (+)  Hepatitis B core antibodies (+)  MRI abdomen ordered by GI, follow-up outpatient

## 2024-08-25 NOTE — ASSESSMENT & PLAN NOTE
Patient presented with abnormal outpatient lab work with hemoglobin of 4.1 along with generalized weakness   Patient denied any melena, hematochezia or hematemesis  CT abdomen pelvis (8/21/24): Gastroesophageal varices present.  Status post 3 units of PRBC transfusion with improved hemoglobin.    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  Mild portal hypertensive gastropathy in the fundus and body of the stomach.  Single, small cratered ulcer in the antrum with clean base.  Continue Protonix every 12 hours  Restarted eliquis & aspirin 8/23    Results from last 7 days   Lab Units 08/25/24  0634 08/24/24  0521 08/23/24  0532 08/22/24  1414 08/21/24  1556 08/21/24  0715 08/20/24  1458   HEMOGLOBIN g/dL 8.0* 7.4* 7.6* 8.2* 9.8* 8.3* 4.2*   HEMATOCRIT % 29.2* 26.8* 27.2* 28.6* 34.9* 28.9* 18.5*   MCV fL 85 83 82 81*  --  81* 77*     Iron panel with evidence of iron deficiency anemia - s/p 3 days IV venofer. Ferrous sulfate ordered for the morning.   Hgb remained stable overnight, however still slowly down trending - will observe one more night given recent initiation of blood thinners. If hgb remains stable, can likely be discharged tomorrow

## 2024-08-25 NOTE — PLAN OF CARE
Problem: PAIN - ADULT  Goal: Verbalizes/displays adequate comfort level or baseline comfort level  Description: Interventions:  - Encourage patient to monitor pain and request assistance  - Assess pain using appropriate pain scale  - Administer analgesics based on type and severity of pain and evaluate response  - Implement non-pharmacological measures as appropriate and evaluate response  - Consider cultural and social influences on pain and pain management  - Notify physician/advanced practitioner if interventions unsuccessful or patient reports new pain  Outcome: Progressing     Problem: INFECTION - ADULT  Goal: Absence or prevention of progression during hospitalization  Description: INTERVENTIONS:  - Assess and monitor for signs and symptoms of infection  - Monitor lab/diagnostic results  - Monitor all insertion sites, i.e. indwelling lines, tubes, and drains  - Monitor endotracheal if appropriate and nasal secretions for changes in amount and color  - Plaza appropriate cooling/warming therapies per order  - Administer medications as ordered  - Instruct and encourage patient and family to use good hand hygiene technique  - Identify and instruct in appropriate isolation precautions for identified infection/condition  Outcome: Progressing  Goal: Absence of fever/infection during neutropenic period  Description: INTERVENTIONS:  - Monitor WBC    Outcome: Progressing     Problem: SAFETY ADULT  Goal: Patient will remain free of falls  Description: INTERVENTIONS:  - Educate patient/family on patient safety including physical limitations  - Instruct patient to call for assistance with activity   - Consult OT/PT to assist with strengthening/mobility   - Keep Call bell within reach  - Keep bed low and locked with side rails adjusted as appropriate  - Keep care items and personal belongings within reach  - Initiate and maintain comfort rounds  - Make Fall Risk Sign visible to staff  - Offer Toileting every 2 Hours,  in advance of need  - Initiate/Maintain bed alarm  - Obtain necessary fall risk management equipment: socks  - Apply yellow socks and bracelet for high fall risk patients  - Consider moving patient to room near nurses station  Outcome: Progressing  Goal: Maintain or return to baseline ADL function  Description: INTERVENTIONS:  -  Assess patient's ability to carry out ADLs; assess patient's baseline for ADL function and identify physical deficits which impact ability to perform ADLs (bathing, care of mouth/teeth, toileting, grooming, dressing, etc.)  - Assess/evaluate cause of self-care deficits   - Assess range of motion  - Assess patient's mobility; develop plan if impaired  - Assess patient's need for assistive devices and provide as appropriate  - Encourage maximum independence but intervene and supervise when necessary  - Involve family in performance of ADLs  - Assess for home care needs following discharge   - Consider OT consult to assist with ADL evaluation and planning for discharge  - Provide patient education as appropriate  Outcome: Progressing  Goal: Maintains/Returns to pre admission functional level  Description: INTERVENTIONS:  - Perform AM-PAC 6 Click Basic Mobility/ Daily Activity assessment daily.  - Set and communicate daily mobility goal to care team and patient/family/caregiver.   - Collaborate with rehabilitation services on mobility goals if consulted  - Perform Range of Motion 4 times a day.  - Reposition patient every 2 hours.  - Dangle patient 4 times a day  - Stand patient 4 times a day  - Ambulate patient 4 times a day  - Out of bed to chair 3 times a day   - Out of bed for meals 3 times a day  - Out of bed for toileting  - Record patient progress and toleration of activity level   Outcome: Progressing     Problem: DISCHARGE PLANNING  Goal: Discharge to home or other facility with appropriate resources  Description: INTERVENTIONS:  - Identify barriers to discharge w/patient and  caregiver  - Arrange for needed discharge resources and transportation as appropriate  - Identify discharge learning needs (meds, wound care, etc.)  - Arrange for interpretive services to assist at discharge as needed  - Refer to Case Management Department for coordinating discharge planning if the patient needs post-hospital services based on physician/advanced practitioner order or complex needs related to functional status, cognitive ability, or social support system  Outcome: Progressing     Problem: Knowledge Deficit  Goal: Patient/family/caregiver demonstrates understanding of disease process, treatment plan, medications, and discharge instructions  Description: Complete learning assessment and assess knowledge base.  Interventions:  - Provide teaching at level of understanding  - Provide teaching via preferred learning methods  Outcome: Progressing     Problem: Nutrition/Hydration-ADULT  Goal: Nutrient/Hydration intake appropriate for improving, restoring or maintaining nutritional needs  Description: Monitor and assess patient's nutrition/hydration status for malnutrition. Collaborate with interdisciplinary team and initiate plan and interventions as ordered.  Monitor patient's weight and dietary intake as ordered or per policy. Utilize nutrition screening tool and intervene as necessary. Determine patient's food preferences and provide high-protein, high-caloric foods as appropriate.     INTERVENTIONS:  - Monitor oral intake, urinary output, labs, and treatment plans  - Assess nutrition and hydration status and recommend course of action  - Evaluate amount of meals eaten  - Assist patient with eating if necessary   - Allow adequate time for meals  - Recommend/ encourage appropriate diets, oral nutritional supplements, and vitamin/mineral supplements  - Order, calculate, and assess calorie counts as needed  - Recommend, monitor, and adjust tube feedings and TPN/PPN based on assessed needs  - Assess need for  intravenous fluids  - Provide specific nutrition/hydration education as appropriate  - Include patient/family/caregiver in decisions related to nutrition  Outcome: Progressing     Problem: HEMATOLOGIC - ADULT  Goal: Maintains hematologic stability  Description: INTERVENTIONS  - Assess for signs and symptoms of bleeding or hemorrhage  - Monitor labs  - Administer supportive blood products/factors as ordered and appropriate  Outcome: Progressing     Problem: Potential for Falls  Goal: Patient will remain free of falls  Description: INTERVENTIONS:  - Educate patient/family on patient safety including physical limitations  - Instruct patient to call for assistance with activity   - Consult OT/PT to assist with strengthening/mobility   - Keep Call bell within reach  - Keep bed low and locked with side rails adjusted as appropriate  - Keep care items and personal belongings within reach  - Initiate and maintain comfort rounds  - Make Fall Risk Sign visible to staff  - Offer Toileting every 2 Hours, in advance of need  - Initiate/Maintain bed alarm  - Obtain necessary fall risk management equipment: socks  - Apply yellow socks and bracelet for high fall risk patients  - Consider moving patient to room near nurses station  Outcome: Progressing

## 2024-08-25 NOTE — ASSESSMENT & PLAN NOTE
"LA 2.4 --> 3.2 --> 1.5  Possibly Type A in setting of anemia. Caution further fluids given liver cirrhosis and volume overload.     No results found for: \"LACTICACID\"     "

## 2024-08-26 ENCOUNTER — TELEPHONE (OUTPATIENT)
Dept: FAMILY MEDICINE CLINIC | Facility: CLINIC | Age: 89
End: 2024-08-26

## 2024-08-26 ENCOUNTER — TRANSITIONAL CARE MANAGEMENT (OUTPATIENT)
Dept: FAMILY MEDICINE CLINIC | Facility: CLINIC | Age: 89
End: 2024-08-26

## 2024-08-26 ENCOUNTER — TELEPHONE (OUTPATIENT)
Dept: CASE MANAGEMENT | Facility: HOSPITAL | Age: 89
End: 2024-08-26

## 2024-08-26 ENCOUNTER — TELEPHONE (OUTPATIENT)
Age: 89
End: 2024-08-26

## 2024-08-26 NOTE — TELEPHONE ENCOUNTER
[facility].  Per Yumiko pt will need appointment with new PCP, Dr. Krause, before home care orders will be given.  Pt had been seeing Dr. Hutchinson at [facility] however when Yumiko called for orders she was told office does not accept pt's insurance.    JAYESH also called [facility] to make referral for MLTSS evaluation as discussed with son.  Per agency since pt already has [facility] family needs to call insurance company themselves to request evaluation.    SW placed call to pt's son, Zack, to review above.  Son was not aware of pt's insurance not being accepted by [facility]. Pt's insurance care does have Dr. Krause listed at PCP.  SW explained situation and need for appointment with PCP before home care services can begin.  Son verbalized understanding and said he would make appointment with Dr. Krause.  SW provided son with contact information and office address for Dr. Krause.  SW also explained that son would need to call [facility] to request MLTSS evaluation for pt.  SW provided son with two phone numbers for [facility] ([phone] and [phone]).  Son said he would call.  SW offered ongoing support if needed.  Son has  contact information for future reference.

## 2024-08-26 NOTE — TELEPHONE ENCOUNTER
Merari from [facility] would like a call back regarding orders and pt having multiple providers listed. She prefers to speak with office directly and is asking for a call back.

## 2024-08-26 NOTE — TELEPHONE ENCOUNTER
I spoke with Jf's son Zack on 8/26/24, who states that he was told today that we do not accept his father's insurance, [facility] and he was under the impression that he needs to switch his PCP to Dr Krause. I explained that we do take his insurance. He is going to call the  back ( who he spoke with earlier today) to have another conversation regarding this and get back to me Raphael OTTO

## 2024-08-26 NOTE — UTILIZATION REVIEW
NOTIFICATION OF ADMISSION DISCHARGE   This is a Notification of Discharge from [facility]. Please be advised that this patient has been discharge from our facility. Below you will find the admission and discharge date and time including the patient’s disposition.   UTILIZATION REVIEW CONTACT:  Ifeoma Lal  Utilization   Network Utilization Review Department  Phone: [phone] x carefully listen to the prompts. All voicemails are confidential.  Email: [email or web address]     ADMISSION INFORMATION  PRESENTATION DATE: 8/20/2024  2:04 PM  OBERVATION ADMISSION DATE: N/A  INPATIENT ADMISSION DATE: 8/20/24  6:34 PM   DISCHARGE DATE: 8/25/2024  3:45 PM   DISPOSITION:Home/Self Care    Network Utilization Review Department  ATTENTION: Please call with any questions or concerns to [phone] and carefully listen to the prompts so that you are directed to the right person. All voicemails are confidential.   For Discharge needs, contact Care Management DC Support Team at [phone] opt. 2  Send all requests for admission clinical reviews, approved or denied determinations and any other requests to dedicated fax number below belonging to the campus where the patient is receiving treatment. List of dedicated fax numbers for the Facilities:  FACILITY NAME UR FAX NUMBER   ADMISSION DENIALS (Administrative/Medical Necessity) [phone]   DISCHARGE SUPPORT TEAM ([facility]) [phone]   PARENT CHILD HEALTH (Maternity/NICU/Pediatrics) [phone]   [facility] [phone]   [facility] [phone]   [facility] [phone]   General acute hospital [phone]   [facility] [phone]   [facility] [phone]   [facility] [phone]   [facility] [phone]    [facility] [phone]   [facility] [phone]   [facility] [phone]   [facility] [phone]

## 2024-08-27 ENCOUNTER — NURSE TRIAGE (OUTPATIENT)
Age: 89
End: 2024-08-27

## 2024-08-27 PROCEDURE — 88305 TISSUE EXAM BY PATHOLOGIST: CPT | Performed by: PATHOLOGY

## 2024-08-27 PROCEDURE — 88341 IMHCHEM/IMCYTCHM EA ADD ANTB: CPT | Performed by: PATHOLOGY

## 2024-08-27 PROCEDURE — 88342 IMHCHEM/IMCYTCHM 1ST ANTB: CPT | Performed by: PATHOLOGY

## 2024-08-27 NOTE — TELEPHONE ENCOUNTER
"Bita, patients son calling.  Patient dx with liver cirrhosis.  Appt in December with hepatology.  Admit to ED for anemia last week.  Seen by GI in-patient.  Patient having frequent paracentesis and frequent fluid build up.  Denies sob at this time.  Pain in feet from swelling which is ongoing.  Appointment made for January, transferred to me to see if something sooner.  Unable to find anything sooner at this time.        Patient had EGD while in hospital.  Patient to have follow up EGD in 3 months - needs order so can schedule.      Answer Assessment - Initial Assessment Questions  1. REASON FOR CALL: \"What is your main concern right now?\"      Patient seen in ED, told to make follow up appointment.  .    Protocols used: No Protocol Available-ADULT-[place]    "

## 2024-08-29 NOTE — TELEPHONE ENCOUNTER
Called and relayed to patient's son Zack     He wants to keep the appt with Hepatology 12/18/24 with Dr. Jose     Did placed a recall for the Repeat EGD as well and relayed he should be receiving a call from the schedulers to set this up for 11/2024    He will follow recommendations and contact the office if anything further is needed or there are additional concerns

## 2024-09-03 ENCOUNTER — TELEPHONE (OUTPATIENT)
Dept: NEUROLOGY | Facility: CLINIC | Age: 89
End: 2024-09-03

## 2024-09-04 ENCOUNTER — TELEPHONE (OUTPATIENT)
Age: 89
End: 2024-09-04

## 2024-09-04 ENCOUNTER — OFFICE VISIT (OUTPATIENT)
Dept: FAMILY MEDICINE CLINIC | Facility: CLINIC | Age: 89
End: 2024-09-04
Payer: COMMERCIAL

## 2024-09-04 ENCOUNTER — APPOINTMENT (OUTPATIENT)
Dept: LAB | Facility: HOSPITAL | Age: 89
End: 2024-09-04
Payer: COMMERCIAL

## 2024-09-04 VITALS
BODY MASS INDEX: 20.38 KG/M2 | OXYGEN SATURATION: 99 % | HEART RATE: 71 BPM | WEIGHT: 115 LBS | SYSTOLIC BLOOD PRESSURE: 122 MMHG | HEIGHT: 63 IN | DIASTOLIC BLOOD PRESSURE: 80 MMHG | TEMPERATURE: 97.2 F | RESPIRATION RATE: 18 BRPM

## 2024-09-04 DIAGNOSIS — D64.9 CHRONIC ANEMIA: Primary | ICD-10-CM

## 2024-09-04 DIAGNOSIS — R33.9 URINE RETENTION: ICD-10-CM

## 2024-09-04 DIAGNOSIS — R79.89 ELEVATED PLATELET COUNT: Primary | ICD-10-CM

## 2024-09-04 DIAGNOSIS — D64.9 CHRONIC ANEMIA: ICD-10-CM

## 2024-09-04 DIAGNOSIS — R18.8 CIRRHOSIS OF LIVER WITH ASCITES, UNSPECIFIED HEPATIC CIRRHOSIS TYPE  (HCC): ICD-10-CM

## 2024-09-04 DIAGNOSIS — K74.60 CIRRHOSIS OF LIVER WITH ASCITES, UNSPECIFIED HEPATIC CIRRHOSIS TYPE  (HCC): ICD-10-CM

## 2024-09-04 DIAGNOSIS — I81 PORTAL VEIN THROMBOSIS: ICD-10-CM

## 2024-09-04 DIAGNOSIS — R56.9 SEIZURE (HCC): ICD-10-CM

## 2024-09-04 LAB
ERYTHROCYTE [DISTWIDTH] IN BLOOD BY AUTOMATED COUNT: 25 % (ref 11.6–15.1)
HCT VFR BLD AUTO: 35.8 % (ref 36.5–49.3)
HGB BLD-MCNC: 10.3 G/DL (ref 12–17)
MCH RBC QN AUTO: 27.8 PG (ref 26.8–34.3)
MCHC RBC AUTO-ENTMCNC: 28.8 G/DL (ref 31.4–37.4)
MCV RBC AUTO: 97 FL (ref 82–98)
PLATELET # BLD AUTO: 492 THOUSANDS/UL (ref 149–390)
PMV BLD AUTO: 10 FL (ref 8.9–12.7)
RBC # BLD AUTO: 3.71 MILLION/UL (ref 3.88–5.62)
WBC # BLD AUTO: 8.72 THOUSAND/UL (ref 4.31–10.16)

## 2024-09-04 PROCEDURE — 85027 COMPLETE CBC AUTOMATED: CPT

## 2024-09-04 PROCEDURE — 99495 TRANSJ CARE MGMT MOD F2F 14D: CPT | Performed by: NURSE PRACTITIONER

## 2024-09-04 PROCEDURE — 36415 COLL VENOUS BLD VENIPUNCTURE: CPT

## 2024-09-04 NOTE — TELEPHONE ENCOUNTER
Scheduled date of EGD(as of today): 12/2/24  Physician performing EGD:  Jerry  Location of EGD:  [facility]  Instructions to MYC     OA Questions for EGD  Date:  9/4/24     Screened by: Rhiannon Saez     Referring Provider:      Pre-Screening: BMI 20.37    Past EGD? If yes - Date: 8/21/24 w/ recall 3 mo later    Physician/Facility: [facility]   Reason:      SCHEDULING STAFF: If the patient is over 75 years old, please schedule an office visit. PT is 88 ys - OV completed 8/19/24  ·      Does the patient want to see a gastroenterologist prior to their procedure to discuss any GI symptoms? NO  ·      Has the patient been hospitalized or had abdominal surgery in the past 6 months? NO  ·      Does the patient use supplemental oxygen? NO  ·      Does the patient take [Coumadin], [Lovenox], [Plavix], [Eliquis], [Xarelto], or other blood thinning medication? YES- Eliquis  ·      Has the patient had a stroke, cardiac event, or stent placed in the past year? NO     SCHEDULING STAFF: If patient answers NO to the above questions, then schedule the procedure. If patient answers YES to any of the above questions, then schedule an office appointment.  ·       If a repeat EGD is belated and patient declines procedure à notify provider.

## 2024-09-04 NOTE — PROGRESS NOTES
"Assessment/Plan:    1. Chronic anemia  -     CBC and Platelet; Future  2. Portal vein thrombosis  Assessment & Plan:  Taking eliquis and aspirin and tolerating it well and denies any bleeding  3. Cirrhosis of liver with ascites, unspecified hepatic cirrhosis type  (HCC)  Assessment & Plan:  Scheduled to follow with gastro, and MRI abdomen scheduled too and scheduled for paracentesis tomorrow  4. Urine retention  Assessment & Plan:  Has suprapubic catheter  5. Seizure (HCC)  Assessment & Plan:  On Keppra and managed by neurologist            Future Appointments   Date Time Provider Department Center   9/5/2024  9:45 AM [place] IR/CATH LAB HLD 1 [place] Cath Lab [facility]   9/17/2024 10:45 AM Jefferson Truong MD NEURO [facility]   9/18/2024  1:00 PM Rosalinda Hunt PA-C HEM ONC [facility]   10/1/2024  2:30 PM WA MRI 1 WA MRI [facility]   10/4/2024 10:45 AM Justa Hutchinson MD [facility]   10/21/2024  8:00 AM [place] VASCULAR 2 WA Vasc [facility]   10/22/2024  1:00 PM [place] VASCULAR 1 [facility]   12/18/2024 11:30 AM Conchita Jose MD GI  [facility]   1/15/2025  1:00 PM IVAN Ledezma GI  Practice-Med           Subjective:      Patient ID: Jf Valera is a 88 y.o. male.    Chief Complaint   Patient presents with   • Transition of Care Management     [id number]         Vitals:  /80   Pulse 71   Temp (!) 97.2 °F (36.2 °C)   Resp 18   Ht 5' 3\" (1.6 m)   Wt 52.2 kg (115 lb)   SpO2 99%   BMI 20.37 kg/m²     HPI  Patient is here to follow up after recent hospitalization with son.  Stated that feeling better. Denies any signs of bleeding.  Complaint with all medications.  Son stated that his appetite is good and have regular BM  Son would like to repeat CBC today and ordered.  Denies chest pain, sob, headache and dizziness.      TCM Call     Date and time call was made  8/26/2024  3:10 PM    Hospital care reviewed  Records reviewed    Patient was hospitialized at  [facility]"    Date of Admission  08/20/24    Date of discharge  08/25/24    Diagnosis  Acute on chronic anemia    Disposition  Home    Were the patients medications reviewed and updated  No  Jf's son states he manages his medications for him and will call if any questions but he knows what he is taking    Current Symptoms  None      TCM Call     Post hospital issues  None    Should patient be enrolled in anticoag monitoring?  No    Scheduled for follow up?  Yes    Patients specialists  Neurologist; Other (comment)    Neurologist name  [facility]    Other specialists names  Call Juanita Glaser PA-C (Gastroenterology)    Did you obtain your prescribed medications  Yes    Do you need help managing your prescriptions or medications  Yes    Why type of assitance do you need  His son manages    Is transportation to your appointment needed  Yes    Specify why  He does not drive    I have advised the patient to call PCP with any new or worsening symptoms  John Daley    Living Arrangements  Family members    Support System  Family    The type of support provided  Physical; Emotional    Do you have social support  Yes, as much as I need    Are you recieving any outpatient services  No    Are you recieving home care services  Yes    Types of home care services  Nurse visit    Interperter language line needed  No    Counseling  Family    Counseling topics  Activities of daily living; Importance of RX compliance; patient and family education; instructions for management; Risk factor reduction; Home health agency benefits    Comments  Jf's son Zack states that he is doing well. No c/o at this time. He knows to take him to the ER if any chest pain, dypspnea, JMoyleLPN                PHQ-2/9 Depression Screening    Little interest or pleasure in doing things: 0 - not at all  Feeling down, depressed, or hopeless: 0 - not at all  PHQ-2 Score: 0  PHQ-2 Interpretation: Negative depression screen             The following  portions of the patient's history were reviewed and updated as appropriate: allergies, current medications, past family history, past medical history, past social history, past surgical history and problem list.      Review of Systems   HENT: Negative.     Respiratory: Negative.     Cardiovascular: Negative.    Gastrointestinal:  Negative for abdominal pain, nausea and vomiting.   Neurological:  Negative for dizziness and light-headedness.         Objective:    Social History     Tobacco Use   Smoking Status Never   • Passive exposure: Never   Smokeless Tobacco Never       Allergies: No Known Allergies      Current Outpatient Medications   Medication Sig Dispense Refill   • apixaban (ELIQUIS) 2.5 mg Take 1 tablet (2.5 mg total) by mouth 2 (two) times a day 60 tablet 0   • aspirin 81 mg chewable tablet Chew 1 tablet (81 mg total) daily 30 tablet 5   • atorvastatin (LIPITOR) 20 mg tablet Take 1 tablet (20 mg total) by mouth daily 30 tablet 5   • ferrous sulfate 325 (65 Fe) mg tablet Take 1 tablet (325 mg total) by mouth daily with breakfast 30 tablet 0   • furosemide (LASIX) 20 mg tablet Take 1 tablet (20 mg total) by mouth daily 30 tablet 0   • levETIRAcetam (KEPPRA) 750 mg tablet Take 1 tablet (750 mg total) by mouth every 12 (twelve) hours 60 tablet 5   • pantoprazole (PROTONIX) 40 mg tablet Take 1 tablet (40 mg total) by mouth daily in the early morning 30 tablet 0   • spironolactone (ALDACTONE) 50 mg tablet Take 1 tablet (50 mg total) by mouth daily 30 tablet 3     No current facility-administered medications for this visit.          Physical Exam  Constitutional:       Appearance: Normal appearance. He is well-developed.   HENT:      Head: Normocephalic.      Nose: Nose normal.   Eyes:      Conjunctiva/sclera: Conjunctivae normal.   Cardiovascular:      Rate and Rhythm: Normal rate and regular rhythm.      Heart sounds: Normal heart sounds.   Pulmonary:      Effort: Pulmonary effort is normal.      Breath  sounds: Normal breath sounds.   Abdominal:      General: Abdomen is protuberant. Bowel sounds are normal. There is distension (ascites).      Palpations: Abdomen is soft.      Tenderness: There is no abdominal tenderness. There is no right CVA tenderness, left CVA tenderness or rebound.   Musculoskeletal:      Comments: Using cane for ambulation   Skin:     General: Skin is warm and dry.      Findings: No rash.   Neurological:      Mental Status: He is alert and oriented to person, place, and time.   Psychiatric:         Attention and Perception: Attention normal.         Mood and Affect: Mood normal.         Behavior: Behavior normal.         Thought Content: Thought content normal.         Judgment: Judgment normal.                     IVAN Danielson

## 2024-09-04 NOTE — ASSESSMENT & PLAN NOTE
Scheduled to follow with gastro, and MRI abdomen scheduled too and scheduled for paracentesis tomorrow

## 2024-09-05 ENCOUNTER — HOSPITAL ENCOUNTER (OUTPATIENT)
Dept: NON INVASIVE DIAGNOSTICS | Facility: HOSPITAL | Age: 89
Discharge: HOME | End: 2024-09-05
Payer: COMMERCIAL

## 2024-09-05 VITALS
HEART RATE: 70 BPM | OXYGEN SATURATION: 100 % | RESPIRATION RATE: 16 BRPM | DIASTOLIC BLOOD PRESSURE: 66 MMHG | SYSTOLIC BLOOD PRESSURE: 130 MMHG

## 2024-09-05 DIAGNOSIS — K74.60 CIRRHOSIS OF LIVER WITH ASCITES, UNSPECIFIED HEPATIC CIRRHOSIS TYPE  (HCC): ICD-10-CM

## 2024-09-05 DIAGNOSIS — I65.1 BASILAR ARTERY STENOSIS: ICD-10-CM

## 2024-09-05 DIAGNOSIS — R18.8 CIRRHOSIS OF LIVER WITH ASCITES, UNSPECIFIED HEPATIC CIRRHOSIS TYPE  (HCC): ICD-10-CM

## 2024-09-05 LAB
APPEARANCE FLD: CLEAR
COLOR FLD: NORMAL
HISTIOCYTES NFR FLD: 14 %
LYMPHOCYTES NFR BLD AUTO: 80 %
NEUTS SEG NFR BLD AUTO: 6 %
SITE: NORMAL
TOTAL CELLS COUNTED SPEC: 100
WBC # FLD MANUAL: 94 /UL

## 2024-09-05 PROCEDURE — 87205 SMEAR GRAM STAIN: CPT | Performed by: PHYSICIAN ASSISTANT

## 2024-09-05 PROCEDURE — 87070 CULTURE OTHR SPECIMN AEROBIC: CPT | Performed by: PHYSICIAN ASSISTANT

## 2024-09-05 PROCEDURE — 89051 BODY FLUID CELL COUNT: CPT | Performed by: PHYSICIAN ASSISTANT

## 2024-09-05 PROCEDURE — 49083 ABD PARACENTESIS W/IMAGING: CPT

## 2024-09-05 PROCEDURE — 49083 ABD PARACENTESIS W/IMAGING: CPT | Performed by: RADIOLOGY

## 2024-09-05 NOTE — SEDATION DOCUMENTATION
Pt arrived to holding area for paracentesis in no apparent distress. Tolerated procedure well. 1400ml clear jeison fluid drained. Bandage placed to right abd.

## 2024-09-05 NOTE — DISCHARGE INSTRUCTIONS
Abdominal Paracentesis     Interventional Radiology Department Number: [[phone].    WHAT YOU NEED TO KNOW:   Abdominal paracentesis is a procedure to remove abnormal fluid buildup in your abdomen. Fluid builds up because of liver problems, such as swelling and scarring. Heart failure, kidney disease, a mass, or problems with your pancreas may also cause fluid buildup.    Before your procedure:   Vital signs:  Caregivers will check your blood pressure, heart rate, breathing rate, and temperature. They will also ask about your pain. These vital signs give caregivers information about your current health.     Caregivers may insert an intravenous tube (IV) into your vein. A vein in the arm is usually chosen. Through the IV tube, you may be given liquids and medicine.     Pre-op care:  You may be asked to urinate in order to empty your bladder. You are taken to the procedure room and moved to a table or bed. You will need to lie on your back or on your side.    Local anesthesia:  This medicine makes you more comfortable during your procedure. Local anesthesia is a shot of medicine put into your skin. Local anesthesia is used to numb the procedure area and dull your pain. You may still feel pressure or pushing during the procedure after you get this medicine.  During your procedure:   Your healthcare provider taps on and feels your abdomen to decide where to insert the needle. Your healthcare provider may also use an ultrasound to help decide where to insert the needle. An ultrasound uses sound waves to show pictures of the inside of your abdomen on a TV-like screen. Your healthcare provider cleans your skin and covers the area around the procedure site with a clean sheet. A needle will be inserted into your abdominal cavity. A syringe will be attached to the needle to remove a small amount of ascites fluid. The needle will be removed once your healthcare provider has removed enough fluid for testing.    To remove a  larger amount of fluid, a needle is inserted into your abdominal cavity. A catheter (small, thin tube) is attached to the needle and the needle is removed. The catheter tubing will be attached to a suction device (gentle vacuum) to help remove the fluid. The fluid will drain into a container attached to the tubing. . When your healthcare provider has pulled enough fluid from your abdomen, he will remove the catheter. Your wound (procedure site) will be covered with a bandage. The ascites fluid may be sent to a lab for tests.  After your procedure Do not get out of bed until your healthcare provider says it is okay. When healthcare providers see that you are not having any problems, you may be able to go home. If you are staying in the hospital, you may be taken back to your room.  Blood tests:  You may need blood taken to give caregivers information about how your body is working. The blood may be taken from your hand, arm, or IV.     Intravenous fluids and volume expanders:  You may need fluids or volume expanders given through your IV after your procedure. These fluids include albumin or saline. Albumin is a protein found in your blood. The IV fluids help prevent a drop in your blood pressure    If you do not have an abdominal paracentesis, your symptoms may get worse. You may feel short of breath, have abdominal and chest pain, and have trouble moving around. You may not learn why fluid is building up in your abdomen. You may not get proper treatment. You may have bleeding inside your stomach or bowels. Your kidneys may stop working, and your liver problems may get worse. The fluid inside your abdomen may get infected and cause abdominal pain and tiredness. An infection may become life-threatening and you may die. Talk with your healthcare provider if you have questions or concerns about your procedure, condition, or care.  Bandage may be removed the next day and you may shower the next day.  If you have any  fever or abdominal pain please notify your MD.  You may call the Radiology nurse at [[phone] until 4pm.After 4pm you may call your ordering MD.Abdominal Paracentesis     Interventional Radiology Department Number: [[phone].    WHAT YOU NEED TO KNOW:   Abdominal paracentesis is a procedure to remove abnormal fluid buildup in your abdomen. Fluid builds up because of liver problems, such as swelling and scarring. Heart failure, kidney disease, a mass, or problems with your pancreas may also cause fluid buildup.    Before your procedure:   Vital signs:  Caregivers will check your blood pressure, heart rate, breathing rate, and temperature. They will also ask about your pain. These vital signs give caregivers information about your current health.     Caregivers may insert an intravenous tube (IV) into your vein. A vein in the arm is usually chosen. Through the IV tube, you may be given liquids and medicine.     Pre-op care:  You may be asked to urinate in order to empty your bladder. You are taken to the procedure room and moved to a table or bed. You will need to lie on your back or on your side.    Local anesthesia:  This medicine makes you more comfortable during your procedure. Local anesthesia is a shot of medicine put into your skin. Local anesthesia is used to numb the procedure area and dull your pain. You may still feel pressure or pushing during the procedure after you get this medicine.  During your procedure:   Your healthcare provider taps on and feels your abdomen to decide where to insert the needle. Your healthcare provider may also use an ultrasound to help decide where to insert the needle. An ultrasound uses sound waves to show pictures of the inside of your abdomen on a TV-like screen. Your healthcare provider cleans your skin and covers the area around the procedure site with a clean sheet. A needle will be inserted into your abdominal cavity. A syringe will be attached to the needle to  remove a small amount of ascites fluid. The needle will be removed once your healthcare provider has removed enough fluid for testing.    To remove a larger amount of fluid, a needle is inserted into your abdominal cavity. A catheter (small, thin tube) is attached to the needle and the needle is removed. The catheter tubing will be attached to a suction device (gentle vacuum) to help remove the fluid. The fluid will drain into a container attached to the tubing. . When your healthcare provider has pulled enough fluid from your abdomen, he will remove the catheter. Your wound (procedure site) will be covered with a bandage. The ascites fluid may be sent to a lab for tests.  After your procedure Do not get out of bed until your healthcare provider says it is okay. When healthcare providers see that you are not having any problems, you may be able to go home. If you are staying in the hospital, you may be taken back to your room.  Blood tests:  You may need blood taken to give caregivers information about how your body is working. The blood may be taken from your hand, arm, or IV.     Intravenous fluids and volume expanders:  You may need fluids or volume expanders given through your IV after your procedure. These fluids include albumin or saline. Albumin is a protein found in your blood. The IV fluids help prevent a drop in your blood pressure    If you do not have an abdominal paracentesis, your symptoms may get worse. You may feel short of breath, have abdominal and chest pain, and have trouble moving around. You may not learn why fluid is building up in your abdomen. You may not get proper treatment. You may have bleeding inside your stomach or bowels. Your kidneys may stop working, and your liver problems may get worse. The fluid inside your abdomen may get infected and cause abdominal pain and tiredness. An infection may become life-threatening and you may die. Talk with your healthcare provider if you have  questions or concerns about your procedure, condition, or care.  Bandage may be removed the next day and you may shower the next day.  If you have any fever or abdominal pain please notify your MD.  You may call the Radiology nurse at [[phone] until 4pm.After 4pm you may call your ordering MD.

## 2024-09-06 RX ORDER — ASPIRIN 81 MG/1
81 TABLET, CHEWABLE ORAL DAILY
Qty: 30 TABLET | Refills: 5 | Status: SHIPPED | OUTPATIENT
Start: 2024-09-06 | End: 2025-03-05

## 2024-09-08 LAB
BACTERIA SPEC BFLD CULT: NO GROWTH
GRAM STN SPEC: NORMAL
GRAM STN SPEC: NORMAL

## 2024-09-16 DIAGNOSIS — K74.60 CIRRHOSIS OF LIVER WITH ASCITES, UNSPECIFIED HEPATIC CIRRHOSIS TYPE  (HCC): ICD-10-CM

## 2024-09-16 DIAGNOSIS — I81 PORTAL VEIN THROMBOSIS: ICD-10-CM

## 2024-09-16 DIAGNOSIS — R60.0 LOWER LEG EDEMA: ICD-10-CM

## 2024-09-16 DIAGNOSIS — D64.9 ANEMIA: ICD-10-CM

## 2024-09-16 DIAGNOSIS — R18.8 CIRRHOSIS OF LIVER WITH ASCITES, UNSPECIFIED HEPATIC CIRRHOSIS TYPE  (HCC): ICD-10-CM

## 2024-09-16 DIAGNOSIS — K92.2 UPPER GI BLEED: ICD-10-CM

## 2024-09-16 DIAGNOSIS — R56.9 SEIZURE (HCC): ICD-10-CM

## 2024-09-16 RX ORDER — LEVETIRACETAM 750 MG/1
750 TABLET ORAL EVERY 12 HOURS SCHEDULED
Qty: 60 TABLET | Refills: 0 | Status: CANCELLED | OUTPATIENT
Start: 2024-09-16 | End: 2025-03-15

## 2024-09-17 ENCOUNTER — OFFICE VISIT (OUTPATIENT)
Dept: NEUROLOGY | Facility: CLINIC | Age: 89
End: 2024-09-17
Payer: COMMERCIAL

## 2024-09-17 VITALS
WEIGHT: 114 LBS | SYSTOLIC BLOOD PRESSURE: 120 MMHG | DIASTOLIC BLOOD PRESSURE: 70 MMHG | BODY MASS INDEX: 20.2 KG/M2 | HEART RATE: 93 BPM | HEIGHT: 63 IN

## 2024-09-17 DIAGNOSIS — I65.1 BASILAR ARTERY STENOSIS: ICD-10-CM

## 2024-09-17 DIAGNOSIS — Z87.898 HISTORY OF SEIZURES: ICD-10-CM

## 2024-09-17 DIAGNOSIS — R56.9 SEIZURE (HCC): Primary | ICD-10-CM

## 2024-09-17 PROCEDURE — 99215 OFFICE O/P EST HI 40 MIN: CPT | Performed by: PSYCHIATRY & NEUROLOGY

## 2024-09-17 RX ORDER — LEVETIRACETAM 750 MG/1
750 TABLET ORAL EVERY 12 HOURS SCHEDULED
Qty: 60 TABLET | Refills: 5 | Status: SHIPPED | OUTPATIENT
Start: 2024-09-17 | End: 2025-03-16

## 2024-09-17 NOTE — PROGRESS NOTES
Return NeuroOutpatient Note        Jf Valera  [id number]  88 y.o.  1935       Hospital Follow-up (Seizure like activity )        History obtained from:  Patient and son     HPI/Subjective:    Jf Valera is an 87 yo M with PMH of tia, presyncope, seizure like activity presents for hospital f/u. He was initially seen by me in March of 2023 for tia/syncope. His MRI brain was normal. His CTA had revealed moderate long segment basilar a stenosis, right mca and left pca atherosclerotic disease. In April of 2024, he had surgery for portal vein thrombosis resulting in mesenteric ischemia resulting in bowel resection. Later, he had presented to ED with seizure like activity. He had 2 episodes of witnessed seizure like activity. It was described as staring into space with eyes rolled back lasting 2 min. 2nd episode consisted of rigidity. His MRI brain was negative for any structural lesion. He had prolonged EEG that was normal.   He was placed on keppra 750mg bid.     Aspirin 81mg was added for severe basilar stenosis. He's on eliquis for portal vein thrombosis.     He's using cane for ambulation. He is able to live by himself but does have family around.     Past Medical History:   Diagnosis Date    Anemia     Basilar artery stenosis     Decreased hearing     Encounter for care or replacement of suprapubic tube (HCC)     Hemopneumothorax 03/17/2024    chest tube inserted-right    HTN (hypertension) 09/23/2013    Hypertension     Mesenteric ischemia (HCC)     Portal vein thrombosis     Seizure-like activity (HCC) 04/16/2024     Social History     Socioeconomic History    Marital status: /Civil Union     Spouse name: Not on file    Number of children: Not on file    Years of education: Not on file    Highest education level: Not on file   Occupational History    Not on file   Tobacco Use    Smoking status: Never     Passive exposure: Never    Smokeless tobacco: Never   Vaping Use    Vaping status: Never Used    Substance and Sexual Activity    Alcohol use: Never    Drug use: Never    Sexual activity: Not Currently   Other Topics Concern    Not on file   Social History Narrative    Not on file     Social Determinants of Health     Financial Resource Strain: Not on file   Food Insecurity: No Food Insecurity (8/25/2024)    Hunger Vital Sign     Worried About Running Out of Food in the Last Year: Never true     Ran Out of Food in the Last Year: Never true   Transportation Needs: No Transportation Needs (8/25/2024)    PRAPARE - Transportation     Lack of Transportation (Medical): No     Lack of Transportation (Non-Medical): No   Physical Activity: Not on file   Stress: Not on file   Social Connections: Not on file   Intimate Partner Violence: Not on file   Housing Stability: Low Risk  (8/25/2024)    Housing Stability Vital Sign     Unable to Pay for Housing in the Last Year: No     Number of Times Moved in the Last Year: 0     Homeless in the Last Year: No     History reviewed. No pertinent family history.  No Known Allergies  Current Outpatient Medications on File Prior to Visit   Medication Sig Dispense Refill    apixaban (ELIQUIS) 2.5 mg Take 1 tablet (2.5 mg total) by mouth 2 (two) times a day 60 tablet 0    aspirin 81 mg chewable tablet Chew 1 tablet (81 mg total) daily 30 tablet 5    atorvastatin (LIPITOR) 20 mg tablet Take 1 tablet (20 mg total) by mouth daily 30 tablet 5    ferrous sulfate 325 (65 Fe) mg tablet Take 1 tablet (325 mg total) by mouth daily with breakfast 30 tablet 0    furosemide (LASIX) 20 mg tablet Take 1 tablet (20 mg total) by mouth daily 30 tablet 0    pantoprazole (PROTONIX) 40 mg tablet Take 1 tablet (40 mg total) by mouth daily in the early morning 30 tablet 0    spironolactone (ALDACTONE) 50 mg tablet Take 1 tablet (50 mg total) by mouth daily 30 tablet 3    [DISCONTINUED] levETIRAcetam (KEPPRA) 750 mg tablet Take 1 tablet (750 mg total) by mouth every 12 (twelve) hours 60 tablet 5     No  "current facility-administered medications on file prior to visit.         Review of Systems   Refer to positive review of systems in HPI.   Review of Systems    Constitutional- No fever  Eyes- No visual change  ENT- Hearing normal  CV- No chest pain  Resp- No Shortness of breath  GI- No diarrhea  - Bladder normal  MS- No Arthritis   Skin- No rash  Psych- No depression  Endo- No DM  Heme- No nodes    Vitals:    09/17/24 1052   BP: 120/70   BP Location: Left arm   Patient Position: Sitting   Cuff Size: Standard   Pulse: 93   Weight: 51.7 kg (114 lb)   Height: 5' 3\" (1.6 m)       PHYSICAL EXAM:  Appearance: No Acute Distress  Ophthalmoscopic: Disc Flat, Normal fundus  Mental status:  Orientation: Awake, Alert, and Orientedx3  Memory: Registation 3/3 Recall 2/3  Attention: normal  Knowledge: good  Language: No aphasia  Speech: No dysarthria  Cranial Nerves:  2 No Visual Defect on Confrontation, Pupils round, equal, reactive to light  3,4,6 Extraocular Movements Intact, no nystagmus  5 Facial Sensation Intact  7 No facial asymmetry  8 Intact hearing  9,10 Palate symmetric, normal gag  11 Good shoulder shrug  12 Tongue Midline  Gait: Stable, uses cane.   Coordination: No ataxia with finger to nose testing, and heel to shin  Sensory: Intact, Symmetric to pinprick, light touch, vibration, and joint position  Muscle Tone: Normal              Muscle exam:  Arm Right Left Leg Right Left   Deltoid 5/5 5/5 Iliopsoas 5/5 5/5   Biceps 5/5 5/5 Quads 5/5 5/5   Triceps 5/5 5/5 Hamstrings 5/5 5/5   Wrist Extension 5/5 5/5 Ankle Dorsi Flexion 5/5 5/5   Wrist Flexion 5/5 5/5 Ankle Plantar Flexion 5/5 5/5   Interossei 5/5 5/5 Ankle Eversion 5/5 5/5   APB 5/5 5/5 Ankle Inversion 5/5 5/5       Reflexes   RJ BJ TJ KJ AJ Plantars Hager's   Right 2+ 2+ 2+ 2+  Downgoing Not present   Left 2+ 2+ 2+ 2+  Downgoing Not present     Personal review of  Labs:                  Diagnoses and all orders for this visit:        1. Seizure (HCC)  " levETIRAcetam (KEPPRA) 750 mg tablet      2. History of seizures        3. Basilar artery stenosis              Patient has remained seizure free since April of 2024.  Will resume keppra at 750mg bid. Discussed if they are concerned, we can lower the dose but won't be able to stop it.   He is to resume aspirin 81mg, lipitor 20mg and eliquis 2.5mg bid.   Encouraged physical activity.   For his age, his memory is intact.             Total time of encounter:  40 min  More than 50% of the time was used in counseling and/or coordination of care  Extent of counseling and/or coordination of care        Hejanice Truong MD  [facility]  [address] [place] [id number] [phone]

## 2024-09-18 ENCOUNTER — OFFICE VISIT (OUTPATIENT)
Dept: HEMATOLOGY ONCOLOGY | Facility: MEDICAL CENTER | Age: 89
End: 2024-09-18
Payer: COMMERCIAL

## 2024-09-18 ENCOUNTER — APPOINTMENT (OUTPATIENT)
Dept: LAB | Facility: HOSPITAL | Age: 89
End: 2024-09-18
Attending: FAMILY MEDICINE
Payer: COMMERCIAL

## 2024-09-18 VITALS
OXYGEN SATURATION: 97 % | DIASTOLIC BLOOD PRESSURE: 70 MMHG | SYSTOLIC BLOOD PRESSURE: 120 MMHG | TEMPERATURE: 97.7 F | HEIGHT: 63 IN | HEART RATE: 83 BPM | BODY MASS INDEX: 20.2 KG/M2 | WEIGHT: 114 LBS

## 2024-09-18 DIAGNOSIS — E53.8 VITAMIN B12 DEFICIENCY: ICD-10-CM

## 2024-09-18 DIAGNOSIS — D64.9 ANEMIA: ICD-10-CM

## 2024-09-18 DIAGNOSIS — K74.60 CIRRHOSIS OF LIVER WITH ASCITES, UNSPECIFIED HEPATIC CIRRHOSIS TYPE  (HCC): ICD-10-CM

## 2024-09-18 DIAGNOSIS — R18.8 CIRRHOSIS OF LIVER WITH ASCITES, UNSPECIFIED HEPATIC CIRRHOSIS TYPE  (HCC): ICD-10-CM

## 2024-09-18 DIAGNOSIS — D50.0 IRON DEFICIENCY ANEMIA DUE TO CHRONIC BLOOD LOSS: ICD-10-CM

## 2024-09-18 DIAGNOSIS — D50.0 IRON DEFICIENCY ANEMIA DUE TO CHRONIC BLOOD LOSS: Primary | ICD-10-CM

## 2024-09-18 DIAGNOSIS — R79.89 ELEVATED PLATELET COUNT: ICD-10-CM

## 2024-09-18 LAB
FERRITIN SERPL-MCNC: 41 NG/ML (ref 24–336)
FOLATE SERPL-MCNC: >22.3 NG/ML
IRON SATN MFR SERPL: 5 % (ref 15–50)
IRON SERPL-MCNC: 16 UG/DL (ref 50–212)
TIBC SERPL-MCNC: 329 UG/DL (ref 250–450)
UIBC SERPL-MCNC: 313 UG/DL (ref 155–355)
VIT B12 SERPL-MCNC: 487 PG/ML (ref 180–914)

## 2024-09-18 PROCEDURE — 82607 VITAMIN B-12: CPT

## 2024-09-18 PROCEDURE — 83550 IRON BINDING TEST: CPT

## 2024-09-18 PROCEDURE — 82746 ASSAY OF FOLIC ACID SERUM: CPT

## 2024-09-18 PROCEDURE — 84165 PROTEIN E-PHORESIS SERUM: CPT

## 2024-09-18 PROCEDURE — 36415 COLL VENOUS BLD VENIPUNCTURE: CPT

## 2024-09-18 PROCEDURE — 82728 ASSAY OF FERRITIN: CPT

## 2024-09-18 PROCEDURE — 99204 OFFICE O/P NEW MOD 45 MIN: CPT | Performed by: PHYSICIAN ASSISTANT

## 2024-09-18 PROCEDURE — 86334 IMMUNOFIX E-PHORESIS SERUM: CPT

## 2024-09-18 PROCEDURE — 83540 ASSAY OF IRON: CPT

## 2024-09-18 RX ORDER — PANTOPRAZOLE SODIUM 40 MG/1
40 TABLET, DELAYED RELEASE ORAL
Qty: 30 TABLET | Refills: 5 | Status: SHIPPED | OUTPATIENT
Start: 2024-09-18 | End: 2025-03-17

## 2024-09-18 RX ORDER — FERROUS SULFATE 325(65) MG
325 TABLET ORAL
Qty: 30 TABLET | Refills: 5 | Status: SHIPPED | OUTPATIENT
Start: 2024-09-18 | End: 2025-03-17

## 2024-09-18 RX ORDER — SPIRONOLACTONE 50 MG/1
50 TABLET, FILM COATED ORAL DAILY
Qty: 30 TABLET | Refills: 5 | Status: SHIPPED | OUTPATIENT
Start: 2024-09-18

## 2024-09-18 RX ORDER — ATORVASTATIN CALCIUM 20 MG/1
20 TABLET, FILM COATED ORAL DAILY
Qty: 30 TABLET | Refills: 0 | Status: SHIPPED | OUTPATIENT
Start: 2024-09-18

## 2024-09-18 RX ORDER — FUROSEMIDE 20 MG
20 TABLET ORAL DAILY
Qty: 30 TABLET | Refills: 1 | Status: SHIPPED | OUTPATIENT
Start: 2024-09-18

## 2024-09-18 NOTE — PROGRESS NOTES
[facility] [address] [facility] [address]  Hematology Ambulatory Consult  Jf Valera, 1935, [id number]  9/18/2024      Assessment and Plan   1. Anemia  Patient presents for evaluation of anemia.  He had a recent hospital stay for acute on chronic anemia.  His hemoglobin was 4.1 on presentation.  Iron studies were consistent with iron deficiency.  He received 3 units PRBC and 3 days of IV iron while inpatient.    Hemoglobin prior to that hospital stay was between 9 and 10 g.    Anemia in the setting of cirrhosis can be multifactorial in origin: acute and chronic gastrointestinal blood loss, folate deficiency, hypersplenism, the anemia of chronic disease (inflammation), and hemolysis may all contribute.  Cirrhosis in adults: Etiologies, clinical manifestations, and diagnosis - UpToDate     As below, patient with EGD during recent hospital stay with finding of esophageal varices and ulcer in the antrum of the stomach.    Will check additional lab work.  Patient had CBC repeated on 9/4/2024.  Hemoglobin had improved to 10.3 at that point.  I would like to recheck iron studies, B12, folate, etc.  Patient could potentially be a candidate for IV iron.  If he is responding well to oral iron then it may be reasonable to continue.    No obvious bleeding.    2.  Cirrhosis  Patient is following with GI.  As below, cirrhosis is of unclear etiology.  Patient denies history of alcohol or drug abuse.  He had EGD during recent hospital stay which showed esophageal varices.  Patient also with ulcer seen in the antrum of the stomach.  He is on PPI.    AFP noted to be elevated at 13.99 during recent hospital stay.  Patient is scheduled for MRI on 10/1.    He has recurrent ascites for which he is on Lasix and spironolactone.    3. Portal vein thrombosis  In the setting of cirrhosis.  Diagnosed in March of this year.  He had associated mesenteric  ischemia requiring ex lap with small bowel resection and lysis of adhesions.  He is on Eliquis.    He follows with neurology for severe basilar stenosis for which she is on aspirin 81 mg daily.    The patient is scheduled for follow-up in approximately 3 months.    Patient voiced agreement and understanding to the above.   Patient knows to call the Hematology/Oncology office with any questions and concerns regarding the above.    Barrier(s) to care: None.   The patient is able to self care with the help of his son.    Subjective     Chief Complaint   Patient presents with    Consult       Referring provider    Giselle Bell PA-C  [address] [place] [address]    History of present illness:     Patient was seen today with his son.  He was referred for evaluation of anemia.    He has complicated past medical history.    He has a fairly recent finding of liver cirrhosis with ascites for which the etiology is unknown.  He has history of portal vein thrombosis, currently on Eliquis. He follows with neurology for severe basilar stenosis for which she is on aspirin 81 mg daily.  He has history of urinary retention status post suprapubic catheter, seizure disorder.    He was diagnosed with portal vein thrombosis in March of this year.  He had associated mesenteric ischemia requiring exploratory laparotomy with small bowel resection and lysis of adhesions.  Imaging shows chronic occlusion of the portal veins and the SMV with cavernous transformation.  He is now maintained on Eliquis.    He had a hospital stay in August of this year at [facility].  He presented with acute on chronic anemia.  On presentation his hemoglobin was 4.1.  CT scan of the abdomen and pelvis showed gastroesophageal varices.  He received 3 units PRBC during his hospitalization.    He was also seen by GI.  EGD showed 2 grade 2 varices in the esophagus.  Mild portal hypertensive gastropathy in the fundus and body of the stomach.   Single, small cratered ulcer in the antrum with clean base.  Iron studies were consistent with iron deficiency anemia and patient was treated with 3 doses of IV Venofer and was placed on oral iron at discharge.    He was noted to have elevated AFP at 13.99.  MRI of the abdomen is scheduled for 10/1.  Patient is scheduled for GI follow-up in December.    He continues to require intermittent paracentesis.  His son says that it has improved with spironolactone and Lasix.    Patient denies any obvious bleeding.  No dark stools.  No hematuria or epistaxis.  No history of alcohol or drug abuse.    His son says that during his hospitalization back in the spring he lost a considerable amount of weight.  He says that his weight prior to that time was around 150.  His weight today is 114 pounds.  He has no abdominal pain.  No nausea or vomiting.    Review of Systems   Constitutional:  Positive for fatigue. Negative for activity change, appetite change and fever.   HENT:  Negative for nosebleeds.    Respiratory:  Negative for cough, choking and shortness of breath.    Cardiovascular:  Negative for chest pain, palpitations and leg swelling.   Gastrointestinal:  Positive for abdominal distention. Negative for abdominal pain, anal bleeding, blood in stool, constipation, diarrhea, nausea and vomiting.   Endocrine: Negative for cold intolerance.   Genitourinary:  Negative for hematuria.   Musculoskeletal:  Positive for arthralgias. Negative for myalgias.   Skin:  Negative for color change, pallor and rash.   Allergic/Immunologic: Negative for immunocompromised state.   Neurological:  Negative for headaches.   Hematological:  Negative for adenopathy. Does not bruise/bleed easily.   All other systems reviewed and are negative.      Past Medical History:   Diagnosis Date    Anemia     Basilar artery stenosis     Decreased hearing     Encounter for care or replacement of suprapubic tube (HCC)     Hemopneumothorax 03/17/2024    chest  tube inserted-right    HTN (hypertension) 09/23/2013    Hypertension     Mesenteric ischemia (HCC)     Portal vein thrombosis     Seizure-like activity (HCC) 04/16/2024     Past Surgical History:   Procedure Laterality Date    IR CHEST TUBE PLACEMENT  3/17/2024    IR PARACENTESIS  7/29/2024    IR PARACENTESIS  8/16/2024    IR PARACENTESIS  8/22/2024    IR PARACENTESIS  9/5/2024    IR SUPRAPUBIC CATHETER CHECK/CHANGE/REINSERTION/UPSIZE  5/17/2024    LAPAROTOMY N/A 3/13/2024    Procedure: LAPAROTOMY EXPLORATORY; SMALL BOWEL RESECTION; LYSIS OF ADHESIONS; SUPRPAPUBIC TUBE INSERTION;  Surgeon: Marvin Azul MD;  Location: [facility];  Service: General    LAPAROTOMY N/A 3/14/2024    Procedure: EXPLORATORY LAPAROTOMY, WASHOUT. SMALL BOWEL ANASTOMOSIS, CONTROL OF LIVER BLEED, CLOSURE OF ABDOMINAL WOUND;  Surgeon: Beltran Lorenz DO;  Location:  MAIN OR;  Service: General     No family history on file.  Social History     Socioeconomic History    Marital status: /Civil Union     Spouse name: Not on file    Number of children: Not on file    Years of education: Not on file    Highest education level: Not on file   Occupational History    Not on file   Tobacco Use    Smoking status: Never     Passive exposure: Never    Smokeless tobacco: Never   Vaping Use    Vaping status: Never Used   Substance and Sexual Activity    Alcohol use: Never    Drug use: Never    Sexual activity: Not Currently   Other Topics Concern    Not on file   Social History Narrative    Not on file     Social Determinants of Health     Financial Resource Strain: Not on file   Food Insecurity: No Food Insecurity (8/25/2024)    Hunger Vital Sign     Worried About Running Out of Food in the Last Year: Never true     Ran Out of Food in the Last Year: Never true   Transportation Needs: No Transportation Needs (8/25/2024)    PRAPARE - Transportation     Lack of Transportation (Medical): No     Lack of Transportation (Non-Medical): No  "  Physical Activity: Not on file   Stress: Not on file   Social Connections: Not on file   Intimate Partner Violence: Not on file   Housing Stability: Low Risk  (8/25/2024)    Housing Stability Vital Sign     Unable to Pay for Housing in the Last Year: No     Number of Times Moved in the Last Year: 0     Homeless in the Last Year: No         Current Outpatient Medications:     apixaban (ELIQUIS) 2.5 mg, Take 1 tablet (2.5 mg total) by mouth 2 (two) times a day, Disp: 60 tablet, Rfl: 0    aspirin 81 mg chewable tablet, Chew 1 tablet (81 mg total) daily, Disp: 30 tablet, Rfl: 5    atorvastatin (LIPITOR) 20 mg tablet, Take 1 tablet (20 mg total) by mouth daily, Disp: 30 tablet, Rfl: 5    ferrous sulfate 325 (65 Fe) mg tablet, Take 1 tablet (325 mg total) by mouth daily with breakfast, Disp: 30 tablet, Rfl: 0    furosemide (LASIX) 20 mg tablet, Take 1 tablet (20 mg total) by mouth daily, Disp: 30 tablet, Rfl: 1    levETIRAcetam (KEPPRA) 750 mg tablet, Take 1 tablet (750 mg total) by mouth every 12 (twelve) hours, Disp: 60 tablet, Rfl: 5    pantoprazole (PROTONIX) 40 mg tablet, Take 1 tablet (40 mg total) by mouth daily in the early morning, Disp: 30 tablet, Rfl: 0    spironolactone (ALDACTONE) 50 mg tablet, Take 1 tablet (50 mg total) by mouth daily, Disp: 30 tablet, Rfl: 5  No Known Allergies    Objective   /70 (BP Location: Left arm, Patient Position: Sitting, Cuff Size: Standard)   Pulse 83   Temp 97.7 °F (36.5 °C) (Temporal)   Ht 5' 3\" (1.6 m)   Wt 51.7 kg (114 lb)   SpO2 97%   BMI 20.19 kg/m²   Physical Exam  Constitutional:       General: He is not in acute distress.     Appearance: Normal appearance. He is well-developed.   HENT:      Head: Normocephalic and atraumatic.      Right Ear: External ear normal.      Left Ear: External ear normal.      Nose: Nose normal.   Eyes:      General: No scleral icterus.     Conjunctiva/sclera: Conjunctivae normal.   Cardiovascular:      Rate and Rhythm: Normal rate " and regular rhythm.   Pulmonary:      Effort: Pulmonary effort is normal. No respiratory distress.      Breath sounds: Normal breath sounds.   Abdominal:      General: There is distension.      Palpations: Abdomen is soft.   Musculoskeletal:      Right lower leg: Edema (Trace) present.      Left lower leg: Edema (Trace) present.   Skin:     Findings: No rash (on exposed skin.).   Neurological:      Mental Status: He is alert and oriented to person, place, and time.   Psychiatric:         Mood and Affect: Mood normal.         Thought Content: Thought content normal.         Judgment: Judgment normal.       Result Review  Labs:      Imagin2024 CT ANGIOGRAM OF THE ABDOMEN AND PELVIS WITH IV CONTRAST   IMPRESSION:     1) No abdominal or pelvic hematoma.     2) Patent abdominal aorta and its major branches. Resolution of infrarenal abdominal aortic thrombi seen on the 2024 CT. 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     3) Cirrhotic liver morphology with evidence of portal hypertension manifested by large abdominal and pelvic ascites, portal hypertensive colopathy, gastroesophageal varices and internal hemorrhoids.     4) 5 mm focus of arterial hyperenhancement in segment 4A of the liver, which blends imperceptibly on subsequent venous phase. This is compatible with the LI-RADS 3 (intermediate probability for HCC) lesion.     5) 1.7 cm exophytic lesion in segment 7 of the liver medially, matching liver parenchyma and attenuation, of unclear etiology. Attention on follow-up.     6) Circumferential wall thickening of the urinary bladder out of proportion for degree of underdistention with suprapubic catheter in place. This may be related to chronic bladder outlet obstruction and/or cystitis.     7) Prostatomegaly (volume of 77 mL) with a 1.7 cm hyperdense, exophytic nodule in the midgland on the left, as described above. Although this may represent an exophytic BPH  nodule extending into the peripheral zone and beyond, underlying neoplasm is not   excluded.     8) Multiple additional findings as above.     The study was marked in EPIC for immediate notification.       Please note:  This report has been generated by a voice recognition software system. Therefore there may be syntax, spelling, and/or grammatical errors. Please call if you have any questions.

## 2024-09-19 ENCOUNTER — APPOINTMENT (OUTPATIENT)
Dept: LAB | Facility: HOSPITAL | Age: 89
End: 2024-09-19
Payer: COMMERCIAL

## 2024-09-19 DIAGNOSIS — D64.9 ANEMIA: ICD-10-CM

## 2024-09-19 LAB
CRP SERPL QL: 8.4 MG/L
ERYTHROCYTE [DISTWIDTH] IN BLOOD BY AUTOMATED COUNT: 19.3 % (ref 11.6–15.1)
HCT VFR BLD AUTO: 35.9 % (ref 36.5–49.3)
HGB BLD-MCNC: 10.5 G/DL (ref 12–17)
LDH SERPL-CCNC: 140 U/L (ref 140–271)
MCH RBC QN AUTO: 28.5 PG (ref 26.8–34.3)
MCHC RBC AUTO-ENTMCNC: 29.2 G/DL (ref 31.4–37.4)
MCV RBC AUTO: 98 FL (ref 82–98)
PLATELET # BLD AUTO: 367 THOUSANDS/UL (ref 149–390)
PMV BLD AUTO: 10 FL (ref 8.9–12.7)
RBC # BLD AUTO: 3.68 MILLION/UL (ref 3.88–5.62)
TSH SERPL DL<=0.05 MIU/L-ACNC: 2.64 UIU/ML (ref 0.45–4.5)
WBC # BLD AUTO: 9.11 THOUSAND/UL (ref 4.31–10.16)

## 2024-09-19 PROCEDURE — 36415 COLL VENOUS BLD VENIPUNCTURE: CPT

## 2024-09-19 PROCEDURE — 83615 LACTATE (LD) (LDH) ENZYME: CPT

## 2024-09-19 PROCEDURE — 85027 COMPLETE CBC AUTOMATED: CPT

## 2024-09-19 PROCEDURE — 86140 C-REACTIVE PROTEIN: CPT

## 2024-09-19 PROCEDURE — 84443 ASSAY THYROID STIM HORMONE: CPT

## 2024-09-20 DIAGNOSIS — D50.0 IRON DEFICIENCY ANEMIA DUE TO CHRONIC BLOOD LOSS: Primary | ICD-10-CM

## 2024-09-20 LAB
ALBUMIN SERPL ELPH-MCNC: 3.52 G/DL (ref 3.2–5.1)
ALBUMIN SERPL ELPH-MCNC: 55 % (ref 48–70)
ALPHA1 GLOB SERPL ELPH-MCNC: 0.39 G/DL (ref 0.15–0.47)
ALPHA1 GLOB SERPL ELPH-MCNC: 6.1 % (ref 1.8–7)
ALPHA2 GLOB SERPL ELPH-MCNC: 0.65 G/DL (ref 0.42–1.04)
ALPHA2 GLOB SERPL ELPH-MCNC: 10.1 % (ref 5.9–14.9)
BETA GLOB ABNORMAL SERPL ELPH-MCNC: 0.35 G/DL (ref 0.31–0.57)
BETA1 GLOB SERPL ELPH-MCNC: 5.5 % (ref 4.7–7.7)
BETA2 GLOB SERPL ELPH-MCNC: 4.1 % (ref 3.1–7.9)
BETA2+GAMMA GLOB SERPL ELPH-MCNC: 0.26 G/DL (ref 0.2–0.58)
GAMMA GLOB ABNORMAL SERPL ELPH-MCNC: 1.23 G/DL (ref 0.4–1.66)
GAMMA GLOB SERPL ELPH-MCNC: 19.2 % (ref 6.9–22.3)
IGG/ALB SER: 1.22 {RATIO} (ref 1.1–1.8)
INTERPRETATION UR IFE-IMP: NORMAL
M PROTEIN 1 MFR SERPL ELPH: 8.6 %
M PROTEIN 1 SERPL ELPH-MCNC: 0.55 G/DL
PROT PATTERN SERPL ELPH-IMP: NORMAL
PROT SERPL-MCNC: 6.4 G/DL (ref 6.4–8.2)

## 2024-09-20 PROCEDURE — 84165 PROTEIN E-PHORESIS SERUM: CPT | Performed by: STUDENT IN AN ORGANIZED HEALTH CARE EDUCATION/TRAINING PROGRAM

## 2024-09-20 PROCEDURE — 86334 IMMUNOFIX E-PHORESIS SERUM: CPT | Performed by: STUDENT IN AN ORGANIZED HEALTH CARE EDUCATION/TRAINING PROGRAM

## 2024-10-01 ENCOUNTER — HOSPITAL ENCOUNTER (OUTPATIENT)
Dept: RADIOLOGY | Facility: HOSPITAL | Age: 89
Discharge: HOME | End: 2024-10-01
Payer: COMMERCIAL

## 2024-10-01 DIAGNOSIS — K76.9 LIVER LESION: ICD-10-CM

## 2024-10-01 PROCEDURE — 74183 MRI ABD W/O CNTR FLWD CNTR: CPT

## 2024-10-01 PROCEDURE — A9585 GADOBUTROL INJECTION: HCPCS | Performed by: PHYSICIAN ASSISTANT

## 2024-10-01 RX ORDER — GADOBUTROL 604.72 MG/ML
5 INJECTION INTRAVENOUS
Status: COMPLETED | OUTPATIENT
Start: 2024-10-01 | End: 2024-10-01

## 2024-10-01 RX ADMIN — GADOBUTROL 5 ML: 604.72 INJECTION INTRAVENOUS at 15:35

## 2024-10-02 ENCOUNTER — PATIENT MESSAGE (OUTPATIENT)
Dept: VASCULAR SURGERY | Facility: CLINIC | Age: 89
End: 2024-10-02

## 2024-10-04 ENCOUNTER — OFFICE VISIT (OUTPATIENT)
Dept: FAMILY MEDICINE CLINIC | Facility: CLINIC | Age: 89
End: 2024-10-04
Payer: COMMERCIAL

## 2024-10-04 VITALS
WEIGHT: 111 LBS | RESPIRATION RATE: 18 BRPM | SYSTOLIC BLOOD PRESSURE: 115 MMHG | TEMPERATURE: 98.5 F | HEART RATE: 72 BPM | BODY MASS INDEX: 19.66 KG/M2 | DIASTOLIC BLOOD PRESSURE: 70 MMHG

## 2024-10-04 DIAGNOSIS — R18.8 CIRRHOSIS OF LIVER WITH ASCITES, UNSPECIFIED HEPATIC CIRRHOSIS TYPE  (HCC): Primary | ICD-10-CM

## 2024-10-04 DIAGNOSIS — K76.9 LIVER LESION: Primary | ICD-10-CM

## 2024-10-04 DIAGNOSIS — R56.9 SEIZURE (HCC): ICD-10-CM

## 2024-10-04 DIAGNOSIS — R33.9 URINE RETENTION: ICD-10-CM

## 2024-10-04 DIAGNOSIS — K74.60 CIRRHOSIS OF LIVER WITH ASCITES, UNSPECIFIED HEPATIC CIRRHOSIS TYPE  (HCC): ICD-10-CM

## 2024-10-04 DIAGNOSIS — K74.60 CIRRHOSIS OF LIVER WITH ASCITES, UNSPECIFIED HEPATIC CIRRHOSIS TYPE  (HCC): Primary | ICD-10-CM

## 2024-10-04 DIAGNOSIS — R18.8 CIRRHOSIS OF LIVER WITH ASCITES, UNSPECIFIED HEPATIC CIRRHOSIS TYPE  (HCC): ICD-10-CM

## 2024-10-04 DIAGNOSIS — Z43.5 ENCOUNTER FOR CARE OR REPLACEMENT OF SUPRAPUBIC TUBE (HCC): ICD-10-CM

## 2024-10-04 PROBLEM — H65.93 MIDDLE EAR EFFUSION, BILATERAL: Status: RESOLVED | Noted: 2024-07-26 | Resolved: 2024-10-04

## 2024-10-04 PROCEDURE — 99214 OFFICE O/P EST MOD 30 MIN: CPT | Performed by: FAMILY MEDICINE

## 2024-10-04 NOTE — PROGRESS NOTES
Ambulatory Visit  Name: Jf Valera      : 1935      MRN: [id number]  Encounter Provider: Justa Hutchinson MD  Encounter Date: 10/4/2024   Encounter department: [facility]    Assessment & Plan  Cirrhosis of liver with ascites, unspecified hepatic cirrhosis type  (HCC)  Unclear etiology. No history of alcohol abuse. Follows GI. On Lasix and Spironolactone. Continue. He does get PRN paracentesis.          Seizure (HCC)  Stable on Keppra. No recent seizure episodes. Follows neurologist Dr. Truong.          Encounter for care or replacement of suprapubic tube (HCC)  Follows urologist Dr. Davis.          Urine retention  Follows urologist Dr. Davis. Has suprapubic tube in place. Goal is to get it removed.             History of Present Illness     HPI  Jf is here today for routine follow up visit.   He has a history of cirrhosis, portal vein thrombosis, asciates, mesenteric ischemia, TIA, seizure like episodes, urinary retention, basilar artery stenosis, anemia.   He reports no acute issues or concerns.         Review of Systems   Constitutional: Negative.    HENT: Negative.     Eyes: Negative.    Respiratory: Negative.     Cardiovascular: Negative.    Gastrointestinal: Negative.    Endocrine: Negative.    Genitourinary: Negative.    Musculoskeletal: Negative.    Neurological: Negative.    Hematological: Negative.    Psychiatric/Behavioral: Negative.             Objective     /70   Pulse 72   Temp 98.5 °F (36.9 °C)   Resp 18   Wt 50.3 kg (111 lb)   BMI 19.66 kg/m²     Physical Exam  Vitals and nursing note reviewed.   Constitutional:       General: He is not in acute distress.     Appearance: Normal appearance. He is well-developed.   HENT:      Head: Normocephalic and atraumatic.   Eyes:      Conjunctiva/sclera: Conjunctivae normal.   Cardiovascular:      Rate and Rhythm: Normal rate and regular rhythm.      Pulses: Normal pulses.      Heart sounds: Normal heart sounds. No murmur  heard.  Pulmonary:      Effort: Pulmonary effort is normal. No respiratory distress.      Breath sounds: Normal breath sounds.   Abdominal:      General: There is distension.      Palpations: Abdomen is soft. There is no mass.      Tenderness: There is no abdominal tenderness.      Hernia: No hernia is present.   Musculoskeletal:         General: No swelling.      Cervical back: Neck supple.   Skin:     General: Skin is warm and dry.      Capillary Refill: Capillary refill takes less than 2 seconds.   Neurological:      Mental Status: He is alert.   Psychiatric:         Mood and Affect: Mood normal.

## 2024-10-04 NOTE — ASSESSMENT & PLAN NOTE
Unclear etiology. No history of alcohol abuse. Follows GI. On Lasix and Spironolactone. Continue. He does get PRN paracentesis.

## 2024-10-04 NOTE — PATIENT COMMUNICATION
Received call from pt's son, Zack, regarding above message. Informed him of response from IVAN Bernstein. He voiced understanding.

## 2024-10-21 ENCOUNTER — HOSPITAL ENCOUNTER (OUTPATIENT)
Dept: RADIOLOGY | Facility: HOSPITAL | Age: 89
Discharge: HOME | End: 2024-10-21
Attending: SURGERY
Payer: COMMERCIAL

## 2024-10-21 ENCOUNTER — APPOINTMENT (OUTPATIENT)
Dept: LAB | Facility: HOSPITAL | Age: 89
End: 2024-10-21
Attending: SPECIALIST
Payer: COMMERCIAL

## 2024-10-21 DIAGNOSIS — I81 PORTAL VEIN THROMBOSIS: ICD-10-CM

## 2024-10-21 DIAGNOSIS — N13.5 STRICTURE OR KINKING OF URETER: ICD-10-CM

## 2024-10-21 PROCEDURE — 93978 VASCULAR STUDY: CPT

## 2024-10-22 ENCOUNTER — HOSPITAL ENCOUNTER (OUTPATIENT)
Dept: RADIOLOGY | Facility: HOSPITAL | Age: 89
Discharge: HOME | End: 2024-10-22
Attending: SURGERY
Payer: COMMERCIAL

## 2024-10-22 ENCOUNTER — APPOINTMENT (OUTPATIENT)
Dept: LAB | Facility: HOSPITAL | Age: 89
End: 2024-10-22
Payer: COMMERCIAL

## 2024-10-22 DIAGNOSIS — I81 PORTAL VEIN THROMBOSIS: ICD-10-CM

## 2024-10-22 DIAGNOSIS — D50.0 IRON DEFICIENCY ANEMIA DUE TO CHRONIC BLOOD LOSS: ICD-10-CM

## 2024-10-22 LAB
ATRIAL RATE: 60 BPM
BASOPHILS # BLD AUTO: 0.03 THOUSANDS/ΜL (ref 0–0.1)
BASOPHILS NFR BLD AUTO: 0 % (ref 0–1)
EOSINOPHIL # BLD AUTO: 0.08 THOUSAND/ΜL (ref 0–0.61)
EOSINOPHIL NFR BLD AUTO: 1 % (ref 0–6)
ERYTHROCYTE [DISTWIDTH] IN BLOOD BY AUTOMATED COUNT: 14.7 % (ref 11.6–15.1)
FERRITIN SERPL-MCNC: 21 NG/ML (ref 24–336)
HCT VFR BLD AUTO: 37.5 % (ref 36.5–49.3)
HGB BLD-MCNC: 11 G/DL (ref 12–17)
IMM GRANULOCYTES # BLD AUTO: 0.07 THOUSAND/UL (ref 0–0.2)
IMM GRANULOCYTES NFR BLD AUTO: 1 % (ref 0–2)
LYMPHOCYTES # BLD AUTO: 1.43 THOUSANDS/ΜL (ref 0.6–4.47)
LYMPHOCYTES NFR BLD AUTO: 16 % (ref 14–44)
MCH RBC QN AUTO: 28.5 PG (ref 26.8–34.3)
MCHC RBC AUTO-ENTMCNC: 29.3 G/DL (ref 31.4–37.4)
MCV RBC AUTO: 97 FL (ref 82–98)
MONOCYTES # BLD AUTO: 0.66 THOUSAND/ΜL (ref 0.17–1.22)
MONOCYTES NFR BLD AUTO: 7 % (ref 4–12)
NEUTROPHILS # BLD AUTO: 6.86 THOUSANDS/ΜL (ref 1.85–7.62)
NEUTS SEG NFR BLD AUTO: 75 % (ref 43–75)
NRBC BLD AUTO-RTO: 0 /100 WBCS
P AXIS: 57 DEGREES
PLATELET # BLD AUTO: 390 THOUSANDS/UL (ref 149–390)
PMV BLD AUTO: 10 FL (ref 8.9–12.7)
PR INTERVAL: 192 MS
QRS AXIS: -71 DEGREES
QRSD INTERVAL: 104 MS
QT INTERVAL: 428 MS
QTC INTERVAL: 428 MS
RBC # BLD AUTO: 3.86 MILLION/UL (ref 3.88–5.62)
T WAVE AXIS: -9 DEGREES
VENTRICULAR RATE: 60 BPM
WBC # BLD AUTO: 9.13 THOUSAND/UL (ref 4.31–10.16)

## 2024-10-22 PROCEDURE — 93010 ELECTROCARDIOGRAM REPORT: CPT | Performed by: INTERNAL MEDICINE

## 2024-10-22 PROCEDURE — 93922 UPR/L XTREMITY ART 2 LEVELS: CPT | Performed by: SURGERY

## 2024-10-22 PROCEDURE — 93978 VASCULAR STUDY: CPT | Performed by: SURGERY

## 2024-10-22 PROCEDURE — 93925 LOWER EXTREMITY STUDY: CPT

## 2024-10-22 PROCEDURE — 93925 LOWER EXTREMITY STUDY: CPT | Performed by: SURGERY

## 2024-10-22 PROCEDURE — 83540 ASSAY OF IRON: CPT

## 2024-10-22 PROCEDURE — 36415 COLL VENOUS BLD VENIPUNCTURE: CPT

## 2024-10-22 PROCEDURE — 83550 IRON BINDING TEST: CPT

## 2024-10-22 PROCEDURE — 93923 UPR/LXTR ART STDY 3+ LVLS: CPT

## 2024-10-22 PROCEDURE — 82728 ASSAY OF FERRITIN: CPT

## 2024-10-22 PROCEDURE — 85025 COMPLETE CBC W/AUTO DIFF WBC: CPT

## 2024-10-23 LAB
IRON SATN MFR SERPL: 21 % (ref 15–50)
IRON SERPL-MCNC: 68 UG/DL (ref 50–212)
TIBC SERPL-MCNC: 321 UG/DL (ref 250–450)
UIBC SERPL-MCNC: 253 UG/DL (ref 155–355)

## 2024-10-23 RX ORDER — ATORVASTATIN CALCIUM 20 MG/1
20 TABLET, FILM COATED ORAL DAILY
Qty: 30 TABLET | Refills: 0 | Status: SHIPPED | OUTPATIENT
Start: 2024-10-23

## 2024-10-24 ENCOUNTER — TELEPHONE (OUTPATIENT)
Dept: HEMATOLOGY ONCOLOGY | Facility: MEDICAL CENTER | Age: 89
End: 2024-10-24

## 2024-10-24 DIAGNOSIS — D47.2 MGUS (MONOCLONAL GAMMOPATHY OF UNKNOWN SIGNIFICANCE): Primary | ICD-10-CM

## 2024-10-24 NOTE — TELEPHONE ENCOUNTER
Discussed lab work with patient's son by telephone.  Recommend to continue oral iron as currently taking.  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  Immunofixation identified as IgA kappa.  Will place order for free light chains, immunoglobulins, beta-2 microglobulin.

## 2024-10-25 ENCOUNTER — ANESTHESIA EVENT (OUTPATIENT)
Dept: PERIOP | Facility: HOSPITAL | Age: 89
End: 2024-10-25
Payer: COMMERCIAL

## 2024-10-25 NOTE — PRE-PROCEDURE INSTRUCTIONS
Pre-Surgery Instructions:   Medication Instructions    apixaban (ELIQUIS) 2.5 mg Instructions provided by Vincent dose 10/25    aspirin 81 mg chewable tablet Instructions provided by Vincent dose 10/23    atorvastatin (LIPITOR) 20 mg tablet Take night before surgery    ferrous sulfate 325 (65 Fe) mg tablet Hold day of surgery.    furosemide (LASIX) 20 mg tablet Hold day of surgery.    levETIRAcetam (KEPPRA) 750 mg tablet Take day of surgery.    pantoprazole (PROTONIX) 40 mg tablet Take day of surgery.    spironolactone (ALDACTONE) 50 mg tablet Hold day of surgery.   Medication instructions for day surgery reviewed. Please use only a sip of water to take your instructed medications. Avoid all over the counter vitamins, supplements and NSAIDS for one week prior to surgery per anesthesia guidelines. Tylenol is ok to take as needed.     You will receive a call one business day prior to surgery with an arrival time and hospital directions. If your surgery is scheduled on a Monday, the hospital will be calling you on the Friday prior to your surgery. If you have not heard from anyone by 8pm, please call the hospital supervisor through the hospital  at [phone]. ([place] [phone] or [place] [phone]).    Do not eat or drink anything after midnight the night before your surgery, including candy, mints, lifesavers, or chewing gum. Do not drink alcohol 24hrs before your surgery. Try not to smoke at least 24hrs before your surgery.       Follow the pre surgery showering instructions as listed in the “My Surgical Experience Booklet” or otherwise provided by your surgeon's office. Do not use a blade to shave the surgical area 1 week before surgery. It is okay to use a clean electric clippers up to 24 hours before surgery. Do not apply any lotions, creams, including makeup, cologne, deodorant, or perfumes after showering on the day of your surgery. Do not use dry shampoo, hair spray, hair gel, or any type of  hair products.     No contact lenses, eye make-up, or artificial eyelashes. Remove nail polish, including gel polish, and any artificial, gel, or acrylic nails if possible. Remove all jewelry including rings and body piercing jewelry.     Wear causal clothing that is easy to take on and off. Consider your type of surgery.    Keep any valuables, jewelry, piercings at home. Please bring any specially ordered equipment (sling, braces) if indicated.    Arrange for a responsible person to drive you to and from the hospital on the day of your surgery. Please confirm the visitor policy for the day of your procedure when you receive your phone call with an arrival time.     Call the surgeon's office with any new illnesses, exposures, or additional questions prior to surgery.    Please reference your “My Surgical Experience Booklet” for additional information to prepare for your upcoming surgery.    Pt. Verbalized an understanding of all instructions reviewed and offers no concerns at this time.

## 2024-10-30 NOTE — H&P
H&P Exam - Urology       Patient: Jf Valera   : 1935 Sex: male   MRN: [id number]     CSN: [id number]      History of Present Illness   HPI:  Jf Valera is a 89 y.o. male who presents with urethral stricture disease urinary retention status post exploratory laparotomy placement of suprapubic tube difficult cath seen in the office status post a DVT on anticoagulants for 6 months now off anticoagulants scheduled to undergo retrograde urethrogram internal ureterotomy Kenalog Injection Kerns catheter insertion SP tube will be left did after patient attempts at voiding trial and removed in the office setting        Review of Systems:   Constitutional:  Negative for activity change, fever, chills and diaphoresis.   HENT: Negative for hearing loss and trouble swallowing.   Eyes: Negative for itching and visual disturbance.   Respiratory: Negative for chest tightness and shortness of breath.   Cardiovascular: Negative for chest pain, edema.   Gastrointestinal: Negative for abdominal distention, na abdominal pain, constipation, diarrhea, Nausea and vomiting.   Genitourinary: Negative for decreased urine volume, difficulty urinating, dysuria, enuresis, frequency, hematuria and urgency.   Musculoskeletal: Negative for gait problem and myalgias.   Neurological: Negative for dizziness and headaches.   Hematological: Does not bruise/bleed easily.       Historical Information   Past Medical History:   Diagnosis Date    Anemia     Anemia     Basilar artery stenosis     Decreased hearing     Encounter for care or replacement of suprapubic tube (HCC)     Hemopneumothorax 2024    chest tube inserted-right    History of transfusion     HTN (hypertension) 2013    Hypertension     Liver disease     cirrosis    Mesenteric ischemia (HCC)     Portal vein thrombosis     Seizure-like activity (HCC) 2024     Past Surgical History:   Procedure Laterality Date    IR CHEST TUBE PLACEMENT  3/17/2024    IR PARACENTESIS  " 7/29/2024    IR PARACENTESIS  8/16/2024    IR PARACENTESIS  8/22/2024    IR PARACENTESIS  9/5/2024    IR SUPRAPUBIC CATHETER CHECK/CHANGE/REINSERTION/UPSIZE  5/17/2024    LAPAROTOMY N/A 3/13/2024    Procedure: LAPAROTOMY EXPLORATORY; SMALL BOWEL RESECTION; LYSIS OF ADHESIONS; SUPRPAPUBIC TUBE INSERTION;  Surgeon: Marvin Azul MD;  Location: [facility];  Service: General    LAPAROTOMY N/A 3/14/2024    Procedure: EXPLORATORY LAPAROTOMY, WASHOUT. SMALL BOWEL ANASTOMOSIS, CONTROL OF LIVER BLEED, CLOSURE OF ABDOMINAL WOUND;  Surgeon: Beltran Lorenz DO;  Location:  MAIN OR;  Service: General     Social History   Social History     Substance and Sexual Activity   Alcohol Use Never     Social History     Substance and Sexual Activity   Drug Use Never     Social History     Tobacco Use   Smoking Status Never    Passive exposure: Never   Smokeless Tobacco Never     Family History: History reviewed. No pertinent family history.    Meds/Allergies   No medications prior to admission.  No Known Allergies    Objective   Vitals: Ht 5' 3\" (1.6 m)   Wt 50.3 kg (111 lb)   BMI 19.66 kg/m²     Physical Exam:  General Alert awake   Normocephalic atraumatic PERRLA  Lungs clear bilaterally  Cardiac normal S1 normal S2  Abdomen soft, flank pain  Extremities no edema    No intake/output data recorded.    Invasive Devices       Peripheral Intravenous Line  Duration             Peripheral IV 08/20/24 Left Hand 71 days    Peripheral IV 08/22/24 Left;Upper;Ventral (anterior) Arm 68 days              Drain  Duration             Suprapubic Catheter 18 Fr. 166 days                        Lab Results: CBC:   Lab Results   Component Value Date    WBC 9.13 10/22/2024    HGB 11.0 (L) 10/22/2024    HCT 37.5 10/22/2024    MCV 97 10/22/2024     10/22/2024    RBC 3.86 (L) 10/22/2024    MCH 28.5 10/22/2024    MCHC 29.3 (L) 10/22/2024    RDW 14.7 10/22/2024    MPV 10.0 10/22/2024    NRBC 0 10/22/2024     CMP:   Lab Results " "  Component Value Date     08/25/2024    CO2 26 08/25/2024    CO2 19 (L) 03/13/2024    BUN 14 08/25/2024    CREATININE 0.80 08/25/2024    GLUCOSE 158 (H) 03/13/2024    CALCIUM 8.3 (L) 08/25/2024    AST 10 (L) 08/24/2024    ALT 7 08/24/2024    ALKPHOS 36 08/24/2024    EGFR 80 08/25/2024     Urinalysis:   Lab Results   Component Value Date    COLORU Light Yellow 04/16/2024    CLARITYU Clear 04/16/2024    SPECGRAV 1.010 04/16/2024    PHUR 6.5 04/16/2024    LEUKOCYTESUR Trace (A) 04/16/2024    NITRITE Negative 04/16/2024    GLUCOSEU Negative 04/16/2024    KETONESU Negative 04/16/2024    BILIRUBINUR Negative 04/16/2024    BLOODU Moderate (A) 04/16/2024     Urine Culture:   Lab Results   Component Value Date    URINECX <10,000 cfu/ml 04/16/2024     PSA: No results found for: \"PSA\"        Assessment/ Plan:  Retrograde urethrogram cystoscopy direct internal urethrotomy Kenalog Injection bilateral retrograde pyelogram change of SP tube      Kp Davis MD  "

## 2024-10-31 ENCOUNTER — ANESTHESIA (OUTPATIENT)
Dept: PERIOP | Facility: HOSPITAL | Age: 89
End: 2024-10-31
Payer: COMMERCIAL

## 2024-10-31 ENCOUNTER — APPOINTMENT (OUTPATIENT)
Dept: RADIOLOGY | Facility: HOSPITAL | Age: 89
End: 2024-10-31
Payer: COMMERCIAL

## 2024-10-31 ENCOUNTER — HOSPITAL ENCOUNTER (OUTPATIENT)
Facility: HOSPITAL | Age: 89
Setting detail: OUTPATIENT SURGERY
Discharge: HOME | End: 2024-10-31
Attending: SPECIALIST | Admitting: SPECIALIST
Payer: COMMERCIAL

## 2024-10-31 VITALS
HEART RATE: 49 BPM | WEIGHT: 114.2 LBS | HEIGHT: 63 IN | SYSTOLIC BLOOD PRESSURE: 162 MMHG | DIASTOLIC BLOOD PRESSURE: 76 MMHG | TEMPERATURE: 97.1 F | BODY MASS INDEX: 20.23 KG/M2 | RESPIRATION RATE: 18 BRPM | OXYGEN SATURATION: 99 %

## 2024-10-31 DIAGNOSIS — N35.919 STRICTURE OF MALE URETHRA, UNSPECIFIED STRICTURE TYPE: Primary | ICD-10-CM

## 2024-10-31 DIAGNOSIS — R33.9 RETENTION OF URINE, UNSPECIFIED: ICD-10-CM

## 2024-10-31 PROCEDURE — 74450 X-RAY URETHRA/BLADDER: CPT

## 2024-10-31 PROCEDURE — C1769 GUIDE WIRE: HCPCS | Performed by: SPECIALIST

## 2024-10-31 PROCEDURE — 88305 TISSUE EXAM BY PATHOLOGIST: CPT | Performed by: PATHOLOGY

## 2024-10-31 RX ORDER — LIDOCAINE HYDROCHLORIDE 10 MG/ML
INJECTION, SOLUTION EPIDURAL; INFILTRATION; INTRACAUDAL; PERINEURAL AS NEEDED
Status: DISCONTINUED | OUTPATIENT
Start: 2024-10-31 | End: 2024-10-31

## 2024-10-31 RX ORDER — CEFAZOLIN SODIUM 1 G/50ML
SOLUTION INTRAVENOUS AS NEEDED
Status: DISCONTINUED | OUTPATIENT
Start: 2024-10-31 | End: 2024-10-31

## 2024-10-31 RX ORDER — SODIUM CHLORIDE, SODIUM LACTATE, POTASSIUM CHLORIDE, CALCIUM CHLORIDE 600; 310; 30; 20 MG/100ML; MG/100ML; MG/100ML; MG/100ML
100 INJECTION, SOLUTION INTRAVENOUS CONTINUOUS
Status: DISCONTINUED | OUTPATIENT
Start: 2024-10-31 | End: 2024-10-31 | Stop reason: SDUPTHER

## 2024-10-31 RX ORDER — ONDANSETRON 2 MG/ML
4 INJECTION INTRAMUSCULAR; INTRAVENOUS ONCE AS NEEDED
Status: DISCONTINUED | OUTPATIENT
Start: 2024-10-31 | End: 2024-10-31 | Stop reason: HOSPADM

## 2024-10-31 RX ORDER — PROPOFOL 10 MG/ML
INJECTION, EMULSION INTRAVENOUS AS NEEDED
Status: DISCONTINUED | OUTPATIENT
Start: 2024-10-31 | End: 2024-10-31

## 2024-10-31 RX ORDER — GLYCINE 1.5 G/100ML
SOLUTION IRRIGATION AS NEEDED
Status: DISCONTINUED | OUTPATIENT
Start: 2024-10-31 | End: 2024-10-31 | Stop reason: HOSPADM

## 2024-10-31 RX ORDER — DEXAMETHASONE SODIUM PHOSPHATE 10 MG/ML
INJECTION, SOLUTION INTRAMUSCULAR; INTRAVENOUS AS NEEDED
Status: DISCONTINUED | OUTPATIENT
Start: 2024-10-31 | End: 2024-10-31

## 2024-10-31 RX ORDER — CEFAZOLIN SODIUM 1 G/50ML
1000 SOLUTION INTRAVENOUS ONCE
Status: DISCONTINUED | OUTPATIENT
Start: 2024-10-31 | End: 2024-10-31 | Stop reason: HOSPADM

## 2024-10-31 RX ORDER — SODIUM CHLORIDE, SODIUM LACTATE, POTASSIUM CHLORIDE, CALCIUM CHLORIDE 600; 310; 30; 20 MG/100ML; MG/100ML; MG/100ML; MG/100ML
100 INJECTION, SOLUTION INTRAVENOUS CONTINUOUS
Status: DISCONTINUED | OUTPATIENT
Start: 2024-10-31 | End: 2024-10-31 | Stop reason: HOSPADM

## 2024-10-31 RX ORDER — FENTANYL CITRATE/PF 50 MCG/ML
50 SYRINGE (ML) INJECTION
Status: DISCONTINUED | OUTPATIENT
Start: 2024-10-31 | End: 2024-10-31 | Stop reason: HOSPADM

## 2024-10-31 RX ORDER — TRIAMCINOLONE ACETONIDE 40 MG/ML
INJECTION, SUSPENSION INTRA-ARTICULAR; INTRAMUSCULAR AS NEEDED
Status: DISCONTINUED | OUTPATIENT
Start: 2024-10-31 | End: 2024-10-31 | Stop reason: HOSPADM

## 2024-10-31 RX ORDER — MAGNESIUM HYDROXIDE 1200 MG/15ML
LIQUID ORAL AS NEEDED
Status: DISCONTINUED | OUTPATIENT
Start: 2024-10-31 | End: 2024-10-31 | Stop reason: HOSPADM

## 2024-10-31 RX ORDER — ONDANSETRON 2 MG/ML
INJECTION INTRAMUSCULAR; INTRAVENOUS AS NEEDED
Status: DISCONTINUED | OUTPATIENT
Start: 2024-10-31 | End: 2024-10-31

## 2024-10-31 RX ORDER — SODIUM CHLORIDE, SODIUM LACTATE, POTASSIUM CHLORIDE, CALCIUM CHLORIDE 600; 310; 30; 20 MG/100ML; MG/100ML; MG/100ML; MG/100ML
INJECTION, SOLUTION INTRAVENOUS CONTINUOUS PRN
Status: DISCONTINUED | OUTPATIENT
Start: 2024-10-31 | End: 2024-10-31

## 2024-10-31 RX ADMIN — DEXAMETHASONE SODIUM PHOSPHATE 10 MG: 10 INJECTION, SOLUTION INTRAMUSCULAR; INTRAVENOUS at 13:49

## 2024-10-31 RX ADMIN — CEFAZOLIN SODIUM 1000 MG: 1 SOLUTION INTRAVENOUS at 13:37

## 2024-10-31 RX ADMIN — PROPOFOL 100 MG: 10 INJECTION, EMULSION INTRAVENOUS at 13:42

## 2024-10-31 RX ADMIN — ONDANSETRON 4 MG: 2 INJECTION INTRAMUSCULAR; INTRAVENOUS at 13:48

## 2024-10-31 RX ADMIN — LIDOCAINE HYDROCHLORIDE 50 MG: 10 INJECTION, SOLUTION EPIDURAL; INFILTRATION; INTRACAUDAL; PERINEURAL at 13:42

## 2024-10-31 RX ADMIN — SODIUM CHLORIDE, SODIUM LACTATE, POTASSIUM CHLORIDE, AND CALCIUM CHLORIDE: .6; .31; .03; .02 INJECTION, SOLUTION INTRAVENOUS at 13:34

## 2024-10-31 NOTE — PROGRESS NOTES
"Progress Note - Urology      Patient: Jf VIEIRA Uhiara   : 1935 Sex: male   MRN: [id number]     CSN: [id number]  Unit/Bed#: OR POOL     SUBJECTIVE:   Patient surgical preop unit no change to history and physical aware risk of anesthesia infection bleeding SP tube to be left in      Objective   Vitals: /70   Pulse 67   Temp 97.8 °F (36.6 °C) (Temporal)   Resp 18   Ht 5' 3\" (1.6 m)   Wt 51.8 kg (114 lb 3.2 oz)   SpO2 100%   BMI 20.23 kg/m²     No intake/output data recorded.      Physical Exam:   General Alert awake   Normocephalic atraumatic PERRLA  Lungs clear bilaterally  Cardiac normal S1 normal S2  Abdomen soft, flank pain  Extremities no edema      Lab Results: CBC:   Lab Results   Component Value Date    WBC 9.13 10/22/2024    HGB 11.0 (L) 10/22/2024    HCT 37.5 10/22/2024    MCV 97 10/22/2024     10/22/2024    RBC 3.86 (L) 10/22/2024    MCH 28.5 10/22/2024    MCHC 29.3 (L) 10/22/2024    RDW 14.7 10/22/2024    MPV 10.0 10/22/2024    NRBC 0 10/22/2024     CMP:   Lab Results   Component Value Date     2024    CO2 26 2024    CO2 19 (L) 2024    BUN 14 2024    CREATININE 0.80 2024    GLUCOSE 158 (H) 2024    CALCIUM 8.3 (L) 2024    AST 10 (L) 2024    ALT 7 2024    ALKPHOS 36 2024    EGFR 80 2024     Urinalysis:   Lab Results   Component Value Date    COLORU Light Yellow 2024    CLARITYU Clear 2024    SPECGRAV 1.010 2024    PHUR 6.5 2024    LEUKOCYTESUR Trace (A) 2024    NITRITE Negative 2024    GLUCOSEU Negative 2024    KETONESU Negative 2024    BILIRUBINUR Negative 2024    BLOODU Moderate (A) 2024     Urine Culture:   Lab Results   Component Value Date    URINECX <10,000 cfu/ml 2024     PSA: No results found for: \"PSA\"      Assessment/ Plan:  Retrograde urethrogram internal arthrotomy Kenalog Injection change SP tube          Kp Davis MD  "

## 2024-10-31 NOTE — PERIOPERATIVE NURSING NOTE
Sp tube clamped    3 way gonzalez clamped at irrigation port  Urine pink tined   dr vargas aware

## 2024-10-31 NOTE — ANESTHESIA PREPROCEDURE EVALUATION
Procedure:  DIRECT VISUAL INTERAL URETHROTOMY (DVIU) (Urethra)    Relevant Problems   ANESTHESIA (within normal limits)      ENDO (within normal limits)      GI/HEPATIC   (+) Cirrhosis of liver with ascites  (HCC)      NEURO/PSYCH   (+) Seizure (HCC)   (+) TIA (transient ischemic attack)      PULMONARY   (+) Hemopneumothorax on right        Physical Exam    Airway    Mallampati score: III  TM Distance: >3 FB  Neck ROM: full     Dental   No notable dental hx     Cardiovascular  Rhythm: regular, Rate: normal    Pulmonary   Breath sounds clear to auscultation    Other Findings        Anesthesia Plan  ASA Score- 3     Anesthesia Type- general with ASA Monitors.         Additional Monitors:     Airway Plan: LMA.           Plan Factors-Exercise tolerance (METS): >4 METS.    Chart reviewed. EKG reviewed.  Existing labs reviewed. Patient summary reviewed.    Patient is not a current smoker.              Induction- intravenous.    Postoperative Plan- Plan for postoperative opioid use. Planned trial extubation        Informed Consent- Anesthetic plan and risks discussed with patient, son and spouse.  I personally reviewed this patient with the CRNA. Discussed and agreed on the Anesthesia Plan with the CRNA..

## 2024-10-31 NOTE — ANESTHESIA POSTPROCEDURE EVALUATION
Post-Op Assessment Note    CV Status:  Stable  Pain Score: 0    Pain management: adequate       Mental Status:  Alert and awake   Hydration Status:  Euvolemic   PONV Controlled:  Controlled   Airway Patency:  Patent     Post Op Vitals Reviewed: Yes    No anethesia notable event occurred.    Staff: Anesthesiologist, CRNA           Last Filed PACU Vitals:  Vitals Value Taken Time   Temp 97.1 °F (36.2 °C) 10/31/24 1448   Pulse 61 10/31/24 1448   /79 10/31/24 1448   Resp 12 10/31/24 1448   SpO2 98 % 10/31/24 1448       Modified Tabitha:  No data recorded

## 2024-10-31 NOTE — PERIOPERATIVE NURSING NOTE
Received patient from pacu, aoo vitals stable. Patient verbalizes pain to procedural site at 0/10, denies further pain or discomfort and states this pain is manageable and would not like further intervention at this time. Patient given water and is tolerating sips well without complaint. Patient and family aware of capped SP tube and of gonzalez catheter. Patient resting in bed, call light in reach and environment cleared. Plan of care ongoing.

## 2024-10-31 NOTE — OP NOTE
OPERATIVE REPORT  PATIENT NAME: Jf Valera    :  1935  MRN: [id number]  Pt Location: [facility] OR ROOM 04    SURGERY DATE: 10/31/2024    Surgeons and Role:     * Kp Davis MD - Primary    Preop Diagnosis:  Stricture of male urethra, unspecified stricture type [N35.919]  Retention of urine, unspecified [R33.9]    Post-Op Diagnosis Codes:     * Stricture of male urethra, unspecified stricture type [N35.919]     * Retention of urine, unspecified [R33.9]    Procedure(s):  CYSTOSCOPY.DIRECT VISUAL INTERAL URETHROTOMY (DVIU) TRANSURETRHAL RESECTION OF BLADDER NECK CONTRACTURE. RETROGRADE URETHROGRAM. KENALOG INJECTION. SUPRAPUBIC TUBE EXCHANGE.    Specimen(s):  ID Type Source Tests Collected by Time Destination   1 : BLADDER NECK CONTRACTURE CHIPS Tissue Urinary Bladder TISSUE EXAM Kp Davis MD 10/31/2024 1432        Estimated Blood Loss:   Minimal    Drains:  Urethral Catheter Latex;Three way 22 Fr. (Active)   Number of days: 0       Suprapubic Catheter 18 Fr. (Active)   Number of days: 0       Anesthesia Type:   General/LMA    Operative Indications:  Stricture of male urethra, unspecified stricture type [N35.919]  Retention of urine, unspecified [R33.9]  This 89-year-old male undergoing emergency exploratory laparotomy months ago having difficult cath suprapubic tube placed IntraOp transferred to my practice developing DVT started on blood thinners found on flex cystoscopy to have distal bulbar stricture patient now has been cleared to come off blood thinners cardiac cleared having SP tube changes at 4-week intervals now to undergo retrograde urethrogram DVIU steroid injection possible Kerns placement    Operative Findings:  Retrograde urethrogram confirming mild bulbar stricture DVIU performed   TUR defect with severe bladder neck contracture undergoing cystoscopy transurethral resection with remaining prostatomegaly prostate tissue causing obstruction aggressively fulgurated formal TURP apparently not  done possible test bladder neck resection Kenalog injection to both bladder neck contracture 5 cc and urethral stricture  Bilateral retrograde pyelograms normal  18 Cypriot SP tube changed and clamped  22 Cypriot three-way Kerns catheter initially inserted left to continuous bladder rogation irrigant clear  Discussed with son patient apparently had cystoscopy TURP in [place] years ago      Complications:   None    Procedure and Technique:  Patient identified in the holding area discussion with family about leaving SP tube in until after voiding from below to confirm adequate drainage.  Patient placed in the OR and after anesthesia was induced with thigh position draped and prepped standard fashion timeout performed SP tube clamped 22 Cypriot cystoscope with 30 lens passed through the urethra stricture noted in the bulbar urethra the cystoscope pulled back to the meatus and a retrograde urethrogram confirm adequate filling of the entire bulbar urethra with drainage into the anterior urethra a 0.038 wire was then passed upward into the prostatic urethra and coiling.  The urethrotome was placed and the stricture was opened and followed inward noting a 4.0 cm by lobar obstructing prostatic urethra both right and left lobes intact till the proximal bladder neck with a severe bladder neck contracture the 0.038 wire was then passed into the bladder and the bladder neck contracture mildly incised entering into the bladder itself the wire was left in patient to procedure and the urethrotome was removed the 24 Cypriot resectoscope was then placed and formal resection of the bladder neck contracture taken down with aggressive fulguration of the remaining prostatic urethra in light of bleeding the chips were removed with the [place].  The cystoscope was then replaced and then the bilateral retrograde pyelograms confirm normal filling and drainage.  The spinal needle was placed and Kenalog Injection given circumferentially around the  previous bladder neck contracture as well as the urethral stricture the cystoscope removed 22 Danish three-way Kerns catheter inserted with 50 cc in the balloon left the mild traction with continuous bladder ovation noting clear irrigant the suprapubic tube was draped and prepped in the 18 Danish tube exchanged but left clamp patient to procedure awakened taken recovery in stable condition be discharged home and have the Kerns catheter removed in a few days undergo voiding trial if voiding well the SP tube to be removed   I was present for the entire procedure.    Patient Disposition:  PACU              SIGNATURE: Kp Davis MD  DATE: October 31, 2024  TIME: 3:20 PM

## 2024-10-31 NOTE — ANESTHESIA POSTPROCEDURE EVALUATION
Post-Op Assessment Note    CV Status:  Stable  Pain Score: 1    Pain management: adequate       Mental Status:  Alert and awake   Hydration Status:  Euvolemic and stable   PONV Controlled:  Controlled   Airway Patency:  Patent     Post Op Vitals Reviewed: Yes    No anethesia notable event occurred.    Staff: Anesthesiologist           Last Filed PACU Vitals:  Vitals Value Taken Time   Temp 97.1 °F (36.2 °C) 10/31/24 1448   Pulse 52 10/31/24 1513   /77 10/31/24 1513   Resp 20 10/31/24 1513   SpO2 98 % 10/31/24 1513       Modified Tabitha:  Activity: 2 (10/31/2024  3:00 PM)  Respiration: 2 (10/31/2024  3:00 PM)  Circulation: 2 (10/31/2024  3:00 PM)  Consciousness: 2 (10/31/2024  3:00 PM)  Oxygen Saturation: 2 (10/31/2024  3:00 PM)  Modified Tabitha Score: 10 (10/31/2024  3:00 PM)

## 2024-10-31 NOTE — DISCHARGE INSTR - AVS FIRST PAGE
#1 no heavy straining or lifting above 10 pounds for 2 weeks    #2 call office fevers, chills, or worsening blood in the urine.    #3  Patient to follow-up in office Monday 9:30 AM November 4 to have catheter removed from the penis no aspirin or blood thinners      Kp Davis M.D. [facility] office  [address] [place] [address].  [place] [address]  [phone]  8:30 AM to 4:30 PM  Monday through Friday    [facility]  [address]  [address]  [place] [address]  [phone]  1:00 to 5:00 PM  Wednesday

## 2024-11-04 ENCOUNTER — TELEPHONE (OUTPATIENT)
Dept: VASCULAR SURGERY | Facility: CLINIC | Age: 89
End: 2024-11-04

## 2024-11-04 NOTE — TELEPHONE ENCOUNTER
Per Claire -- Please schedule patient for an office visit with an AP to review studies as these are the first vascular studies we have performed on this patient.  TARUN and MARITZA done 10/22/24

## 2024-11-05 PROCEDURE — 88305 TISSUE EXAM BY PATHOLOGIST: CPT | Performed by: PATHOLOGY

## 2024-11-18 ENCOUNTER — ANESTHESIA (OUTPATIENT)
Dept: ANESTHESIOLOGY | Facility: HOSPITAL | Age: 89
End: 2024-11-18

## 2024-11-18 ENCOUNTER — ANESTHESIA EVENT (OUTPATIENT)
Dept: ANESTHESIOLOGY | Facility: HOSPITAL | Age: 89
End: 2024-11-18

## 2024-11-18 ENCOUNTER — APPOINTMENT (OUTPATIENT)
Dept: LAB | Facility: HOSPITAL | Age: 89
End: 2024-11-18
Payer: COMMERCIAL

## 2024-11-18 DIAGNOSIS — D50.0 IRON DEFICIENCY ANEMIA DUE TO CHRONIC BLOOD LOSS: ICD-10-CM

## 2024-11-18 DIAGNOSIS — D47.2 MGUS (MONOCLONAL GAMMOPATHY OF UNKNOWN SIGNIFICANCE): ICD-10-CM

## 2024-11-18 LAB
BASOPHILS # BLD AUTO: 0.05 THOUSANDS/ÂΜL (ref 0–0.1)
BASOPHILS NFR BLD AUTO: 1 % (ref 0–1)
EOSINOPHIL # BLD AUTO: 0.11 THOUSAND/ÂΜL (ref 0–0.61)
EOSINOPHIL NFR BLD AUTO: 1 % (ref 0–6)
ERYTHROCYTE [DISTWIDTH] IN BLOOD BY AUTOMATED COUNT: 13.7 % (ref 11.6–15.1)
FERRITIN SERPL-MCNC: 23 NG/ML (ref 24–336)
HCT VFR BLD AUTO: 44.1 % (ref 36.5–49.3)
HGB BLD-MCNC: 13.3 G/DL (ref 12–17)
IGA SERPL-MCNC: 640 MG/DL (ref 66–433)
IGG SERPL-MCNC: 832 MG/DL (ref 635–1741)
IGM SERPL-MCNC: <20 MG/DL (ref 45–281)
IMM GRANULOCYTES # BLD AUTO: 0.02 THOUSAND/UL (ref 0–0.2)
IMM GRANULOCYTES NFR BLD AUTO: 0 % (ref 0–2)
IRON SATN MFR SERPL: 10 % (ref 15–50)
IRON SERPL-MCNC: 37 UG/DL (ref 50–212)
LYMPHOCYTES # BLD AUTO: 1.7 THOUSANDS/ÂΜL (ref 0.6–4.47)
LYMPHOCYTES NFR BLD AUTO: 20 % (ref 14–44)
MCH RBC QN AUTO: 28.1 PG (ref 26.8–34.3)
MCHC RBC AUTO-ENTMCNC: 30.2 G/DL (ref 31.4–37.4)
MCV RBC AUTO: 93 FL (ref 82–98)
MONOCYTES # BLD AUTO: 0.68 THOUSAND/ÂΜL (ref 0.17–1.22)
MONOCYTES NFR BLD AUTO: 8 % (ref 4–12)
NEUTROPHILS # BLD AUTO: 5.95 THOUSANDS/ÂΜL (ref 1.85–7.62)
NEUTS SEG NFR BLD AUTO: 70 % (ref 43–75)
NRBC BLD AUTO-RTO: 0 /100 WBCS
PLATELET # BLD AUTO: 410 THOUSANDS/UL (ref 149–390)
PMV BLD AUTO: 9.9 FL (ref 8.9–12.7)
RBC # BLD AUTO: 4.74 MILLION/UL (ref 3.88–5.62)
TIBC SERPL-MCNC: 368 UG/DL (ref 250–450)
UIBC SERPL-MCNC: 331 UG/DL (ref 155–355)
WBC # BLD AUTO: 8.51 THOUSAND/UL (ref 4.31–10.16)

## 2024-11-18 PROCEDURE — 83540 ASSAY OF IRON: CPT

## 2024-11-18 PROCEDURE — 82784 ASSAY IGA/IGD/IGG/IGM EACH: CPT

## 2024-11-18 PROCEDURE — 82232 ASSAY OF BETA-2 PROTEIN: CPT

## 2024-11-18 PROCEDURE — 83550 IRON BINDING TEST: CPT

## 2024-11-18 PROCEDURE — 83521 IG LIGHT CHAINS FREE EACH: CPT

## 2024-11-18 PROCEDURE — 36415 COLL VENOUS BLD VENIPUNCTURE: CPT

## 2024-11-18 PROCEDURE — 85025 COMPLETE CBC W/AUTO DIFF WBC: CPT

## 2024-11-18 PROCEDURE — 82728 ASSAY OF FERRITIN: CPT

## 2024-11-19 DIAGNOSIS — D64.9 ANEMIA: ICD-10-CM

## 2024-11-20 DIAGNOSIS — I81 PORTAL VEIN THROMBOSIS: ICD-10-CM

## 2024-11-20 DIAGNOSIS — I65.1 BASILAR ARTERY STENOSIS: ICD-10-CM

## 2024-11-20 LAB
KAPPA LC FREE SER-MCNC: 23.4 MG/L (ref 3.3–19.4)
KAPPA LC FREE/LAMBDA FREE SER: 0.93 {RATIO} (ref 0.26–1.65)
LAMBDA LC FREE SERPL-MCNC: 25.1 MG/L (ref 5.7–26.3)

## 2024-11-20 RX ORDER — ASPIRIN 81 MG/1
81 TABLET, CHEWABLE ORAL DAILY
Qty: 90 TABLET | Refills: 0 | Status: SHIPPED | OUTPATIENT
Start: 2024-11-20 | End: 2025-05-19

## 2024-11-20 RX ORDER — FERROUS SULFATE 325(65) MG
325 TABLET ORAL
Qty: 30 TABLET | Refills: 0 | Status: SHIPPED | OUTPATIENT
Start: 2024-11-20 | End: 2025-05-19

## 2024-11-21 LAB — B2 MICROGLOB SERPL-MCNC: 2.5 MG/L (ref 0.6–2.4)

## 2024-11-25 ENCOUNTER — TELEPHONE (OUTPATIENT)
Age: 89
End: 2024-11-25

## 2024-11-25 ENCOUNTER — TELEPHONE (OUTPATIENT)
Dept: GASTROENTEROLOGY | Facility: CLINIC | Age: 89
End: 2024-11-25

## 2024-11-25 NOTE — TELEPHONE ENCOUNTER
Patient is having gastro surgery next week and needs doctor to complete the 2 day hold for Eliquis and fax back today. Form was scanned into Media file.

## 2024-11-25 NOTE — TELEPHONE ENCOUNTER
Clearance to hold eliquis would have to come from his vascular surgeon. I did see he has an appointment scheduled with vascular surgeon this Friday.

## 2024-11-25 NOTE — TELEPHONE ENCOUNTER
Yue calling from [facility]'s Gastro stating that pt is having and EGD on 12/2. Gastro needs to know if it is okay for pt to hold Eliquis for 2 days.      Please advise

## 2024-11-26 NOTE — TELEPHONE ENCOUNTER
Called and spoke with Gastro office and also patient's son, Zack. They are aware that Eliqusudhir galaviz will be discussed at  on 11/29/24 with Elise.

## 2024-11-28 PROBLEM — I73.9 PERIPHERAL ARTERIAL DISEASE (HCC): Status: ACTIVE | Noted: 2024-11-28

## 2024-11-28 PROBLEM — K55.069 MESENTERIC VEIN THROMBOSIS (HCC): Status: ACTIVE | Noted: 2024-11-28

## 2024-11-28 NOTE — PATIENT INSTRUCTIONS
Peripheral arterial disease    Plan:  -Evidence of moderate to severe bilateral peripheral arterial disease  -Continue optimal medical therapy on apixaban 2.5 twice daily (Hx mesenteric/ protal vein thrombosis), aspirin 81 and atorvastatin 20.  -Wear good protective footwear, avoid foot wounds, moisturize feet and monitor daily for wounds/ changes  -Follow up MARITZA in 1 year with office visit          Portal vein thrombosis  Mesenteric vein thrombosis    -CT scan shows portal vein and mesenteric vein thromboses in the chronic phase with collateral vessels  -Recommended for lifelong anticoagulation on apixaban 2.5 twice a day  -Will continue to monitor clinically

## 2024-11-29 ENCOUNTER — OFFICE VISIT (OUTPATIENT)
Dept: VASCULAR SURGERY | Facility: CLINIC | Age: 89
End: 2024-11-29
Payer: COMMERCIAL

## 2024-11-29 VITALS
WEIGHT: 113 LBS | DIASTOLIC BLOOD PRESSURE: 84 MMHG | SYSTOLIC BLOOD PRESSURE: 162 MMHG | HEIGHT: 63 IN | RESPIRATION RATE: 18 BRPM | HEART RATE: 72 BPM | OXYGEN SATURATION: 98 % | BODY MASS INDEX: 20.02 KG/M2

## 2024-11-29 DIAGNOSIS — I72.8 SUPERIOR MESENTERIC ARTERY ANEURYSM (HCC): ICD-10-CM

## 2024-11-29 DIAGNOSIS — K55.069 MESENTERIC VEIN THROMBOSIS (HCC): Primary | ICD-10-CM

## 2024-11-29 DIAGNOSIS — I81 PORTAL VEIN THROMBOSIS: ICD-10-CM

## 2024-11-29 DIAGNOSIS — K55.9 MESENTERIC ISCHEMIA (HCC): ICD-10-CM

## 2024-11-29 DIAGNOSIS — I73.9 PERIPHERAL ARTERIAL DISEASE (HCC): ICD-10-CM

## 2024-11-29 PROCEDURE — 99214 OFFICE O/P EST MOD 30 MIN: CPT | Performed by: PHYSICIAN ASSISTANT

## 2024-11-29 NOTE — ASSESSMENT & PLAN NOTE
-No claudication, ischemic rest pain or tissue loss  -Functionally limited and uses a cane to ambulate    -AOIL 10/21/24: Normal aorta. YAHIR, IIA and EIA are normal and patent.     SMA patent and aneurysmal 1.1 cm. Renals patent    -MARITZA 10/22/24:     R 0.80/100/47; > 75% proximal poplieal artery. 50-75% TPT. Enlarged lymph channels.      L 0.59/79/37; 50-75% prox SFA HG stenosis v occlusion mid and distal SFA with pop recon. Tibio-peroneal occlusive disease. Enlarged lymph channels.     Plan:  -Asymptomatic moderate to severe bilateral peripheral arterial disease  -Discussed the pathophysiology and treatment of PAD including indications for procedural interventions  -Discussed the rationale indications for medical therapy  -Continue optimal medical therapy on apixaban 2.5 twice daily (Hx mesenteric/ protal vein thrombosis), aspirin 81 and atorvastatin 20.  -Wear good protective footwear, avoid foot wounds, moisturize feet and monitor daily for wounds/ changes  -Instructed to call the office directly should he develop any foot pain or tissue loss  -Follow up MARITZA in 1 year with office visit  Orders:    VAS ARTERIAL DUPLEX- LOWER LIMB BILATERAL; Future

## 2024-11-29 NOTE — TELEPHONE ENCOUNTER
Per office note from 11/29/24, pt is cleared to hold his eliquis. I talked to son Zack , and advised him to have pt stop eliquis on 11/30/24 for 2 days and restart once ok from provider. Sb

## 2024-11-29 NOTE — ASSESSMENT & PLAN NOTE
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 YAHIR, IIA and EIA are normal and patent.     SMA patent and aneurysmal 1.1 cm. Renals patent    -MARITZA 10/22/24:     R 0.80/100/47; > 75% proximal poplieal artery. 50-75% TPT. Enlarged lymph channels.      L 0.59/79/37; 50-75% prox SFA HG stenosis v occlusion mid and distal SFA with pop recon. Tibio-peroneal occlusive disease. Enlarged lymph channels.     -CTA a/p 8/20/24 (partial findings):    -Patent abdominal aorta and its major branches. Resolution of infrarenal abdominal aortic thrombi seen on the March 2024 CT.     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 Cirrhotic liver morphology with evidence of portal hypertension manifested by large abdominal and pelvic ascites, portal hypertensive colopathy, gastroesophageal varices and internal hemorrhoids.     -Liver lesions.     -Circumferential wall thickening of the urinary bladder out of proportion for degree of underdistention with suprapubic catheter in place. This may be related to chronic bladder outlet obstruction and/or cystitis.    -Prostatomegaly; 1.7 cm hyperdense, exophytic nodule in the midgland on the left; may represent an exophytic BPH nodule extending into the peripheral zone and beyond, underlying neoplasm is not excluded.    Plan:  -Infrarenal abdominal aortic thrombi resolved on CTA 8/20/24  -Chronic occlusions portal vein and the SMV with evidence of collateralization  -Reviewed AOIL, MARITZA and CTA a/p vascular findings  -Continue with lifelong anticoagulation on " apixaban 2.5 BID  -Anticoagulation education provided; discussed risks/ benefits of holding apixaban  -Ok to hold apixaban 2.5 BID for 2 days prior to EGD 12/2/24 and resume as soon as safely possible after procedure  -Asked patient/ son to call office for any future holds to a/c or new abdominal symptoms  -Follow up vascular testing with MARITZA and mesenteric duplex in 1 year with office visit

## 2024-11-29 NOTE — PROGRESS NOTES
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 YAHIR, IIA and EIA are normal and patent.     SMA patent and aneurysmal 1.1 cm. Renals patent    -MARITZA 10/22/24:     R 0.80/100/47; > 75% proximal poplieal artery. 50-75% TPT. Enlarged lymph channels.      L 0.59/79/37; 50-75% prox SFA HG stenosis v occlusion mid and distal SFA with pop recon. Tibio-peroneal occlusive disease. Enlarged lymph channels.     -CTA a/p 24 (partial findings):    -Patent abdominal aorta and its major branches. Resolution of infrarenal abdominal aortic thrombi seen on the 2024 CT.     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 Cirrhotic liver morphology with evidence of portal hypertension manifested by large abdominal and pelvic ascites, portal hypertensive colopathy, gastroesophageal varices and internal hemorrhoids.     -Liver lesions.     -Circumferential wall thickening of the urinary bladder out of proportion for degree of underdistention with suprapubic catheter in place. This may be related to chronic bladder outlet obstruction and/or cystitis.    -Prostatomegaly; 1.7 cm hyperdense, exophytic nodule in the midgland on the left; may represent an exophytic BPH nodule extending into the peripheral zone and beyond, underlying neoplasm is not " excluded.    Plan:  -Infrarenal abdominal aortic thrombi resolved on CTA 8/20/24  -Chronic occlusions portal vein and the SMV with evidence of collateralization  -Reviewed AOIL, MARITZA and CTA a/p vascular findings  -Continue with lifelong anticoagulation on apixaban 2.5 BID  -Anticoagulation education provided; discussed risks/ benefits of holding apixaban  -Ok to hold apixaban 2.5 BID for 2 days prior to EGD 12/2/24 and resume as soon as safely possible after procedure  -Asked patient/ son to call office for any future holds to a/c or new abdominal symptoms  -Follow up vascular testing with MARITZA and mesenteric duplex in 1 year with office visit       Portal vein thrombosis  -As above under mesenteric vein thrombosis       Peripheral arterial disease (HCC)  -No claudication, ischemic rest pain or tissue loss  -Functionally limited and uses a cane to ambulate    -AOIL 10/21/24: Normal aorta. YAHIR, IIA and EIA are normal and patent.     SMA patent and aneurysmal 1.1 cm. Renals patent    -MARITZA 10/22/24:     R 0.80/100/47; > 75% proximal poplieal artery. 50-75% TPT. Enlarged lymph channels.      L 0.59/79/37; 50-75% prox SFA HG stenosis v occlusion mid and distal SFA with pop recon. Tibio-peroneal occlusive disease. Enlarged lymph channels.     Plan:  -Asymptomatic moderate to severe bilateral peripheral arterial disease  -Discussed the pathophysiology and treatment of PAD including indications for procedural interventions  -Discussed the rationale indications for medical therapy  -Continue optimal medical therapy on apixaban 2.5 twice daily (Hx mesenteric/ protal vein thrombosis), aspirin 81 and atorvastatin 20.  -Wear good protective footwear, avoid foot wounds, moisturize feet and monitor daily for wounds/ changes  -Instructed to call the office directly should he develop any foot pain or tissue loss  -Follow up MARITZA in 1 year with office visit  Orders:    VAS ARTERIAL DUPLEX- LOWER LIMB BILATERAL; Future    Superior  "mesenteric artery aneurysm (HCC)  -AOIL demonstrates patent and aneurysmal SMA 1.1 cm  -Already on apixaban 2.5 twice daily (Hx mesenteric/ protal vein thrombosis), aspirin 81 and atorvastatin 20.  -Follow-up duplex in 1 year  Orders:    VAS CELIAC AND/OR MESENTERIC DUPLEX; Future      History of Present Illness   CC: Patient had an AOIL and MARITZA on 10/22/24. Pt denies claudication, rest pain, or non-healing wounds.     HPI   Jf Valera is a 89 y.o. male seizure d/o, Hx prostate CA, malnutrition, anemia, hypertension, liver disease with ascites, basilar arterial stenosis, peripheral arterial disease and history of portal and SMV thrombosis.  Mr. Valera had presented in March '24 with abdominal pain related to portal / mesenteric venous thrombosis with veno-occlusive small bowel ischemia and underwent ex-lap, lysis of adhesions, small bowel resection and suprapubic catheter insertion. Imaging demonstrated extensive portal and SMV thrombosis, as well as significant chronic appearing soft plaque versus chronic thrombus in the infrarenal aorta and left superficial femoral artery.  He has been managed on \"lifelong\" DVT prophylaxis on apixaban 2.5 twice a day, along with aspirin and atorvastatin 20.     Consult 4/19/24, Dr. Villegas: 88-year-old gentleman presents for hospital follow-up. He was admitted in March with mesenteric venous thrombosis he underwent small bowel resection with reanastomosis as well as super suprapubic catheter placement. 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 He was managed with anticoagulation he did not undergo any vascular intervention during that admission. Since that time he is done very well he denies any abdominal pain he is tolerating a regular diet he denies any lower " extremity claudication or rest pain or lower extremity wounds he is a non-smoker no prior embolic events or hypercoagulable disorders. He is compliant with his Eliquis and is currently taking aspirin as well. On exam he has palpable femoral pulses bilaterally and absent distal pulses bilaterally his abdomen is soft I discussed the findings of his imaging with him and his son who accompanied him I recommended lifelong anticoagulation as well as aspirin he was not previously taking a statin so I did prescribe him 20 mg daily of Lipitor. He will need to get refills of his statin from his PCP in the future I recommended surveillance of his aortic plaque as well as his SFA plaque with aortoiliac and lower extremity duplex in 6 months and we will follow-up with him in the office in 1 year.        11/29/24:   Mr. Valera is new to me but was earlier this year by Dr. Villegas for portal and SMV thrombosis, and thrombi in the infrarenal abdominal aortic. Patient presents with some for follow-up and discuss holding anticoagulation.      Patient offers no complaints.  He has no abdominal pain, postprandial pain or claudication.  He eats a wide variety of foods without difficulty only avoiding sodium, spicy foods and those that might worsen constipation. He does ambulate with a cane.  He is seeing GI for work up of anemia and suffered a recent ulcer and will be going through follow up EGD on 12/2. He remains on apixaban 2.5 BID.  He has had no falls or excessive bleeding otherwise.     Last month, October, son states that he was actually off of aspirin for 7 days and anticoagulation for at least 5 days by Dr. Davis for transurethral resection of bladder neck contracture and injection.    Medical therapy includes apixaban 2.5 twice daily, aspirin 81 and atorvastatin 20.    We discussed risk/ benefit of holding blood thinners. Since infrarenal abdominal aortic thrombi have resolved and portal vein/SMV thrombosis have reached the  "chronic phase, it would be reasonable to hold apixaban for 2 days (4 doses) prior to planned EGD and resume as soon as possible after procedure when safe to do so per GI.    Review of Systems   Constitutional: Negative.    HENT: Negative.     Eyes: Negative.    Respiratory: Negative.     Cardiovascular: Negative.    Gastrointestinal: Negative.    Endocrine: Negative.    Genitourinary: Negative.    Musculoskeletal:  Positive for gait problem.   Skin: Negative.    Allergic/Immunologic: Negative.    Neurological:  Negative for dizziness, weakness and numbness.   Hematological: Negative.    Psychiatric/Behavioral: Negative.          Objective   /84 (BP Location: Left arm, Patient Position: Sitting)   Pulse 72   Resp 18   Ht 5' 3\" (1.6 m)   Wt 51.3 kg (113 lb)   SpO2 98%   BMI 20.02 kg/m²     Abdomen soft/ NT  RRR soft systolic murmur, 1-2+ pedal edema.  Feet are warm and well-perfused.  Capillary refill less than 2 seconds.  No easily palpable pulses in the feet.      Physical Exam  Vitals and nursing note reviewed.   Constitutional:       Appearance: He is well-developed.   HENT:      Head: Normocephalic and atraumatic.   Eyes:      Pupils: Pupils are equal, round, and reactive to light.   Neck:      Vascular: No JVD.   Cardiovascular:      Rate and Rhythm: Normal rate and regular rhythm.      Pulses:           Carotid pulses are 2+ on the right side and 2+ on the left side.       Radial pulses are 2+ on the right side and 2+ on the left side.      Heart sounds: S1 normal and S2 normal. Murmur heard.      Systolic murmur is present with a grade of 2/6.      No friction rub. No gallop.   Pulmonary:      Effort: Pulmonary effort is normal. No accessory muscle usage or respiratory distress.      Breath sounds: Normal breath sounds. No wheezing or rales.   Abdominal:      General: Bowel sounds are normal. There is no distension.      Palpations: Abdomen is soft.      Tenderness: There is no abdominal " tenderness.   Musculoskeletal:         General: No deformity. Normal range of motion.      Cervical back: Neck supple.      Right lower le+ Pitting Edema present.      Left lower le+ Pitting Edema present.   Skin:     General: Skin is warm and dry.      Findings: No lesion or rash.      Nails: There is no clubbing.   Neurological:      Mental Status: He is alert and oriented to person, place, and time.      Comments: Grossly normal    Psychiatric:         Behavior: Behavior is cooperative.         AOIL 10/21/24  CONCLUSION:     Impression  The aorta is patent and normal in caliber measuring 2.5 cm in its greatest  diameter.  The common, internal and external iliac arteries are patent and normal in  caliber. Diffuse disease noted throughout the proximal right common iliac  artery.  The common hepatic and splenic arteries are patent (vessels originate from  aorta)  The superior mesenteric artery is patent and is aneurysmal in the mid segment  measuring approximately 1.1 cm. Patent b/l renal arteries.          MARITZA 10/22/24  [facility] REPORT  CLINICAL:  Indications:  Patient with history of soft plaque seen in the aorta and SMV thrombus presents  for follow up study. Patient denies pain with walking or any open wounds or  ulcers at this time.  Operative History:  No cardiovascular surgeries  Risk Factors:  The patient has history of HTN.  Clinical:  Right Pressure:  138/ mm Hg, Left Pressure:  135/ mm Hg.     FINDINGS:     Segment                Right                   Left                                            Impression  PSV (cm/s)  Impression  PSV (cm/s)    Common Femoral Artery                      94                      85    Prox Profunda                              53                      95    Prox SFA                                  102  50-75%             233    Mid SFA                                    86  Occluded             0    Dist SFA                                   77   Occluded             0    Proximal Pop           >75%               308                      79    Distal Pop                                 42                      33    Tibioperoneal          50-75%             222                      27    Dist Post Tibial                          105  Occluded             0    Dist. Ant. Tibial                         102                      76    Dist Peroneal                             112                      49             CONCLUSION:     Impression:  RIGHT LOWER LIMB:  Evidence of >75% stenosis in the proximal popliteal artery.  Evidence of 50-75% stenosis in the tibioperoneal trunk.  Ankle/Brachial index: 0.80 which is in the moderate disease category.  PVR/ PPG tracings are dampened.  Metatarsal pressure of 100 mmHg  Great toe pressure of 47 mmHg, within the healing range  Tech Note:  There is an echogenic structure located in the inguinal region measuring  approximately 2.2 cm suggestive of enlarged lymphatic channels.     LEFT LOWER LIMB:  Evidence of 50-75% stenosis in the proximal superficial femoral artery.  High grade stenosis vs. occlusion of the mid and distal superficial femoral  artery with reconstitution at the proximal popliteal artery.  Evidence suggestive of Tibio-peroneal occlusive disease.  Ankle/Brachial index: 0.59 which is in the severe disease category.  PVR/ PPG tracings are dampened.  Metatarsal pressure of 79 mmHg  Great toe pressure of 37 mmHg, within the healing range  Tech Note:  There is an echogenic structure located in the inguinal region measuring  approximately 1.7 cm suggestive of enlarged lymphatic channels.            CTA a/p 8/20/24    VESSELS: Normal caliber abdominal aorta. No aortic dissection. Scattered atherosclerotic calcifications in the infrarenal abdominal aorta, and the bilateral common, internal and external iliac arteries with resultant multifocal stenoses. The thrombi seen   in the infrarenal abdominal aorta on the  3/13/2024 CT have resolved.     There is no celiac axis, with the left gastric, common hepatic, and splenic arteries arising directly from the abdominal aorta. These vessels are patent. The superior mesenteric artery, inferior mesenteric artery and bilateral renal arteries are overall   patent.     The bilateral common iliac arteries are overall patent and normal in caliber. The bilateral internal and external iliac arteries are also overall patent.     There is chronic occlusion of the portal veins with cavernous transformation. The superior mesenteric vein is not visualized due to its chronic thrombosis. There is formation of collateral vessels in the abdomen.     The hepatic veins, bilateral renal veins, bilateral common iliac, internal iliac and external iliac veins are patent. The IVC is patent.    IMPRESSION:     1) No abdominal or pelvic hematoma.     2) Patent abdominal aorta and its major branches. Resolution of infrarenal abdominal aortic thrombi seen on the March 2024 CT. 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     3) Cirrhotic liver morphology with evidence of portal hypertension manifested by large abdominal and pelvic ascites, portal hypertensive colopathy, gastroesophageal varices and internal hemorrhoids.     4) 5 mm focus of arterial hyperenhancement in segment 4A of the liver, which blends imperceptibly on subsequent venous phase. This is compatible with the LI-RADS 3 (intermediate probability for HCC) lesion.     5) 1.7 cm exophytic lesion in segment 7 of the liver medially, matching liver parenchyma and attenuation, of unclear etiology. Attention on follow-up.     6) Circumferential wall thickening of the urinary bladder out of proportion for degree of underdistention with suprapubic catheter in place. This may be related to chronic bladder outlet obstruction and/or cystitis.     7) Prostatomegaly (volume of 77 mL) with a 1.7 cm hyperdense, exophytic  "nodule in the midgland on the left, as described above. Although this may represent an exophytic BPH nodule extending into the peripheral zone and beyond, underlying neoplasm is not   excluded.      I have reviewed and made appropriate changes to the review of systems input by the medical assistant.    Vitals:    11/29/24 0802   BP: 162/84   BP Location: Left arm   Patient Position: Sitting   Pulse: 72   Resp: 18   SpO2: 98%   Weight: 51.3 kg (113 lb)   Height: 5' 3\" (1.6 m)       Patient Active Problem List   Diagnosis    Cataracts, bilateral    TIA (transient ischemic attack)    S/P exploratory laparotomy    Mesenteric ischemia (HCC)    Portal vein thrombosis    H/O prostate cancer    Palliative care by specialist    Goals of care, counseling/discussion    Hemopneumothorax on right    Seizure (HCC)    Urine retention    Encounter for care or replacement of suprapubic tube (HCC)    Basilar artery stenosis    Cirrhosis of liver with ascites  (HCC)    Severe protein-calorie malnutrition (HCC)    Mesenteric vein thrombosis (HCC)    Peripheral arterial disease (HCC)       Past Surgical History:   Procedure Laterality Date    FL RETROGRADE URETHROCYSTOGRAM  10/31/2024    IR CHEST TUBE PLACEMENT  3/17/2024    IR PARACENTESIS  7/29/2024    IR PARACENTESIS  8/16/2024    IR PARACENTESIS  8/22/2024    IR PARACENTESIS  9/5/2024    IR SUPRAPUBIC CATHETER CHECK/CHANGE/REINSERTION/UPSIZE  5/17/2024    LAPAROTOMY N/A 3/13/2024    Procedure: LAPAROTOMY EXPLORATORY; SMALL BOWEL RESECTION; LYSIS OF ADHESIONS; SUPRPAPUBIC TUBE INSERTION;  Surgeon: Marvin Azul MD;  Location: [facility];  Service: General    LAPAROTOMY N/A 3/14/2024    Procedure: EXPLORATORY LAPAROTOMY, WASHOUT. SMALL BOWEL ANASTOMOSIS, CONTROL OF LIVER BLEED, CLOSURE OF ABDOMINAL WOUND;  Surgeon: Beltran Lorenz DO;  Location: [facility];  Service: General    ND CYSTOURETHROSCOPY W/INTERNAL URETHROTOMY N/A 10/31/2024    Procedure: CYSTOSCOPY,DIRECT VISUAL " INTERAL URETHROTOMY (DVIU) TRANSURETRHAL RESECTION OF BLADDER NECK CONTRACTURE, RETROGRADE URETHROGRAM, KENALOG INJECTION, SUPRAPUBIC TUBE EXCHANGE.;  Surgeon: Kp Davis MD;  Location: [facility];  Service: Urology       History reviewed. No pertinent family history.    Social History     Socioeconomic History    Marital status: /Civil Union     Spouse name: Not on file    Number of children: Not on file    Years of education: Not on file    Highest education level: Not on file   Occupational History    Not on file   Tobacco Use    Smoking status: Never     Passive exposure: Never    Smokeless tobacco: Never   Vaping Use    Vaping status: Never Used   Substance and Sexual Activity    Alcohol use: Never    Drug use: Never    Sexual activity: Not Currently   Other Topics Concern    Not on file   Social History Narrative    Not on file     Social Drivers of Health     Financial Resource Strain: Not on file   Food Insecurity: No Food Insecurity (8/25/2024)    Nursing - Inadequate Food Risk Classification     Worried About Running Out of Food in the Last Year: Never true     Ran Out of Food in the Last Year: Never true     Ran Out of Food in the Last Year: Not on file   Transportation Needs: No Transportation Needs (8/25/2024)    PRAPARE - Transportation     Lack of Transportation (Medical): No     Lack of Transportation (Non-Medical): No   Physical Activity: Not on file   Stress: Not on file   Social Connections: Not on file   Intimate Partner Violence: Not on file   Housing Stability: Low Risk  (8/25/2024)    Housing Stability Vital Sign     Unable to Pay for Housing in the Last Year: No     Number of Times Moved in the Last Year: 0     Homeless in the Last Year: No       No Known Allergies      Current Outpatient Medications:     apixaban (ELIQUIS) 2.5 mg, Take 1 tablet (2.5 mg total) by mouth 2 (two) times a day, Disp: 180 tablet, Rfl: 0    aspirin 81 mg chewable tablet, Chew 1 tablet (81 mg total)  daily, Disp: 90 tablet, Rfl: 0    atorvastatin (LIPITOR) 20 mg tablet, Take 1 tablet by mouth once daily, Disp: 30 tablet, Rfl: 0    ferrous sulfate 325 (65 Fe) mg tablet, Take 1 tablet (325 mg total) by mouth daily with breakfast, Disp: 30 tablet, Rfl: 0    furosemide (LASIX) 20 mg tablet, Take 1 tablet (20 mg total) by mouth daily, Disp: 30 tablet, Rfl: 1    levETIRAcetam (KEPPRA) 750 mg tablet, Take 1 tablet (750 mg total) by mouth every 12 (twelve) hours, Disp: 60 tablet, Rfl: 5    pantoprazole (PROTONIX) 40 mg tablet, Take 1 tablet (40 mg total) by mouth daily in the early morning, Disp: 30 tablet, Rfl: 5    spironolactone (ALDACTONE) 50 mg tablet, Take 1 tablet (50 mg total) by mouth daily, Disp: 30 tablet, Rfl: 5

## 2024-12-02 ENCOUNTER — ANESTHESIA (OUTPATIENT)
Dept: GASTROENTEROLOGY | Facility: AMBULARY SURGERY CENTER | Age: 89
End: 2024-12-02
Payer: COMMERCIAL

## 2024-12-02 ENCOUNTER — HOSPITAL ENCOUNTER (OUTPATIENT)
Dept: GASTROENTEROLOGY | Facility: AMBULARY SURGERY CENTER | Age: 89
Setting detail: OUTPATIENT SURGERY
Discharge: HOME | End: 2024-12-02
Attending: INTERNAL MEDICINE
Payer: COMMERCIAL

## 2024-12-02 VITALS
HEART RATE: 72 BPM | DIASTOLIC BLOOD PRESSURE: 65 MMHG | SYSTOLIC BLOOD PRESSURE: 134 MMHG | TEMPERATURE: 98.5 F | OXYGEN SATURATION: 98 % | RESPIRATION RATE: 18 BRPM

## 2024-12-02 DIAGNOSIS — K25.9 GASTRIC ULCER, UNSPECIFIED CHRONICITY, UNSPECIFIED WHETHER GASTRIC ULCER HEMORRHAGE OR PERFORATION PRESENT: ICD-10-CM

## 2024-12-02 DIAGNOSIS — K74.60 CIRRHOSIS OF LIVER WITH ASCITES, UNSPECIFIED HEPATIC CIRRHOSIS TYPE  (HCC): ICD-10-CM

## 2024-12-02 DIAGNOSIS — R18.8 CIRRHOSIS OF LIVER WITH ASCITES, UNSPECIFIED HEPATIC CIRRHOSIS TYPE  (HCC): ICD-10-CM

## 2024-12-02 PROCEDURE — 88305 TISSUE EXAM BY PATHOLOGIST: CPT | Performed by: PATHOLOGY

## 2024-12-02 PROCEDURE — 43239 EGD BIOPSY SINGLE/MULTIPLE: CPT | Performed by: INTERNAL MEDICINE

## 2024-12-02 PROCEDURE — 88342 IMHCHEM/IMCYTCHM 1ST ANTB: CPT | Performed by: PATHOLOGY

## 2024-12-02 RX ORDER — SODIUM CHLORIDE, SODIUM LACTATE, POTASSIUM CHLORIDE, CALCIUM CHLORIDE 600; 310; 30; 20 MG/100ML; MG/100ML; MG/100ML; MG/100ML
INJECTION, SOLUTION INTRAVENOUS CONTINUOUS PRN
Status: DISCONTINUED | OUTPATIENT
Start: 2024-12-02 | End: 2024-12-02

## 2024-12-02 RX ORDER — SODIUM CHLORIDE, SODIUM LACTATE, POTASSIUM CHLORIDE, CALCIUM CHLORIDE 600; 310; 30; 20 MG/100ML; MG/100ML; MG/100ML; MG/100ML
125 INJECTION, SOLUTION INTRAVENOUS CONTINUOUS
Status: CANCELLED | OUTPATIENT
Start: 2024-12-02

## 2024-12-02 RX ORDER — LIDOCAINE HYDROCHLORIDE 10 MG/ML
INJECTION, SOLUTION EPIDURAL; INFILTRATION; INTRACAUDAL; PERINEURAL AS NEEDED
Status: DISCONTINUED | OUTPATIENT
Start: 2024-12-02 | End: 2024-12-02

## 2024-12-02 RX ORDER — PROPOFOL 10 MG/ML
INJECTION, EMULSION INTRAVENOUS AS NEEDED
Status: DISCONTINUED | OUTPATIENT
Start: 2024-12-02 | End: 2024-12-02

## 2024-12-02 RX ORDER — AMLODIPINE BESYLATE 5 MG/1
5 TABLET ORAL DAILY
COMMUNITY

## 2024-12-02 RX ORDER — SODIUM CHLORIDE, SODIUM LACTATE, POTASSIUM CHLORIDE, CALCIUM CHLORIDE 600; 310; 30; 20 MG/100ML; MG/100ML; MG/100ML; MG/100ML
125 INJECTION, SOLUTION INTRAVENOUS CONTINUOUS
Status: DISCONTINUED | OUTPATIENT
Start: 2024-12-02 | End: 2024-12-06 | Stop reason: HOSPADM

## 2024-12-02 RX ADMIN — LIDOCAINE HYDROCHLORIDE 100 MG: 10 INJECTION, SOLUTION EPIDURAL; INFILTRATION; INTRACAUDAL; PERINEURAL at 11:33

## 2024-12-02 RX ADMIN — SODIUM CHLORIDE, SODIUM LACTATE, POTASSIUM CHLORIDE, AND CALCIUM CHLORIDE: .6; .31; .03; .02 INJECTION, SOLUTION INTRAVENOUS at 11:26

## 2024-12-02 RX ADMIN — PROPOFOL 100 MG: 10 INJECTION, EMULSION INTRAVENOUS at 11:33

## 2024-12-02 NOTE — ANESTHESIA PREPROCEDURE EVALUATION
Procedure:  EGD    Relevant Problems   CARDIO   (+) Basilar artery stenosis   (+) Mesenteric ischemia (HCC)   (+) Mesenteric vein thrombosis (HCC)   (+) Peripheral arterial disease (HCC)   (+) Portal vein thrombosis      GI/HEPATIC   (+) Cirrhosis of liver with ascites  (HCC)      NEURO/PSYCH   (+) Seizure (HCC)   (+) TIA (transient ischemic attack)      PULMONARY   (+) Hemopneumothorax on right   Paracentesis this morning, 2600ml removed.    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 s/p  -3/13/24 exlap, SBR, AUREA, Suprapubic tube insertion, ABthera VAC   -3/14/24 exlap, small bowel-small bowel anastomosis, abdominal closure    Hgb 4.2 on admit, s/p transfusion   Latest Reference Range & Units 08/21/24 15:56   Hemoglobin 12.0 - 17.0 g/dL 9.8 (L)   (L): Data is abnormally low  Physical Exam    Airway    Mallampati score: II  TM Distance: >3 FB  Neck ROM: full     Dental   Comment: Denies loose teeth     Cardiovascular  Cardiovascular exam normal    Pulmonary  Pulmonary exam normal     Other Findings  Portions of exam deferred due to low yield and/or unknown COVID status      Anesthesia Plan  ASA Score- 3     Anesthesia Type- IV sedation with anesthesia with ASA Monitors.         Additional Monitors:     Airway Plan:            Plan Factors-    Chart reviewed.   Existing labs reviewed. Patient summary reviewed.    Patient is not a current smoker.              Induction- intravenous.    Postoperative Plan-     Perioperative Resuscitation Plan - Level 1 - Full Code.       Informed Consent- Anesthetic plan and risks discussed with patient and son.  I personally reviewed this patient with the CRNA. Discussed and agreed on the Anesthesia Plan with the CRNA..      Consent obtained from son Zack Valera at bedside.

## 2024-12-02 NOTE — H&P
History and Physical -  [facility]  Jf Valera 89 y.o. male MRN: [id number]                  HPI: Jf Valera is a 89 y.o. year old male who presents for gastric ulcer      REVIEW OF SYSTEMS: Per the HPI, and otherwise unremarkable.    Historical Information   Past Medical History:   Diagnosis Date    Anemia     Anemia     Basilar artery stenosis     Decreased hearing     Encounter for care or replacement of suprapubic tube (HCC)     Hemopneumothorax 03/17/2024    chest tube inserted-right    History of transfusion     HTN (hypertension) 09/23/2013    Hypertension     Liver disease     cirrosis    Mesenteric ischemia (HCC)     Portal vein thrombosis     Seizure-like activity (HCC) 04/16/2024     Past Surgical History:   Procedure Laterality Date    FL RETROGRADE URETHROCYSTOGRAM  10/31/2024    IR CHEST TUBE PLACEMENT  3/17/2024    IR PARACENTESIS  7/29/2024    IR PARACENTESIS  8/16/2024    IR PARACENTESIS  8/22/2024    IR PARACENTESIS  9/5/2024    IR SUPRAPUBIC CATHETER CHECK/CHANGE/REINSERTION/UPSIZE  5/17/2024    LAPAROTOMY N/A 3/13/2024    Procedure: LAPAROTOMY EXPLORATORY; SMALL BOWEL RESECTION; LYSIS OF ADHESIONS; SUPRPAPUBIC TUBE INSERTION;  Surgeon: Marvin Azul MD;  Location: [facility];  Service: General    LAPAROTOMY N/A 3/14/2024    Procedure: EXPLORATORY LAPAROTOMY, WASHOUT. SMALL BOWEL ANASTOMOSIS, CONTROL OF LIVER BLEED, CLOSURE OF ABDOMINAL WOUND;  Surgeon: Beltran Lorenz DO;  Location:  MAIN OR;  Service: General    [place] CYSTOURETHROSCOPY W/INTERNAL URETHROTOMY N/A 10/31/2024    Procedure: CYSTOSCOPY,DIRECT VISUAL INTERAL URETHROTOMY (DVIU) TRANSURETRHAL RESECTION OF BLADDER NECK CONTRACTURE, RETROGRADE URETHROGRAM, KENALOG INJECTION, SUPRAPUBIC TUBE EXCHANGE.;  Surgeon: Kp Davis MD;  Location: [facility];  Service: Urology     Social History   Social History     Substance and Sexual Activity   Alcohol Use Never     Social History     Substance and Sexual  Activity   Drug Use Never     Social History     Tobacco Use   Smoking Status Never    Passive exposure: Never   Smokeless Tobacco Never     History reviewed. No pertinent family history.    Meds/Allergies       Current Outpatient Medications:     amLODIPine (NORVASC) 5 mg tablet    aspirin 81 mg chewable tablet    atorvastatin (LIPITOR) 20 mg tablet    ferrous sulfate 325 (65 Fe) mg tablet    furosemide (LASIX) 20 mg tablet    pantoprazole (PROTONIX) 40 mg tablet    spironolactone (ALDACTONE) 50 mg tablet    apixaban (ELIQUIS) 2.5 mg    levETIRAcetam (KEPPRA) 750 mg tablet    Current Facility-Administered Medications:     lactated ringers infusion, 125 mL/hr, Intravenous, Continuous    No Known Allergies    Objective     /74   Pulse 81   Temp 98.5 °F (36.9 °C) (Temporal)   Resp 18   SpO2 100%       PHYSICAL EXAM    Gen: NAD  Head: NCAT  CV: RRR  CHEST: Clear  ABD: soft, NT/ND  EXT: no edema      ASSESSMENT/PLAN:  This is a 89 y.o. year old male here for EGD, and he is stable and optimized for his procedure.

## 2024-12-02 NOTE — ANESTHESIA POSTPROCEDURE EVALUATION
Post-Op Assessment Note    CV Status:  Stable  Pain Score: 0    Pain management: adequate       Mental Status:  Sleepy   Hydration Status:  Stable   PONV Controlled:  None   Airway Patency:  Patent     Post Op Vitals Reviewed: Yes    No anethesia notable event occurred.    Staff: CRNA           Last Filed PACU Vitals:  Vitals Value Taken Time   Temp     Pulse 82    /61    Resp 16    SpO2 98        Modified Tabitha:  Activity: 2 (12/2/2024 10:35 AM)  Respiration: 2 (12/2/2024 10:35 AM)  Circulation: 2 (12/2/2024 10:35 AM)  Consciousness: 2 (12/2/2024 10:35 AM)  Oxygen Saturation: 2 (12/2/2024 10:35 AM)  Modified Tabitha Score: 10 (12/2/2024 10:35 AM)

## 2024-12-05 PROCEDURE — 88305 TISSUE EXAM BY PATHOLOGIST: CPT | Performed by: PATHOLOGY

## 2024-12-05 PROCEDURE — 88342 IMHCHEM/IMCYTCHM 1ST ANTB: CPT | Performed by: PATHOLOGY

## 2024-12-17 ENCOUNTER — APPOINTMENT (OUTPATIENT)
Dept: LAB | Facility: HOSPITAL | Age: 89
End: 2024-12-17
Payer: COMMERCIAL

## 2024-12-17 DIAGNOSIS — D50.0 IRON DEFICIENCY ANEMIA DUE TO CHRONIC BLOOD LOSS: ICD-10-CM

## 2024-12-17 DIAGNOSIS — K74.60 CIRRHOSIS OF LIVER WITH ASCITES, UNSPECIFIED HEPATIC CIRRHOSIS TYPE  (HCC): ICD-10-CM

## 2024-12-17 DIAGNOSIS — D64.9 ANEMIA: ICD-10-CM

## 2024-12-17 DIAGNOSIS — R18.8 CIRRHOSIS OF LIVER WITH ASCITES, UNSPECIFIED HEPATIC CIRRHOSIS TYPE  (HCC): ICD-10-CM

## 2024-12-17 DIAGNOSIS — E53.8 VITAMIN B12 DEFICIENCY: ICD-10-CM

## 2024-12-17 LAB
ALBUMIN SERPL BCG-MCNC: 4.2 G/DL (ref 3.5–5)
ALP SERPL-CCNC: 64 U/L (ref 34–104)
ALT SERPL W P-5'-P-CCNC: 19 U/L (ref 7–52)
ANION GAP SERPL CALCULATED.3IONS-SCNC: 6 MMOL/L (ref 4–13)
AST SERPL W P-5'-P-CCNC: 24 U/L (ref 13–39)
BASOPHILS # BLD AUTO: 0.05 THOUSANDS/ÂΜL (ref 0–0.1)
BASOPHILS NFR BLD AUTO: 1 % (ref 0–1)
BILIRUB SERPL-MCNC: 0.58 MG/DL (ref 0.2–1)
BUN SERPL-MCNC: 15 MG/DL (ref 5–25)
CALCIUM SERPL-MCNC: 9.5 MG/DL (ref 8.4–10.2)
CHLORIDE SERPL-SCNC: 106 MMOL/L (ref 96–108)
CO2 SERPL-SCNC: 32 MMOL/L (ref 21–32)
CREAT SERPL-MCNC: 0.87 MG/DL (ref 0.6–1.3)
EOSINOPHIL # BLD AUTO: 0.12 THOUSAND/ÂΜL (ref 0–0.61)
EOSINOPHIL NFR BLD AUTO: 1 % (ref 0–6)
ERYTHROCYTE [DISTWIDTH] IN BLOOD BY AUTOMATED COUNT: 14.8 % (ref 11.6–15.1)
FERRITIN SERPL-MCNC: 26 NG/ML (ref 24–336)
FOLATE SERPL-MCNC: >22.3 NG/ML
GFR SERPL CREATININE-BSD FRML MDRD: 77 ML/MIN/1.73SQ M
GLUCOSE P FAST SERPL-MCNC: 81 MG/DL (ref 65–99)
HCT VFR BLD AUTO: 41.7 % (ref 36.5–49.3)
HGB BLD-MCNC: 12.6 G/DL (ref 12–17)
IMM GRANULOCYTES # BLD AUTO: 0.04 THOUSAND/UL (ref 0–0.2)
IMM GRANULOCYTES NFR BLD AUTO: 0 % (ref 0–2)
INR PPP: 1.15 (ref 0.85–1.19)
IRON SATN MFR SERPL: 7 % (ref 15–50)
IRON SERPL-MCNC: 26 UG/DL (ref 50–212)
LYMPHOCYTES # BLD AUTO: 1.45 THOUSANDS/ÂΜL (ref 0.6–4.47)
LYMPHOCYTES NFR BLD AUTO: 16 % (ref 14–44)
MCH RBC QN AUTO: 27.2 PG (ref 26.8–34.3)
MCHC RBC AUTO-ENTMCNC: 30.2 G/DL (ref 31.4–37.4)
MCV RBC AUTO: 90 FL (ref 82–98)
MONOCYTES # BLD AUTO: 0.61 THOUSAND/ÂΜL (ref 0.17–1.22)
MONOCYTES NFR BLD AUTO: 7 % (ref 4–12)
NEUTROPHILS # BLD AUTO: 6.92 THOUSANDS/ÂΜL (ref 1.85–7.62)
NEUTS SEG NFR BLD AUTO: 75 % (ref 43–75)
NRBC BLD AUTO-RTO: 0 /100 WBCS
PLATELET # BLD AUTO: 379 THOUSANDS/UL (ref 149–390)
PMV BLD AUTO: 9.9 FL (ref 8.9–12.7)
POTASSIUM SERPL-SCNC: 3.9 MMOL/L (ref 3.5–5.3)
PROT SERPL-MCNC: 7.3 G/DL (ref 6.4–8.4)
PROTHROMBIN TIME: 15.2 SECONDS (ref 12.3–15)
RBC # BLD AUTO: 4.63 MILLION/UL (ref 3.88–5.62)
SODIUM SERPL-SCNC: 144 MMOL/L (ref 135–147)
TIBC SERPL-MCNC: 352.8 UG/DL (ref 250–450)
TRANSFERRIN SERPL-MCNC: 252 MG/DL (ref 203–362)
UIBC SERPL-MCNC: 327 UG/DL (ref 155–355)
VIT B12 SERPL-MCNC: 482 PG/ML (ref 180–914)
WBC # BLD AUTO: 9.19 THOUSAND/UL (ref 4.31–10.16)

## 2024-12-17 PROCEDURE — 83550 IRON BINDING TEST: CPT

## 2024-12-17 PROCEDURE — 82746 ASSAY OF FOLIC ACID SERUM: CPT

## 2024-12-17 PROCEDURE — 85610 PROTHROMBIN TIME: CPT

## 2024-12-17 PROCEDURE — 82607 VITAMIN B-12: CPT

## 2024-12-17 PROCEDURE — 83540 ASSAY OF IRON: CPT

## 2024-12-17 PROCEDURE — 36415 COLL VENOUS BLD VENIPUNCTURE: CPT

## 2024-12-17 PROCEDURE — 82728 ASSAY OF FERRITIN: CPT

## 2024-12-18 ENCOUNTER — OFFICE VISIT (OUTPATIENT)
Dept: GASTROENTEROLOGY | Facility: CLINIC | Age: 89
End: 2024-12-18
Payer: COMMERCIAL

## 2024-12-18 ENCOUNTER — TELEPHONE (OUTPATIENT)
Age: 89
End: 2024-12-18

## 2024-12-18 VITALS
WEIGHT: 114.5 LBS | BODY MASS INDEX: 20.29 KG/M2 | SYSTOLIC BLOOD PRESSURE: 131 MMHG | HEART RATE: 73 BPM | DIASTOLIC BLOOD PRESSURE: 71 MMHG | HEIGHT: 63 IN

## 2024-12-18 DIAGNOSIS — K27.9 H PYLORI ULCER: ICD-10-CM

## 2024-12-18 DIAGNOSIS — I81 PORTAL VEIN THROMBOSIS: ICD-10-CM

## 2024-12-18 DIAGNOSIS — K76.6 PORTAL HYPERTENSION (HCC): ICD-10-CM

## 2024-12-18 DIAGNOSIS — A04.8 H. PYLORI INFECTION: Primary | ICD-10-CM

## 2024-12-18 DIAGNOSIS — K74.60 CIRRHOSIS OF LIVER WITH ASCITES, UNSPECIFIED HEPATIC CIRRHOSIS TYPE  (HCC): Primary | ICD-10-CM

## 2024-12-18 DIAGNOSIS — R18.8 CIRRHOSIS OF LIVER WITH ASCITES, UNSPECIFIED HEPATIC CIRRHOSIS TYPE  (HCC): Primary | ICD-10-CM

## 2024-12-18 DIAGNOSIS — B96.81 H PYLORI ULCER: ICD-10-CM

## 2024-12-18 PROCEDURE — 99214 OFFICE O/P EST MOD 30 MIN: CPT | Performed by: STUDENT IN AN ORGANIZED HEALTH CARE EDUCATION/TRAINING PROGRAM

## 2024-12-18 RX ORDER — METRONIDAZOLE 250 MG/1
250 TABLET ORAL
Qty: 56 TABLET | Refills: 0 | Status: SHIPPED | OUTPATIENT
Start: 2024-12-18 | End: 2025-01-01

## 2024-12-18 RX ORDER — PANTOPRAZOLE SODIUM 40 MG/1
40 TABLET, DELAYED RELEASE ORAL
Qty: 28 TABLET | Refills: 0 | Status: SHIPPED | OUTPATIENT
Start: 2024-12-18 | End: 2025-01-01

## 2024-12-18 RX ORDER — CARVEDILOL 3.12 MG/1
3.12 TABLET ORAL 2 TIMES DAILY WITH MEALS
Qty: 180 TABLET | Refills: 3 | Status: SHIPPED | OUTPATIENT
Start: 2024-12-18 | End: 2025-12-18

## 2024-12-18 RX ORDER — BISMUTH SUBSALICYLATE 262 MG/1
524 TABLET, CHEWABLE ORAL
Qty: 112 TABLET | Refills: 0 | Status: SHIPPED | OUTPATIENT
Start: 2024-12-18 | End: 2025-01-01

## 2024-12-18 RX ORDER — TETRACYCLINE HYDROCHLORIDE 500 MG/1
500 CAPSULE ORAL 4 TIMES DAILY
Qty: 56 CAPSULE | Refills: 0 | Status: SHIPPED | OUTPATIENT
Start: 2024-12-18 | End: 2025-01-01

## 2024-12-18 NOTE — PROGRESS NOTES
[facility] - Outpatient Consultation  Jf Valera 89 y.o. male MRN: [id number]  Encounter: [id number]    PCP:  Justa Hutchinson MD, [phone]  Referring Provider:  No ref. provider found,     Patient: Jf Valera, 1935  Reason for Referral: portal hypertension     ASSESSMENT/PLAN:  89 y.o. 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    He was admitted to [facility] in March 2024 for abdominal pain in the context of acute mesenteric ischemia due to portal vein thrombosis. He required exploratory laparotomy and SBR of distal ischemic small bowel.  He was noted to have normal appearing liver with normal liver chemistries and platelet count. He was started on anticoagulation with Eliquis.     Course was complicated by lower extremity edema and ascites requiring LVP in July 2024. Ascitic fluid suggestive of portal hypertension given high SAAG and low Tp and was negative for SBP. He was started on low dose diuretics with clinical improvement and his last LVP was in Septemberwith removal of 1.4L. He had a repeat CT in 8/2024 which showed ?cirrhotic liver morphology with chronic PVT with cavernous transformation and gastroesophageal varices. There was also concern for LR-3 lesion and exophytic nodule but he had a subsequent MRI which did not show any suspicious lesions and exophytic nodule was felt to be a regenerative nodule. He did have an EGD which showed non-bleeding esophageal varices.    I personally reviewed his imaging and he does not have cirrhotic liver morphology. Furthermore, his liver tests, synthetic function and platelets are normal. He has no risk factors for chronic liver disease and was otherwise healthy prior to his hospitalization in March.     I suspect that he has non-cirrhotic portal hypertension in the context of his  PVT. Would recommend US elastography to confirm this is the case as well as a hypercoagulability work-up to evaluate for MDS given his age. Discussed that he should remain on anticoagulation indefinitely even if hypercoagulability work-up is negative.     He has been doing well off of diuretics and discussed holding unless he develops recurrent ascites. I would recommend starting carvedilol for CSPH and primary variceal prophylaxis. He should also avoid interval weight gain, excessive EtOH consumption and hepatotoxic medications. He will return to clinic in 6 months or sooner if needed. Thank you for the opportunity to consult in his care.    - CBC, CMP and INR every 6 months   - US elastography  - Start carvedilol 3.125 mg BID and increase to 6.25 mg BID if he is able to tolerate  - JAK2, PNH, Prothrombin and Factor V Leiden  - Continue Eliquis 2.5 mg BID   - Recommend quadruple therapy for H. Pylori    Conchita Jose MD  Division of Gastroenterology and Hepatology  [facility]    ============================================================================  CC/HPI: 89 y.o. 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    He was admitted to [facility] in March 2024 for abdominal pain in the context of acute mesenteric ischemia due to portal vein thrombosis. He required exploratory laparotomy and SBR of distal ischemic small bowel. He returned to the OR which for active bleeding from the inferior surface of the liver, small bowel to small bowel anastomosis and abdominal closure. He was noted to have normal appearing liver with normal liver chemistries and platelet count. He was started on anticoagulation with Eliquis.     Course was complicated by lower extremity edema and ascites requiring LVP in July 2024. Ascitic fluid suggestive of  portal hypertension given high SAAG and low Tp. Fluid studies were also negative for SBP. He was started on low dose diuretics with improvement of his volume status with last LVP in September with removal of 1.4L. He had a repeat CT in 8/2024 which showed ?cirrhotic liver morphology with chronic PVT with cavernous transformation and gastroesophageal varices. There was also concern for LR-3 lesion and exophytic nodule but he had a subsequent MRI which did not show any suspicious lesions and exophytic nodule was felt to be a regenerative nodule.     He was then readmitted for severe iron deficiency anemia seen on routine labs.  He had an EGD which showed grade 2 esophageal varices, PHG and a clean-based ulcer in the antrum. He had a repeat EGD earlier this month which showed ulcer healing and gastric biopsies were positive for H. Pylori.     He is accompanied by his wife and son who is translating. He has no personal history of chronic liver disease and has no family history of liver disease or cirrhosis. 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 He denies excessive EtOH consumption and recreational drug use.     He has not been taking his diuretics and has had no recurrence of ascites.     ROS: Complete review of systems otherwise negative.     PAST MEDICAL/SURGICAL HISTORY:  Past Medical History:   Diagnosis Date    Anemia     Anemia     Basilar artery stenosis     Decreased hearing     Encounter for care or replacement of suprapubic tube (HCC)     GERD (gastroesophageal reflux disease)     Hemopneumothorax 03/17/2024    chest tube inserted-right    History of transfusion     HTN (hypertension) 09/23/2013    Hypertension     Liver disease     cirrosis    Mesenteric ischemia (HCC)     Portal vein thrombosis     Seizure-like activity (HCC) 04/16/2024        Past Surgical History:   Procedure Laterality Date    FL RETROGRADE URETHROCYSTOGRAM  10/31/2024    IR CHEST TUBE PLACEMENT   3/17/2024    IR PARACENTESIS  7/29/2024    IR PARACENTESIS  8/16/2024    IR PARACENTESIS  8/22/2024    IR PARACENTESIS  9/5/2024    IR SUPRAPUBIC CATHETER CHECK/CHANGE/REINSERTION/UPSIZE  5/17/2024    LAPAROTOMY N/A 3/13/2024    Procedure: LAPAROTOMY EXPLORATORY; SMALL BOWEL RESECTION; LYSIS OF ADHESIONS; SUPRPAPUBIC TUBE INSERTION;  Surgeon: Marvin Azul MD;  Location: [facility];  Service: General    LAPAROTOMY N/A 3/14/2024    Procedure: EXPLORATORY LAPAROTOMY, WASHOUT. SMALL BOWEL ANASTOMOSIS, CONTROL OF LIVER BLEED, CLOSURE OF ABDOMINAL WOUND;  Surgeon: Beltran Lorenz DO;  Location:  MAIN OR;  Service: General    [place] CYSTOURETHROSCOPY W/INTERNAL URETHROTOMY N/A 10/31/2024    Procedure: CYSTOSCOPY,DIRECT VISUAL INTERAL URETHROTOMY (DVIU) TRANSURETRHAL RESECTION OF BLADDER NECK CONTRACTURE, RETROGRADE URETHROGRAM, KENALOG INJECTION, SUPRAPUBIC TUBE EXCHANGE.;  Surgeon: Kp Davis MD;  Location: [facility];  Service: Urology       FAMILY/SOCIAL HISTORY:  History reviewed. No pertinent family history.    Social History     Tobacco Use    Smoking status: Never     Passive exposure: Never    Smokeless tobacco: Never   Vaping Use    Vaping status: Never Used   Substance Use Topics    Alcohol use: Never    Drug use: Never       MEDICATIONS:  Current Outpatient Medications on File Prior to Visit   Medication Sig Dispense Refill    apixaban (ELIQUIS) 2.5 mg Take 1 tablet (2.5 mg total) by mouth 2 (two) times a day 180 tablet 0    aspirin 81 mg chewable tablet Chew 1 tablet (81 mg total) daily 90 tablet 0    atorvastatin (LIPITOR) 20 mg tablet Take 1 tablet by mouth once daily 30 tablet 0    ferrous sulfate 325 (65 Fe) mg tablet Take 1 tablet (325 mg total) by mouth daily with breakfast 30 tablet 0    levETIRAcetam (KEPPRA) 750 mg tablet Take 1 tablet (750 mg total) by mouth every 12 (twelve) hours 60 tablet 5    pantoprazole (PROTONIX) 40 mg tablet Take 1 tablet (40 mg total) by mouth daily in the  "early morning 30 tablet 5    spironolactone (ALDACTONE) 50 mg tablet Take 1 tablet (50 mg total) by mouth daily 30 tablet 5    amLODIPine (NORVASC) 5 mg tablet Take 5 mg by mouth daily (Patient not taking: Reported on 12/18/2024)       No current facility-administered medications on file prior to visit.     No Known Allergies    PHYSICAL EXAM:  /71 (Patient Position: Sitting, Cuff Size: Standard)   Pulse 73   Ht 5' 3\" (1.6 m)   Wt 51.9 kg (114 lb 8 oz)   BMI 20.28 kg/m²   GENERAL: NAD, AAO  HEENT: anicteric, OP clear, MMM  ABDOMEN: S/ND/NT, normoactive BS, no hepatomegaly, spleen not palpable  EXTREMITIES: no edema  SKIN: no rashes, no palmar erythema, no spider angiomata   NEURO: normal gait, no tremor, no asterixis     LABS/RADIOLOGY/ENDOSCOPY:  Lab Results   Component Value Date    WBC 9.19 12/17/2024    HGB 12.6 12/17/2024    HCT 41.7 12/17/2024     12/17/2024    GLUF 81 12/17/2024    BUN 15 12/17/2024    CREATININE 0.87 12/17/2024    K 3.9 12/17/2024     12/17/2024    CO2 32 12/17/2024    BILIDIR 0.24 (H) 03/14/2024    ALKPHOS 64 12/17/2024    ALT 19 12/17/2024    AST 24 12/17/2024    CALCIUM 9.5 12/17/2024    EGFR 77 12/17/2024    TRIG 75 03/11/2023    HDL 27 (L) 03/11/2023    INR 1.15 12/17/2024    PTT 31 08/20/2024     MRI abdomen (10/2024)  1.  Limited evaluation due to significant patient motion. The LI-RADS 3 observation of concern in segment 4A of the liver is not visualized on this exam. Stable exophytic nodule in segment 7 that matches liver enhancement and signal on all sequences   favored to reflect prominent nodular focus of parenchyma. Continued follow-up is advised. Multiphase CT may be considered as alternative to MRI.  2.  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 Moderate volume diffuse ascites.     EGD (12/2024)  Z-line 41 cm from the incisors. Variation less than 1 cm.  Grade II varices in the lower third of the esophagus  Mild portal " hypertensive gastropathy in the fundus of the stomach, body of the stomach and antrum  No gastric varices.  The previously visualized gastric ulcer was no longer present.  The duodenum appeared normal.  Performed forceps biopsies in the body of the stomach, greater curve of the stomach, lesser curve of the stomach and antrum to rule out H. pylori    MELD 3.0: 7 at 12/17/2024  9:49 AM  MELD-Na: 8 at 12/17/2024  9:49 AM  Calculated from:  Serum Creatinine: 0.87 mg/dL (Using min of 1 mg/dL) at 12/17/2024  9:49 AM  Serum Sodium: 144 mmol/L (Using max of 137 mmol/L) at 12/17/2024  9:49 AM  Total Bilirubin: 0.58 mg/dL (Using min of 1 mg/dL) at 12/17/2024  9:49 AM  Serum Albumin: 4.2 g/dL (Using max of 3.5 g/dL) at 12/17/2024  9:49 AM  INR(ratio): 1.15 at 12/17/2024  9:49 AM  Age at listing (hypothetical): 89 years  Sex: Male at 12/17/2024  9:49 AM

## 2024-12-18 NOTE — TELEPHONE ENCOUNTER
Spoke with patient's son and advised results and treatment plan.  Medication orders have been sent to the provider to sign and send to the pharmacy.

## 2024-12-18 NOTE — PATIENT INSTRUCTIONS
Stop diuretics for now but please contact me if you notice abdominal/leg swelling or weight gain  Start carvedilol (blood pressure medication) 3.125 mg twice a day. If his blood pressure is not controlled (SBP >120), you can increase to 6.25 mg twice a day  Please schedule an ultrasound of the liver to evaluate for cirrhosis. [phone]  Check with your hematologist if she is okay with sending hypercoagulable work-up for PVT (LUISA 2 mutation)  If confirmed to have cirrhosis, he will need imaging every 6 months for liver cancer (next due April 2024)

## 2024-12-18 NOTE — TELEPHONE ENCOUNTER
----- Message from Conchita Jose MD sent at 12/18/2024 12:10 PM EST -----  Hi    This patient had an EGD with Dr. Edwards and had biopsies that were positive for H. Pylori. Can we please start him on quadruple therapy. Please contact the son about instructions. Thanks!    Conchita

## 2024-12-20 ENCOUNTER — OFFICE VISIT (OUTPATIENT)
Dept: HEMATOLOGY ONCOLOGY | Facility: MEDICAL CENTER | Age: 89
End: 2024-12-20

## 2024-12-20 VITALS
SYSTOLIC BLOOD PRESSURE: 158 MMHG | WEIGHT: 115 LBS | RESPIRATION RATE: 17 BRPM | TEMPERATURE: 97.6 F | BODY MASS INDEX: 20.38 KG/M2 | HEART RATE: 74 BPM | DIASTOLIC BLOOD PRESSURE: 82 MMHG | HEIGHT: 63 IN

## 2024-12-20 DIAGNOSIS — D50.0 IRON DEFICIENCY ANEMIA DUE TO CHRONIC BLOOD LOSS: ICD-10-CM

## 2024-12-20 DIAGNOSIS — R18.8 CIRRHOSIS OF LIVER WITH ASCITES, UNSPECIFIED HEPATIC CIRRHOSIS TYPE  (HCC): ICD-10-CM

## 2024-12-20 DIAGNOSIS — D47.2 MGUS (MONOCLONAL GAMMOPATHY OF UNKNOWN SIGNIFICANCE): Primary | ICD-10-CM

## 2024-12-20 DIAGNOSIS — E53.8 VITAMIN B12 DEFICIENCY: ICD-10-CM

## 2024-12-20 DIAGNOSIS — K74.60 CIRRHOSIS OF LIVER WITH ASCITES, UNSPECIFIED HEPATIC CIRRHOSIS TYPE  (HCC): ICD-10-CM

## 2024-12-20 DIAGNOSIS — I81 PORTAL VEIN THROMBOSIS: ICD-10-CM

## 2024-12-20 DIAGNOSIS — D64.89 ANEMIA DUE TO OTHER CAUSE, NOT CLASSIFIED: ICD-10-CM

## 2024-12-20 NOTE — ASSESSMENT & PLAN NOTE
Patient is following with GI.  As below, cirrhosis is of unclear etiology.  Patient denies history of alcohol or drug abuse.  He had EGD during recent hospital stay which showed esophageal varices.  Patient also with ulcer seen in the antrum of the stomach.  He is on PPI.     AFP noted to be elevated at 13.99 during recent hospital stay.  Had an MRI on 10/1/2024. The LI-RADS 3 observation of concern in segment 4A of the liver was not visualized on the exam.  Continued follow-up was advised.  There is cirrhosis with chronic portal vein occlusion and cavernous formation.     He has history of ascites for which he is on Lasix and spironolactone.

## 2024-12-20 NOTE — ASSESSMENT & PLAN NOTE
In the setting of cirrhosis.  Diagnosed in March of this year.  He had associated mesenteric ischemia requiring ex lap with small bowel resection and lysis of adhesions.  He is on longterm AC with Eliquis.     He follows with neurology for severe basilar stenosis for which she is on aspirin 81 mg daily.    Patient is considering undergoing testing for prothrombin gene mutation and factor V Leiden.  He is aware that these results do not change his current management from our standpoint.

## 2024-12-20 NOTE — PROGRESS NOTES
Name: Jf Valera      : 1935      MRN: [id number]  Encounter Provider: Rosalinda Hunt PA-C  Encounter Date: 2024   Encounter department: [facility]  :  Assessment & Plan  MGUS (monoclonal gammopathy of unknown significance)  Patient had SPEP completed on 2024.  He had finding of M spike of 0.55.  Immunofixation showed a monoclonal gammopathy identified as IgA kappa.    Prince free light chain is 23.4.  Lambda free light chain is 25.1.  Ratio 0.93.  IgA 640, IgG 832, IgM less than 20.    At present, patient has no anemia, significant renal insufficiency, elevated total protein, elevated calcium.    Finding is consistent with MGUS.  Markers should be repeated again in the spring.    Cirrhosis of liver with ascites, unspecified hepatic cirrhosis type  (HCC)  Patient is following with GI.  As below, cirrhosis is of unclear etiology.  Patient denies history of alcohol or drug abuse.  He had EGD during recent hospital stay which showed esophageal varices.  Patient also with ulcer seen in the antrum of the stomach.  He is on PPI.     AFP noted to be elevated at 13.99 during recent hospital stay.  Had an MRI on 10/1/2024. The LI-RADS 3 observation of concern in segment 4A of the liver was not visualized on the exam.  Continued follow-up was advised.  There is cirrhosis with chronic portal vein occlusion and cavernous formation.     He has history of ascites for which he is on Lasix and spironolactone.       Vitamin B12 deficiency  Vitamin B12 on 2024 was 482 which is good.  Orders:  •  Vitamin B12; Future  •  Folate; Future    Portal vein thrombosis  In the setting of cirrhosis.  Diagnosed in March of this year.  He had associated mesenteric ischemia requiring ex lap with small bowel resection and lysis of adhesions.  He is on longterm AC with Eliquis.     He follows with neurology for severe basilar stenosis for which she is on aspirin 81 mg  daily.    Patient is considering undergoing testing for prothrombin gene mutation and factor V Leiden.  He is aware that these results do not change his current management from our standpoint.       Anemia due to other cause, not classified  Patient presents for evaluation of anemia.  He had a recent hospital stay for acute on chronic anemia.  His hemoglobin was 4.1 on presentation.  Iron studies were consistent with iron deficiency.  He received 3 units PRBC and 3 days of IV iron while inpatient.     Hemoglobin prior to that hospital stay was between 9 and 10 g.     Anemia in the setting of cirrhosis can be multifactorial in origin: acute and chronic gastrointestinal blood loss, folate deficiency, hypersplenism, the anemia of chronic disease (inflammation), and hemolysis may all contribute.  Cirrhosis in adults: Etiologies, clinical manifestations, and diagnosis - UpToDate     Based on current lab work there is really no concern for MDS.  The anemia was related to iron deficiency and has improved significantly with oral iron and in addition patient has normal WBC count, MCV, and platelets.       Iron deficiency anemia due to chronic blood loss  As below, patient with EGD during recent hospital stay with finding of esophageal varices and ulcer in the antrum of the stomach.    His lab work was consistent with iron deficiency anemia.    Currently he is taking oral iron 1 tablet daily.  He had repeat lab work on 12/17/2024.  Ferritin was 26 and iron saturation was 7%.  His hemoglobin has improved to 12.6.  He can continue oral iron 1 tablet daily.    Patient will return to clinic in 3 months time.  Orders:  •  CBC and differential; Future  •  Iron Panel (Includes Ferritin, Iron Sat%, Iron, and TIBC); Future  •  Comprehensive metabolic panel; Future        History of Present Illness {?Quick Links Encounters * My Last Note * Last Note in Specialty * Snapshot * Since Last Visit * History :57011}  Chief Complaint   Patient  presents with   • Follow-up   Jf Valera is a 89 y.o. male who was initially seen for evaluation of anemia.     He has complicated past medical history.  He is here today with his wife and son.     He has a fairly recent finding of liver cirrhosis with ascites for which the etiology is unknown.  He has history of portal vein thrombosis, currently on Eliquis. He follows with neurology for severe basilar stenosis for which she is on aspirin 81 mg daily.  He has history of urinary retention status post suprapubic catheter, seizure disorder.     He was diagnosed with portal vein thrombosis in March of this year.  He had associated mesenteric ischemia requiring exploratory laparotomy with small bowel resection and lysis of adhesions.  Imaging shows chronic occlusion of the portal veins and the SMV with cavernous transformation.  He is now maintained on Eliquis.     He had a hospital stay in August of this year at [place] [facility].  He presented with acute on chronic anemia.  On presentation his hemoglobin was 4.1.  CT scan of the abdomen and pelvis showed gastroesophageal varices.  He received 3 units PRBC during his hospitalization.     He was also seen by GI.  EGD showed 2 grade 2 varices in the esophagus.  Mild portal hypertensive gastropathy in the fundus and body of the stomach.  Single, small cratered ulcer in the antrum with clean base.  Iron studies were consistent with iron deficiency anemia and patient was treated with 3 doses of IV Venofer and was placed on oral iron at discharge.     He was noted to have elevated AFP at 13.99.  He is following with GI and also hepatology.     He is on spironolactone and Lasix and has not required paracentesis for several months time.     Patient denies any obvious bleeding.  He is currently being treated for H pylori. No hematuria or epistaxis.  No history of alcohol or drug abuse.     His son says that during his hospitalization back in the spring he lost a considerable  "amount of weight.  He says that his weight prior to that time was around 150.  His weight today is 115 pounds.  He has no abdominal pain.      Review of Systems   Constitutional:  Positive for fatigue. Negative for activity change, appetite change and fever.   HENT:  Negative for nosebleeds.    Respiratory:  Negative for cough, choking and shortness of breath.    Cardiovascular:  Negative for chest pain, palpitations and leg swelling.   Gastrointestinal:  Negative for abdominal distention, abdominal pain, anal bleeding, blood in stool, constipation, diarrhea, nausea and vomiting.   Endocrine: Negative for cold intolerance.   Genitourinary:  Negative for hematuria.   Musculoskeletal:  Positive for arthralgias. Negative for myalgias.   Skin:  Negative for color change, pallor and rash.   Allergic/Immunologic: Negative for immunocompromised state.   Neurological:  Negative for headaches.   Hematological:  Negative for adenopathy. Does not bruise/bleed easily.   All other systems reviewed and are negative.        Objective {?Quick Links Trend Vitals * Enter New Vitals * Results Review * Timeline (Adult) * Labs * Imaging * Cardiology * Procedures * Lung Cancer Screening * Surgical eConsent :01192}  /82 (BP Location: Left arm, Patient Position: Sitting, Cuff Size: Adult)   Pulse 74   Temp 97.6 °F (36.4 °C) (Temporal)   Resp 17   Ht 5' 3\" (1.6 m)   Wt 52.2 kg (115 lb)   BMI 20.37 kg/m²     Physical Exam  Constitutional:       General: He is not in acute distress.     Appearance: Normal appearance. He is well-developed.   HENT:      Head: Normocephalic and atraumatic.      Right Ear: External ear normal.      Left Ear: External ear normal.      Nose: Nose normal.   Eyes:      General: No scleral icterus.     Conjunctiva/sclera: Conjunctivae normal.   Cardiovascular:      Rate and Rhythm: Normal rate and regular rhythm.   Pulmonary:      Effort: Pulmonary effort is normal. No respiratory distress.      Breath " sounds: Normal breath sounds.   Abdominal:      General: Abdomen is flat. There is no distension.      Palpations: Abdomen is soft.      Tenderness: There is no abdominal tenderness.   Skin:     Findings: No rash (on exposed skin.).   Neurological:      Mental Status: He is alert and oriented to person, place, and time.   Psychiatric:         Mood and Affect: Mood normal.         Thought Content: Thought content normal.         Judgment: Judgment normal.       Labs: I have reviewed the following labs:  Results for orders placed or performed in visit on 12/17/24   Vitamin B12   Result Value Ref Range    Vitamin B-12 482 180 - 914 pg/mL   Result Value Ref Range    Folate >22.3 >5.9 ng/mL   Protime-INR   Result Value Ref Range    Protime 15.2 (H) 12.3 - 15.0 seconds    INR 1.15 0.85 - 1.19   TIBC Panel (incl. Iron, TIBC, % Iron Saturation)   Result Value Ref Range    Iron Saturation 7 (L) 15 - 50 %    TIBC 352.8 250 - 450 ug/dL    Iron 26 (L) 50 - 212 ug/dL    Transferrin 252 203 - 362 mg/dL    UIBC 327 155 - 355 ug/dL   Result Value Ref Range    Ferritin 26 24 - 336 ng/mL

## 2024-12-24 ENCOUNTER — RESULTS FOLLOW-UP (OUTPATIENT)
Dept: GASTROENTEROLOGY | Facility: CLINIC | Age: 89
End: 2024-12-24

## 2024-12-24 NOTE — RESULT ENCOUNTER NOTE
Please call the patient regarding results.  Biopsies positive for H. pylori.  Please treat with tetracycline based quadruple therapy per protocol

## 2025-01-01 DIAGNOSIS — D64.9 ANEMIA: ICD-10-CM

## 2025-01-01 RX ORDER — FERROUS SULFATE 325(65) MG
325 TABLET ORAL
Qty: 30 TABLET | Refills: 0 | Status: SHIPPED | OUTPATIENT
Start: 2025-01-01 | End: 2025-06-30

## 2025-01-02 ENCOUNTER — HOSPITAL ENCOUNTER (OUTPATIENT)
Dept: RADIOLOGY | Facility: HOSPITAL | Age: 89
Discharge: HOME | End: 2025-01-02
Attending: STUDENT IN AN ORGANIZED HEALTH CARE EDUCATION/TRAINING PROGRAM
Payer: COMMERCIAL

## 2025-01-02 DIAGNOSIS — I81 PORTAL VEIN THROMBOSIS: ICD-10-CM

## 2025-01-02 DIAGNOSIS — K74.60 CIRRHOSIS OF LIVER WITH ASCITES, UNSPECIFIED HEPATIC CIRRHOSIS TYPE  (HCC): ICD-10-CM

## 2025-01-02 DIAGNOSIS — R18.8 CIRRHOSIS OF LIVER WITH ASCITES, UNSPECIFIED HEPATIC CIRRHOSIS TYPE  (HCC): ICD-10-CM

## 2025-01-02 PROCEDURE — 76981 USE PARENCHYMA: CPT

## 2025-01-07 ENCOUNTER — OFFICE VISIT (OUTPATIENT)
Dept: FAMILY MEDICINE CLINIC | Facility: CLINIC | Age: 89
End: 2025-01-07
Payer: COMMERCIAL

## 2025-01-07 VITALS
DIASTOLIC BLOOD PRESSURE: 80 MMHG | BODY MASS INDEX: 20.66 KG/M2 | TEMPERATURE: 96 F | HEIGHT: 63 IN | RESPIRATION RATE: 16 BRPM | HEART RATE: 76 BPM | WEIGHT: 116.6 LBS | SYSTOLIC BLOOD PRESSURE: 144 MMHG

## 2025-01-07 DIAGNOSIS — R18.8 CIRRHOSIS OF LIVER WITH ASCITES, UNSPECIFIED HEPATIC CIRRHOSIS TYPE  (HCC): ICD-10-CM

## 2025-01-07 DIAGNOSIS — I81 PORTAL VEIN THROMBOSIS: ICD-10-CM

## 2025-01-07 DIAGNOSIS — E55.9 VITAMIN D DEFICIENCY: ICD-10-CM

## 2025-01-07 DIAGNOSIS — K55.069 MESENTERIC VEIN THROMBOSIS (HCC): ICD-10-CM

## 2025-01-07 DIAGNOSIS — I10 HYPERTENSION, UNSPECIFIED TYPE: Primary | ICD-10-CM

## 2025-01-07 DIAGNOSIS — K74.60 CIRRHOSIS OF LIVER WITH ASCITES, UNSPECIFIED HEPATIC CIRRHOSIS TYPE  (HCC): ICD-10-CM

## 2025-01-07 PROCEDURE — 99214 OFFICE O/P EST MOD 30 MIN: CPT | Performed by: FAMILY MEDICINE

## 2025-01-07 RX ORDER — CARVEDILOL 6.25 MG/1
6.25 TABLET ORAL 2 TIMES DAILY WITH MEALS
Qty: 180 TABLET | Refills: 0 | Status: SHIPPED | OUTPATIENT
Start: 2025-01-07 | End: 2025-04-07

## 2025-01-07 NOTE — ASSESSMENT & PLAN NOTE
Continue Eliquis, ASA and statin.   Orders:    apixaban (ELIQUIS) 2.5 mg; Take 1 tablet (2.5 mg total) by mouth 2 (two) times a day

## 2025-01-07 NOTE — PROGRESS NOTES
"Name: Jf Valera      : 1935      MRN: [id number]  Encounter Provider: Justa Hutchinson MD  Encounter Date: 2025   Encounter department: [facility]  :  Assessment & Plan  Portal vein thrombosis  Continue Eliquis, ASA and statin.   Orders:    apixaban (ELIQUIS) 2.5 mg; Take 1 tablet (2.5 mg total) by mouth 2 (two) times a day    Hypertension, unspecified type  BP is on the higher end. His GI doctor started him on coreg and held amlodipine. Will increase dose of coreg from 3.125mg to 6.25mg BID.   Orders:    carvedilol (COREG) 6.25 mg tablet; Take 1 tablet (6.25 mg total) by mouth 2 (two) times a day with meals    Vitamin D deficiency    Orders:    Vitamin D 25 hydroxy; Future    Mesenteric vein thrombosis (HCC)         Cirrhosis of liver with ascites, unspecified hepatic cirrhosis type  (HCC)  Managed by GI. US elastography done, results pending.               History of Present Illness     HPI  Jf Valera is a 89 y.o. male who is here today for routine follow up visit.   Reports no acute issues/concerns in office today.   Has been compliant with current medications.    Review of Systems   Constitutional: Negative.    HENT: Negative.     Eyes: Negative.    Respiratory: Negative.     Cardiovascular: Negative.    Gastrointestinal: Negative.    Endocrine: Negative.    Genitourinary: Negative.    Musculoskeletal: Negative.    Neurological: Negative.    Hematological: Negative.    Psychiatric/Behavioral: Negative.         Objective   /80   Pulse 76   Temp (!) 96 °F (35.6 °C)   Resp 16   Ht 5' 3\" (1.6 m)   Wt 52.9 kg (116 lb 9.6 oz)   BMI 20.65 kg/m²      Physical Exam  Vitals and nursing note reviewed.   Constitutional:       General: He is not in acute distress.     Appearance: Normal appearance. He is well-developed.   HENT:      Head: Normocephalic and atraumatic.   Eyes:      Conjunctiva/sclera: Conjunctivae normal.   Cardiovascular:      Rate and Rhythm: Normal rate and regular " rhythm.      Pulses: Normal pulses.      Heart sounds: Normal heart sounds. No murmur heard.  Pulmonary:      Effort: Pulmonary effort is normal. No respiratory distress.      Breath sounds: Normal breath sounds.   Musculoskeletal:         General: No swelling.      Cervical back: Neck supple.   Skin:     General: Skin is warm and dry.      Capillary Refill: Capillary refill takes less than 2 seconds.   Neurological:      Mental Status: He is alert.   Psychiatric:         Mood and Affect: Mood normal.

## 2025-01-15 ENCOUNTER — APPOINTMENT (OUTPATIENT)
Dept: LAB | Facility: HOSPITAL | Age: 89
End: 2025-01-15
Payer: COMMERCIAL

## 2025-01-15 ENCOUNTER — OFFICE VISIT (OUTPATIENT)
Dept: GASTROENTEROLOGY | Facility: CLINIC | Age: 89
End: 2025-01-15
Payer: COMMERCIAL

## 2025-01-15 ENCOUNTER — APPOINTMENT (OUTPATIENT)
Dept: LAB | Facility: HOSPITAL | Age: 89
End: 2025-01-15
Attending: STUDENT IN AN ORGANIZED HEALTH CARE EDUCATION/TRAINING PROGRAM
Payer: COMMERCIAL

## 2025-01-15 VITALS
WEIGHT: 116 LBS | DIASTOLIC BLOOD PRESSURE: 76 MMHG | HEART RATE: 86 BPM | BODY MASS INDEX: 20.55 KG/M2 | HEIGHT: 63 IN | SYSTOLIC BLOOD PRESSURE: 142 MMHG

## 2025-01-15 DIAGNOSIS — K76.6 PORTAL HYPERTENSION (HCC): ICD-10-CM

## 2025-01-15 DIAGNOSIS — D50.0 IRON DEFICIENCY ANEMIA DUE TO CHRONIC BLOOD LOSS: ICD-10-CM

## 2025-01-15 DIAGNOSIS — A04.8 H. PYLORI INFECTION: ICD-10-CM

## 2025-01-15 DIAGNOSIS — I81 PORTAL VEIN THROMBOSIS: ICD-10-CM

## 2025-01-15 DIAGNOSIS — K25.9 GASTRIC ULCER, UNSPECIFIED CHRONICITY, UNSPECIFIED WHETHER GASTRIC ULCER HEMORRHAGE OR PERFORATION PRESENT: ICD-10-CM

## 2025-01-15 DIAGNOSIS — A04.8 H. PYLORI INFECTION: Primary | ICD-10-CM

## 2025-01-15 DIAGNOSIS — E55.9 VITAMIN D DEFICIENCY: ICD-10-CM

## 2025-01-15 LAB
25(OH)D3 SERPL-MCNC: 22 NG/ML (ref 30–100)
BASOPHILS # BLD AUTO: 0.03 THOUSANDS/ΜL (ref 0–0.1)
BASOPHILS NFR BLD AUTO: 0 % (ref 0–1)
EOSINOPHIL # BLD AUTO: 0.13 THOUSAND/ΜL (ref 0–0.61)
EOSINOPHIL NFR BLD AUTO: 2 % (ref 0–6)
ERYTHROCYTE [DISTWIDTH] IN BLOOD BY AUTOMATED COUNT: 16.7 % (ref 11.6–15.1)
FERRITIN SERPL-MCNC: 34 NG/ML (ref 24–336)
HCT VFR BLD AUTO: 44.8 % (ref 36.5–49.3)
HGB BLD-MCNC: 13.6 G/DL (ref 12–17)
IMM GRANULOCYTES # BLD AUTO: 0.02 THOUSAND/UL (ref 0–0.2)
IMM GRANULOCYTES NFR BLD AUTO: 0 % (ref 0–2)
IRON SATN MFR SERPL: 15 % (ref 15–50)
IRON SERPL-MCNC: 49 UG/DL (ref 50–212)
LYMPHOCYTES # BLD AUTO: 1.51 THOUSANDS/ΜL (ref 0.6–4.47)
LYMPHOCYTES NFR BLD AUTO: 18 % (ref 14–44)
MCH RBC QN AUTO: 27.9 PG (ref 26.8–34.3)
MCHC RBC AUTO-ENTMCNC: 30.4 G/DL (ref 31.4–37.4)
MCV RBC AUTO: 92 FL (ref 82–98)
MONOCYTES # BLD AUTO: 0.59 THOUSAND/ΜL (ref 0.17–1.22)
MONOCYTES NFR BLD AUTO: 7 % (ref 4–12)
NEUTROPHILS # BLD AUTO: 6.09 THOUSANDS/ΜL (ref 1.85–7.62)
NEUTS SEG NFR BLD AUTO: 73 % (ref 43–75)
NRBC BLD AUTO-RTO: 0 /100 WBCS
PLATELET # BLD AUTO: 382 THOUSANDS/UL (ref 149–390)
PMV BLD AUTO: 10.3 FL (ref 8.9–12.7)
RBC # BLD AUTO: 4.87 MILLION/UL (ref 3.88–5.62)
TIBC SERPL-MCNC: 334.6 UG/DL (ref 250–450)
TRANSFERRIN SERPL-MCNC: 239 MG/DL (ref 203–362)
UIBC SERPL-MCNC: 286 UG/DL (ref 155–355)
WBC # BLD AUTO: 8.37 THOUSAND/UL (ref 4.31–10.16)

## 2025-01-15 PROCEDURE — 36415 COLL VENOUS BLD VENIPUNCTURE: CPT

## 2025-01-15 PROCEDURE — 85025 COMPLETE CBC W/AUTO DIFF WBC: CPT

## 2025-01-15 PROCEDURE — 82306 VITAMIN D 25 HYDROXY: CPT

## 2025-01-15 PROCEDURE — 99214 OFFICE O/P EST MOD 30 MIN: CPT | Performed by: NURSE PRACTITIONER

## 2025-01-15 PROCEDURE — 83550 IRON BINDING TEST: CPT

## 2025-01-15 PROCEDURE — 83540 ASSAY OF IRON: CPT

## 2025-01-15 PROCEDURE — 82728 ASSAY OF FERRITIN: CPT

## 2025-01-15 NOTE — PROGRESS NOTES
Name: Jf Valera      : 1935      MRN: [id number]  Encounter Provider: IVAN Ledezma  Encounter Date: 1/15/2025   Encounter department: [facility] [address]  :  Assessment & Plan  H. pylori infection  S/p quadruple therapy, tolerated this well, will be due for follow up stool antigen testing to confirm eradication later this month. He is no longer on PPI.       Portal hypertension (HCC)  Secondary to PVT c/b ascites, esophageal varices. There is no evidence of cirrhosis on labs and on recent US elastography.   He is doing well off diuretics currently.   Last EGD in 2024 noted grade II varices  Continue Carvedilol 6.25 BID   Follow up with Dr. Jose as scheduled 24  Scheduled to get repeat CT hepatoma protocol 25 to follow up on nodule       Gastric ulcer, unspecified chronicity, unspecified whether gastric ulcer hemorrhage or perforation present  Noted on 24 EGD, healed on follow up EGD in 2024 with biopsy positive for H.pylori gastritis at that time.           History of Present Illness   HPI  Jf Valera is a 89 y.o. male with history of HTN, basilar artery stenosis and history of acute PVT c/b mesenteric ischemia requiring resection on Eliquis, ascites and non-bleeding esophageal varices who presents with son for follow up.     He was last seen by Dr. Jose in December ans suspected to have non cirrhotic portal htn in context of his PVT and US elastography was ordered. He was started on carvedilol for primary variceal prophylaxis.     He has since had US elastography which did not show any significant fibrosis/cirrhosis and discussed this with patient and son that likely the PVT was the cause of the portal HTN and there was no evidence of cirrhosis. He denies any abdominal swelling, black or bloody stool. He is tolerating Coreg and dose increased by PCP.       Review of Systems   Constitutional:  Negative for unexpected  "weight change.   HENT:  Negative for trouble swallowing.    Gastrointestinal:  Negative for abdominal distention, abdominal pain, anal bleeding, blood in stool, constipation, diarrhea, nausea, rectal pain and vomiting.          Objective   /76 (BP Location: Left arm, Patient Position: Sitting, Cuff Size: Standard)   Pulse 86   Ht 5' 3\" (1.6 m)   Wt 52.6 kg (116 lb)   BMI 20.55 kg/m²      Physical Exam  Vitals and nursing note reviewed.   Constitutional:       General: He is not in acute distress.     Appearance: He is well-developed.   HENT:      Head: Normocephalic and atraumatic.   Eyes:      Conjunctiva/sclera: Conjunctivae normal.   Cardiovascular:      Rate and Rhythm: Normal rate and regular rhythm.      Heart sounds: No murmur heard.  Pulmonary:      Effort: Pulmonary effort is normal. No respiratory distress.      Breath sounds: Normal breath sounds.   Abdominal:      Palpations: Abdomen is soft.      Tenderness: There is no abdominal tenderness.   Musculoskeletal:         General: No swelling.      Cervical back: Neck supple.   Skin:     General: Skin is warm and dry.      Capillary Refill: Capillary refill takes less than 2 seconds.   Neurological:      Mental Status: He is alert and oriented to person, place, and time.   Psychiatric:         Mood and Affect: Mood normal.           "

## 2025-01-16 ENCOUNTER — RESULTS FOLLOW-UP (OUTPATIENT)
Dept: FAMILY MEDICINE CLINIC | Facility: CLINIC | Age: 89
End: 2025-01-16

## 2025-01-16 ENCOUNTER — RESULTS FOLLOW-UP (OUTPATIENT)
Dept: GASTROENTEROLOGY | Facility: CLINIC | Age: 89
End: 2025-01-16

## 2025-01-16 RX ORDER — ATORVASTATIN CALCIUM 20 MG/1
20 TABLET, FILM COATED ORAL DAILY
Qty: 30 TABLET | Refills: 0 | Status: SHIPPED | OUTPATIENT
Start: 2025-01-16

## 2025-01-17 ENCOUNTER — TELEPHONE (OUTPATIENT)
Dept: NEUROLOGY | Facility: CLINIC | Age: 89
End: 2025-01-17

## 2025-01-17 NOTE — TELEPHONE ENCOUNTER
Called and spoke with son Zack. I informed him that the appt 03/17 at 3:30. The provider is not available at that time. Patient son understood and agreed to reschedule appt. Patient new appt is 04/07 at 12 pm with Dr. Truong. Appt reminder card will be mailed out as well.

## 2025-01-20 LAB
F2 GENE MUT ANL BLD/T: NORMAL
F5 GENE MUT ANL BLD/T: NORMAL
Lab: NORMAL
Lab: NORMAL

## 2025-01-23 LAB — SCAN RESULT: NORMAL

## 2025-01-31 ENCOUNTER — APPOINTMENT (OUTPATIENT)
Dept: LAB | Facility: HOSPITAL | Age: 89
End: 2025-01-31
Attending: STUDENT IN AN ORGANIZED HEALTH CARE EDUCATION/TRAINING PROGRAM
Payer: COMMERCIAL

## 2025-01-31 PROCEDURE — 82103 ALPHA-1-ANTITRYPSIN TOTAL: CPT

## 2025-01-31 PROCEDURE — 82104 ALPHA-1-ANTITRYPSIN PHENO: CPT

## 2025-01-31 PROCEDURE — 87338 HPYLORI STOOL AG IA: CPT

## 2025-01-31 PROCEDURE — 88185 FLOWCYTOMETRY/TC ADD-ON: CPT

## 2025-01-31 PROCEDURE — 88184 FLOWCYTOMETRY/ TC 1 MARKER: CPT

## 2025-01-31 PROCEDURE — 36415 COLL VENOUS BLD VENIPUNCTURE: CPT

## 2025-02-02 DIAGNOSIS — I81 PORTAL VEIN THROMBOSIS: ICD-10-CM

## 2025-02-02 DIAGNOSIS — I65.1 BASILAR ARTERY STENOSIS: ICD-10-CM

## 2025-02-02 DIAGNOSIS — D64.9 ANEMIA: ICD-10-CM

## 2025-02-03 ENCOUNTER — RESULTS FOLLOW-UP (OUTPATIENT)
Age: 89
End: 2025-02-03

## 2025-02-03 LAB
H PYLORI AG STL QL IA: NEGATIVE
SCAN RESULT: NORMAL

## 2025-02-03 RX ORDER — FERROUS SULFATE 325(65) MG
325 TABLET ORAL
Qty: 30 TABLET | Refills: 5 | Status: SHIPPED | OUTPATIENT
Start: 2025-02-03 | End: 2025-08-02

## 2025-02-03 RX ORDER — ASPIRIN 81 MG/1
81 TABLET, CHEWABLE ORAL DAILY
Qty: 90 TABLET | Refills: 1 | Status: SHIPPED | OUTPATIENT
Start: 2025-02-03 | End: 2025-08-02

## 2025-02-17 LAB
A1AT PHENOTYP SERPL IFE: NORMAL
A1AT SERPL-MCNC: 153 MG/DL (ref 101–187)

## 2025-03-17 ENCOUNTER — APPOINTMENT (OUTPATIENT)
Dept: LAB | Facility: HOSPITAL | Age: 89
End: 2025-03-17
Payer: COMMERCIAL

## 2025-03-17 DIAGNOSIS — E53.8 VITAMIN B12 DEFICIENCY: ICD-10-CM

## 2025-03-17 DIAGNOSIS — D47.2 MGUS (MONOCLONAL GAMMOPATHY OF UNKNOWN SIGNIFICANCE): ICD-10-CM

## 2025-03-17 DIAGNOSIS — D50.0 IRON DEFICIENCY ANEMIA DUE TO CHRONIC BLOOD LOSS: ICD-10-CM

## 2025-03-17 LAB
ALBUMIN SERPL BCG-MCNC: 4.3 G/DL (ref 3.5–5)
ALP SERPL-CCNC: 58 U/L (ref 34–104)
ALT SERPL W P-5'-P-CCNC: 19 U/L (ref 7–52)
ANION GAP SERPL CALCULATED.3IONS-SCNC: 10 MMOL/L (ref 4–13)
AST SERPL W P-5'-P-CCNC: 26 U/L (ref 13–39)
BASOPHILS # BLD AUTO: 0.04 THOUSANDS/ÂΜL (ref 0–0.1)
BASOPHILS NFR BLD AUTO: 1 % (ref 0–1)
BILIRUB SERPL-MCNC: 0.71 MG/DL (ref 0.2–1)
BUN SERPL-MCNC: 15 MG/DL (ref 5–25)
CALCIUM SERPL-MCNC: 9.6 MG/DL (ref 8.4–10.2)
CHLORIDE SERPL-SCNC: 106 MMOL/L (ref 96–108)
CO2 SERPL-SCNC: 27 MMOL/L (ref 21–32)
CREAT SERPL-MCNC: 0.91 MG/DL (ref 0.6–1.3)
EOSINOPHIL # BLD AUTO: 0.1 THOUSAND/ÂΜL (ref 0–0.61)
EOSINOPHIL NFR BLD AUTO: 1 % (ref 0–6)
ERYTHROCYTE [DISTWIDTH] IN BLOOD BY AUTOMATED COUNT: 15.7 % (ref 11.6–15.1)
FERRITIN SERPL-MCNC: 28 NG/ML (ref 24–336)
FOLATE SERPL-MCNC: >22.3 NG/ML
GFR SERPL CREATININE-BSD FRML MDRD: 74 ML/MIN/1.73SQ M
GLUCOSE P FAST SERPL-MCNC: 78 MG/DL (ref 65–99)
HCT VFR BLD AUTO: 42.4 % (ref 36.5–49.3)
HGB BLD-MCNC: 13.2 G/DL (ref 12–17)
IGA SERPL-MCNC: 602 MG/DL (ref 66–433)
IGG SERPL-MCNC: 801 MG/DL (ref 635–1741)
IGM SERPL-MCNC: 22 MG/DL (ref 45–281)
IMM GRANULOCYTES # BLD AUTO: 0.03 THOUSAND/UL (ref 0–0.2)
IMM GRANULOCYTES NFR BLD AUTO: 0 % (ref 0–2)
IRON SATN MFR SERPL: 11 % (ref 15–50)
IRON SERPL-MCNC: 38 UG/DL (ref 50–212)
LYMPHOCYTES # BLD AUTO: 1.55 THOUSANDS/ÂΜL (ref 0.6–4.47)
LYMPHOCYTES NFR BLD AUTO: 19 % (ref 14–44)
MCH RBC QN AUTO: 28.9 PG (ref 26.8–34.3)
MCHC RBC AUTO-ENTMCNC: 31.1 G/DL (ref 31.4–37.4)
MCV RBC AUTO: 93 FL (ref 82–98)
MONOCYTES # BLD AUTO: 0.67 THOUSAND/ÂΜL (ref 0.17–1.22)
MONOCYTES NFR BLD AUTO: 8 % (ref 4–12)
NEUTROPHILS # BLD AUTO: 5.75 THOUSANDS/ÂΜL (ref 1.85–7.62)
NEUTS SEG NFR BLD AUTO: 71 % (ref 43–75)
NRBC BLD AUTO-RTO: 0 /100 WBCS
PLATELET # BLD AUTO: 386 THOUSANDS/UL (ref 149–390)
PMV BLD AUTO: 10 FL (ref 8.9–12.7)
POTASSIUM SERPL-SCNC: 4.4 MMOL/L (ref 3.5–5.3)
PROT SERPL-MCNC: 7.2 G/DL (ref 6.4–8.4)
RBC # BLD AUTO: 4.56 MILLION/UL (ref 3.88–5.62)
SODIUM SERPL-SCNC: 143 MMOL/L (ref 135–147)
TIBC SERPL-MCNC: 351.4 UG/DL (ref 250–450)
TRANSFERRIN SERPL-MCNC: 251 MG/DL (ref 203–362)
UIBC SERPL-MCNC: 313 UG/DL (ref 155–355)
VIT B12 SERPL-MCNC: 580 PG/ML (ref 180–914)
WBC # BLD AUTO: 8.14 THOUSAND/UL (ref 4.31–10.16)

## 2025-03-17 PROCEDURE — 82232 ASSAY OF BETA-2 PROTEIN: CPT

## 2025-03-17 PROCEDURE — 82607 VITAMIN B-12: CPT

## 2025-03-17 PROCEDURE — 82728 ASSAY OF FERRITIN: CPT

## 2025-03-17 PROCEDURE — 80053 COMPREHEN METABOLIC PANEL: CPT

## 2025-03-17 PROCEDURE — 83550 IRON BINDING TEST: CPT

## 2025-03-17 PROCEDURE — 82746 ASSAY OF FOLIC ACID SERUM: CPT

## 2025-03-17 PROCEDURE — 85025 COMPLETE CBC W/AUTO DIFF WBC: CPT

## 2025-03-17 PROCEDURE — 83521 IG LIGHT CHAINS FREE EACH: CPT

## 2025-03-17 PROCEDURE — 36415 COLL VENOUS BLD VENIPUNCTURE: CPT

## 2025-03-17 PROCEDURE — 82784 ASSAY IGA/IGD/IGG/IGM EACH: CPT

## 2025-03-17 PROCEDURE — 84165 PROTEIN E-PHORESIS SERUM: CPT

## 2025-03-17 PROCEDURE — 83540 ASSAY OF IRON: CPT

## 2025-03-18 LAB
KAPPA LC FREE SER-MCNC: 22 MG/L (ref 3.3–19.4)
KAPPA LC FREE/LAMBDA FREE SER: 0.88 {RATIO} (ref 0.26–1.65)
LAMBDA LC FREE SERPL-MCNC: 25.1 MG/L (ref 5.7–26.3)

## 2025-03-19 LAB
ALBUMIN SERPL ELPH-MCNC: 3.92 G/DL (ref 3.2–5.1)
ALBUMIN SERPL ELPH-MCNC: 57.7 % (ref 48–70)
ALPHA1 GLOB SERPL ELPH-MCNC: 0.29 G/DL (ref 0.15–0.47)
ALPHA1 GLOB SERPL ELPH-MCNC: 4.3 % (ref 1.8–7)
ALPHA2 GLOB SERPL ELPH-MCNC: 0.63 G/DL (ref 0.42–1.04)
ALPHA2 GLOB SERPL ELPH-MCNC: 9.2 % (ref 5.9–14.9)
B2 MICROGLOB SERPL-MCNC: 2.3 MG/L (ref 0.6–2.4)
BETA GLOB ABNORMAL SERPL ELPH-MCNC: 0.37 G/DL (ref 0.31–0.57)
BETA1 GLOB SERPL ELPH-MCNC: 5.4 % (ref 4.7–7.7)
BETA2 GLOB SERPL ELPH-MCNC: 4 % (ref 3.1–7.9)
BETA2+GAMMA GLOB SERPL ELPH-MCNC: 0.27 G/DL (ref 0.2–0.58)
GAMMA GLOB ABNORMAL SERPL ELPH-MCNC: 1.32 G/DL (ref 0.4–1.66)
GAMMA GLOB SERPL ELPH-MCNC: 19.4 % (ref 6.9–22.3)
IGG/ALB SER: 1.36 {RATIO} (ref 1.1–1.8)
M PROTEIN 1 MFR SERPL ELPH: 9.3 %
M PROTEIN 1 SERPL ELPH-MCNC: 0.63 G/DL
PROT PATTERN SERPL ELPH-IMP: NORMAL
PROT SERPL-MCNC: 6.8 G/DL (ref 6.4–8.2)

## 2025-03-19 PROCEDURE — 84165 PROTEIN E-PHORESIS SERUM: CPT | Performed by: PATHOLOGY

## 2025-03-25 ENCOUNTER — OFFICE VISIT (OUTPATIENT)
Dept: HEMATOLOGY ONCOLOGY | Facility: MEDICAL CENTER | Age: 89
End: 2025-03-25
Payer: COMMERCIAL

## 2025-03-25 VITALS
HEART RATE: 59 BPM | OXYGEN SATURATION: 99 % | DIASTOLIC BLOOD PRESSURE: 74 MMHG | RESPIRATION RATE: 17 BRPM | WEIGHT: 115 LBS | HEIGHT: 63 IN | BODY MASS INDEX: 20.38 KG/M2 | SYSTOLIC BLOOD PRESSURE: 136 MMHG | TEMPERATURE: 97.9 F

## 2025-03-25 DIAGNOSIS — E53.8 VITAMIN B12 DEFICIENCY: ICD-10-CM

## 2025-03-25 DIAGNOSIS — D47.2 MGUS (MONOCLONAL GAMMOPATHY OF UNKNOWN SIGNIFICANCE): Primary | ICD-10-CM

## 2025-03-25 DIAGNOSIS — D64.89 ANEMIA DUE TO OTHER CAUSE, NOT CLASSIFIED: ICD-10-CM

## 2025-03-25 DIAGNOSIS — R18.8 CIRRHOSIS OF LIVER WITH ASCITES, UNSPECIFIED HEPATIC CIRRHOSIS TYPE  (HCC): ICD-10-CM

## 2025-03-25 DIAGNOSIS — I81 PORTAL VEIN THROMBOSIS: ICD-10-CM

## 2025-03-25 DIAGNOSIS — K74.60 CIRRHOSIS OF LIVER WITH ASCITES, UNSPECIFIED HEPATIC CIRRHOSIS TYPE  (HCC): ICD-10-CM

## 2025-03-25 DIAGNOSIS — D50.0 IRON DEFICIENCY ANEMIA DUE TO CHRONIC BLOOD LOSS: ICD-10-CM

## 2025-03-25 PROCEDURE — 99214 OFFICE O/P EST MOD 30 MIN: CPT | Performed by: PHYSICIAN ASSISTANT

## 2025-03-25 NOTE — PROGRESS NOTES
Name: Jf Valera      : 1935      MRN: [id number]  Encounter Provider: Rosalinda Hunt PA-C  Encounter Date: 3/25/2025   Encounter department: [facility]  :  Assessment & Plan  MGUS (monoclonal gammopathy of unknown significance)  Patient had SPEP completed on 2024.  He had finding of M spike of 0.55.  Immunofixation showed a monoclonal gammopathy identified as IgA kappa.     [place] free light chain is 23.4.  Lambda free light chain is 25.1.  Ratio 0.93.  IgA 640, IgG 832, IgM less than 20.     At present, patient has no anemia, significant renal insufficiency, elevated total protein, elevated calcium.     Finding is consistent with MGUS.  Markers were repeated on 3/17/2025 and are stable.      Markers should be repeated again in 6 months or so, sooner if necessary.         Cirrhosis of liver with ascites, unspecified hepatic cirrhosis type  (HCC)  Patient is following with GI-Dr. Jose.  He is currently on Aldactone.  He is having no issues with recurrent ascites.    Vitamin B12 deficiency  Vitamin B12 on 3/17/2025 was 580 which is good.        Portal vein thrombosis  Diagnosed in March when .  He had associated mesenteric ischemia requiring ex lap with small bowel resection and lysis of adhesions.  He is on longterm AC with Eliquis.     He follows with neurology for severe basilar stenosis for which she is on aspirin 81 mg daily.    He had workup for MPD completed by GI.  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  He is already on baby aspirin as above.  He does not have thrombocytosis or elevated H/H.  With myeloproliferative disorder we need to keep hematocrit less than 45%.    Last lab work was drawn on 3/17/2025.  WBC 8.14, hemoglobin 13.2, hematocrit 42.4, platelets 386.  He does not need any treatment for JAK2 MPD.  Continue observation.        Anemia due to other cause, not classified  Patient presents for evaluation of anemia.  He had a  hospitalization in August 2024 for acute on chronic anemia.  His hemoglobin was 4.1 on presentation.  Iron studies were consistent with iron deficiency.  He received 3 units PRBC and 3 days of IV iron while inpatient.     Hemoglobin prior to that hospital stay was between 9 and 10 g.     His lab work has improved significantly with oral iron.     Last lab work was drawn on 3/17/2025.  WBC 8.14, hemoglobin 13.2, hematocrit 42.4, platelets 386.      Iron deficiency anemia due to chronic blood loss  As below, patient with EGD during recent hospital stay with finding of esophageal varices and ulcer in the antrum of the stomach.     His lab work was consistent with iron deficiency anemia.     Currently he is taking oral iron 1 tablet daily.  Last lab work was drawn on 3/17/2025.  WBC 8.14, hemoglobin 13.2, hematocrit 42.4, platelets 386.  Total iron is 38, ferritin 28, iron saturation 11%.      I believe it is better to keep patient mildly iron deficient as he is asymptomatic from the iron deficiency and if we increase his iron his hemoglobin will increase.  He is aware that with the JAK2 MPD if his hematocrit becomes greater than 45% he will be at risk for thrombotic complications/require a phlebotomy.    He can continue oral iron 1 tablet daily.     Patient will repeat lab work in 3 months time.  He will return to clinic in 6 months time.  Orders:    CBC and differential; Standing    Iron Panel (Includes Ferritin, Iron Sat%, Iron, and TIBC); Standing    Comprehensive metabolic panel; Standing        History of Present Illness   Chief Complaint   Patient presents with    Follow-up   Jf Valera is a 89 y.o. male who was initially seen for evaluation of anemia.     He has complicated past medical history.  He is here today with his son.     He has a fairly recent finding of liver cirrhosis with ascites for which the etiology is unknown.  He has history of portal vein thrombosis, currently on Eliquis. He follows with  neurology for severe basilar stenosis for which she is on aspirin 81 mg daily.  He has history of urinary retention status post suprapubic catheter, seizure disorder.     He was diagnosed with portal vein thrombosis in March of this year.  He had associated mesenteric ischemia requiring exploratory laparotomy with small bowel resection and lysis of adhesions.  Imaging shows chronic occlusion of the portal veins and the SMV with cavernous transformation.  He is now maintained on Eliquis.     He had a hospital stay in August of this year at [facility].  He presented with acute on chronic anemia.  On presentation his hemoglobin was 4.1.  CT scan of the abdomen and pelvis showed gastroesophageal varices.  He received 3 units PRBC during his hospitalization.     He was also seen by GI.  EGD showed 2 grade 2 varices in the esophagus.  Mild portal hypertensive gastropathy in the fundus and body of the stomach.  Single, small cratered ulcer in the antrum with clean base.  Iron studies were consistent with iron deficiency anemia and patient was treated with 3 doses of IV Venofer and was placed on oral iron at discharge.     He was noted to have elevated AFP at 13.99.  He is following with GI and also hepatology.     Patient denies any obvious bleeding.  He is feeling well.  He is taking oral iron 1 tablet daily.  No hematuria or epistaxis.  No history of alcohol or drug abuse.     His weight is stable at 115 pounds.  He has good appetite.  He has no abdominal pain.  He has no shortness of breath or chest pain.    Review of Systems   Constitutional:  Positive for fatigue. Negative for activity change, appetite change and fever.   HENT:  Negative for nosebleeds.    Respiratory:  Negative for cough, choking and shortness of breath.    Cardiovascular:  Negative for chest pain, palpitations and leg swelling.   Gastrointestinal:  Negative for abdominal distention, abdominal pain, anal bleeding, blood in stool, constipation,  "diarrhea, nausea and vomiting.   Endocrine: Negative for cold intolerance.   Genitourinary:  Negative for hematuria.   Musculoskeletal:  Positive for arthralgias. Negative for myalgias.   Skin:  Negative for color change, pallor and rash.   Allergic/Immunologic: Negative for immunocompromised state.   Neurological:  Negative for headaches.   Hematological:  Negative for adenopathy. Does not bruise/bleed easily.   All other systems reviewed and are negative.    Objective   /74 (BP Location: Left arm, Patient Position: Sitting, Cuff Size: Adult)   Pulse 59   Temp 97.9 °F (36.6 °C) (Temporal)   Resp 17   Ht 5' 3\" (1.6 m)   Wt 52.2 kg (115 lb)   SpO2 99%   BMI 20.37 kg/m²     Physical Exam  Constitutional:       General: He is not in acute distress.     Appearance: Normal appearance. He is well-developed.      Comments: Thin.   HENT:      Head: Normocephalic and atraumatic.      Right Ear: External ear normal.      Left Ear: External ear normal.      Nose: Nose normal.   Eyes:      General: No scleral icterus.     Conjunctiva/sclera: Conjunctivae normal.   Cardiovascular:      Rate and Rhythm: Normal rate and regular rhythm.   Pulmonary:      Effort: Pulmonary effort is normal. No respiratory distress.      Breath sounds: Normal breath sounds.   Abdominal:      General: Abdomen is flat. There is no distension.      Palpations: Abdomen is soft.      Tenderness: There is no abdominal tenderness.   Skin:     Findings: No rash (on exposed skin.).   Neurological:      Mental Status: He is alert and oriented to person, place, and time.   Psychiatric:         Mood and Affect: Mood normal.         Thought Content: Thought content normal.         Judgment: Judgment normal.     Labs: I have reviewed the following labs:  Results for orders placed or performed in visit on 03/17/25   CBC and differential   Result Value Ref Range    WBC 8.14 4.31 - 10.16 Thousand/uL    RBC 4.56 3.88 - 5.62 Million/uL    Hemoglobin 13.2 " 12.0 - 17.0 g/dL    Hematocrit 42.4 36.5 - 49.3 %    MCV 93 82 - 98 fL    MCH 28.9 26.8 - 34.3 pg    MCHC 31.1 (L) 31.4 - 37.4 g/dL    RDW 15.7 (H) 11.6 - 15.1 %    MPV 10.0 8.9 - 12.7 fL    Platelets 386 149 - 390 Thousands/uL    nRBC 0 /100 WBCs    Segmented % 71 43 - 75 %    Immature Grans % 0 0 - 2 %    Lymphocytes % 19 14 - 44 %    Monocytes % 8 4 - 12 %    Eosinophils Relative 1 0 - 6 %    Basophils Relative 1 0 - 1 %    Absolute Neutrophils 5.75 1.85 - 7.62 Thousands/µL    Absolute Immature Grans 0.03 0.00 - 0.20 Thousand/uL    Absolute Lymphocytes 1.55 0.60 - 4.47 Thousands/µL    Absolute Monocytes 0.67 0.17 - 1.22 Thousand/µL    Eosinophils Absolute 0.10 0.00 - 0.61 Thousand/µL    Basophils Absolute 0.04 0.00 - 0.10 Thousands/µL   Vitamin B12   Result Value Ref Range    Vitamin B-12 580 180 - 914 pg/mL   Result Value Ref Range    Folate >22.3 >5.9 ng/mL   Protein electrophoresis, serum   Result Value Ref Range    A/G Ratio 1.36 1.10 - 1.80    Albumin Electrophoresis 57.7 48.0 - 70.0 %    Albumin CONC 3.92 3.20 - 5.10 g/dl    Alpha 1 4.3 1.8 - 7.0 %    ALPHA 1 CONC 0.29 0.15 - 0.47 g/dL    Alpha 2 9.2 5.9 - 14.9 %    ALPHA 2 CONC 0.63 0.42 - 1.04 g/dL    Beta-1 5.4 4.7 - 7.7 %    BETA 1 CONC 0.37 0.31 - 0.57 g/dL    Beta-2 4.0 3.1 - 7.9 %    BETA 2 CONC 0.27 0.20 - 0.58 g/dL    Gamma Globulin 19.4 6.9 - 22.3 %    GAMMA CONC 1.32 0.40 - 1.66 g/dL    M Peak ID 1 9.30 %    M PEAK 1 CONC 0.63 g/dL    Total Protein 6.8 6.4 - 8.2 g/dL    SPEP Interpretation See Comment    Immunoglobulin free LT chains blood   Result Value Ref Range    Ig Kappa Free Light Chain 22.0 (H) 3.3 - 19.4 mg/L    Ig Lambda Free Light Chain 25.1 5.7 - 26.3 mg/L    Kappa/Lambda FluidC Ratio 0.88 0.26 - 1.65   IgG, IgA, IgM   Result Value Ref Range     (H) 66 - 433 mg/dL     635 - 1,741 mg/dL    IGM 22 (L) 45 - 281 mg/dL   Beta 2 microglobulin, serum   Result Value Ref Range    Beta-2 Microglobulin 2.3 0.6 - 2.4 mg/L    Comprehensive metabolic panel   Result Value Ref Range    Sodium 143 135 - 147 mmol/L    Potassium 4.4 3.5 - 5.3 mmol/L    Chloride 106 96 - 108 mmol/L    CO2 27 21 - 32 mmol/L    ANION GAP 10 4 - 13 mmol/L    BUN 15 5 - 25 mg/dL    Creatinine 0.91 0.60 - 1.30 mg/dL    Glucose, Fasting 78 65 - 99 mg/dL    Calcium 9.6 8.4 - 10.2 mg/dL    AST 26 13 - 39 U/L    ALT 19 7 - 52 U/L    Alkaline Phosphatase 58 34 - 104 U/L    Total Protein 7.2 6.4 - 8.4 g/dL    Albumin 4.3 3.5 - 5.0 g/dL    Total Bilirubin 0.71 0.20 - 1.00 mg/dL    eGFR 74 ml/min/1.73sq m   TIBC Panel (incl. Iron, TIBC, % Iron Saturation)   Result Value Ref Range    Iron Saturation 11 (L) 15 - 50 %    TIBC 351.4 250 - 450 ug/dL    Iron 38 (L) 50 - 212 ug/dL    Transferrin 251 203 - 362 mg/dL    UIBC 313 155 - 355 ug/dL   Result Value Ref Range    Ferritin 28 24 - 336 ng/mL

## 2025-03-25 NOTE — ASSESSMENT & PLAN NOTE
Patient is following with GI-Dr. Jose.  He is currently on Aldactone.  He is having no issues with recurrent ascites.

## 2025-03-25 NOTE — ASSESSMENT & PLAN NOTE
Diagnosed in March when 2024.  He had associated mesenteric ischemia requiring ex lap with small bowel resection and lysis of adhesions.  He is on longterm AC with Eliquis.     He follows with neurology for severe basilar stenosis for which she is on aspirin 81 mg daily.    He had workup for MPD completed by GI.  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  He is already on baby aspirin as above.  He does not have thrombocytosis or elevated H/H.  With myeloproliferative disorder we need to keep hematocrit less than 45%.    Last lab work was drawn on 3/17/2025.  WBC 8.14, hemoglobin 13.2, hematocrit 42.4, platelets 386.  He does not need any treatment for JAK2 MPD.  Continue observation.

## 2025-03-31 DIAGNOSIS — R56.9 SEIZURE (HCC): ICD-10-CM

## 2025-04-01 RX ORDER — LEVETIRACETAM 750 MG/1
750 TABLET ORAL EVERY 12 HOURS SCHEDULED
Qty: 60 TABLET | Refills: 0 | Status: SHIPPED | OUTPATIENT
Start: 2025-04-01 | End: 2025-04-07 | Stop reason: SDUPTHER

## 2025-04-05 ENCOUNTER — HOSPITAL ENCOUNTER (OUTPATIENT)
Dept: RADIOLOGY | Facility: HOSPITAL | Age: 89
Discharge: HOME | End: 2025-04-05
Payer: COMMERCIAL

## 2025-04-05 DIAGNOSIS — K74.60 CIRRHOSIS OF LIVER WITH ASCITES, UNSPECIFIED HEPATIC CIRRHOSIS TYPE  (HCC): ICD-10-CM

## 2025-04-05 DIAGNOSIS — K76.9 LIVER LESION: ICD-10-CM

## 2025-04-05 DIAGNOSIS — R18.8 CIRRHOSIS OF LIVER WITH ASCITES, UNSPECIFIED HEPATIC CIRRHOSIS TYPE  (HCC): ICD-10-CM

## 2025-04-05 PROCEDURE — 74160 CT ABDOMEN W/CONTRAST: CPT

## 2025-04-05 RX ADMIN — IOHEXOL 85 ML: 350 INJECTION, SOLUTION INTRAVENOUS at 09:00

## 2025-04-07 ENCOUNTER — OFFICE VISIT (OUTPATIENT)
Age: 89
End: 2025-04-07
Payer: COMMERCIAL

## 2025-04-07 VITALS
HEIGHT: 63 IN | DIASTOLIC BLOOD PRESSURE: 82 MMHG | BODY MASS INDEX: 20.73 KG/M2 | HEART RATE: 85 BPM | WEIGHT: 117 LBS | SYSTOLIC BLOOD PRESSURE: 140 MMHG | OXYGEN SATURATION: 92 %

## 2025-04-07 DIAGNOSIS — R56.9 SEIZURE (HCC): ICD-10-CM

## 2025-04-07 DIAGNOSIS — G40.909 SEIZURE DISORDER (HCC): Primary | ICD-10-CM

## 2025-04-07 DIAGNOSIS — D64.9 ANEMIA: ICD-10-CM

## 2025-04-07 DIAGNOSIS — I81 PORTAL VEIN THROMBOSIS: ICD-10-CM

## 2025-04-07 DIAGNOSIS — Z51.81 THERAPEUTIC DRUG MONITORING: ICD-10-CM

## 2025-04-07 PROCEDURE — 99214 OFFICE O/P EST MOD 30 MIN: CPT | Performed by: PSYCHIATRY & NEUROLOGY

## 2025-04-07 RX ORDER — LEVETIRACETAM 750 MG/1
750 TABLET ORAL EVERY 12 HOURS SCHEDULED
Qty: 180 TABLET | Refills: 2 | Status: SHIPPED | OUTPATIENT
Start: 2025-04-07 | End: 2025-10-04

## 2025-04-07 NOTE — ASSESSMENT & PLAN NOTE
Orders:  •  levETIRAcetam (KEPPRA) 750 mg tablet; Take 1 tablet (750 mg total) by mouth every 12 (twelve) hours   Good

## 2025-04-07 NOTE — PROGRESS NOTES
Return NeuroOutpatient Note        Jf Valera  [id number]  89 y.o.  1935       Seizure , History of seizures , and Basilar artery stenosis        History obtained from:  Patient and son     HPI/Subjective:    Jf Valera is an 89 yo M with PMH of seizure, basilar a stenosis presents as f/u.   Per my previous history, he was initially seen by me in March of 2023 for tia/syncope. His MRI brain was normal. His CTA had revealed moderate long segment basilar a stenosis, right mca and left pca atherosclerotic disease. In April of 2024, he had surgery for portal vein thrombosis resulting in mesenteric ischemia resulting in bowel resection. Later, he had presented to ED with seizure like activity. He had 2 episodes of witnessed seizure like activity. It was described as staring into space with eyes rolled back lasting 2 min. 2nd episode consisted of rigidity. His MRI brain was negative for any structural lesion. He had prolonged EEG that was normal.   He was placed on keppra 750mg bid. Son denies any side effects.     Today, his son states that he may have had seizure 16 yrs ago in . He was placed on AED but took it for sometime and then stopped.     For his age, he's functional. He doesn't need any care. Cognitively as well, he's very good.      Aspirin 81mg was added for severe basilar stenosis. He's on eliquis for portal vein thrombosis.      He's using cane for ambulation. He is able to live by himself but does have family around.     Past Medical History:   Diagnosis Date   • Anemia    • Anemia    • Basilar artery stenosis    • Decreased hearing    • Encounter for care or replacement of suprapubic tube (HCC)    • GERD (gastroesophageal reflux disease)    • Hemopneumothorax 03/17/2024    chest tube inserted-right   • History of transfusion    • HTN (hypertension) 09/23/2013   • Hypertension    • Liver disease     cirrosis   • Mesenteric ischemia (HCC)    • Portal vein thrombosis    • Seizure-like activity  ([facility]) 04/16/2024     Social History     Socioeconomic History   • Marital status: /Civil Union     Spouse name: Not on file   • Number of children: Not on file   • Years of education: Not on file   • Highest education level: Not on file   Occupational History   • Not on file   Tobacco Use   • Smoking status: Never     Passive exposure: Never   • Smokeless tobacco: Never   Vaping Use   • Vaping status: Never Used   Substance and Sexual Activity   • Alcohol use: Never   • Drug use: Never   • Sexual activity: Not Currently   Other Topics Concern   • Not on file   Social History Narrative   • Not on file     Social Drivers of Health     Financial Resource Strain: Not on file   Food Insecurity: No Food Insecurity (8/25/2024)    Nursing - Inadequate Food Risk Classification    • Worried About Running Out of Food in the Last Year: Never true    • Ran Out of Food in the Last Year: Never true    • Ran Out of Food in the Last Year: Not on file   Transportation Needs: No Transportation Needs (8/25/2024)    PRAPARE - Transportation    • Lack of Transportation (Medical): No    • Lack of Transportation (Non-Medical): No   Physical Activity: Not on file   Stress: Not on file   Social Connections: Not on file   Intimate Partner Violence: Not on file   Housing Stability: Low Risk  (8/25/2024)    Housing Stability Vital Sign    • Unable to Pay for Housing in the Last Year: No    • Number of Times Moved in the Last Year: 0    • Homeless in the Last Year: No     History reviewed. No pertinent family history.  No Known Allergies  Current Outpatient Medications on File Prior to Visit   Medication Sig Dispense Refill   • apixaban (ELIQUIS) 2.5 mg Take 1 tablet (2.5 mg total) by mouth 2 (two) times a day 180 tablet 1   • aspirin 81 mg chewable tablet Chew 1 tablet (81 mg total) daily 90 tablet 1   • atorvastatin (LIPITOR) 20 mg tablet Take 1 tablet (20 mg total) by mouth daily 30 tablet 0   • carvedilol (COREG) 6.25 mg tablet Take 1  "tablet (6.25 mg total) by mouth 2 (two) times a day with meals 180 tablet 0   • ferrous sulfate 325 (65 Fe) mg tablet Take 1 tablet (325 mg total) by mouth daily with breakfast 30 tablet 5   • spironolactone (ALDACTONE) 50 mg tablet Take 1 tablet (50 mg total) by mouth daily (Patient taking differently: Take 50 mg by mouth daily as needed) 30 tablet 5   • [DISCONTINUED] levETIRAcetam (KEPPRA) 750 mg tablet Take 1 tablet (750 mg total) by mouth every 12 (twelve) hours 60 tablet 0     No current facility-administered medications on file prior to visit.         Review of Systems   Refer to positive review of systems in HPI.   Review of Systems    Constitutional- No fever  Eyes- No visual change  ENT- Hearing normal  CV- No chest pain  Resp- No Shortness of breath  GI- No diarrhea  - Bladder normal  MS- No Arthritis   Skin- No rash  Psych- No depression  Endo- No DM  Heme- No nodes    Vitals:    04/07/25 1216   BP: 140/82   BP Location: Left arm   Patient Position: Sitting   Cuff Size: Standard   Pulse: 85   SpO2: 92%   Weight: 53.1 kg (117 lb)   Height: 5' 3\" (1.6 m)       PHYSICAL EXAM:  Appearance: No Acute Distress  Ophthalmoscopic: Disc Flat, Normal fundus  Mental status:  Orientation: Awake, Alert, and Orientedx3  Memory: Registation 3/3 Recall 2/3  Attention: normal  Knowledge: good  Language: No aphasia  Speech: No dysarthria  Cranial Nerves:  2 No Visual Defect on Confrontation, Pupils round, equal, reactive to light  3,4,6 Extraocular Movements Intact, no nystagmus  5 Facial Sensation Intact  7 No facial asymmetry  8 Intact hearing  9,10 Palate symmetric, normal gag  11 Good shoulder shrug  12 Tongue Midline  Gait: Stable  Coordination: No ataxia with finger to nose testing, and heel to shin  Sensory: Intact, Symmetric to pinprick, light touch, vibration, and joint position  Muscle Tone: Normal              Muscle exam:  Arm Right Left Leg Right Left   Deltoid 5/5 5/5 Iliopsoas 5/5 5/5   Biceps 5/5 5/5 Quads " 5/5 5/5   Triceps 5/5 5/5 Hamstrings 5/5 5/5   Wrist Extension 5/5 5/5 Ankle Dorsi Flexion 5/5 5/5   Wrist Flexion 5/5 5/5 Ankle Plantar Flexion 5/5 5/5   Interossei 5/5 5/5 Ankle Eversion 5/5 5/5   APB 5/5 5/5 Ankle Inversion 5/5 5/5       Reflexes   RJ BJ TJ KJ AJ Plantars Hager's   Right 2+ 2+ 2+ 2+  Downgoing Not present   Left 2+ 2+ 2+ 2+  Downgoing Not present     Personal review of  Labs:                    Diagnoses and all orders for this visit:      Assessment & Plan  Seizure disorder (HCC)  He is to resume keppra 750mg bid.          Therapeutic drug monitoring    Orders:  •  Levetiracetam level; Future    Seizure (HCC)    Orders:  •  levETIRAcetam (KEPPRA) 750 mg tablet; Take 1 tablet (750 mg total) by mouth every 12 (twelve) hours                      Total time of encounter:  30 min  More than 50% of the time was used in counseling and/or coordination of care  Extent of counseling and/or coordination of care        Jefferson Truong MD  [facility]  [address] [place] [id number] [phone]

## 2025-04-08 RX ORDER — FERROUS SULFATE 325(65) MG
325 TABLET ORAL
Qty: 30 TABLET | Refills: 0 | OUTPATIENT
Start: 2025-04-08 | End: 2025-10-05

## 2025-04-08 RX ORDER — ATORVASTATIN CALCIUM 20 MG/1
20 TABLET, FILM COATED ORAL DAILY
Qty: 30 TABLET | Refills: 0 | Status: SHIPPED | OUTPATIENT
Start: 2025-04-08

## 2025-04-10 ENCOUNTER — RESULTS FOLLOW-UP (OUTPATIENT)
Age: 89
End: 2025-04-10

## 2025-04-10 DIAGNOSIS — K76.9 LIVER LESION: ICD-10-CM

## 2025-04-10 DIAGNOSIS — K74.60 CIRRHOSIS OF LIVER WITH ASCITES, UNSPECIFIED HEPATIC CIRRHOSIS TYPE  (HCC): Primary | ICD-10-CM

## 2025-04-10 DIAGNOSIS — R18.8 CIRRHOSIS OF LIVER WITH ASCITES, UNSPECIFIED HEPATIC CIRRHOSIS TYPE  (HCC): Primary | ICD-10-CM

## 2025-04-10 DIAGNOSIS — I81 PORTAL VEIN THROMBOSIS: ICD-10-CM

## 2025-04-10 NOTE — PROGRESS NOTES
Reviewed CT findings with patient which showed benign hemangiomas and diaphragmatic hernias. There was mention of cirrhosis with chronic PVT and varices but we discussed that his biopsy confirmed non-cirrhotic portal hypertension. Recommend repeating CT hepatoma protocol in 6 months to confirm stability.

## 2025-06-05 DIAGNOSIS — I81 PORTAL VEIN THROMBOSIS: ICD-10-CM

## 2025-06-06 RX ORDER — ATORVASTATIN CALCIUM 20 MG/1
20 TABLET, FILM COATED ORAL DAILY
Qty: 30 TABLET | Refills: 0 | Status: SHIPPED | OUTPATIENT
Start: 2025-06-06

## 2025-07-07 ENCOUNTER — APPOINTMENT (OUTPATIENT)
Dept: LAB | Facility: HOSPITAL | Age: 89
End: 2025-07-07
Attending: PHYSICIAN ASSISTANT
Payer: COMMERCIAL

## 2025-07-07 DIAGNOSIS — D50.0 IRON DEFICIENCY ANEMIA DUE TO CHRONIC BLOOD LOSS: ICD-10-CM

## 2025-07-07 DIAGNOSIS — I10 HYPERTENSION, UNSPECIFIED TYPE: ICD-10-CM

## 2025-07-07 DIAGNOSIS — Z51.81 THERAPEUTIC DRUG MONITORING: ICD-10-CM

## 2025-07-07 LAB
ALBUMIN SERPL BCG-MCNC: 4.3 G/DL (ref 3.5–5)
ALP SERPL-CCNC: 59 U/L (ref 34–104)
ALT SERPL W P-5'-P-CCNC: 20 U/L (ref 7–52)
ANION GAP SERPL CALCULATED.3IONS-SCNC: 8 MMOL/L (ref 4–13)
AST SERPL W P-5'-P-CCNC: 25 U/L (ref 13–39)
BASOPHILS # BLD AUTO: 0.04 THOUSANDS/ÂΜL (ref 0–0.1)
BASOPHILS NFR BLD AUTO: 1 % (ref 0–1)
BILIRUB SERPL-MCNC: 0.94 MG/DL (ref 0.2–1)
BUN SERPL-MCNC: 13 MG/DL (ref 5–25)
CALCIUM SERPL-MCNC: 9.7 MG/DL (ref 8.4–10.2)
CHLORIDE SERPL-SCNC: 103 MMOL/L (ref 96–108)
CO2 SERPL-SCNC: 29 MMOL/L (ref 21–32)
CREAT SERPL-MCNC: 0.83 MG/DL (ref 0.6–1.3)
EOSINOPHIL # BLD AUTO: 0.17 THOUSAND/ÂΜL (ref 0–0.61)
EOSINOPHIL NFR BLD AUTO: 2 % (ref 0–6)
ERYTHROCYTE [DISTWIDTH] IN BLOOD BY AUTOMATED COUNT: 15.2 % (ref 11.6–15.1)
FERRITIN SERPL-MCNC: 32 NG/ML (ref 30–336)
GFR SERPL CREATININE-BSD FRML MDRD: 78 ML/MIN/1.73SQ M
GLUCOSE P FAST SERPL-MCNC: 81 MG/DL (ref 65–99)
HCT VFR BLD AUTO: 44 % (ref 36.5–49.3)
HGB BLD-MCNC: 13.8 G/DL (ref 12–17)
IMM GRANULOCYTES # BLD AUTO: 0.03 THOUSAND/UL (ref 0–0.2)
IMM GRANULOCYTES NFR BLD AUTO: 0 % (ref 0–2)
IRON SATN MFR SERPL: 14 % (ref 15–50)
IRON SERPL-MCNC: 52 UG/DL (ref 50–212)
LEVETIRACETAM SERPL-MCNC: 17.5 UG/ML (ref 12–46)
LYMPHOCYTES # BLD AUTO: 2.16 THOUSANDS/ÂΜL (ref 0.6–4.47)
LYMPHOCYTES NFR BLD AUTO: 25 % (ref 14–44)
MCH RBC QN AUTO: 28.7 PG (ref 26.8–34.3)
MCHC RBC AUTO-ENTMCNC: 31.4 G/DL (ref 31.4–37.4)
MCV RBC AUTO: 92 FL (ref 82–98)
MONOCYTES # BLD AUTO: 0.72 THOUSAND/ÂΜL (ref 0.17–1.22)
MONOCYTES NFR BLD AUTO: 8 % (ref 4–12)
NEUTROPHILS # BLD AUTO: 5.54 THOUSANDS/ÂΜL (ref 1.85–7.62)
NEUTS SEG NFR BLD AUTO: 64 % (ref 43–75)
NRBC BLD AUTO-RTO: 0 /100 WBCS
PLATELET # BLD AUTO: 403 THOUSANDS/UL (ref 149–390)
PMV BLD AUTO: 10 FL (ref 8.9–12.7)
POTASSIUM SERPL-SCNC: 3.6 MMOL/L (ref 3.5–5.3)
PROT SERPL-MCNC: 7.6 G/DL (ref 6.4–8.4)
RBC # BLD AUTO: 4.81 MILLION/UL (ref 3.88–5.62)
SODIUM SERPL-SCNC: 140 MMOL/L (ref 135–147)
TIBC SERPL-MCNC: 371 UG/DL (ref 250–450)
TRANSFERRIN SERPL-MCNC: 265 MG/DL (ref 203–362)
UIBC SERPL-MCNC: 319 UG/DL (ref 155–355)
WBC # BLD AUTO: 8.66 THOUSAND/UL (ref 4.31–10.16)

## 2025-07-07 PROCEDURE — 83520 IMMUNOASSAY QUANT NOS NONAB: CPT

## 2025-07-07 PROCEDURE — 82728 ASSAY OF FERRITIN: CPT

## 2025-07-07 PROCEDURE — 36415 COLL VENOUS BLD VENIPUNCTURE: CPT

## 2025-07-07 PROCEDURE — 85025 COMPLETE CBC W/AUTO DIFF WBC: CPT

## 2025-07-07 PROCEDURE — 80053 COMPREHEN METABOLIC PANEL: CPT

## 2025-07-07 PROCEDURE — 83540 ASSAY OF IRON: CPT

## 2025-07-07 PROCEDURE — 83550 IRON BINDING TEST: CPT

## 2025-07-09 RX ORDER — CARVEDILOL 6.25 MG/1
6.25 TABLET ORAL 2 TIMES DAILY WITH MEALS
Qty: 60 TABLET | Refills: 0 | Status: SHIPPED | OUTPATIENT
Start: 2025-07-09 | End: 2025-10-07

## 2025-07-16 ENCOUNTER — OFFICE VISIT (OUTPATIENT)
Age: 89
End: 2025-07-16
Payer: COMMERCIAL

## 2025-07-16 VITALS
SYSTOLIC BLOOD PRESSURE: 146 MMHG | HEIGHT: 63 IN | BODY MASS INDEX: 20.38 KG/M2 | DIASTOLIC BLOOD PRESSURE: 85 MMHG | WEIGHT: 115 LBS | HEART RATE: 80 BPM

## 2025-07-16 DIAGNOSIS — I81 PORTAL VEIN THROMBOSIS: ICD-10-CM

## 2025-07-16 DIAGNOSIS — R18.8 CIRRHOSIS OF LIVER WITH ASCITES, UNSPECIFIED HEPATIC CIRRHOSIS TYPE  (HCC): Primary | ICD-10-CM

## 2025-07-16 DIAGNOSIS — K74.60 CIRRHOSIS OF LIVER WITH ASCITES, UNSPECIFIED HEPATIC CIRRHOSIS TYPE  (HCC): Primary | ICD-10-CM

## 2025-07-16 DIAGNOSIS — K76.6 PORTAL HYPERTENSION (HCC): ICD-10-CM

## 2025-07-16 DIAGNOSIS — Z15.89 JAK-2 GENE MUTATION: ICD-10-CM

## 2025-07-16 PROCEDURE — 99214 OFFICE O/P EST MOD 30 MIN: CPT | Performed by: STUDENT IN AN ORGANIZED HEALTH CARE EDUCATION/TRAINING PROGRAM

## 2025-07-16 RX ORDER — AMLODIPINE BESYLATE 5 MG/1
5 TABLET ORAL
COMMUNITY

## 2025-07-16 NOTE — PROGRESS NOTES
[facility] - Outpatient Consultation  Jf Valera 89 y.o. male MRN: [id number]  Encounter: [id number]    PCP:  Justa Hutchinson MD, [phone]  Referring Provider:  No ref. 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 He was last seen by me in 2024.      He was admitted to [facility] in 2024 for abdominal pain in the context of acute mesenteric ischemia due to portal vein thrombosis. He required exploratory laparotomy and SBR of distal ischemic small bowel.  He was noted to have normal appearing liver with normal liver chemistries and platelet count. He was started on anticoagulation with Eliquis.      Course was complicated by lower extremity edema and ascites requiring LVP in 2024. Ascitic fluid suggestive of portal hypertension given high SAAG and low Tp and was negative for SBP. He was started on low dose diuretics with clinical improvement and his last LVP was in Septemberwith removal of 1.4L. He had a repeat CT in 2024 which showed ?cirrhotic liver morphology with chronic PVT with cavernous transformation and gastroesophageal varices. There was also concern for LR-3 lesion and exophytic nodule but he had a subsequent MRI which did not show any suspicious lesions and exophytic nodule was felt to be a a small diaphragmatic hernia containing liver tissue on CT. He did have an EGD which showed non-bleeding esophageal  varices.     He has no clinical or imaging evidence of cirrhosis. Prior to his hospitalization, he had no risk factors for CLD and was otherwise healthy. 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    Presentation is consistent with non-cirrhotic portal hypertension but we discussed that a liver biopsy would be a definitive diagnosis. However, I would defer at this time unless he has fibrosis progression. We did discuss that he should remain on anticoagulation indefinitely.     He has been doing well off of diuretics and discussed holding unless he develops recurrent ascites. He should also avoid interval weight gain, excessive EtOH consumption and hepatotoxic medications. He will return to clinic in 6 months or sooner if needed. Thank you for the opportunity to consult in his care.     - CBC, CMP and INR every 6 months   - Repeat US elastography due 1/2026   - Continue carvedilol 6.25 mg BID   - Continue apixaban 2.5 mg BID      Conchita Jose MD        Orders:    CBC and differential; Future    Comprehensive metabolic panel; Future    Protime-INR; Future    AFP tumor marker; Future    US elastography/UGAP; Future        History of Present Illness   Jf Valera is a 89 y.o. male  with history of HTN, basilar artery stenosis and history of acute PVT in the context of LUISA 2 mutation c/b mesenteric ischemia requiring resection, ascites and non-bleeding esophageal varices who presents for follow up evaluation. He was last seen by me in December 2024.     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 Follows with  hematology and has been on Eliquis   - Tolerated carvedilol 6.25 mg BID  - Completed HP treatment and had subsequent negative antigen testing      Extended liver history  He was admitted to [facility] in March  2024 for abdominal pain in the context of acute mesenteric ischemia due to portal vein thrombosis. He required exploratory laparotomy and SBR of distal ischemic small bowel. He returned to the OR which for active bleeding from the inferior surface of the liver, small bowel to small bowel anastomosis and abdominal closure. He was noted to have normal appearing liver with normal liver chemistries and platelet count. He was started on anticoagulation with Eliquis.      Course was complicated by lower extremity edema and ascites requiring LVP in July 2024. Ascitic fluid suggestive of portal hypertension given high SAAG and low Tp. Fluid studies were also negative for SBP. He was started on low dose diuretics with improvement of his volume status with last LVP in September with removal of 1.4L. He had a repeat CT in 8/2024 which showed ?cirrhotic liver morphology with chronic PVT with cavernous transformation and gastroesophageal varices. There was also concern for LR-3 lesion and exophytic nodule but he had a subsequent MRI which did not show any suspicious lesions and exophytic nodule was felt to be a regenerative nodule.      He was then readmitted for severe iron deficiency anemia seen on routine labs.  He had an EGD which showed grade 2 esophageal varices, PHG and a clean-based ulcer in the antrum. He had a repeat EGD earlier this month which showed ulcer healing and gastric biopsies were positive for H. Pylori.      He is accompanied by his wife and son who is translating. He has no personal history of chronic liver disease and has no family history of liver disease or cirrhosis. 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 He denies excessive EtOH consumption and recreational drug use.      He has not been taking his diuretics and has had no recurrence of ascites.      HPI    Review of Systems   Constitutional: Negative.    HENT: Negative.     Eyes: Negative.    Respiratory:  "Negative.     Cardiovascular: Negative.    Gastrointestinal: Negative.    Endocrine: Negative.    Genitourinary: Negative.    Musculoskeletal: Negative.    Allergic/Immunologic: Negative.    Neurological: Negative.    Hematological: Negative.    Psychiatric/Behavioral: Negative.      A complete review of systems is negative other than that noted above in the HPI.      Current Medications[1]  Objective   /85 (BP Location: Left arm, Patient Position: Sitting, Cuff Size: Standard)   Pulse 80   Ht 5' 3\" (1.6 m)   Wt 52.2 kg (115 lb)   BMI 20.37 kg/m²     Physical Exam  Constitutional:       Appearance: Normal appearance. He is normal weight.     Eyes:      General: No scleral icterus.     Extraocular Movements: Extraocular movements intact.      Pupils: Pupils are equal, round, and reactive to light.     Abdominal:      General: Abdomen is flat. There is no distension.     Skin:     General: Skin is warm and dry.      Coloration: Skin is not jaundiced.     Neurological:      General: No focal deficit present.      Mental Status: He is alert and oriented to person, place, and time.        Lab Results: I personally reviewed relevant lab results.     LABS/RADIOLOGY/ENDOSCOPY:  Lab Results   Component Value Date    WBC 8.66 07/07/2025    HGB 13.8 07/07/2025    HCT 44.0 07/07/2025     (H) 07/07/2025    GLUF 81 07/07/2025    BUN 13 07/07/2025    CREATININE 0.83 07/07/2025    K 3.6 07/07/2025     07/07/2025    CO2 29 07/07/2025    BILIDIR 0.24 (H) 03/14/2024    ALKPHOS 59 07/07/2025    ALT 20 07/07/2025    AST 25 07/07/2025    CALCIUM 9.7 07/07/2025    EGFR 78 07/07/2025    TRIG 75 03/11/2023    HDL 27 (L) 03/11/2023    INR 1.15 12/17/2024    PTT 31 08/20/2024       MELD 3.0: 7 at 12/17/2024  9:49 AM  MELD-Na: 8 at 12/17/2024  9:49 AM  Calculated from:  Serum Creatinine: 0.87 mg/dL (Using min of 1 mg/dL) at 12/17/2024  9:49 AM  Serum Sodium: 144 mmol/L (Using max of 137 mmol/L) at 12/17/2024  9:49 " AM  Total Bilirubin: 0.58 mg/dL (Using min of 1 mg/dL) at 12/17/2024  9:49 AM  Serum Albumin: 4.2 g/dL (Using max of 3.5 g/dL) at 12/17/2024  9:49 AM  INR(ratio): 1.15 at 12/17/2024  9:49 AM  Age at listing (hypothetical): 89 years  Sex: Male at 12/17/2024  9:49 AM    US elastography (1/2025)    Shear Wave Elastography sampling was performed to evaluate stiffness changes within the liver associated with fibrosis.  E1 9.13 kPa  E2 7.43 kPa  E3 7.35 kPa  E4 7.33 kPa  E5 7.28 kPa  E6 8.05 kPa  E7 8.33 kPa  E8 8.18 kPa  E9 7.3 kPa  E10 6.88 kPa     E median: 7.39 kPa.  The corresponding Metavir score is F0-F1 (absent or mild fibrosis), range 0-8.26 kPa. < 1.66 m/s.  IQR/median: 11.4% (IQR of less than 30% is considered a satisfactory data set).         Results for orders placed during the hospital encounter of 12/02/24    EGD    Impression  Grade II varices in the lower third of the esophagus  Mild portal hypertensive gastropathy in the fundus of the stomach, body of the stomach and antrum  No gastric varices.  The previously visualized gastric ulcer was no longer present.  The duodenum appeared normal.  Performed forceps biopsies in the body of the stomach, greater curve of the stomach, lesser curve of the stomach and antrum to rule out H. pylori      RECOMMENDATION:  Await pathology results  Follow up with GI Clinic  Follow-up with hepatology as scheduled.  Consider initiation of nonselective beta-blocker            Wesley Edwards MD    CT A/P (4/2025)  Stable 4 mm hypervascular nodule in segment 4A and 6 mm hypervascular nodule in segment 8 demonstrating 6 months of stability.     The exophytic nodule in segment 7 appears to represent a small diaphragmatic hernia containing liver tissue.     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     Circumferential fold thickening in the ascending colon suggesting colitis.             [1]   Current Outpatient Medications   Medication Sig Dispense  Refill    amLODIPine (NORVASC) 5 mg tablet 5 mg Occasionally takes 10 mg prn      apixaban (ELIQUIS) 2.5 mg Take 1 tablet (2.5 mg total) by mouth 2 (two) times a day 180 tablet 1    aspirin 81 mg chewable tablet Chew 1 tablet (81 mg total) daily 90 tablet 1    atorvastatin (LIPITOR) 20 mg tablet Take 1 tablet (20 mg total) by mouth daily 30 tablet 0    carvedilol (COREG) 6.25 mg tablet Take 1 tablet (6.25 mg total) by mouth 2 (two) times a day with meals 60 tablet 0    ferrous sulfate 325 (65 Fe) mg tablet Take 1 tablet (325 mg total) by mouth daily with breakfast 30 tablet 5    levETIRAcetam (KEPPRA) 750 mg tablet Take 1 tablet (750 mg total) by mouth every 12 (twelve) hours 180 tablet 2    spironolactone (ALDACTONE) 50 mg tablet Take 1 tablet (50 mg total) by mouth daily (Patient not taking: Reported on 7/16/2025) 30 tablet 5     No current facility-administered medications for this visit.

## 2025-07-16 NOTE — ASSESSMENT & PLAN NOTE
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 He was last seen by me in December 2024.      He was admitted to [facility] in March 2024 for abdominal pain in the context of acute mesenteric ischemia due to portal vein thrombosis. He required exploratory laparotomy and SBR of distal ischemic small bowel.  He was noted to have normal appearing liver with normal liver chemistries and platelet count. He was started on anticoagulation with Eliquis.      Course was complicated by lower extremity edema and ascites requiring LVP in July 2024. Ascitic fluid suggestive of portal hypertension given high SAAG and low Tp and was negative for SBP. He was started on low dose diuretics with clinical improvement and his last LVP was in Septemberwith removal of 1.4L. He had a repeat CT in 8/2024 which showed ?cirrhotic liver morphology with chronic PVT with cavernous transformation and gastroesophageal varices. There was also concern for LR-3 lesion and exophytic nodule but he had a subsequent MRI which did not show any suspicious lesions and exophytic nodule was felt to be a a small diaphragmatic hernia containing liver tissue on CT. He did have an EGD which showed non-bleeding esophageal varices.     He has no clinical or imaging evidence of cirrhosis. Prior to his hospitalization, he had no risk factors for CLD and was otherwise healthy. 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    Presentation is consistent with non-cirrhotic portal hypertension but we discussed that a liver biopsy would be a definitive diagnosis. However, I would defer at this time unless he has fibrosis progression. We did discuss that he should remain on anticoagulation indefinitely.     He has been doing well  off of diuretics and discussed holding unless he develops recurrent ascites. He should also avoid interval weight gain, excessive EtOH consumption and hepatotoxic medications. He will return to clinic in 6 months or sooner if needed. Thank you for the opportunity to consult in his care.     - CBC, CMP and INR every 6 months   - Repeat US elastography due 1/2026   - Continue carvedilol 6.25 mg BID   - Continue apixaban 2.5 mg BID      Conchita Jose MD        Orders:    CBC and differential; Future    Comprehensive metabolic panel; Future    Protime-INR; Future    AFP tumor marker; Future    US elastography/UGAP; Future

## 2025-07-16 NOTE — ASSESSMENT & PLAN NOTE
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 He was last seen by me in December 2024.      He was admitted to [facility] in March 2024 for abdominal pain in the context of acute mesenteric ischemia due to portal vein thrombosis. He required exploratory laparotomy and SBR of distal ischemic small bowel.  He was noted to have normal appearing liver with normal liver chemistries and platelet count. He was started on anticoagulation with Eliquis.      Course was complicated by lower extremity edema and ascites requiring LVP in July 2024. Ascitic fluid suggestive of portal hypertension given high SAAG and low Tp and was negative for SBP. He was started on low dose diuretics with clinical improvement and his last LVP was in Septemberwith removal of 1.4L. He had a repeat CT in 8/2024 which showed ?cirrhotic liver morphology with chronic PVT with cavernous transformation and gastroesophageal varices. There was also concern for LR-3 lesion and exophytic nodule but he had a subsequent MRI which did not show any suspicious lesions and exophytic nodule was felt to be a a small diaphragmatic hernia containing liver tissue on CT. He did have an EGD which showed non-bleeding esophageal varices.     He has no clinical or imaging evidence of cirrhosis. Prior to his hospitalization, he had no risk factors for CLD and was otherwise healthy. 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    Presentation is consistent with non-cirrhotic portal hypertension but we discussed that a liver biopsy would be a definitive diagnosis. However, I would defer at this time unless he has fibrosis progression. We did discuss that he should remain on anticoagulation indefinitely.     He has been doing well  off of diuretics and discussed holding unless he develops recurrent ascites. He should also avoid interval weight gain, excessive EtOH consumption and hepatotoxic medications. He will return to clinic in 6 months or sooner if needed. Thank you for the opportunity to consult in his care.     - CBC, CMP and INR every 6 months   - Repeat US elastography due 1/2026   - Continue carvedilol 6.25 mg BID   - Continue apixaban 2.5 mg BID      Conchita Jose MD        Orders:    CBC and differential; Future    Comprehensive metabolic panel; Future    Protime-INR; Future    AFP tumor marker; Future    US elastography/UGAP; Future

## 2025-07-18 DIAGNOSIS — I65.1 BASILAR ARTERY STENOSIS: ICD-10-CM

## 2025-07-21 RX ORDER — ASPIRIN 81 MG/1
81 TABLET, CHEWABLE ORAL DAILY
Qty: 90 TABLET | Refills: 0 | Status: SHIPPED | OUTPATIENT
Start: 2025-07-21 | End: 2026-01-17

## (undated) DEVICE — GLOVE SRG BIOGEL 8

## (undated) DEVICE — SPECIMEN CONTAINER STERILE PEEL PACK

## (undated) DEVICE — ADHESIVE SKIN HIGH VISCOSITY EXOFIN 1ML

## (undated) DEVICE — SUT SILK 3-0 SH 30 IN K832H

## (undated) DEVICE — DRESSING MEPILEX AG BORDER POST-OP 4 X 10 IN

## (undated) DEVICE — INTENDED FOR TISSUE SEPARATION, AND OTHER PROCEDURES THAT REQUIRE A SHARP SURGICAL BLADE TO PUNCTURE OR CUT.: Brand: BARD-PARKER SAFETY BLADES SIZE 10, STERILE

## (undated) DEVICE — PACK GENERAL LF

## (undated) DEVICE — Device

## (undated) DEVICE — KNIFE,COLD,STERILE, SINGLE USE: Brand: N.A.

## (undated) DEVICE — PROXIMATE LINEAR CUTTER RELOAD, BLUE, 75MM: Brand: PROXIMATE

## (undated) DEVICE — MEDI-VAC YANK SUCT HNDL W/TPRD BULBOUS TIP: Brand: CARDINAL HEALTH

## (undated) DEVICE — SPONGE STICK WITH PVP-I: Brand: KENDALL

## (undated) DEVICE — CATHETER PLUG WITH CAP: Brand: DOVER

## (undated) DEVICE — TOWEL SET X-RAY

## (undated) DEVICE — POUCH URO CATCHER II STERILE

## (undated) DEVICE — NEPTUNE E-SEP SMOKE EVACUATION PENCIL, COATED, 70MM BLADE, PUSH BUTTON SWITCH: Brand: NEPTUNE E-SEP

## (undated) DEVICE — STERILE DOUBLE BASIN SET PACK: Brand: CARDINAL HEALTH

## (undated) DEVICE — SPONGE LAP 18 X 18 IN STRL RFD

## (undated) DEVICE — PLUMEPEN PRO 10FT

## (undated) DEVICE — INTENDED FOR TISSUE SEPARATION, AND OTHER PROCEDURES THAT REQUIRE A SHARP SURGICAL BLADE TO PUNCTURE OR CUT.: Brand: BARD-PARKER SAFETY BLADES SIZE 15, STERILE

## (undated) DEVICE — CYSTOSCOPY PACK: Brand: CONVERTORS

## (undated) DEVICE — DRAPE UTILITY

## (undated) DEVICE — SUT SILK 2-0 TIES 144 IN LA55G

## (undated) DEVICE — GUIDEWIRE STRGHT TIP 0.038 IN SOLO PLUS

## (undated) DEVICE — POOLE SUCTION HANDLE: Brand: CARDINAL HEALTH

## (undated) DEVICE — SUT VICRYL 3-0 SH 27 IN J416H

## (undated) DEVICE — SUT PDS II 3-0 SH 27 IN Z316H

## (undated) DEVICE — DRAPE EQUIPMENT RF WAND

## (undated) DEVICE — ASTOUND STANDARD SURGICAL GOWN, XL: Brand: CONVERTORS

## (undated) DEVICE — GLOVE INDICATOR PI UNDERGLOVE SZ 7.5 BLUE

## (undated) DEVICE — VIOLET BRAIDED (POLYGLACTIN 910), SYNTHETIC ABSORBABLE SUTURE: Brand: COATED VICRYL

## (undated) DEVICE — SUT SILK 3-0 SH CR/8 18 IN C013D

## (undated) DEVICE — BETHLEHEM MAJOR GENERAL PACK: Brand: CARDINAL HEALTH

## (undated) DEVICE — PROXIMATE RELOADABLE LINEAR CUTTER WITH SAFETY LOCK-OUT, 75MM: Brand: PROXIMATE

## (undated) DEVICE — TOWEL SURG XR DETECT GREEN STRL RFD

## (undated) DEVICE — GLOVE SRG LF STRL BGL SKNSNS 8 PF

## (undated) DEVICE — ABTHERA OPEN ABDOMEN DRESSING WITH SENSATRAC PAD: Brand: ABTHERA™ SENSAT.R.A.C.™

## (undated) DEVICE — BASIC DOUBLE BASIN 2-LF: Brand: MEDLINE INDUSTRIES, INC.

## (undated) DEVICE — DRESSING MEPILEX AG BORDER 4 X 12 IN

## (undated) DEVICE — FABRIC REINFORCED, SURGICAL GOWN, XL: Brand: CONVERTORS

## (undated) DEVICE — EVACUATOR BLADDER ELLIK DISP STRL

## (undated) DEVICE — GLOVE SRG BIOGEL 7

## (undated) DEVICE — LUBRICANT JELLY SURGILUBE TUBE 4OZ FLIP TOP

## (undated) DEVICE — SUT VICRYL 2-0 18 IN J905T

## (undated) DEVICE — SURGICEL 4 X 8IN

## (undated) DEVICE — DRAPE LAPAROTOMY W/POUCHES

## (undated) DEVICE — 3000CC GUARDIAN II: Brand: GUARDIAN

## (undated) DEVICE — WILLIAMS CYSTOSCOPIC INJECTION NEEDLE: Brand: WILLIAMS

## (undated) DEVICE — SUT VICRYL 0 18 IN J906G

## (undated) DEVICE — ELECTRODE BLADE MOD  E-Z CLEAN 6.5IN -0014M

## (undated) DEVICE — ENSEAL 20 CM SHAFT, LARGE JAW: Brand: ENSEAL X1

## (undated) DEVICE — VAC CANISTER 500ML

## (undated) DEVICE — HEAVY DUTY TABLE COVER: Brand: CONVERTORS

## (undated) DEVICE — 3M™ IOBAN™ 2 ANTIMICROBIAL INCISE DRAPE 6650EZ: Brand: IOBAN™ 2

## (undated) DEVICE — SOLUTION BOWL: Brand: KENDALL

## (undated) DEVICE — SPONGE 4 X 4 XRAY 16 PLY STRL LF RFD

## (undated) DEVICE — CYSTO TUBING TUR Y IRRIGATION

## (undated) DEVICE — ELECTRODE,CUTTING,STERILE.24FR: Brand: N.A.

## (undated) DEVICE — CHLORAPREP HI-LITE 26ML ORANGE

## (undated) DEVICE — SUT PDS II 1 CTX-B 36 IN ZB371